# Patient Record
Sex: MALE | Race: WHITE | NOT HISPANIC OR LATINO | Employment: FULL TIME | URBAN - METROPOLITAN AREA
[De-identification: names, ages, dates, MRNs, and addresses within clinical notes are randomized per-mention and may not be internally consistent; named-entity substitution may affect disease eponyms.]

---

## 2017-01-10 ENCOUNTER — TELEPHONE (OUTPATIENT)
Dept: INTERNAL MEDICINE | Facility: CLINIC | Age: 62
End: 2017-01-10

## 2017-01-10 DIAGNOSIS — R35.1 NOCTURIA: ICD-10-CM

## 2017-01-10 DIAGNOSIS — R93.1 AGATSTON CORONARY ARTERY CALCIUM SCORE GREATER THAN 400: Primary | ICD-10-CM

## 2017-01-10 DIAGNOSIS — K21.9 GASTROESOPHAGEAL REFLUX DISEASE WITHOUT ESOPHAGITIS: ICD-10-CM

## 2017-01-10 DIAGNOSIS — N40.0 BENIGN NON-NODULAR PROSTATIC HYPERPLASIA WITHOUT LOWER URINARY TRACT SYMPTOMS: ICD-10-CM

## 2017-01-10 DIAGNOSIS — Z00.00 ROUTINE GENERAL MEDICAL EXAMINATION AT A HEALTH CARE FACILITY: ICD-10-CM

## 2017-01-10 DIAGNOSIS — M10.9 GOUT, UNSPECIFIED: ICD-10-CM

## 2017-01-10 DIAGNOSIS — I10 ESSENTIAL HYPERTENSION, BENIGN: ICD-10-CM

## 2017-01-10 DIAGNOSIS — I10 ESSENTIAL HYPERTENSION: ICD-10-CM

## 2017-01-10 RX ORDER — ROSUVASTATIN CALCIUM 10 MG/1
40 TABLET, COATED ORAL DAILY
Qty: 90 TABLET | Refills: 3 | Status: SHIPPED | OUTPATIENT
Start: 2017-01-10 | End: 2017-08-02

## 2017-01-10 NOTE — TELEPHONE ENCOUNTER
From: Raul Dutta <[email or web address]>  Date: Fri, Jan 6, 2017 at 8:30 PM  Subject: Re:  To: Darnell Grant <[email or web address]>    Charles Patrick- a stress imaging test is reasonable. You are on a statin and aspirin. I can order this and set up a cardiology follow up. [place]    On Fri, Jan 6, 2017 at 7:21 PM Darnell Grant [email or web address]> wrote:  charles ribeiro  CT is 90 percentile for age  coronary calcium score 772 (>300 ) or above 75% is considered high  interpretation  high and signifcant coronary atherosclerotic plaque burden c/w high risk clinically significant CAD  areas of mod non-obstructive coronoary narorrowing are likely present with as much as 30-40% chance there is also flow limiting stenosis  suggestions are: stress test with imaging may be indicated; referral to [facility] specialist may be indicated  low dose ASA  aggressive clinical approach to modifying risk including chol mgmt   suggestions?  thanks  darnell

## 2017-01-11 NOTE — TELEPHONE ENCOUNTER
Test ordered, [employer] message sent to pt, provided phone # to call for scheduling.  Shara Pinto RN

## 2017-01-16 DIAGNOSIS — E88.810 METABOLIC SYNDROME X: ICD-10-CM

## 2017-01-16 DIAGNOSIS — R93.1 AGATSTON CORONARY ARTERY CALCIUM SCORE GREATER THAN 400: ICD-10-CM

## 2017-01-16 LAB
CALCIUM SERPL-MCNC: 9.3 MG/DL (ref 8.5–10.1)
DEPRECATED CALCIDIOL+CALCIFEROL SERPL-MC: 30 UG/L (ref 20–75)
HBA1C MFR BLD: 5.2 % (ref 4.3–6)
PTH-INTACT SERPL-MCNC: 87 PG/ML (ref 12–72)

## 2017-02-07 ENCOUNTER — HOSPITAL ENCOUNTER (OUTPATIENT)
Dept: NUCLEAR MEDICINE | Facility: CLINIC | Age: 62
Setting detail: NUCLEAR MEDICINE
End: 2017-02-07
Attending: INTERNAL MEDICINE
Payer: COMMERCIAL

## 2017-02-07 ENCOUNTER — HOSPITAL ENCOUNTER (OUTPATIENT)
Dept: NUCLEAR MEDICINE | Facility: CLINIC | Age: 62
Setting detail: NUCLEAR MEDICINE
Discharge: HOME | End: 2017-02-07
Attending: INTERNAL MEDICINE | Admitting: INTERNAL MEDICINE
Payer: COMMERCIAL

## 2017-02-07 DIAGNOSIS — R93.1 AGATSTON CORONARY ARTERY CALCIUM SCORE GREATER THAN 400: ICD-10-CM

## 2017-02-07 PROCEDURE — 93018 CV STRESS TEST I&R ONLY: CPT | Performed by: INTERNAL MEDICINE

## 2017-02-07 PROCEDURE — 78452 HT MUSCLE IMAGE SPECT MULT: CPT | Mod: 26 | Performed by: INTERNAL MEDICINE

## 2017-02-07 PROCEDURE — 34300033 ZZH RX 343: Performed by: INTERNAL MEDICINE

## 2017-02-07 PROCEDURE — 25000128 H RX IP 250 OP 636: Performed by: INTERNAL MEDICINE

## 2017-02-07 PROCEDURE — 93016 CV STRESS TEST SUPVJ ONLY: CPT | Performed by: INTERNAL MEDICINE

## 2017-02-07 PROCEDURE — 78452 HT MUSCLE IMAGE SPECT MULT: CPT

## 2017-02-07 PROCEDURE — A9502 TC99M TETROFOSMIN: HCPCS | Performed by: INTERNAL MEDICINE

## 2017-02-07 RX ORDER — REGADENOSON 0.08 MG/ML
0.4 INJECTION, SOLUTION INTRAVENOUS ONCE
Status: COMPLETED | OUTPATIENT
Start: 2017-02-07 | End: 2017-02-07

## 2017-02-07 RX ADMIN — TETROFOSMIN 37.8 MCI.: 0.23 INJECTION, POWDER, LYOPHILIZED, FOR SOLUTION INTRAVENOUS at 10:28

## 2017-02-07 RX ADMIN — REGADENOSON 0.4 MG: 0.08 INJECTION, SOLUTION INTRAVENOUS at 10:23

## 2017-02-07 RX ADMIN — TETROFOSMIN 9.8 MCI.: 0.23 INJECTION, POWDER, LYOPHILIZED, FOR SOLUTION INTRAVENOUS at 09:15

## 2017-02-07 NOTE — PROGRESS NOTES
Pt here for Lexiscan.  Test, medication and side effects reviewed with patient.  Lung sounds clear.  Pt denies caffeine use. Pt tolerated Lexiscan dose without any adverse reactions.

## 2017-02-08 ENCOUNTER — PRE VISIT (OUTPATIENT)
Dept: CARDIOLOGY | Facility: CLINIC | Age: 62
End: 2017-02-08

## 2017-02-08 NOTE — TELEPHONE ENCOUNTER
2/7/17--Lexiscan  Impression:  1. Normal myocardial SPECT study with a summed stress score of 0. A  summed stress score of <4 is associated with an annual event rate of  0.8% and 0.9% for myocardial infarction and cardiac death,  respectively (Home. Circulation 1998;98:535-43).  2. Normal left ventricular systolic function as described above.  3. No significant perfusion abnormalities.  4. No prior study available for comparison.

## 2017-02-10 ENCOUNTER — OFFICE VISIT (OUTPATIENT)
Dept: CARDIOLOGY | Facility: CLINIC | Age: 62
End: 2017-02-10
Attending: INTERNAL MEDICINE
Payer: COMMERCIAL

## 2017-02-10 VITALS
WEIGHT: 296.2 LBS | HEIGHT: 75 IN | HEART RATE: 72 BPM | BODY MASS INDEX: 36.83 KG/M2 | DIASTOLIC BLOOD PRESSURE: 78 MMHG | SYSTOLIC BLOOD PRESSURE: 138 MMHG | OXYGEN SATURATION: 97 %

## 2017-02-10 DIAGNOSIS — R93.1 AGATSTON CORONARY ARTERY CALCIUM SCORE GREATER THAN 400: Primary | ICD-10-CM

## 2017-02-10 PROCEDURE — 99213 OFFICE O/P EST LOW 20 MIN: CPT

## 2017-02-10 PROCEDURE — 99203 OFFICE O/P NEW LOW 30 MIN: CPT | Mod: ZP | Performed by: INTERNAL MEDICINE

## 2017-02-10 ASSESSMENT — PAIN SCALES - GENERAL: PAINLEVEL: NO PAIN (0)

## 2017-02-10 NOTE — NURSING NOTE
Chief Complaint   Patient presents with     New Patient     Patient referred by Raul Dutta MD for cardiac evaluation related to [place] coronary artery calcium score greater than 400

## 2017-02-10 NOTE — MR AVS SNAPSHOT
After Visit Summary   2/10/2017    Darnell Grant    MRN: [id number]           Patient Information     Date Of Birth          1955        Visit Information        Provider Department      2/10/2017 10:00 AM Heidi Abraham MD [facility]        Care Instructions    Thank you for your visit today.  Please call me with any questions or concerns.   Robby Guzman RN  Cardiology Care Coordinator  [phone], press option 1 then option 3        Follow-ups after your visit        Follow-up notes from your care team     Return in about 2 years (around 2/10/2019) for Abnormal calcium scan, Dr. Abraham.      Who to contact     If you have questions or need follow up information about today's clinic visit or your schedule please contact [facility] directly at [phone].  Normal or non-critical lab and imaging results will be communicated to you by MyChart, letter or phone within 4 business days after the clinic has received the results. If you do not hear from us within 7 days, please contact the clinic through "[employer]"hart or phone. If you have a critical or abnormal lab result, we will notify you by phone as soon as possible.  Submit refill requests through [employer] or call your pharmacy and they will forward the refill request to us. Please allow 3 business days for your refill to be completed.          Additional Information About Your Visit        MyChart Information     [employer] gives you secure access to your electronic health record. If you see a primary care provider, you can also send messages to your care team and make appointments. If you have questions, please call your primary care clinic.  If you do not have a primary care provider, please call [phone] and they will assist you.        Care EveryWhere ID     This is your Care EveryWhere ID. This could be used by other organizations to access your [place] medical records  [id number]        Your Vitals Were     Pulse  "Height BMI (Body Mass Index) Pulse Oximetry          72 1.905 m (6' 3\") 37.02 kg/m2 97%         Blood Pressure from Last 3 Encounters:   02/10/17 138/78   11/30/16 128/83   05/04/16 122/83    Weight from Last 3 Encounters:   02/10/17 134.355 kg (296 lb 3.2 oz)   11/30/16 134.401 kg (296 lb 4.8 oz)   05/04/16 126.1 kg (278 lb)              Today, you had the following     No orders found for display       Primary Care Provider Office Phone # Fax #    Raul Basil Dutta [phone]        [facility] [address] [facility] [address]  [facility] [address]        Thank you!     Thank you for choosing [facility]  for your care. Our goal is always to provide you with excellent care. Hearing back from our patients is one way we can continue to improve our services. Please take a few minutes to complete the written survey that you may receive in the mail after your visit with us. Thank you!             Your Updated Medication List - Protect others around you: Learn how to safely use, store and throw away your medicines at www.disposemymeds.org.          This list is accurate as of: 2/10/17 10:24 AM.  Always use your most recent med list.                   Brand Name Dispense Instructions for use    albuterol 108 (90 BASE) MCG/ACT Inhaler    PROAIR HFA/PROVENTIL HFA/VENTOLIN HFA    2 Inhaler    Inhale 2 puffs into the lungs every 6 hours as needed for shortness of breath / dyspnea or wheezing       allopurinol 100 MG tablet    ZYLOPRIM    90 tablet    1 qday       amLODIPine 10 MG tablet    NORVASC    90 tablet    Take 1 tablet (10 mg) by mouth daily       dutasteride 0.5 MG capsule    AVODART    90 capsule    Take 1 capsule (0.5 mg) by mouth daily       fluticasone-salmeterol 500-50 MCG/DOSE diskus inhaler    ADVAIR    3 Inhaler    Inhale 1 puff into the lungs 2 times daily       metFORMIN 1000 MG tablet    GLUCOPHAGE    90 tablet    Take one tablet daily at bedtime       omeprazole 20 MG tablet     90 " tablet    Take 1 tablet (20 mg) by mouth daily       order for DME      Respironics Dreamstation AutoPap 7-15 cm with Respironics Leidy mask.       rosuvastatin 10 MG tablet    CRESTOR    90 tablet    Take 4 tablets (40 mg) by mouth daily       tamsulosin 0.4 MG capsule    FLOMAX    90 capsule    Take 1 capsule (0.4 mg) by mouth daily       valsartan 160 MG tablet    DIOVAN    90 tablet    Take 1 tablet (160 mg) by mouth daily

## 2017-02-10 NOTE — PROGRESS NOTES
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 This was done for screening. Not driven by Sx.    HT, HL, Metabolic syndrome, LISETTE, BPH, Gout.  No prior MI. Never smoked. Dad had CABG in his 70s    No chest pains , dyspnea, palpitations or syncope. Largely sedentary.    PAST MEDICAL HISTORY:  Past Medical History   Diagnosis Date     BPH (benign prostatic hyperplasia)      Hypertension        CURRENT MEDICATIONS:  Current Outpatient Prescriptions   Medication Sig Dispense Refill     rosuvastatin (CRESTOR) 10 MG tablet Take 4 tablets (40 mg) by mouth daily 90 tablet 3     valsartan (DIOVAN) 160 MG tablet Take 1 tablet (160 mg) by mouth daily 90 tablet 3     allopurinol (ZYLOPRIM) 100 MG tablet 1 qday 90 tablet 3     amLODIPine (NORVASC) 10 MG tablet Take 1 tablet (10 mg) by mouth daily 90 tablet 3     metFORMIN (GLUCOPHAGE) 1000 MG tablet Take one tablet daily at bedtime 90 tablet 3     omeprazole 20 MG tablet Take 1 tablet (20 mg) by mouth daily 90 tablet 3     dutasteride (AVODART) 0.5 MG capsule Take 1 capsule (0.5 mg) by mouth daily 90 capsule 3     tamsulosin (FLOMAX) 0.4 MG capsule Take 1 capsule (0.4 mg) by mouth daily 90 capsule 3     order for DME RespirBioVexs Dreamstation AutoPap 7-15 cm with Respironics Leidy mask.       fluticasone-salmeterol (ADVAIR) 500-50 MCG/DOSE diskus inhaler Inhale 1 puff into the lungs 2 times daily 3 Inhaler 2     albuterol (PROAIR HFA/PROVENTIL HFA/VENTOLIN HFA) 108 (90 BASE) MCG/ACT Inhaler Inhale 2 puffs into the lungs every 6 hours as needed for shortness of breath / dyspnea or wheezing 2 Inhaler 2       PAST SURGICAL HISTORY:  Past Surgical History   Procedure Laterality Date     Back surgery       repair disc     Hc breath hydrogen test  5/22/2013     Procedure: HYDROGEN BREATH TEST;  Surgeon: Donny Paris MD;  Location:  GI       ALLERGIES     Allergies   Allergen Reactions     Ragweeds      Watery eyes,  "itchy nose       FAMILY HISTORY:  Family History   Problem Relation Age of Onset     Asthma Brother        SOCIAL HISTORY:  Social History     Social History     Marital Status:      Spouse Name: N/A     Number of Children: N/A     Years of Education: N/A     Social History Main Topics     Smoking status: Never Smoker      Smokeless tobacco: Never Used     Alcohol Use: No     Drug Use: No     Sexual Activity: Not Asked     Other Topics Concern     None     Social History Narrative       ROS:   Constitutional: No fever, chills, or sweats. No weight gain/loss   ENT: No visual disturbance, ear ache, epistaxis, sore throat  Allergies/Immunologic: Negative.   Respiratory: No cough, hemoptysia  Cardiovascular: As per HPI  GI: No nausea, vomiting, hematemesis, melena, or hematochezia  : No urinary frequency, dysuria, or hematuria  Integument: Negative  Psychiatric: Negative  Neuro: Negative  Endocrinology: Negative   Musculoskeletal: Negative    EXAM:  /78 mmHg  Pulse 72  Ht 1.905 m (6' 3\")  Wt 134.355 kg (296 lb 3.2 oz)  BMI 37.02 kg/m2  SpO2 97%  In general, the patient is a pleasant male in no apparent distress.    HEENT: NC/AT.  PERRLA.  EOMI.  Sclerae white, not injected.  Nares clear.  Pharynx without erythema or exudate.  Dentition intact.    Neck: No adenopathy.  No thyromegaly. Carotids +4/4 bilaterally without bruits.  No jugular venous distension.   Heart: RRR. Normal S1, S2 splits physiologically. No murmur, rub, click, or gallop. The PMI is in the 5th ICS in the midclavicular line. There is no heave.    Lungs: CTA.  No ronchi, wheezes, rales.  No dullness to percussion.   Abdomen: Soft, nontender, nondistended. No organomegaly.  No bruits.   Extremities: No clubbing, cyanosis, or edema.  The pulses are +4/4 at the radial, brachial, femoral, popliteal, DP, and PT sites bilaterally.  No bruits are noted.  Neurologic: Alert and oriented to person/place/time, normal speech, gait and " affect  Skin: No petechiae, purpura or rash.    Labs:  LIPID RESULTS:  Lab Results   Component Value Date    CHOL 120 11/21/2016    HDL 37* 11/21/2016    LDL 40 11/21/2016    LDL 71 06/06/2013    TRIG 219* 11/21/2016    CHOLHDLRATIO 3.4 08/31/2015    NHDL 83 11/21/2016       LIVER ENZYME RESULTS:  Lab Results   Component Value Date    AST 20 11/21/2016    ALT 41 11/21/2016       CBC RESULTS:  Lab Results   Component Value Date    WBC 5.9 07/14/2014    RBC 5.23 07/14/2014    HGB 15.9 07/14/2014    HCT 45.8 07/14/2014    MCV 88 07/14/2014    MCH 30.4 07/14/2014    MCHC 34.7 07/14/2014    RDW 14.4 07/14/2014     07/14/2014       BMP RESULTS:  Lab Results   Component Value Date     11/21/2016    POTASSIUM 4.0 11/21/2016    CHLORIDE 105 11/21/2016    CO2 28 11/21/2016    ANIONGAP 8 11/21/2016    * 11/21/2016    BUN 14 11/21/2016    CR 0.86 11/21/2016    GFRESTIMATED >90  Non  GFR Calc   11/21/2016    GFRESTBLACK >90   GFR Calc   11/21/2016    ZHANE 9.3 01/16/2017        A1C RESULTS:  Lab Results   Component Value Date    A1C 5.2 01/16/2017       INR RESULTS:  Lab Results   Component Value Date    INR 0.90 12/25/2010       Procedures:      Assessment and Plan:     Please see HPI for history.    Darnell had a Lexiscan on 2/7/17 that was normal . EF 62%.  Echo in 2013 showed normal LV function.  LDL 11/16 : 40. HDL 37.     Cardiac exam is benign. Labs and medications reviewed.    Recommend : EKG    Overall, I do not feel we need to proceed to coronary angiography. The Ca score is 774 and hence a CTA may not be very helpful. The NM stress test is normal and hence the possibility of CAD is low. Primary prevention measures would be helpful - as currently being done.Follow up with Dr. Dutta. Review in 2 years or sooner as needed.    Plans discussed with Dr. Grant, who is in agreement.      CC  Patient Care Team:  Raul Dutta MD as PCP - General (Internal  Medicine)  LOGAN BOWMAN

## 2017-02-10 NOTE — LETTER
2/10/2017      RE: Darnell Grant  [address] [facility] [place] [address]       Dear Colleague,    Thank you for the opportunity to participate in the care of your patient, Darnell Grant, at the [facility]. Please see a copy of my visit note below.    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 This was done for screening. Not driven by Sx.    HT, HL, Metabolic syndrome, LISETTE, BPH, Gout.  No prior MI. Never smoked. Dad had CABG in his 70s    No chest pains , dyspnea, palpitations or syncope. Largely sedentary.    PAST MEDICAL HISTORY:  Past Medical History   Diagnosis Date     BPH (benign prostatic hyperplasia)      Hypertension        CURRENT MEDICATIONS:  Current Outpatient Prescriptions   Medication Sig Dispense Refill     rosuvastatin (CRESTOR) 10 MG tablet Take 4 tablets (40 mg) by mouth daily 90 tablet 3     valsartan (DIOVAN) 160 MG tablet Take 1 tablet (160 mg) by mouth daily 90 tablet 3     allopurinol (ZYLOPRIM) 100 MG tablet 1 qday 90 tablet 3     amLODIPine (NORVASC) 10 MG tablet Take 1 tablet (10 mg) by mouth daily 90 tablet 3     metFORMIN (GLUCOPHAGE) 1000 MG tablet Take one tablet daily at bedtime 90 tablet 3     omeprazole 20 MG tablet Take 1 tablet (20 mg) by mouth daily 90 tablet 3     dutasteride (AVODART) 0.5 MG capsule Take 1 capsule (0.5 mg) by mouth daily 90 capsule 3     tamsulosin (FLOMAX) 0.4 MG capsule Take 1 capsule (0.4 mg) by mouth daily 90 capsule 3     order for DME Respironics Dreamstation AutoPap 7-15 cm with Respironics Leidy mask.       fluticasone-salmeterol (ADVAIR) 500-50 MCG/DOSE diskus inhaler Inhale 1 puff into the lungs 2 times daily 3 Inhaler 2     albuterol (PROAIR HFA/PROVENTIL HFA/VENTOLIN HFA) 108 (90 BASE) MCG/ACT Inhaler Inhale 2 puffs into the lungs every 6 hours as needed for shortness of breath / dyspnea or wheezing 2 Inhaler 2  "      PAST SURGICAL HISTORY:  Past Surgical History   Procedure Laterality Date     Back surgery       repair disc     Hc breath hydrogen test  5/22/2013     Procedure: HYDROGEN BREATH TEST;  Surgeon: Donny Paris MD;  Location:  GI       ALLERGIES     Allergies   Allergen Reactions     Ragweeds      Watery eyes, itchy nose       FAMILY HISTORY:  Family History   Problem Relation Age of Onset     Asthma Brother        SOCIAL HISTORY:  Social History     Social History     Marital Status:      Spouse Name: N/A     Number of Children: N/A     Years of Education: N/A     Social History Main Topics     Smoking status: Never Smoker      Smokeless tobacco: Never Used     Alcohol Use: No     Drug Use: No     Sexual Activity: Not Asked     Other Topics Concern     None     Social History Narrative       ROS:   Constitutional: No fever, chills, or sweats. No weight gain/loss   ENT: No visual disturbance, ear ache, epistaxis, sore throat  Allergies/Immunologic: Negative.   Respiratory: No cough, hemoptysia  Cardiovascular: As per HPI  GI: No nausea, vomiting, hematemesis, melena, or hematochezia  : No urinary frequency, dysuria, or hematuria  Integument: Negative  Psychiatric: Negative  Neuro: Negative  Endocrinology: Negative   Musculoskeletal: Negative    EXAM:  /78 mmHg  Pulse 72  Ht 1.905 m (6' 3\")  Wt 134.355 kg (296 lb 3.2 oz)  BMI 37.02 kg/m2  SpO2 97%  In general, the patient is a pleasant male in no apparent distress.    HEENT: NC/AT.  PERRLA.  EOMI.  Sclerae white, not injected.  Nares clear.  Pharynx without erythema or exudate.  Dentition intact.    Neck: No adenopathy.  No thyromegaly. Carotids +4/4 bilaterally without bruits.  No jugular venous distension.   Heart: RRR. Normal S1, S2 splits physiologically. No murmur, rub, click, or gallop. The PMI is in the 5th ICS in the midclavicular line. There is no heave.    Lungs: CTA.  No ronchi, wheezes, rales.  No dullness to percussion. "   Abdomen: Soft, nontender, nondistended. No organomegaly.  No bruits.   Extremities: No clubbing, cyanosis, or edema.  The pulses are +4/4 at the radial, brachial, femoral, popliteal, DP, and PT sites bilaterally.  No bruits are noted.  Neurologic: Alert and oriented to person/place/time, normal speech, gait and affect  Skin: No petechiae, purpura or rash.    Labs:  LIPID RESULTS:  Lab Results   Component Value Date    CHOL 120 11/21/2016    HDL 37* 11/21/2016    LDL 40 11/21/2016    LDL 71 06/06/2013    TRIG 219* 11/21/2016    CHOLHDLRATIO 3.4 08/31/2015    NHDL 83 11/21/2016       LIVER ENZYME RESULTS:  Lab Results   Component Value Date    AST 20 11/21/2016    ALT 41 11/21/2016       CBC RESULTS:  Lab Results   Component Value Date    WBC 5.9 07/14/2014    RBC 5.23 07/14/2014    HGB 15.9 07/14/2014    HCT 45.8 07/14/2014    MCV 88 07/14/2014    MCH 30.4 07/14/2014    MCHC 34.7 07/14/2014    RDW 14.4 07/14/2014     07/14/2014       BMP RESULTS:  Lab Results   Component Value Date     11/21/2016    POTASSIUM 4.0 11/21/2016    CHLORIDE 105 11/21/2016    CO2 28 11/21/2016    ANIONGAP 8 11/21/2016    * 11/21/2016    BUN 14 11/21/2016    CR 0.86 11/21/2016    GFRESTIMATED >90  Non  GFR Calc   11/21/2016    GFRESTBLACK >90   GFR Calc   11/21/2016    ZHANE 9.3 01/16/2017        A1C RESULTS:  Lab Results   Component Value Date    A1C 5.2 01/16/2017       INR RESULTS:  Lab Results   Component Value Date    INR 0.90 12/25/2010       Procedures:      Assessment and Plan:     Please see HPI for history.    Darnell had a Lexiscan on 2/7/17 that was normal . EF 62%.  Echo in 2013 showed normal LV function.  LDL 11/16 : 40. HDL 37.     Cardiac exam is benign. Labs and medications reviewed.    Recommend : EKG    Overall, I do not feel we need to proceed to coronary angiography. The Ca score is 774 and hence a CTA may not be very helpful. The NM stress test is normal and hence  the possibility of CAD is low. Primary prevention measures would be helpful - as currently being done.Follow up with Dr. Bowman. Review in 2 years or sooner as needed.    Plans discussed with Dr. Grant, who is in agreement.      CC  Patient Care Team:  Logan Bowman MD as PCP - General (Internal Medicine)  LOGAN BOWMAN      Please do not hesitate to contact me if you have any questions/concerns.     Sincerely,     Heidi Abraham MD

## 2017-02-10 NOTE — NURSING NOTE
Med Reconcile: Reviewed and verified all current medications with the patient. The updated medication list was printed and given to the patient.  Return Appointment: Follow up in 2 years. Patient given instructions regarding scheduling next clinic visit. Patient demonstrated understanding of this information and agreed to call with further questions or concerns.  Patient stated he understood all health information given and agreed to call with further questions or concerns.

## 2017-03-02 ENCOUNTER — MYC MEDICAL ADVICE (OUTPATIENT)
Dept: INTERNAL MEDICINE | Facility: CLINIC | Age: 62
End: 2017-03-02

## 2017-03-02 DIAGNOSIS — E55.9 VITAMIN D DEFICIENCY: ICD-10-CM

## 2017-03-02 DIAGNOSIS — R93.1 AGATSTON CORONARY ARTERY CALCIUM SCORE GREATER THAN 400: Primary | ICD-10-CM

## 2017-03-16 ENCOUNTER — TELEPHONE (OUTPATIENT)
Dept: INTERNAL MEDICINE | Facility: CLINIC | Age: 62
End: 2017-03-16

## 2017-03-16 DIAGNOSIS — E66.9 OBESITY: ICD-10-CM

## 2017-03-16 DIAGNOSIS — E88.810 METABOLIC SYNDROME: Primary | ICD-10-CM

## 2017-03-16 NOTE — TELEPHONE ENCOUNTER
Will do. [place]    On Thu, Mar 16, 2017 at 8:32 AM Raul Dutta <[email or web address]> wrote:  Can you put in this referral? Alexis ribeiro    ---------- Forwarded message ---------  From: Darnell Grant <[email or web address]>  Date: Thu, Mar 16, 2017 at 12:04 AM  Subject: Fwd: lisa  To: Raul Dutta <[email or web address]>    darrin ribeiro  not sure how to request a referral in [facility] but i have made an appt with pravin whom i know over the years on weight issues  could you submit a referral for this? appointment is scheduled week after next  thanks  darnell  ---------- Forwarded message ----------  From: Jyoti Grant <[email or web address]  Date: Thu, Mar 16, 2017 at 12:01 AM  Subject: lisa  To: Darnell Grant <[email or web address]>    [facility]  Address: [address] [place] [address] [address], [place] [address]  Phone: [phone]     Dr. Diaz Delvalle  ----------  Above requested referral done .  Shara Pinto RN

## 2017-03-31 DIAGNOSIS — R93.1 AGATSTON CORONARY ARTERY CALCIUM SCORE GREATER THAN 400: ICD-10-CM

## 2017-03-31 DIAGNOSIS — E55.9 VITAMIN D DEFICIENCY: ICD-10-CM

## 2017-03-31 LAB
DEPRECATED CALCIDIOL+CALCIFEROL SERPL-MC: 38 UG/L (ref 20–75)
PTH-INTACT SERPL-MCNC: 108 PG/ML (ref 12–72)

## 2017-04-26 ENCOUNTER — MEDICAL CORRESPONDENCE (OUTPATIENT)
Dept: HEALTH INFORMATION MANAGEMENT | Facility: CLINIC | Age: 62
End: 2017-04-26

## 2017-04-27 DIAGNOSIS — E29.9 TESTICULAR DYSFUNCTION: Primary | ICD-10-CM

## 2017-04-27 LAB
FSH SERPL-ACNC: 3 IU/L (ref 0.7–10.8)
LH SERPL-ACNC: 4.7 IU/L (ref 1.5–9.3)
PROLACTIN SERPL-MCNC: 7 UG/L (ref 2–18)

## 2017-04-28 LAB — TESTOST SERPL-MCNC: 195 NG/DL (ref 240–950)

## 2017-04-30 ENCOUNTER — TELEPHONE (OUTPATIENT)
Dept: OTHER | Facility: CLINIC | Age: 62
End: 2017-04-30

## 2017-04-30 DIAGNOSIS — J20.9 ACUTE BRONCHITIS, UNSPECIFIED ORGANISM: Primary | ICD-10-CM

## 2017-04-30 RX ORDER — AZITHROMYCIN 250 MG/1
TABLET, FILM COATED ORAL
Qty: 6 TABLET | Refills: 0 | Status: SHIPPED | OUTPATIENT
Start: 2017-04-30 | End: 2018-01-26

## 2017-04-30 NOTE — TELEPHONE ENCOUNTER
Reason for call: concern for bronchitis    Discussed recent symptoms of worsening cough with bronchospasm and shortness of breath. At times productive. Has noted similar symptoms in the past. No fevers. Has h/o bronchitis and responded well to antibiotics in the past. Will be traveling out of country and wants antibiotic in case issues. Reviewed for drug allergies and no allergies    Recommendations   -prescribed azithromycin. 500 mg day 1 then 250 mg q24 hours x 4.   -follow up with PCP if recurrent or worsening sx    Patient agreeable to plan    Diaz Grant MD

## 2017-08-02 ENCOUNTER — OFFICE VISIT (OUTPATIENT)
Dept: INTERNAL MEDICINE | Facility: CLINIC | Age: 62
End: 2017-08-02

## 2017-08-02 VITALS
WEIGHT: 277.4 LBS | DIASTOLIC BLOOD PRESSURE: 75 MMHG | BODY MASS INDEX: 34.67 KG/M2 | HEART RATE: 83 BPM | RESPIRATION RATE: 18 BRPM | OXYGEN SATURATION: 98 % | SYSTOLIC BLOOD PRESSURE: 115 MMHG

## 2017-08-02 DIAGNOSIS — K21.9 GASTROESOPHAGEAL REFLUX DISEASE WITHOUT ESOPHAGITIS: ICD-10-CM

## 2017-08-02 DIAGNOSIS — I10 ESSENTIAL HYPERTENSION: ICD-10-CM

## 2017-08-02 DIAGNOSIS — R53.83 OTHER FATIGUE: Primary | ICD-10-CM

## 2017-08-02 DIAGNOSIS — M10.9 GOUT, UNSPECIFIED: ICD-10-CM

## 2017-08-02 DIAGNOSIS — N40.0 BENIGN NON-NODULAR PROSTATIC HYPERPLASIA WITHOUT LOWER URINARY TRACT SYMPTOMS: ICD-10-CM

## 2017-08-02 DIAGNOSIS — Z13.89 SCREENING FOR DIABETIC PERIPHERAL NEUROPATHY: ICD-10-CM

## 2017-08-02 DIAGNOSIS — Z00.00 ROUTINE GENERAL MEDICAL EXAMINATION AT A HEALTH CARE FACILITY: ICD-10-CM

## 2017-08-02 DIAGNOSIS — Z11.59 NEED FOR HEPATITIS C SCREENING TEST: ICD-10-CM

## 2017-08-02 DIAGNOSIS — Z12.5 ENCOUNTER FOR SCREENING FOR MALIGNANT NEOPLASM OF PROSTATE: ICD-10-CM

## 2017-08-02 DIAGNOSIS — E78.5 HYPERLIPIDEMIA LDL GOAL <100: ICD-10-CM

## 2017-08-02 DIAGNOSIS — I10 ESSENTIAL HYPERTENSION, BENIGN: ICD-10-CM

## 2017-08-02 DIAGNOSIS — R35.1 NOCTURIA: ICD-10-CM

## 2017-08-02 RX ORDER — VALSARTAN 160 MG/1
160 TABLET ORAL DAILY
Qty: 90 TABLET | Refills: 3 | Status: SHIPPED | OUTPATIENT
Start: 2017-08-02 | End: 2018-03-16

## 2017-08-02 RX ORDER — ROSUVASTATIN CALCIUM 10 MG/1
40 TABLET, COATED ORAL DAILY
Qty: 90 TABLET | Refills: 3 | Status: SHIPPED | OUTPATIENT
Start: 2017-08-02 | End: 2018-09-11

## 2017-08-02 RX ORDER — AMLODIPINE BESYLATE 10 MG/1
10 TABLET ORAL DAILY
Qty: 90 TABLET | Refills: 3 | Status: SHIPPED | OUTPATIENT
Start: 2017-08-02 | End: 2018-09-11

## 2017-08-02 RX ORDER — ALLOPURINOL 100 MG/1
TABLET ORAL
Qty: 90 TABLET | Refills: 3 | Status: SHIPPED | OUTPATIENT
Start: 2017-08-02 | End: 2018-09-11

## 2017-08-02 RX ORDER — NICOTINE POLACRILEX 4 MG/1
20 GUM, CHEWING ORAL DAILY
Qty: 90 TABLET | Refills: 3 | Status: SHIPPED | OUTPATIENT
Start: 2017-08-02 | End: 2018-03-12

## 2017-08-02 RX ORDER — DUTASTERIDE 0.5 MG/1
0.5 CAPSULE, LIQUID FILLED ORAL DAILY
Qty: 90 CAPSULE | Refills: 3 | Status: SHIPPED | OUTPATIENT
Start: 2017-08-02 | End: 2018-11-19

## 2017-08-02 RX ORDER — TAMSULOSIN HYDROCHLORIDE 0.4 MG/1
0.4 CAPSULE ORAL DAILY
Qty: 90 CAPSULE | Refills: 3 | Status: SHIPPED | OUTPATIENT
Start: 2017-08-02 | End: 2018-11-19

## 2017-08-02 ASSESSMENT — PAIN SCALES - GENERAL: PAINLEVEL: NO PAIN (0)

## 2017-08-02 NOTE — PATIENT INSTRUCTIONS
[facility] Medication Refill Request Information:  * Please contact your pharmacy regarding ANY request for medication refills.  ** [facility] Pres[facility] Fax = [phone]  * Please allow 3 business days for routine medication refills.  * Please allow 5 business days for controlled substance medication refills.     [facility] Test Result notification information:  *You will be notified with in 7-10 days of your appointment day regarding the results of your test.  If you are on MyChart you will be notified as soon as the provider has reviewed the results and signed off on them.    [facility] [phone]

## 2017-08-02 NOTE — NURSING NOTE
Chief Complaint   Patient presents with     Hypertension     Patient is here to follow up on blood pressure.      Yokasta Loomis LPN at 6:11 PM on 8/2/2017.

## 2017-08-02 NOTE — MR AVS SNAPSHOT
After Visit Summary   8/2/2017    Darnell Grant    MRN: [id number]           Patient Information     Date Of Birth          1955        Visit Information        Provider Department      8/2/2017 6:25 PM Raul Dutta MD [facility]        Today's Diagnoses     Other fatigue    -  1    Need for hepatitis C screening test        Screening for diabetic peripheral neuropathy        Essential hypertension        Gout, unspecified        Gastroesophageal reflux disease without esophagitis        Benign non-nodular prostatic hyperplasia without lower urinary tract symptoms        Routine general medical examination at a health care facility        Essential hypertension, benign        Nocturia        Hyperlipidemia LDL goal <100        Encounter for screening for malignant neoplasm of prostate          Care Instructions    Primary Care Center Medication Refill Request Information:  * Please contact your pharmacy regarding ANY request for medication refills.  ** [facility] Fax = [phone]  * Please allow 3 business days for routine medication refills.  * Please allow 5 business days for controlled substance medication refills.     Primary Care Center Test Result notification information:  *You will be notified with in 7-10 days of your appointment day regarding the results of your test.  If you are on MyChart you will be notified as soon as the provider has reviewed the results and signed off on them.    [facility] [phone]             Follow-ups after your visit        Future tests that were ordered for you today     Open Future Orders        Priority Expected Expires Ordered    PSA screen Routine 8/2/2017 8/2/2018 8/2/2017    Comprehensive metabolic panel Routine 2/7/2018 8/2/2018 8/2/2017    Lipid Profile Routine 2/7/2018 8/2/2018 8/2/2017    CBC with platelets differential Routine 2/7/2018 8/1/2018 8/2/2017    Hepatitis C Screen Reflex to HCV RNA Quant  and Genotype Routine 2/7/2018 8/2/2018 8/2/2017    TSH WITH FREE T4 REFLEX - (Today) Routine 2/7/2018 8/2/2018 8/2/2017            Who to contact     Please call your clinic at [phone] to:    Ask questions about your health    Make or cancel appointments    Discuss your medicines    Learn about your test results    Speak to your doctor   If you have compliments or concerns about an experience at your clinic, or if you wish to file a complaint, please contact [facility] Patient Relations at [phone] or email us at [email or web address]         Additional Information About Your Visit        [employer] Information     [employer] gives you secure access to your electronic health record. If you see a primary care provider, you can also send messages to your care team and make appointments. If you have questions, please call your primary care clinic.  If you do not have a primary care provider, please call [phone] and they will assist you.      [employer] is an electronic gateway that provides easy, online access to your medical records. With [employer], you can request a clinic appointment, read your test results, renew a prescription or communicate with your care team.     To access your existing account, please contact your [facility] or call [phone] for assistance.        Care EveryWhere ID     This is your Care EveryWhere ID. This could be used by other organizations to access your [place] medical records  [id number]        Your Vitals Were     Pulse Respirations Pulse Oximetry BMI (Body Mass Index)          83 18 98% 34.67 kg/m2         Blood Pressure from Last 3 Encounters:   08/02/17 115/75   02/10/17 138/78   11/30/16 128/83    Weight from Last 3 Encounters:   08/02/17 125.8 kg (277 lb 6.4 oz)   02/10/17 134.4 kg (296 lb 3.2 oz)   11/30/16 134.4 kg (296 lb 4.8 oz)                 Where to get your medicines      These medications were  sent to [facility] - [place], [place] - [address]  [address], [facility] [address]     Phone:  [phone]     allopurinol 100 MG tablet    amLODIPine 10 MG tablet    dutasteride 0.5 MG capsule    metFORMIN 1000 MG tablet    omeprazole 20 MG tablet    rosuvastatin 10 MG tablet    tamsulosin 0.4 MG capsule    valsartan 160 MG tablet          Primary Care Provider Office Phone # Fax #    Raul Basil Dutta [phone]        [facility] [address]  [facility] [address]        Equal Access to Services     [facility]: Hadii aad ku hadasho Soomaali, waaxda luqadaha, qaybta kaalmada adeegyabret, ric farias . So [facility] [phone].    ATENCIÓN: Si habla español, tiene a mulligan disposición servicios gratuitos de asistencia lingüística. [facility] [phone].    We comply with applicable federal civil rights laws and [place] laws. We do not discriminate on the basis of race, color, national origin, age, disability sex, sexual orientation or gender identity.            Thank you!     Thank you for choosing [facility]  for your care. Our goal is always to provide you with excellent care. Hearing back from our patients is one way we can continue to improve our services. Please take a few minutes to complete the written survey that you may receive in the mail after your visit with us. Thank you!             Your Updated Medication List - Protect others around you: Learn how to safely use, store and throw away your medicines at www.disposemymeds.org.          This list is accurate as of: 8/2/17  7:21 PM.  Always use your most recent med list.                   Brand Name Dispense Instructions for use Diagnosis    albuterol 108 (90 BASE) MCG/ACT Inhaler    PROAIR HFA/PROVENTIL HFA/VENTOLIN HFA    2 Inhaler    Inhale 2 puffs into the lungs every 6 hours as needed for shortness of breath / dyspnea or wheezing     Obstructive chronic bronchitis with exacerbation (H)       allopurinol 100 MG tablet    ZYLOPRIM    90 tablet    1 qday    Gout, unspecified, Essential hypertension, Gastroesophageal reflux disease without esophagitis, Benign non-nodular prostatic hyperplasia without lower urinary tract symptoms, Routine general medical examination at a health care facility, Essential hypertension, benign, Nocturia, Need for hepatitis C screening test, Screening for diabetic peripheral neuropathy, Other fatigue, Hyperlipidemia LDL goal <100       amLODIPine 10 MG tablet    NORVASC    90 tablet    Take 1 tablet (10 mg) by mouth daily    Essential hypertension, Gout, unspecified, Gastroesophageal reflux disease without esophagitis, Benign non-nodular prostatic hyperplasia without lower urinary tract symptoms, Routine general medical examination at a health care facility, Essential hypertension, benign, Nocturia, Need for hepatitis C screening test, Screening for diabetic peripheral neuropathy, Other fatigue, Hyperlipidemia LDL goal <100       azithromycin 250 MG tablet    ZITHROMAX    6 tablet    Take 500 mg once then 250 mg q24 hours x 4 days    Acute bronchitis, unspecified organism       dutasteride 0.5 MG capsule    AVODART    90 capsule    Take 1 capsule (0.5 mg) by mouth daily    Benign non-nodular prostatic hyperplasia without lower urinary tract symptoms, Essential hypertension, Gout, unspecified, Gastroesophageal reflux disease without esophagitis, Routine general medical examination at a health care facility, Essential hypertension, benign, Nocturia, Need for hepatitis C screening test, Screening for diabetic peripheral neuropathy, Other fatigue, Hyperlipidemia LDL goal <100       fluticasone-salmeterol 500-50 MCG/DOSE diskus inhaler    ADVAIR    3 Inhaler    Inhale 1 puff into the lungs 2 times daily    Obstructive chronic bronchitis with exacerbation (H)       metFORMIN 1000 MG tablet    GLUCOPHAGE    90 tablet    Take one  tablet daily at bedtime    Essential hypertension, Gout, unspecified, Gastroesophageal reflux disease without esophagitis, Benign non-nodular prostatic hyperplasia without lower urinary tract symptoms, Routine general medical examination at a health care facility, Essential hypertension, benign, Nocturia, Need for hepatitis C screening test, Screening for diabetic peripheral neuropathy, Other fatigue, Hyperlipidemia LDL goal <100       omeprazole 20 MG tablet     90 tablet    Take 1 tablet (20 mg) by mouth daily    Gastroesophageal reflux disease without esophagitis, Essential hypertension, Gout, unspecified, Benign non-nodular prostatic hyperplasia without lower urinary tract symptoms, Routine general medical examination at a health care facility, Essential hypertension, benign, Nocturia, Need for hepatitis C screening test, Screening for diabetic peripheral neuropathy, Other fatigue, Hyperlipidemia LDL goal <100       order for DME      RespirStarGreetz Dreamstation AutoPap 7-15 cm with Respironics Leidy mask.        rosuvastatin 10 MG tablet    CRESTOR    90 tablet    Take 4 tablets (40 mg) by mouth daily    Essential hypertension, Gout, unspecified, Gastroesophageal reflux disease without esophagitis, Benign non-nodular prostatic hyperplasia without lower urinary tract symptoms, Routine general medical examination at a health care facility, Essential hypertension, benign, Nocturia, Need for hepatitis C screening test, Screening for diabetic peripheral neuropathy, Other fatigue, Hyperlipidemia LDL goal <100       tamsulosin 0.4 MG capsule    FLOMAX    90 capsule    Take 1 capsule (0.4 mg) by mouth daily    Benign non-nodular prostatic hyperplasia without lower urinary tract symptoms, Nocturia, Routine general medical examination at a health care facility, Essential hypertension, benign, Gout, unspecified, Essential hypertension, Gastroesophageal reflux disease without esophagitis, Need for hepatitis C screening test,  Screening for diabetic peripheral neuropathy, Other fatigue, Hyperlipidemia LDL goal <100       valsartan 160 MG tablet    DIOVAN    90 tablet    Take 1 tablet (160 mg) by mouth daily    Essential hypertension, benign, Routine general medical examination at a health care facility, Gout, unspecified, Essential hypertension, Gastroesophageal reflux disease without esophagitis, Benign non-nodular prostatic hyperplasia without lower urinary tract symptoms, Nocturia, Need for hepatitis C screening test, Screening for diabetic peripheral neuropathy, Other fatigue, Hyperlipidemia LDL goal <100

## 2017-08-02 NOTE — PROGRESS NOTES
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 He's been doing well and is seeing an endocrinologist for wt loss, hypogonadism, and the elevated PTH. Unclear whether this is a primary hyperpara (normocalcemic) or secondary (due to low vitamin d) so currently getting vitamin d replacement to rule this out first. No chest pain, dyspnea, rashes, or other symptoms.    O) /75  Pulse 83  Resp 18  Wt 125.8 kg (277 lb 6.4 oz)  SpO2 98%  BMI 34.67 kg/m2  Well appearing  Cor RRR no MRG  Lungs clear  Abd soft and benign  Ext nl   Neuro nl    Labs and studies reviewed.     A/P 1) HTN. BP excellent on current regimen no change- valsartan and amlodipine renewed.  2) Hyperlipidemia - max RF reduction strategy. Continue rosuvastatin (renewed)  3) Hyperparathyroidism. Primary (normocalcemic) vs secondary (due to vitamin d deficiency). Rule out vitamin D component first with adequate supplementation and retesting. The elevated PTH likely contributed to the high calcium score on coronary screening.   4) Hypogonadism. Followed/managed by Dr. Lomas. Follow for side effects of clomiphene.  5) BPH- AUA score of 8. No indication for surgical intervention. Check PSA on clomiphene. Continue medical mgmt with dutaseride, tamsulosin.  6) Metabolic Syndrome- continue metformin. Aggressive wt loss regimen ongoing.   7) Gerd Stable on PPI.  8) Gout- no flares on allopurinol. Urate 5.8    Over 15 min of 25 min visit spent in care coordination and counseling related to the above issues.    Raul Dutta MD, FACP, FAAP

## 2017-10-01 ENCOUNTER — TELEPHONE (OUTPATIENT)
Dept: HEMATOLOGY | Facility: CLINIC | Age: 62
End: 2017-10-01

## 2017-10-01 DIAGNOSIS — B35.1 ONYCHOMYCOSIS: Primary | ICD-10-CM

## 2017-10-01 RX ORDER — CEPHALEXIN 500 MG/1
500 CAPSULE ORAL 2 TIMES DAILY
Qty: 14 CAPSULE | Refills: 0 | Status: SHIPPED | OUTPATIENT
Start: 2017-10-01 | End: 2018-01-26

## 2017-10-02 NOTE — TELEPHONE ENCOUNTER
Nailbed infection not improving with soaking    Recs  Advised topical abx  Will prescribe PO abx in case doesn't improve    Diaz Grant MD   PGY5  [facility] Onc Fellow

## 2018-01-04 ENCOUNTER — TELEPHONE (OUTPATIENT)
Dept: CARDIOLOGY | Facility: CLINIC | Age: 63
End: 2018-01-04

## 2018-01-05 DIAGNOSIS — I49.3 VENTRICULAR ECTOPY: Primary | ICD-10-CM

## 2018-01-05 NOTE — TELEPHONE ENCOUNTER
"Dr. Grant called me on 1/2/18 in regard to asymptomatic PVCs and communicated the following information:    \"Thanks so much Leo for reviewing this  Please go ahead and access my medical records.   It is hard for me to jeffry frequency but it seems to be that every 4-12 beats or so there is a run of PVCs or dropped beats or poor transmission.No symptoms. Last phentramine 37.5 mg was Saturday 8 am and nothing has changed.   safe trip home   best wishes   darnell Gordon    Happy holidays and New Year    I have been seeing Dr. Diaz Delvalle for weight loss. Today he noticed an irregular heart beat and did a cardiogram that is below showing PVCs along with other findings. The irregular rhythm was asymptomatic. My blood pressure at checkin was 116/80.i believe i had a normal stress test through you around the time of the last appointment. in the more distant past i have had a normal 24 hr holter. about 2 years ago i had a high calcium score on my imaging scan that lead me to see you.    I am on phentramine 37.5 (to be increased to 56 mg) and the following meds as below.   I am not due to see you until 2/19 per clinic. Can you let me know what i should do for followup?     Thanks  Best wishes  Darnell\"    I reviewed the chart and was able to confirm the Lexiscan in Feb 2017 was a normal study.  His ECHO in June 3013 showed normal LV function and no structural heart disease (no LVH, no valvular heart disease)  I am unable to locate outside Holter or coronary calcium score.  I need to clarify whether the PVCs were symptomatic.  During the ECG, he was asymptomatic.  However, his message above suggests he may be having tachycardia.   A review of his medications indicates the use of phentermine which can be associate with arrhythmia.      In summary, I would like to determine whether he is having significant ventricular arrhythmias and a Holter would be the easiest if he is not having any symptoms.  If he is having " symptoms, we could simply perform a Ziopatch.  Assuming no complex ventricular ectopy and presumed normal LV function and recent negative stress nuclear study, coronary ischemia would not be likely.  Furthermore, if there is no complex ventricular ectopy, the ongoing use of Phentermine would not be an issue.  If NSVT on monitor, I would avoid Phentermine.    Leo Dutta MD    ADDENDUM (1/17/18): Ziopatch showed frequent ventricular ectopy, 2 dominant morphologies for PVCs with 14% burden.  107 NSVT, max 5 beats.  I left message for Darnell that I would like to see him in the clinic.  Consider stress cardiac MRI.    Leo Dutta MD

## 2018-01-12 ENCOUNTER — HOSPITAL ENCOUNTER (OUTPATIENT)
Dept: CARDIOLOGY | Facility: CLINIC | Age: 63
Discharge: HOME | End: 2018-01-12
Attending: INTERNAL MEDICINE | Admitting: INTERNAL MEDICINE
Payer: COMMERCIAL

## 2018-01-12 DIAGNOSIS — I49.3 VENTRICULAR ECTOPY: ICD-10-CM

## 2018-01-12 PROCEDURE — 93227 XTRNL ECG REC<48 HR R&I: CPT | Performed by: INTERNAL MEDICINE

## 2018-01-12 PROCEDURE — 93225 XTRNL ECG REC<48 HRS REC: CPT | Performed by: INTERNAL MEDICINE

## 2018-01-15 DIAGNOSIS — R31.29 MICROSCOPIC HEMATURIA: Primary | ICD-10-CM

## 2018-01-17 ENCOUNTER — HOSPITAL ENCOUNTER (OUTPATIENT)
Dept: CT IMAGING | Facility: CLINIC | Age: 63
Discharge: HOME | End: 2018-01-17
Attending: UROLOGY | Admitting: UROLOGY
Payer: COMMERCIAL

## 2018-01-17 DIAGNOSIS — R31.29 MICROSCOPIC HEMATURIA: ICD-10-CM

## 2018-01-17 LAB — RADIOLOGIST FLAGS: NORMAL

## 2018-01-17 PROCEDURE — 25000128 H RX IP 250 OP 636: Performed by: RADIOLOGY

## 2018-01-17 PROCEDURE — 74178 CT ABD&PLV WO CNTR FLWD CNTR: CPT

## 2018-01-17 RX ORDER — IOPAMIDOL 755 MG/ML
135 INJECTION, SOLUTION INTRAVASCULAR ONCE
Status: COMPLETED | OUTPATIENT
Start: 2018-01-17 | End: 2018-01-17

## 2018-01-17 RX ADMIN — IOPAMIDOL 135 ML: 755 INJECTION, SOLUTION INTRAVENOUS at 14:10

## 2018-01-18 DIAGNOSIS — I47.29 NSVT (NONSUSTAINED VENTRICULAR TACHYCARDIA) (H): Primary | ICD-10-CM

## 2018-01-18 LAB
BACTERIA SPEC CULT: NO GROWTH
COPATH REPORT: NORMAL
Lab: NORMAL
SPECIMEN SOURCE: NORMAL

## 2018-01-19 ENCOUNTER — HOSPITAL ENCOUNTER (OUTPATIENT)
Dept: MRI IMAGING | Facility: CLINIC | Age: 63
Discharge: HOME | End: 2018-01-19
Attending: INTERNAL MEDICINE | Admitting: INTERNAL MEDICINE
Payer: COMMERCIAL

## 2018-01-19 ENCOUNTER — OFFICE VISIT (OUTPATIENT)
Dept: CARDIOLOGY | Facility: CLINIC | Age: 63
End: 2018-01-19
Attending: INTERNAL MEDICINE
Payer: COMMERCIAL

## 2018-01-19 VITALS
SYSTOLIC BLOOD PRESSURE: 124 MMHG | RESPIRATION RATE: 16 BRPM | DIASTOLIC BLOOD PRESSURE: 74 MMHG | OXYGEN SATURATION: 92 %

## 2018-01-19 VITALS
DIASTOLIC BLOOD PRESSURE: 74 MMHG | HEART RATE: 86 BPM | HEIGHT: 75 IN | SYSTOLIC BLOOD PRESSURE: 124 MMHG | OXYGEN SATURATION: 96 % | WEIGHT: 282.7 LBS | BODY MASS INDEX: 35.15 KG/M2

## 2018-01-19 DIAGNOSIS — I49.3 PVC'S (PREMATURE VENTRICULAR CONTRACTIONS): Primary | ICD-10-CM

## 2018-01-19 DIAGNOSIS — I47.29 NSVT (NONSUSTAINED VENTRICULAR TACHYCARDIA) (H): ICD-10-CM

## 2018-01-19 PROCEDURE — 93010 ELECTROCARDIOGRAM REPORT: CPT | Mod: ZP | Performed by: INTERNAL MEDICINE

## 2018-01-19 PROCEDURE — 40000065 ZZH STATISTIC EKG NON-CHARGEABLE

## 2018-01-19 PROCEDURE — 75563 CARD MRI W/STRESS IMG & DYE: CPT

## 2018-01-19 PROCEDURE — 93018 CV STRESS TEST I&R ONLY: CPT | Performed by: INTERNAL MEDICINE

## 2018-01-19 PROCEDURE — 25000128 H RX IP 250 OP 636: Performed by: INTERNAL MEDICINE

## 2018-01-19 PROCEDURE — 75563 CARD MRI W/STRESS IMG & DYE: CPT | Mod: 26 | Performed by: INTERNAL MEDICINE

## 2018-01-19 PROCEDURE — 99215 OFFICE O/P EST HI 40 MIN: CPT | Mod: ZP | Performed by: INTERNAL MEDICINE

## 2018-01-19 PROCEDURE — 93017 CV STRESS TEST TRACING ONLY: CPT

## 2018-01-19 PROCEDURE — 93005 ELECTROCARDIOGRAM TRACING: CPT

## 2018-01-19 PROCEDURE — A9585 GADOBUTROL INJECTION: HCPCS | Performed by: INTERNAL MEDICINE

## 2018-01-19 PROCEDURE — 93016 CV STRESS TEST SUPVJ ONLY: CPT | Performed by: INTERNAL MEDICINE

## 2018-01-19 PROCEDURE — G0463 HOSPITAL OUTPT CLINIC VISIT: HCPCS | Mod: 25,ZF

## 2018-01-19 RX ORDER — ACYCLOVIR 200 MG/1
0-1 CAPSULE ORAL
Status: DISCONTINUED | OUTPATIENT
Start: 2018-01-19 | End: 2018-01-20 | Stop reason: HOSPADM

## 2018-01-19 RX ORDER — DIAZEPAM 5 MG
5 TABLET ORAL EVERY 30 MIN PRN
Status: DISCONTINUED | OUTPATIENT
Start: 2018-01-19 | End: 2018-01-20 | Stop reason: HOSPADM

## 2018-01-19 RX ORDER — AMINOPHYLLINE 25 MG/ML
100 INJECTION, SOLUTION INTRAVENOUS ONCE
Status: COMPLETED | OUTPATIENT
Start: 2018-01-19 | End: 2018-01-19

## 2018-01-19 RX ORDER — GADOBUTROL 604.72 MG/ML
10 INJECTION INTRAVENOUS ONCE
Status: COMPLETED | OUTPATIENT
Start: 2018-01-19 | End: 2018-01-19

## 2018-01-19 RX ORDER — REGADENOSON 0.08 MG/ML
0.4 INJECTION, SOLUTION INTRAVENOUS ONCE
Status: COMPLETED | OUTPATIENT
Start: 2018-01-19 | End: 2018-01-19

## 2018-01-19 RX ORDER — ALBUTEROL SULFATE 90 UG/1
2 AEROSOL, METERED RESPIRATORY (INHALATION) EVERY 5 MIN PRN
Status: DISCONTINUED | OUTPATIENT
Start: 2018-01-19 | End: 2018-01-20 | Stop reason: HOSPADM

## 2018-01-19 RX ORDER — GADOBUTROL 604.72 MG/ML
10 INJECTION INTRAVENOUS ONCE
Status: DISCONTINUED | OUTPATIENT
Start: 2018-01-19 | End: 2018-01-20 | Stop reason: HOSPADM

## 2018-01-19 RX ADMIN — AMINOPHYLLINE 100 MG: 25 INJECTION, SOLUTION INTRAVENOUS at 14:00

## 2018-01-19 RX ADMIN — GADOBUTROL 5 ML: 604.72 INJECTION INTRAVENOUS at 14:26

## 2018-01-19 RX ADMIN — REGADENOSON 0.4 MG: 0.08 INJECTION, SOLUTION INTRAVENOUS at 13:43

## 2018-01-19 RX ADMIN — GADOBUTROL 10 ML: 604.72 INJECTION INTRAVENOUS at 14:26

## 2018-01-19 ASSESSMENT — PAIN SCALES - GENERAL: PAINLEVEL: NO PAIN (0)

## 2018-01-19 NOTE — PROGRESS NOTES
"CARDIOLOGY CONSULTATION    Referring Provider:  Raul Dutta  Primary Care Provider:   Raul Dutta  Indication for Consultation:  PVCs    HPI: Darnell Grant is a 62 year old male being seen today for evaluation of ventricular ectopy.   The patient's risk factor profile is: (+) HTN, (+) glucose intolerance, (+) hypercholesterolemia, (-) tobacco use, (+) fam Hx premature CAD [paternal CABG in 60s]  The patient has no history of cardiovascular disease (CAD, CHF, arrhythmia, valvular heart disease).  The patient has no Hx of PAD or cerebrovascular disease.    He has had intermittent cardiac studies over the past several years.  He had an ECHO in 2013 that showed normal LV function and no valvular heart disease.  He had a stress nuclear study in Feb 2017 that was completely normal.  He has not had a prior coronary angiogram.  He had a coronary calcium score of 772 (90% percentile) in 2017.    He had a recent routine clinic visit and had a pulse 100-110.  On auscultation, irregularity was noted.  An ECG showed NSR with LVH and ventricular ectopy.  The patient had no symptoms at that time.    The patient denies a history of chest discomfort, dyspnea, PND, orthopnea, pedal edema, lightheadedness, and syncope.  On occasion, he has had a sensation that his heart beat is irregular.  It \"skips a beat\".  With effort, he is able to detect irregularity in his pulse.  He does not exercise so he is not able to determine whether the PVCs are suppressed or increase with exercise.    He walks up two flight of stairs with mild dyspnea.  If he goes up fast, he may get short of breath.  He does not participate in formal exercise program.        PAST MEDICAL HISTORY:Past Medical History:   Diagnosis Date     BPH (benign prostatic hyperplasia)      Hypertension        CURRENT MEDICATIONS:  Current Outpatient Prescriptions   Medication Sig Dispense Refill     rosuvastatin (CRESTOR) 10 MG tablet Take 4 tablets (40 mg) by " mouth daily 90 tablet 3     valsartan (DIOVAN) 160 MG tablet Take 1 tablet (160 mg) by mouth daily 90 tablet 3     allopurinol (ZYLOPRIM) 100 MG tablet 1 qday 90 tablet 3     amLODIPine (NORVASC) 10 MG tablet Take 1 tablet (10 mg) by mouth daily 90 tablet 3     metFORMIN (GLUCOPHAGE) 1000 MG tablet Take one tablet daily at bedtime 90 tablet 3     omeprazole 20 MG tablet Take 1 tablet (20 mg) by mouth daily 90 tablet 3     dutasteride (AVODART) 0.5 MG capsule Take 1 capsule (0.5 mg) by mouth daily 90 capsule 3     tamsulosin (FLOMAX) 0.4 MG capsule Take 1 capsule (0.4 mg) by mouth daily 90 capsule 3     fluticasone-salmeterol (ADVAIR) 500-50 MCG/DOSE diskus inhaler Inhale 1 puff into the lungs 2 times daily 3 Inhaler 2     albuterol (PROAIR HFA/PROVENTIL HFA/VENTOLIN HFA) 108 (90 BASE) MCG/ACT Inhaler Inhale 2 puffs into the lungs every 6 hours as needed for shortness of breath / dyspnea or wheezing 2 Inhaler 2     cephALEXin (KEFLEX) 500 MG capsule Take 1 capsule (500 mg) by mouth 2 times daily (Patient not taking: Reported on 1/19/2018) 14 capsule 0     azithromycin (ZITHROMAX) 250 MG tablet Take 500 mg once then 250 mg q24 hours x 4 days (Patient not taking: Reported on 1/19/2018) 6 tablet 0     order for DME Respironics Dreamstation AutoPap 7-15 cm with Respironics Leidy mask.         PAST SURGICAL HISTORY:  Past Surgical History:   Procedure Laterality Date     BACK SURGERY      repair disc     HC BREATH HYDROGEN TEST  5/22/2013    Procedure: HYDROGEN BREATH TEST;  Surgeon: Donny Paris MD;  Location:  GI       ALLERGIES  Ragweeds    FAMILY HX:  Family History   Problem Relation Age of Onset     Asthma Brother        SOCIAL HX:  Social History     Social History     Marital status:      Spouse name: N/A     Number of children: N/A     Years of education: N/A     Social History Main Topics     Smoking status: Never Smoker     Smokeless tobacco: Never Used     Alcohol use No     Drug use: No      "Sexual activity: Not Asked     Other Topics Concern     None     Social History Narrative       ROS:  Constitutional: No fever, chills, or sweats. No weight gain/loss.   HEENT: No visual disturbance, ear ache, epistaxis, sore throat.   Allergies/Immunologic: Negative.   Respiratory: No cough, hemoptysis.   Cardiovascular: As per HPI.   GI: No nausea, vomiting, hematemesis, melena, or hematochezia.   : No urinary frequency, dysuria, or hematuria.   Integument: No rash.   Psychiatric: No anxiety / depression.   Neuro: No speech disturbance, focal sensory or motor deficit.   Endocrinology: No polyuria / polyphagia.   Musculoskeletal: No myalgia.    VITAL SIGNS:  /74  Pulse 86  Ht 1.905 m (6' 3\")  Wt 128.2 kg (282 lb 11.2 oz)  SpO2 96%  BMI 35.34 kg/m2  Body mass index is 35.34 kg/(m^2).  Wt Readings from Last 2 Encounters:   01/19/18 128.2 kg (282 lb 11.2 oz)   08/02/17 125.8 kg (277 lb 6.4 oz)       PHYSICAL EXAM  Danrell Grant is a 62 year old male.in no acute distress.  Overweight.  HEENT: Eyes Nonicteric.  Neck: JVP normal.  Carotids +3/3 bilaterally without bruits.  Lungs: CTA.  Cor: RRR. Normal S1 and S2.  No murmur, rub, or gallop.  PMI in Lf 5th ICS.  Abd: Soft, nontender, nondistended.  NABS.  No pulsatile mass.  Extremities: No C/C/E.  Pulses +3/3 symmetric in upper and lower extremities.  Neuro: Grossly intact.  Psych: A&O x 3.  Skin: No rash.    LABS  Recent Labs   Lab Test  07/14/14   0801  05/20/13   1208   WBC  5.9  6.5   HGB  15.9  15.6   HCT  45.8  44.7   PLT  167  187     Recent Labs   Lab Test  01/16/17   0811  11/21/16   0714  04/18/16   0808   NA   --   142  140   POTASSIUM   --   4.0  3.8   CHLORIDE   --   105  104   CO2   --   28  28   GLC   --   114*  108*   BUN   --   14  10   CR   --   0.86  0.91   ZHANE  9.3  8.9  9.3     Recent Labs   Lab Test  11/21/16   0714  04/18/16   0808  08/31/15   0741  07/14/14   0801   CHOL  120  98  117  120   HDL  37*  29*  34*  32*   LDL  40  35  45  " 59   TRIG  219*  168*  190*  146   CHOLHDLRATIO   --    --   3.4  3.7   NHDL  83  68   --    --         EK18  12:31 pm  NSR.  LVH.  Nonspecific QRS widening.  Two separate PVCs, different sources.    EK18  2:37 pm  NSR.  LVH.  Nonspecific QRS widening.  No ventricular ectopy.    HOLTER (18): Frequent ventricular ectopy, 2 dominant morphologies for PVCs with 14% burden.  107 NSVT, max 5 beats.  I left message for Darnell that I would like to see him in the clinic.  Consider stress cardiac MRI.  ECHO: 13  Global and regional left ventricular function is normal with an EF of 55-60%.   Global right ventricular function is normal. Pulmonary artery systolic pressure is normal. No pericardial effusion is present.    Coronary Calcium Score:  2017  Score 772.  90% percentile    LEXISCAN STRESS TEST:  17  Findings:  1. Overall quality of the study: Slightly reduced due to patient motion artifact.   2. Left ventricular cavity is normal on the rest and stress studies.  3. SPECT images demonstrate overall homogeneous radiotracer uptake in all myocardial segments at both stress and rest The summed stress score is 0.   4. Gated SPECT images reveal normal left ventricular systolic function without wall motion abnormalities. Left ventricular ejection fraction is 62%. Left ventricular end-diastolic volume is 205 mL. End-systolic volume is 78 mL.       Impression:  1. Normal myocardial SPECT study with a summed stress score of 0. A summed stress score of <4 is associated with an annual event rate of 0.8% and 0.9% for myocardial infarction and cardiac death, respectively ([employer]. Circulation 1998;98:535-43).  2. Normal left ventricular systolic function as described above.  3. No significant perfusion abnormalities.  4. No prior study available for comparison.    CARDIAC CATH: None    ASSESSMENT AND PLAN:   1. Ventricular ectopy, NSVT.  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  He has no other symptoms to suggest CHF or typical angina.  His physical exam is unremarkable.  His ECG shows PVCs with two dominant morphologies and his Holter showed 14% PVC burden with two dominant morphologies.  He had 107 runs of NSVT with no more than 5 consecutive beats.  He has no symptoms with the PVCs or NSVT.  He has normal LV function on prior ECHO and this is confirmed with recent Lexiscan and on cardiac MRI today.  He has no evidence of prior MI or ischemia by Lexiscan or stress cardiac MRI.  I do not believe further testing would be indicated given the asymptomatic nature of the PVCs and the normal LV function.  I did discuss the likelihood of underlying CAD based on the prior coronary calcium score but the only symptom I can elicit is mild SOB with 2 flights of stairs and this could easily be attributable to exercise deconditioning.  With the results of the noninvasive studies, I would not recommend coronary angiography at this time.  However, I did explain to him that should he develop a change in symptoms, he should contact us at once and we might reconsider coronary angiography.      2. HTN.  Continue Norvasc 10 qd an Diovan 160 mg qd.    3. Hypercholesterolemia.  LDL well controlled.  Continue Crestor 10 mg qd.    4. Metabolic syndrome / Glucose Intolerance. Continue Metformin.        FOLLOW UP:  PCP    I spent approximately 90 minutes in the process of gathering information, making calls, reviewing studies, and interviewing and examining patient.    Stress Cardiac MRI (1/19/18):  1. The LV is normal in cavity size and wall thickness. The global systolic function is normal. The LVEF is  55%. There are no regional wall motion abnormalities.  2. The RV is normal in cavity size. The global systolic function is normal. The RVEF is 61%.   3. Both atria are normal in size.  4. There is no significant valvular disease.   5. Late gadolinium enhancement  imaging shows no MI, fibrosis or infiltrative disease.   6. Regadenoson stress perfusion imaging shows no ischemia.    CONCLUSIONS: No ischemia or infarction. Normal LV and RV function, LVEF of 55% and RVEF of 61%. No obvious substrate for arrhythmia.    ZIOPATCH: (Jan 2018):      ADDENDUM (2/8/18): Ziopatch showed only 1 episode of NSVT (4 beats) in comparison to prior Holter.   PVC burden was 11% in comparison to 14% on the prior Holter.  I would not treat these asymptomatic arrhythmias.  A beta blocker would not be unreasonable if current antihypertensive regime (Ca blocker, ARB) becomes less effective.    Leo Dutta MD    Divisions of Cardiology  [place], [place]    CC  Patient Care Team:  Logan Bowman MD as PCP - General (Internal Medicine)  Robby Guzman RN as Nurse Coordinator (Cardiology)  LOGAN BOWMAN

## 2018-01-19 NOTE — PROGRESS NOTES
Pt here for cardiac MRI with stress. Safety checklist, allergies, and meds all reviewed. Test explained and all questions answered. Lungs clear. Denied caffeine intake. Lexiscan 0.4mg given over 15 seconds followed by 5cc NS flush. Aminophylline 100mg post Janny injection per protocol. Pt tolerated scan, meds, and contrast well; stable throughout. Pre and post EKG completed. Pt monitored post MRI and escorted back to [facility] waiting room.

## 2018-01-19 NOTE — LETTER
"1/19/2018      RE: Darnell Grant  [address]  [place] [address]       Dear Colleague,    Thank you for the opportunity to participate in the care of your patient, Darnell Grant, at the [facility] at [facility]. Please see a copy of my visit note below.    CARDIOLOGY CONSULTATION    Referring Provider:  Raul Dutta  Primary Care Provider:   Raul Dutta  Indication for Consultation:  PVCs    HPI: Darnell Grant is a 62 year old male being seen today for evaluation of ventricular ectopy.   The patient's risk factor profile is: (+) HTN, (+) glucose intolerance, (+) hypercholesterolemia, (-) tobacco use, (+) fam Hx premature CAD [paternal CABG in 60s]  The patient has no history of cardiovascular disease (CAD, CHF, arrhythmia, valvular heart disease).  The patient has no Hx of PAD or cerebrovascular disease.    He has had intermittent cardiac studies over the past several years.  He had an ECHO in 2013 that showed normal LV function and no valvular heart disease.  He had a stress nuclear study in Feb 2017 that was completely normal.  He has not had a prior coronary angiogram.  He had a coronary calcium score of 772 (90% percentile) in 2017.    He had a recent routine clinic visit and had a pulse 100-110.  On auscultation, irregularity was noted.  An ECG showed NSR with LVH and ventricular ectopy.  The patient had no symptoms at that time.    The patient denies a history of chest discomfort, dyspnea, PND, orthopnea, pedal edema, lightheadedness, and syncope.  On occasion, he has had a sensation that his heart beat is irregular.  It \"skips a beat\".  With effort, he is able to detect irregularity in his pulse.  He does not exercise so he is not able to determine whether the PVCs are suppressed or increase with exercise.    He walks up two flight of stairs with mild dyspnea.  If he goes up fast, he may get short of breath.  He does not participate " in formal exercise program.        PAST MEDICAL HISTORY:Past Medical History:   Diagnosis Date     BPH (benign prostatic hyperplasia)      Hypertension        CURRENT MEDICATIONS:  Current Outpatient Prescriptions   Medication Sig Dispense Refill     rosuvastatin (CRESTOR) 10 MG tablet Take 4 tablets (40 mg) by mouth daily 90 tablet 3     valsartan (DIOVAN) 160 MG tablet Take 1 tablet (160 mg) by mouth daily 90 tablet 3     allopurinol (ZYLOPRIM) 100 MG tablet 1 qday 90 tablet 3     amLODIPine (NORVASC) 10 MG tablet Take 1 tablet (10 mg) by mouth daily 90 tablet 3     metFORMIN (GLUCOPHAGE) 1000 MG tablet Take one tablet daily at bedtime 90 tablet 3     omeprazole 20 MG tablet Take 1 tablet (20 mg) by mouth daily 90 tablet 3     dutasteride (AVODART) 0.5 MG capsule Take 1 capsule (0.5 mg) by mouth daily 90 capsule 3     tamsulosin (FLOMAX) 0.4 MG capsule Take 1 capsule (0.4 mg) by mouth daily 90 capsule 3     fluticasone-salmeterol (ADVAIR) 500-50 MCG/DOSE diskus inhaler Inhale 1 puff into the lungs 2 times daily 3 Inhaler 2     albuterol (PROAIR HFA/PROVENTIL HFA/VENTOLIN HFA) 108 (90 BASE) MCG/ACT Inhaler Inhale 2 puffs into the lungs every 6 hours as needed for shortness of breath / dyspnea or wheezing 2 Inhaler 2     cephALEXin (KEFLEX) 500 MG capsule Take 1 capsule (500 mg) by mouth 2 times daily (Patient not taking: Reported on 1/19/2018) 14 capsule 0     azithromycin (ZITHROMAX) 250 MG tablet Take 500 mg once then 250 mg q24 hours x 4 days (Patient not taking: Reported on 1/19/2018) 6 tablet 0     order for DME Respironics Dreamstation AutoPap 7-15 cm with Respironics Leidy mask.         PAST SURGICAL HISTORY:  Past Surgical History:   Procedure Laterality Date     BACK SURGERY      repair disc     HC BREATH HYDROGEN TEST  5/22/2013    Procedure: HYDROGEN BREATH TEST;  Surgeon: Donny Paris MD;  Location:  GI       ALLERGIES  Ragweeds    FAMILY HX:  Family History   Problem Relation Age of Onset      "Asthma Brother        SOCIAL HX:  Social History     Social History     Marital status:      Spouse name: N/A     Number of children: N/A     Years of education: N/A     Social History Main Topics     Smoking status: Never Smoker     Smokeless tobacco: Never Used     Alcohol use No     Drug use: No     Sexual activity: Not Asked     Other Topics Concern     None     Social History Narrative       ROS:  Constitutional: No fever, chills, or sweats. No weight gain/loss.   HEENT: No visual disturbance, ear ache, epistaxis, sore throat.   Allergies/Immunologic: Negative.   Respiratory: No cough, hemoptysis.   Cardiovascular: As per HPI.   GI: No nausea, vomiting, hematemesis, melena, or hematochezia.   : No urinary frequency, dysuria, or hematuria.   Integument: No rash.   Psychiatric: No anxiety / depression.   Neuro: No speech disturbance, focal sensory or motor deficit.   Endocrinology: No polyuria / polyphagia.   Musculoskeletal: No myalgia.    VITAL SIGNS:  /74  Pulse 86  Ht 1.905 m (6' 3\")  Wt 128.2 kg (282 lb 11.2 oz)  SpO2 96%  BMI 35.34 kg/m2  Body mass index is 35.34 kg/(m^2).  Wt Readings from Last 2 Encounters:   01/19/18 128.2 kg (282 lb 11.2 oz)   08/02/17 125.8 kg (277 lb 6.4 oz)       PHYSICAL EXAM  Darnell Grant is a 62 year old male.in no acute distress.  Overweight.  HEENT: Eyes Nonicteric.  Neck: JVP normal.  Carotids +3/3 bilaterally without bruits.  Lungs: CTA.  Cor: RRR. Normal S1 and S2.  No murmur, rub, or gallop.  PMI in Lf 5th ICS.  Abd: Soft, nontender, nondistended.  NABS.  No pulsatile mass.  Extremities: No C/C/E.  Pulses +3/3 symmetric in upper and lower extremities.  Neuro: Grossly intact.  Psych: A&O x 3.  Skin: No rash.    LABS  Recent Labs   Lab Test  07/14/14   0801  05/20/13   1208   WBC  5.9  6.5   HGB  15.9  15.6   HCT  45.8  44.7   PLT  167  187     Recent Labs   Lab Test  01/16/17   0811  11/21/16   0714  04/18/16   0808   NA   --   142  140   POTASSIUM   --  "  4.0  3.8   CHLORIDE   --   105  104   CO2   --   28  28   GLC   --   114*  108*   BUN   --   14  10   CR   --   0.86  0.91   ZHANE  9.3  8.9  9.3     Recent Labs   Lab Test  16   0714  16   0808  08/31/15   0741  14   0801   CHOL  120  98  117  120   HDL  37*  29*  34*  32*   LDL  40  35  45  59   TRIG  219*  168*  190*  146   CHOLHDLRATIO   --    --   3.4  3.7   NHDL  83  68   --    --         EK18  12:31 pm  NSR.  LVH.  Nonspecific QRS widening.  Two separate PVCs, different sources.    EK18  2:37 pm  NSR.  LVH.  Nonspecific QRS widening.  No ventricular ectopy.    ZIOPATCH (18):  Frequent ventricular ectopy, 2 dominant morphologies for PVCs with 14% burden.  107 NSVT, max 5 beats.  I left message for Darnell that I would like to see him in the clinic.  Consider stress cardiac MRI.  ECHO: 13  Global and regional left ventricular function is normal with an EF of 55-60%.   Global right ventricular function is normal. Pulmonary artery systolic pressure is normal. No pericardial effusion is present.    Coronary Calcium Score:  2017  Score 772.  90% percentile    LEXISCAN STRESS TEST:  17  Findings:  1. Overall quality of the study: Slightly reduced due to patient motion artifact.   2. Left ventricular cavity is normal on the rest and stress studies.  3. SPECT images demonstrate overall homogeneous radiotracer uptake in all myocardial segments at both stress and rest The summed stress score is 0.   4. Gated SPECT images reveal normal left ventricular systolic function without wall motion abnormalities. Left ventricular ejection fraction is 62%. Left ventricular end-diastolic volume is 205 mL. End-systolic volume is 78 mL.       Impression:  1. Normal myocardial SPECT study with a summed stress score of 0. A summed stress score of <4 is associated with an annual event rate of 0.8% and 0.9% for myocardial infarction and cardiac death, respectively ([place].  Circulation 1998;98:535-43).  2. Normal left ventricular systolic function as described above.  3. No significant perfusion abnormalities.  4. No prior study available for comparison.    CARDIAC CATH: None    ASSESSMENT AND PLAN:   1. Ventricular ectopy, NSVT.  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  He has no other symptoms to suggest CHF or typical angina.  His physical exam is unremarkable.  His ECG shows PVCs with two dominant morphologies and his Ziopatch showed 14% PVC burden with two dominant morphologies.  He had 107 runs of NSVT with no more than 5 consecutive beats.  He has no symptoms with the PVCs or NSVT.  He has normal LV function on prior ECHO and this is confirmed with recent Lexiscan and on cardiac MRI today.  He has no evidence of prior MI or ischemia by Lexiscan or stress cardiac MRI.  I do not believe further testing would be indicated given the asymptomatic nature of the PVCs and the normal LV function.  I did discuss the likelihood of underlying CAD based on the prior coronary calcium score but the only symptom I can elicit is mild SOB with 2 flights of stairs and this could easily be attributable to exercise deconditioning.  With the results of the noninvasive studies, I would not recommend coronary angiography at this time.  However, I did explain to him that should he develop a change in symptoms, he should contact us at once and we might reconsider coronary angiography.      2. HTN.  Continue Norvasc 10 qd an Diovan 160 mg qd.    3. Hypercholesterolemia.  LDL well controlled.  Continue Crestor 10 mg qd.    4. Metabolic syndrome / Glucose Intolerance. Continue Metformin.        FOLLOW UP:  PCP    I spent approximately 90 minutes in the process of gathering information, making calls, reviewing studies, and interviewing and examining patient.    Stress Cardiac MRI (1/19/18):  1. The LV is normal in cavity size and wall thickness. The  global systolic function is normal. The LVEF is  55%. There are no regional wall motion abnormalities.  2. The RV is normal in cavity size. The global systolic function is normal. The RVEF is 61%.   3. Both atria are normal in size.  4. There is no significant valvular disease.   5. Late gadolinium enhancement imaging shows no MI, fibrosis or infiltrative disease.   6. Regadenoson stress perfusion imaging shows no ischemia.    CONCLUSIONS: No ischemia or infarction. Normal LV and RV function, LVEF of 55% and RVEF of 61%. No obvious substrate for arrhythmia.    Leo Dutta MD    Divisions of Cardiology  [facility]  Patient Care Team:  Logan Bowman MD as PCP - General (Internal Medicine)  Robby Guzman RN as Nurse Coordinator (Cardiology)  LOGAN BOWMAN

## 2018-01-19 NOTE — NURSING NOTE
Chief Complaint   Patient presents with     Follow Up For     return PVCS     Vitals were taken and medications were reconciled.     Shira Aiken MA

## 2018-01-19 NOTE — PATIENT INSTRUCTIONS
Patient Instructions:  It was a pleasure to see you in the cardiology clinic today.      If you have any questions, you can reach my nurse, Jolie Rogers, at [phone].  Press Option #1 for the [facility], and then press Option #3 for nursing.  We are encouraging the use of [employer] to communicate with your HealthCare Provider    Note the new medications: none  Stop the following medications: none    Follow the American Heart Association Diet and Lifestyle recommendations:  Limit saturated fat, trans fat, sodium, red meat, sweets and sugar-sweetened beverages. If you choose to eat red meat, compare labels and select the leanest cuts available.  Aim for at least 150 minutes of moderate physical activity or 75 minutes of vigorous physical activity - or an equal combination of both - each week.    The results from today include: pending cMR  Please follow up with Dr. Dutta by phone    Sincerely,    Leo Dutta MD     If you have an urgent need after hours (8:00 am to 4:30 pm) please call [phone] and ask for the cardiology fellow on call.

## 2018-01-19 NOTE — MR AVS SNAPSHOT
After Visit Summary   1/19/2018    Darnell Grant    MRN: [id number]           Patient Information     Date Of Birth          1955        Visit Information        Provider Department      1/19/2018 2:00 PM Leo Dutta MD [facility]        Today's Diagnoses     PVC's (premature ventricular contractions)    -  1      Care Instructions    Patient Instructions:  It was a pleasure to see you in the cardiology clinic today.      If you have any questions, you can reach my nurse, Jolie Rogers, at [phone].  Press Option #1 for the [facility], and then press Option #3 for nursing.  We are encouraging the use of Heyzap to communicate with your HealthCare Provider    Note the new medications: none  Stop the following medications: none    Follow the American Heart Association Diet and Lifestyle recommendations:  Limit saturated fat, trans fat, sodium, red meat, sweets and sugar-sweetened beverages. If you choose to eat red meat, compare labels and select the leanest cuts available.  Aim for at least 150 minutes of moderate physical activity or 75 minutes of vigorous physical activity - or an equal combination of both - each week.    The results from today include: pending cMR  Please follow up with Dr. Dutta by phone    Sincerely,    Leo Dutta MD     If you have an urgent need after hours (8:00 am to 4:30 pm) please call [phone] and ask for the cardiology fellow on call.                    Follow-ups after your visit        Your next 10 appointments already scheduled     Jan 26, 2018  3:00 PM CST   (Arrive by 2:45 PM)   Return Visit with Yaya Lomeli MD   [facility] ([facility])    [address] [place] [id number] [phone]            Mar 06, 2018  7:30 AM CST   LAB with  LAB   [facility] ([facility])    [facility]  [address] [facility] [id number] [phone]           Please do not eat 10-12 hours before your appointment if you are coming in fasting for labs on lipids, cholesterol, or glucose (sugar). This does not apply to pregnant women. Water, hot tea and black coffee (with nothing added) are okay. Do not drink other fluids, diet soda or chew gum.            Mar 12, 2018 11:00 AM CDT   (Arrive by 10:45 AM)   Return Visit with Raul Dutta MD   [facility] ([facility])    [address] [facility]  [address] [facility] [address] [phone]              Future tests that were ordered for you today     Open Standing Orders        Priority Remaining Interval Expires Ordered    Oxygen: Nasal cannula Routine [id number] CONTINUOUS  1/19/2018          Open Future Orders        Priority Expected Expires Ordered    Zio Patch Holter Routine  3/5/2018 1/19/2018    MRI Cardiac w/contrast and stress Routine 1/19/2018 1/18/2019 1/18/2018            Who to contact     If you have questions or need follow up information about today's clinic visit or your schedule please contact [facility] directly at [phone].  Normal or non-critical lab and imaging results will be communicated to you by PartTechart, letter or phone within 4 business days after the clinic has received the results. If you do not hear from us within 7 days, please contact the clinic through PartTechart or phone. If you have a critical or abnormal lab result, we will notify you by phone as soon as possible.  Submit refill requests through Ichiba or call your pharmacy and they will forward the refill request to us. Please allow 3 business days for your refill to be completed.          Additional Information About Your Visit        Ichiba Information     Ichiba gives you secure access to your electronic health record. If you see a primary care provider, you can also send messages to your  "care team and make appointments. If you have questions, please call your primary care clinic.  If you do not have a primary care provider, please call [phone] and they will assist you.        Care EveryWhere ID     This is your Care EveryWhere ID. This could be used by other organizations to access your [place] medical records  [id number]        Your Vitals Were     Pulse Height Pulse Oximetry BMI (Body Mass Index)          86 1.905 m (6' 3\") 96% 35.34 kg/m2         Blood Pressure from Last 3 Encounters:   01/19/18 124/74   01/19/18 124/74   08/02/17 115/75    Weight from Last 3 Encounters:   01/19/18 128.2 kg (282 lb 11.2 oz)   08/02/17 125.8 kg (277 lb 6.4 oz)   02/10/17 134.4 kg (296 lb 3.2 oz)              We Performed the Following     EKG 12-lead, tracing only        Primary Care Provider Office Phone # Fax #    Raul Basil Dutta [phone]       [address] [address]        Equal Access to Services     [facility]: Hadii aad ku hadasho Soomaali, waaxda luqadaha, qaybta kaalmada aderobsonyada, ric farias . So [facility] [phone].    ATENCIÓN: Si habla español, tiene a mulligan disposición servicios gratuitos de asistencia lingüística. [facility] [phone].    We comply with applicable federal civil rights laws and [place] laws. We do not discriminate on the basis of race, color, national origin, age, disability, sex, sexual orientation, or gender identity.            Thank you!     Thank you for choosing [facility]  for your care. Our goal is always to provide you with excellent care. Hearing back from our patients is one way we can continue to improve our services. Please take a few minutes to complete the written survey that you may receive in the mail after your visit with us. Thank you!             Your Updated Medication List - Protect others around you: Learn how to safely use, store and throw away your medicines at " www.disposemymeds.org.          This list is accurate as of: 1/19/18  4:31 PM.  Always use your most recent med list.                   Brand Name Dispense Instructions for use Diagnosis    albuterol 108 (90 BASE) MCG/ACT Inhaler    PROAIR HFA/PROVENTIL HFA/VENTOLIN HFA    2 Inhaler    Inhale 2 puffs into the lungs every 6 hours as needed for shortness of breath / dyspnea or wheezing    Obstructive chronic bronchitis with exacerbation (H)       allopurinol 100 MG tablet    ZYLOPRIM    90 tablet    1 qday    Gout, unspecified, Essential hypertension, Gastroesophageal reflux disease without esophagitis, Benign non-nodular prostatic hyperplasia without lower urinary tract symptoms, Routine general medical examination at a health care facility, Essential hypertension, benign, Nocturia, Need for hepatitis C screening test, Screening for diabetic peripheral neuropathy, Other fatigue, Hyperlipidemia LDL goal <100       amLODIPine 10 MG tablet    NORVASC    90 tablet    Take 1 tablet (10 mg) by mouth daily    Essential hypertension, Gout, unspecified, Gastroesophageal reflux disease without esophagitis, Benign non-nodular prostatic hyperplasia without lower urinary tract symptoms, Routine general medical examination at a health care facility, Essential hypertension, benign, Nocturia, Need for hepatitis C screening test, Screening for diabetic peripheral neuropathy, Other fatigue, Hyperlipidemia LDL goal <100       azithromycin 250 MG tablet    ZITHROMAX    6 tablet    Take 500 mg once then 250 mg q24 hours x 4 days    Acute bronchitis, unspecified organism       cephALEXin 500 MG capsule    KEFLEX    14 capsule    Take 1 capsule (500 mg) by mouth 2 times daily    Onychomycosis       dutasteride 0.5 MG capsule    AVODART    90 capsule    Take 1 capsule (0.5 mg) by mouth daily    Benign non-nodular prostatic hyperplasia without lower urinary tract symptoms, Essential hypertension, Gout, unspecified, Gastroesophageal reflux  disease without esophagitis, Routine general medical examination at a health care facility, Essential hypertension, benign, Nocturia, Need for hepatitis C screening test, Screening for diabetic peripheral neuropathy, Other fatigue, Hyperlipidemia LDL goal <100       fluticasone-salmeterol 500-50 MCG/DOSE diskus inhaler    ADVAIR    3 Inhaler    Inhale 1 puff into the lungs 2 times daily    Obstructive chronic bronchitis with exacerbation (H)       metFORMIN 1000 MG tablet    GLUCOPHAGE    90 tablet    Take one tablet daily at bedtime    Essential hypertension, Gout, unspecified, Gastroesophageal reflux disease without esophagitis, Benign non-nodular prostatic hyperplasia without lower urinary tract symptoms, Routine general medical examination at a health care facility, Essential hypertension, benign, Nocturia, Need for hepatitis C screening test, Screening for diabetic peripheral neuropathy, Other fatigue, Hyperlipidemia LDL goal <100       omeprazole 20 MG tablet     90 tablet    Take 1 tablet (20 mg) by mouth daily    Gastroesophageal reflux disease without esophagitis, Essential hypertension, Gout, unspecified, Benign non-nodular prostatic hyperplasia without lower urinary tract symptoms, Routine general medical examination at a health care facility, Essential hypertension, benign, Nocturia, Need for hepatitis C screening test, Screening for diabetic peripheral neuropathy, Other fatigue, Hyperlipidemia LDL goal <100       order for DME      Respironics Dreamstation AutoPap 7-15 cm with Respironics Leidy mask.        rosuvastatin 10 MG tablet    CRESTOR    90 tablet    Take 4 tablets (40 mg) by mouth daily    Essential hypertension, Gout, unspecified, Gastroesophageal reflux disease without esophagitis, Benign non-nodular prostatic hyperplasia without lower urinary tract symptoms, Routine general medical examination at a health care facility, Essential hypertension, benign, Nocturia, Need for hepatitis C screening  test, Screening for diabetic peripheral neuropathy, Other fatigue, Hyperlipidemia LDL goal <100       tamsulosin 0.4 MG capsule    FLOMAX    90 capsule    Take 1 capsule (0.4 mg) by mouth daily    Benign non-nodular prostatic hyperplasia without lower urinary tract symptoms, Nocturia, Routine general medical examination at a health care facility, Essential hypertension, benign, Gout, unspecified, Essential hypertension, Gastroesophageal reflux disease without esophagitis, Need for hepatitis C screening test, Screening for diabetic peripheral neuropathy, Other fatigue, Hyperlipidemia LDL goal <100       valsartan 160 MG tablet    DIOVAN    90 tablet    Take 1 tablet (160 mg) by mouth daily    Essential hypertension, benign, Routine general medical examination at a health care facility, Gout, unspecified, Essential hypertension, Gastroesophageal reflux disease without esophagitis, Benign non-nodular prostatic hyperplasia without lower urinary tract symptoms, Nocturia, Need for hepatitis C screening test, Screening for diabetic peripheral neuropathy, Other fatigue, Hyperlipidemia LDL goal <100

## 2018-01-22 ENCOUNTER — PRE VISIT (OUTPATIENT)
Dept: UROLOGY | Facility: CLINIC | Age: 63
End: 2018-01-22

## 2018-01-22 DIAGNOSIS — Z29.89 ALTITUDE SICKNESS PREVENTATIVE MEASURES: Primary | ICD-10-CM

## 2018-01-22 LAB
INTERPRETATION ECG - MUSE: NORMAL

## 2018-01-22 RX ORDER — ACETAZOLAMIDE 125 MG/1
125 TABLET ORAL 3 TIMES DAILY
Qty: 30 TABLET | Refills: 0 | Status: SHIPPED | OUTPATIENT
Start: 2018-01-22 | End: 2018-03-07

## 2018-01-22 NOTE — TELEPHONE ENCOUNTER
Patient coming in for a hematuria work up. Chart reviewed and labs and imaging are available. No need for a call.

## 2018-01-26 ENCOUNTER — ALLIED HEALTH/NURSE VISIT (OUTPATIENT)
Dept: CARDIOLOGY | Facility: CLINIC | Age: 63
End: 2018-01-26
Attending: INTERNAL MEDICINE
Payer: COMMERCIAL

## 2018-01-26 ENCOUNTER — OFFICE VISIT (OUTPATIENT)
Dept: UROLOGY | Facility: CLINIC | Age: 63
End: 2018-01-26
Payer: COMMERCIAL

## 2018-01-26 ENCOUNTER — APPOINTMENT (OUTPATIENT)
Dept: UROLOGY | Facility: CLINIC | Age: 63
End: 2018-01-26
Payer: COMMERCIAL

## 2018-01-26 VITALS
WEIGHT: 275 LBS | DIASTOLIC BLOOD PRESSURE: 84 MMHG | HEIGHT: 75 IN | HEART RATE: 98 BPM | SYSTOLIC BLOOD PRESSURE: 135 MMHG | BODY MASS INDEX: 34.19 KG/M2

## 2018-01-26 DIAGNOSIS — I49.3 PVC'S (PREMATURE VENTRICULAR CONTRACTIONS): ICD-10-CM

## 2018-01-26 DIAGNOSIS — N32.89 MASS OF URINARY BLADDER: Primary | ICD-10-CM

## 2018-01-26 DIAGNOSIS — R31.29 MICROSCOPIC HEMATURIA: ICD-10-CM

## 2018-01-26 PROCEDURE — 0298T ZZC EXT ECG > 48HR TO 21 DAY REVIEW AND INTERPRETATN: CPT | Performed by: INTERNAL MEDICINE

## 2018-01-26 PROCEDURE — 0296T ZIO PATCH HOLTER: CPT | Mod: ZF

## 2018-01-26 RX ORDER — CEFAZOLIN SODIUM 1 G/3ML
1 INJECTION, POWDER, FOR SOLUTION INTRAMUSCULAR; INTRAVENOUS SEE ADMIN INSTRUCTIONS
Status: CANCELLED | OUTPATIENT
Start: 2018-01-26

## 2018-01-26 RX ORDER — CEFAZOLIN SODIUM 1 G/50ML
3 SOLUTION INTRAVENOUS
Status: CANCELLED | OUTPATIENT
Start: 2018-01-26

## 2018-01-26 ASSESSMENT — PAIN SCALES - GENERAL
PAINLEVEL: NO PAIN (0)
PAINLEVEL: NO PAIN (0)

## 2018-01-26 NOTE — PROGRESS NOTES
Pre-procedure diagnosis: Bladder mass/hematuria  Post procedure diagnosis: abnormal cystoscopy  Procedure performed: cystoscopy  Surgeon: JOSIAS Lomeli MD  Anesthesia: local    Indications for procedure: Patient is a 62 year old male with a history of urinary calculi and recent hematuria.  CT urogram showed an enhancing mass at the dome of the bladder with negative urine cytology.  Here today for cysto    Description of procedure: After fully informed voluntary consent was obtained patient was brought into the procedure room, identified and placed in a supine position on the cysto table.  The groin/scrotum were prepped and draped in a sterile fashion with betadine.  A 15F flexible cystoscope was inserted into the urethra and the bladder and urethra examined in a systematic manner.  There was a <1cm mass on the dome to posterior wall junction corresponding to the mass seen on CT with a smooth surface mucosa.  No stones or diverticula.  Ureteric orifices were normal in position and number and effluxing clear urine.  The prostate was 3.5 cm long and showedbilobar hypertrophy.  There was a small median lobe.  Distal urethra was normal.  The patient tolerated the procedure well and there were no complications.      Assessment/Plan: Patient with a history of hematuria and a bladder mass with negative cytology.  Will schedule transurethral resection of mass.  Risks and benefits explained.  He understands and agrees to proceed

## 2018-01-26 NOTE — NURSING NOTE
Invasive Procedure Safety Checklist:    Procedure: Cystoscopy    Action: Complete sections and checkboxes as appropriate.    Pre-procedure:  1. Patient ID Verified with 2 identifiers (Sofi and  or MRN) : YES    2. Procedure and site verified with patient/designee (when able) : YES    3. Accurate consent documentation in medical record : YES    4. H&P (or appropriate assessment) documented in medical record : NO  H&P must be up to 30 days prior to procedure an updated within 24 hours of                 Procedure as applicable.     5. Relevant diagnostic and radiology test results appropriately labeled and displayed as applicable : NO    6. Blood products, implants, devices, and/or special equipment available for the procedure as applicable : NO    7. Procedure site(s) marked with provider initials [Exclusions: ] : NO    8. Marking not required. Reason : Yes  Procedure does not require site marking    Time Out:     Time-Out performed immediately prior to starting procedure, including verbal and active participation of all team members addressing: YES    1. Correct patient identity.  2. Confirmed that the correct side and site are marked.  3. An accurate procedure to be done.  4. Agreement on the procedure to be done.  5. Correct patient position.  6. Relevant images and results are properly labeled and appropriately displayed.  7. The need to administer antibiotics or fluids for irrigation purposes during the procedure as applicable.  8. Safety precautions based on patient history or medication use.    During Procedure: Verification of correct person, site, and procedure occurs any time the responsibility for care of the patient is transferred to another member of the care team.    VASILIY Dodson

## 2018-01-26 NOTE — LETTER
1/26/2018       RE: Darnell Grant  [address]  [facility] [address]     Dear Colleague,    Thank you for referring your patient, Darnell Grant, to the [facility] AND [facility] PR[facility] at [facility]. Please see a copy of my visit note below.    Pre-procedure diagnosis: Bladder mass/hematuria  Post procedure diagnosis: abnormal cystoscopy  Procedure performed: cystoscopy  Surgeon: JOSIAS Lomeli MD  Anesthesia: local    Indications for procedure: Patient is a 62 year old male with a history of urinary calculi and recent hematuria.  CT urogram showed an enhancing mass at the dome of the bladder with negative urine cytology.  Here today for cysto    Description of procedure: After fully informed voluntary consent was obtained patient was brought into the procedure room, identified and placed in a supine position on the cysto table.  The groin/scrotum were prepped and draped in a sterile fashion with betadine.  A 15F flexible cystoscope was inserted into the urethra and the bladder and urethra examined in a systematic manner.  There was a <1cm mass on the dome to posterior wall junction corresponding to the mass seen on CT with a smooth surface mucosa.  No stones or diverticula.  Ureteric orifices were normal in position and number and effluxing clear urine.  The prostate was 3.5 cm long and showedbilobar hypertrophy.  There was a small median lobe.  Distal urethra was normal.  The patient tolerated the procedure well and there were no complications.      Assessment/Plan: Patient with a history of hematuria and a bladder mass with negative cytology.  Will schedule transurethral resection of mass.  Risks and benefits explained.  He understands and agrees to proceed      Again, thank you for allowing me to participate in the care of your patient.      Sincerely,    Yaya Lomeli MD

## 2018-01-26 NOTE — MR AVS SNAPSHOT
After Visit Summary   1/26/2018    Darnell Grant    MRN: [id number]           Patient Information     Date Of Birth          1955        Visit Information        Provider Department      1/26/2018 3:00 PM Yaya Lomeli MD [facility] for Prostate and Urologic Cancers        Today's Diagnoses     Mass of urinary bladder    -  1    Microscopic hematuria           Follow-ups after your visit        Additional Services     PAC Visit Referral (For [facility] Only)       Does this visit require an Anesthesia consult?  Yes - Evaluate for medical necessity related to one of the following conditions:      H&P done by:  N/A and Other (Specify): PAC      Please be aware that coverage of these services is subject to the terms and limitations of your health insurance plan.  Call member services at your health plan with any benefit or coverage questions.      Please bring the following to your appointment:  >>   Any x-rays, CTs or MRIs which have been performed.  Contact the facility where they were done to arrange for  prior to your scheduled appointment.  Any new CT, MRI or other procedures ordered by your specialist must be performed at a [facility] facility or coordinated by your clinic's referral office.    >>   List of current medications  >>   This referral request   >>   Any documents/labs given to you for this referral                  Follow-up notes from your care team     Return in about 1 week (around 2/2/2018).      Your next 10 appointments already scheduled     Jan 26, 2018  4:30 PM CST   (Arrive by 4:15 PM)   Return Visit with  Prostate Cancer Ctr Nurse   [facility]state [facility] Urologic Cancers ([facility])    [address] [place] [address]  4th Floor  [facility] [address] [phone]            Mar 06, 2018  7:30 AM CST   LAB with  LAB    Health Lab ([facility])    [address] [place] [address]  Floor  [facility] [id number] [phone]           Please do not eat 10-12 hours before your appointment if you are coming in fasting for labs on lipids, cholesterol, or glucose (sugar). This does not apply to pregnant women. Water, hot tea and black coffee (with nothing added) are okay. Do not drink other fluids, diet soda or chew gum.            Mar 12, 2018 11:00 AM CDT   (Arrive by 10:45 AM)   Return Visit with Raul Dutta MD   [facility] ([facility])    [address] [facility]  4th Floor  [facility] [id number] [phone]              Who to contact     Please call your clinic at [phone] to:    Ask questions about your health    Make or cancel appointments    Discuss your medicines    Learn about your test results    Speak to your doctor   If you have compliments or concerns about an experience at your clinic, or if you wish to file a complaint, please contact [facility] Patient Relations at [phone] or email us at [email or web address]         Additional Information About Your Visit        [employer] Information     [employer] gives you secure access to your electronic health record. If you see a primary care provider, you can also send messages to your care team and make appointments. If you have questions, please call your primary care clinic.  If you do not have a primary care provider, please call [phone] and they will assist you.      [employer] is an electronic gateway that provides easy, online access to your medical records. With [employer], you can request a clinic appointment, read your test results, renew a prescription or communicate with your care team.     To access your existing account, please contact your [facility] Clinic or call [phone] for assistance.        Care EveryWhere ID     This is your Care EveryWhere ID. This could be used by other organizations to  "access your [facility] medical records  [id number]        Your Vitals Were     Pulse Height BMI (Body Mass Index)             98 1.905 m (6' 3\") 34.37 kg/m2          Blood Pressure from Last 3 Encounters:   01/26/18 135/84   01/19/18 124/74   01/19/18 124/74    Weight from Last 3 Encounters:   01/26/18 124.7 kg (275 lb)   01/19/18 128.2 kg (282 lb 11.2 oz)   08/02/17 125.8 kg (277 lb 6.4 oz)              We Performed the Following     Cystoscopy ([address])     PAC Visit Referral (For [facility] Only)     Renee-Operative Worksheet          Today's Medication Changes          These changes are accurate as of 1/26/18  4:24 PM.  If you have any questions, ask your nurse or doctor.               Stop taking these medicines if you haven't already. Please contact your care team if you have questions.     albuterol 108 (90 BASE) MCG/ACT Inhaler   Commonly known as:  PROAIR HFA/PROVENTIL HFA/VENTOLIN HFA   Stopped by:  Yaya Lomeli MD           azithromycin 250 MG tablet   Commonly known as:  ZITHROMAX   Stopped by:  Yaya Lomeli MD           cephALEXin 500 MG capsule   Commonly known as:  KEFLEX   Stopped by:  Yaya Lomeli MD           fluticasone-salmeterol 500-50 MCG/DOSE diskus inhaler   Commonly known as:  ADVAIR   Stopped by:  Yaya Lomeli MD                    Primary Care Provider Office Phone # Fax     Raul Basil Dutta [phone] [address] [address]        Equal Access to Services     [facility] : Hadhardik Amaro, wadre lupavel, qaybta markalric renteria. So [facility] [phone].    ATENCIÓN: Si habla español, tiene a mulligan disposición servicios gratuitos de asistencia lingüística. Llame al [phone].    We comply with applicable federal civil rights laws and [place] laws. We do not discriminate on the basis of race, color, national origin, age, disability, sex, sexual " orientation, or gender identity.            Thank you!     Thank you for choosing [facility] AND [facility] FOR PROSTATE AND UROLOGIC CANCERS  for your care. Our goal is always to provide you with excellent care. Hearing back from our patients is one way we can continue to improve our services. Please take a few minutes to complete the written survey that you may receive in the mail after your visit with us. Thank you!             Your Updated Medication List - Protect others around you: Learn how to safely use, store and throw away your medicines at www.disposemymeds.org.          This list is accurate as of 1/26/18  4:24 PM.  Always use your most recent med list.                   Brand Name Dispense Instructions for use Diagnosis    acetaZOLAMIDE 125 MG tablet    DIAMOX    30 tablet    Take 1 tablet (125 mg) by mouth 3 times daily    Altitude sickness preventative measures       allopurinol 100 MG tablet    ZYLOPRIM    90 tablet    1 qday    Gout, unspecified, Essential hypertension, Gastroesophageal reflux disease without esophagitis, Benign non-nodular prostatic hyperplasia without lower urinary tract symptoms, Routine general medical examination at a health care facility, Essential hypertension, benign, Nocturia, Need for hepatitis C screening test, Screening for diabetic peripheral neuropathy, Other fatigue, Hyperlipidemia LDL goal <100       amLODIPine 10 MG tablet    NORVASC    90 tablet    Take 1 tablet (10 mg) by mouth daily    Essential hypertension, Gout, unspecified, Gastroesophageal reflux disease without esophagitis, Benign non-nodular prostatic hyperplasia without lower urinary tract symptoms, Routine general medical examination at a health care facility, Essential hypertension, benign, Nocturia, Need for hepatitis C screening test, Screening for diabetic peripheral neuropathy, Other fatigue, Hyperlipidemia LDL goal <100       dutasteride 0.5 MG capsule    AVODART    90 capsule    Take 1 capsule  (0.5 mg) by mouth daily    Benign non-nodular prostatic hyperplasia without lower urinary tract symptoms, Essential hypertension, Gout, unspecified, Gastroesophageal reflux disease without esophagitis, Routine general medical examination at a health care facility, Essential hypertension, benign, Nocturia, Need for hepatitis C screening test, Screening for diabetic peripheral neuropathy, Other fatigue, Hyperlipidemia LDL goal <100       metFORMIN 1000 MG tablet    GLUCOPHAGE    90 tablet    Take one tablet daily at bedtime    Essential hypertension, Gout, unspecified, Gastroesophageal reflux disease without esophagitis, Benign non-nodular prostatic hyperplasia without lower urinary tract symptoms, Routine general medical examination at a health care facility, Essential hypertension, benign, Nocturia, Need for hepatitis C screening test, Screening for diabetic peripheral neuropathy, Other fatigue, Hyperlipidemia LDL goal <100       omeprazole 20 MG tablet     90 tablet    Take 1 tablet (20 mg) by mouth daily    Gastroesophageal reflux disease without esophagitis, Essential hypertension, Gout, unspecified, Benign non-nodular prostatic hyperplasia without lower urinary tract symptoms, Routine general medical examination at a health care facility, Essential hypertension, benign, Nocturia, Need for hepatitis C screening test, Screening for diabetic peripheral neuropathy, Other fatigue, Hyperlipidemia LDL goal <100       order for DME      Respironics Dreamstation AutoPap 7-15 cm with Respironics Leidy mask.        rosuvastatin 10 MG tablet    CRESTOR    90 tablet    Take 4 tablets (40 mg) by mouth daily    Essential hypertension, Gout, unspecified, Gastroesophageal reflux disease without esophagitis, Benign non-nodular prostatic hyperplasia without lower urinary tract symptoms, Routine general medical examination at a health care facility, Essential hypertension, benign, Nocturia, Need for hepatitis C screening test,  Screening for diabetic peripheral neuropathy, Other fatigue, Hyperlipidemia LDL goal <100       tamsulosin 0.4 MG capsule    FLOMAX    90 capsule    Take 1 capsule (0.4 mg) by mouth daily    Benign non-nodular prostatic hyperplasia without lower urinary tract symptoms, Nocturia, Routine general medical examination at a health care facility, Essential hypertension, benign, Gout, unspecified, Essential hypertension, Gastroesophageal reflux disease without esophagitis, Need for hepatitis C screening test, Screening for diabetic peripheral neuropathy, Other fatigue, Hyperlipidemia LDL goal <100       valsartan 160 MG tablet    DIOVAN    90 tablet    Take 1 tablet (160 mg) by mouth daily    Essential hypertension, benign, Routine general medical examination at a health care facility, Gout, unspecified, Essential hypertension, Gastroesophageal reflux disease without esophagitis, Benign non-nodular prostatic hyperplasia without lower urinary tract symptoms, Nocturia, Need for hepatitis C screening test, Screening for diabetic peripheral neuropathy, Other fatigue, Hyperlipidemia LDL goal <100

## 2018-01-26 NOTE — NURSING NOTE
Per Leo Marcus, patient to have 48 hr holter monitor placed.  Diagnosis: PVC's  Monitor placed: Yes  Patient Instructed: Yes  Patient verbalized understanding: Yes  Holter # [id number]      Placed by VASILIY Jackson

## 2018-01-26 NOTE — MR AVS SNAPSHOT
After Visit Summary   1/26/2018    Darnell Grant    MRN: [id number]           Patient Information     Date Of Birth          1955        Visit Information        Provider Department      1/26/2018 11:30 AM Tech, Uc Cvc Monitor, [facility]        Today's Diagnoses     PVC's (premature ventricular contractions)           Follow-ups after your visit        Your next 10 appointments already scheduled     Jan 26, 2018  3:00 PM CST   (Arrive by 2:45 PM)   Return Visit with Yaya Lomeli MD   [facility] Prostate and Urologic Cancers ([facility])    [address] [place] [id number] [phone]            Mar 06, 2018  7:30 AM CST   LAB with [facility]   [facility] ([facility])    [address] [place] [id number] [phone]           Please do not eat 10-12 hours before your appointment if you are coming in fasting for labs on lipids, cholesterol, or glucose (sugar). This does not apply to pregnant women. Water, hot tea and black coffee (with nothing added) are okay. Do not drink other fluids, diet soda or chew gum.            Mar 12, 2018 11:00 AM CDT   (Arrive by 10:45 AM)   Return Visit with Raul Dutta MD   [facility] ([facility])    [address] [place] [id number] [phone]              Who to contact     If you have questions or need follow up information about today's clinic visit or your schedule please contact [facility] directly at [phone].  Normal or non-critical lab and imaging results will be communicated to you by MyChart, letter or phone within 4 business days after the clinic has received the results. If you do not hear from us within 7 days, please contact the clinic through MyChart or phone. If you have a critical or abnormal lab result, we will  notify you by phone as soon as possible.  Submit refill requests through [employer] or call your pharmacy and they will forward the refill request to us. Please allow 3 business days for your refill to be completed.          Additional Information About Your Visit        [employer] Information     [employer] gives you secure access to your electronic health record. If you see a primary care provider, you can also send messages to your care team and make appointments. If you have questions, please call your primary care clinic.  If you do not have a primary care provider, please call [phone] and they will assist you.        Care EveryWhere ID     This is your Care EveryWhere ID. This could be used by other organizations to access your [place] medical records  [id number]         Blood Pressure from Last 3 Encounters:   01/19/18 124/74   01/19/18 124/74   08/02/17 115/75    Weight from Last 3 Encounters:   01/19/18 128.2 kg (282 lb 11.2 oz)   08/02/17 125.8 kg (277 lb 6.4 oz)   02/10/17 134.4 kg (296 lb 3.2 oz)              We Performed the Following     Zio Patch Holter        Primary Care Provider Office Phone # Fax #    Raul Basil Dutta [phone]       [address] [address]        Equal Access to Services     BRIGITTE BOWENS : Hadii aad ku hadasho Soomaali, waaxda luqadaha, qaybta kaalmada adeegyada, waxay idiin hayaan vasiliy farias . So [facility] [phone].    ATENCIÓN: Si habla español, tiene a mulligan disposición servicios gratuitos de asistencia lingüística. Alexandra al [phone].    We comply with applicable federal civil rights laws and [place] laws. We do not discriminate on the basis of race, color, national origin, age, disability, sex, sexual orientation, or gender identity.            Thank you!     Thank you for choosing [facility]  for your care. Our goal is always to provide you with excellent care. Hearing back from our patients is one way we can  continue to improve our services. Please take a few minutes to complete the written survey that you may receive in the mail after your visit with us. Thank you!             Your Updated Medication List - Protect others around you: Learn how to safely use, store and throw away your medicines at www.disposemymeds.org.          This list is accurate as of 1/26/18 11:35 AM.  Always use your most recent med list.                   Brand Name Dispense Instructions for use Diagnosis    acetaZOLAMIDE 125 MG tablet    DIAMOX    30 tablet    Take 1 tablet (125 mg) by mouth 3 times daily    Altitude sickness preventative measures       albuterol 108 (90 BASE) MCG/ACT Inhaler    PROAIR HFA/PROVENTIL HFA/VENTOLIN HFA    2 Inhaler    Inhale 2 puffs into the lungs every 6 hours as needed for shortness of breath / dyspnea or wheezing    Obstructive chronic bronchitis with exacerbation (H)       allopurinol 100 MG tablet    ZYLOPRIM    90 tablet    1 qday    Gout, unspecified, Essential hypertension, Gastroesophageal reflux disease without esophagitis, Benign non-nodular prostatic hyperplasia without lower urinary tract symptoms, Routine general medical examination at a health care facility, Essential hypertension, benign, Nocturia, Need for hepatitis C screening test, Screening for diabetic peripheral neuropathy, Other fatigue, Hyperlipidemia LDL goal <100       amLODIPine 10 MG tablet    NORVASC    90 tablet    Take 1 tablet (10 mg) by mouth daily    Essential hypertension, Gout, unspecified, Gastroesophageal reflux disease without esophagitis, Benign non-nodular prostatic hyperplasia without lower urinary tract symptoms, Routine general medical examination at a health care facility, Essential hypertension, benign, Nocturia, Need for hepatitis C screening test, Screening for diabetic peripheral neuropathy, Other fatigue, Hyperlipidemia LDL goal <100       azithromycin 250 MG tablet    ZITHROMAX    6 tablet    Take 500 mg once then  250 mg q24 hours x 4 days    Acute bronchitis, unspecified organism       cephALEXin 500 MG capsule    KEFLEX    14 capsule    Take 1 capsule (500 mg) by mouth 2 times daily    Onychomycosis       dutasteride 0.5 MG capsule    AVODART    90 capsule    Take 1 capsule (0.5 mg) by mouth daily    Benign non-nodular prostatic hyperplasia without lower urinary tract symptoms, Essential hypertension, Gout, unspecified, Gastroesophageal reflux disease without esophagitis, Routine general medical examination at a health care facility, Essential hypertension, benign, Nocturia, Need for hepatitis C screening test, Screening for diabetic peripheral neuropathy, Other fatigue, Hyperlipidemia LDL goal <100       fluticasone-salmeterol 500-50 MCG/DOSE diskus inhaler    ADVAIR    3 Inhaler    Inhale 1 puff into the lungs 2 times daily    Obstructive chronic bronchitis with exacerbation (H)       metFORMIN 1000 MG tablet    GLUCOPHAGE    90 tablet    Take one tablet daily at bedtime    Essential hypertension, Gout, unspecified, Gastroesophageal reflux disease without esophagitis, Benign non-nodular prostatic hyperplasia without lower urinary tract symptoms, Routine general medical examination at a health care facility, Essential hypertension, benign, Nocturia, Need for hepatitis C screening test, Screening for diabetic peripheral neuropathy, Other fatigue, Hyperlipidemia LDL goal <100       omeprazole 20 MG tablet     90 tablet    Take 1 tablet (20 mg) by mouth daily    Gastroesophageal reflux disease without esophagitis, Essential hypertension, Gout, unspecified, Benign non-nodular prostatic hyperplasia without lower urinary tract symptoms, Routine general medical examination at a health care facility, Essential hypertension, benign, Nocturia, Need for hepatitis C screening test, Screening for diabetic peripheral neuropathy, Other fatigue, Hyperlipidemia LDL goal <100       order for [facility] [place] [place]      Respironics Dreamstation AutoPap 7-15 cm  with Respironics Leidy mask.        rosuvastatin 10 MG tablet    CRESTOR    90 tablet    Take 4 tablets (40 mg) by mouth daily    Essential hypertension, Gout, unspecified, Gastroesophageal reflux disease without esophagitis, Benign non-nodular prostatic hyperplasia without lower urinary tract symptoms, Routine general medical examination at a health care facility, Essential hypertension, benign, Nocturia, Need for hepatitis C screening test, Screening for diabetic peripheral neuropathy, Other fatigue, Hyperlipidemia LDL goal <100       tamsulosin 0.4 MG capsule    FLOMAX    90 capsule    Take 1 capsule (0.4 mg) by mouth daily    Benign non-nodular prostatic hyperplasia without lower urinary tract symptoms, Nocturia, Routine general medical examination at a health care facility, Essential hypertension, benign, Gout, unspecified, Essential hypertension, Gastroesophageal reflux disease without esophagitis, Need for hepatitis C screening test, Screening for diabetic peripheral neuropathy, Other fatigue, Hyperlipidemia LDL goal <100       valsartan 160 MG tablet    DIOVAN    90 tablet    Take 1 tablet (160 mg) by mouth daily    Essential hypertension, benign, Routine general medical examination at a health care facility, Gout, unspecified, Essential hypertension, Gastroesophageal reflux disease without esophagitis, Benign non-nodular prostatic hyperplasia without lower urinary tract symptoms, Nocturia, Need for hepatitis C screening test, Screening for diabetic peripheral neuropathy, Other fatigue, Hyperlipidemia LDL goal <100

## 2018-01-26 NOTE — PATIENT INSTRUCTIONS
Schedule surgery with Dr. Lomeli.    It was a pleasure meeting with you today.  Thank you for allowing me and my team the privilege of caring for you today.  YOU are the reason we are here, and I truly hope we provided you with the excellent service you deserve.  Please let us know if there is anything else we can do for you so that we can be sure you are leaving completely satisfied with your care experience.      VASILIY Dodson

## 2018-01-26 NOTE — NURSING NOTE
"Chief Complaint   Patient presents with     RECHECK     Hematuria follow up       Initial Ht 1.905 m (6' 3\")  Wt 124.7 kg (275 lb)  BMI 34.37 kg/m2 Estimated body mass index is 34.37 kg/(m^2) as calculated from the following:    Height as of this encounter: 1.905 m (6' 3\").    Weight as of this encounter: 124.7 kg (275 lb).  Medication Reconciliation: complete     Kathryn AMANDA    "

## 2018-01-29 ENCOUNTER — OFFICE VISIT (OUTPATIENT)
Dept: SURGERY | Facility: CLINIC | Age: 63
End: 2018-01-29
Payer: COMMERCIAL

## 2018-01-29 ENCOUNTER — APPOINTMENT (OUTPATIENT)
Dept: SURGERY | Facility: CLINIC | Age: 63
End: 2018-01-29
Payer: COMMERCIAL

## 2018-01-29 ENCOUNTER — ANESTHESIA EVENT (OUTPATIENT)
Dept: SURGERY | Facility: AMBULATORY SURGERY CENTER | Age: 63
End: 2018-01-29

## 2018-01-29 ENCOUNTER — ALLIED HEALTH/NURSE VISIT (OUTPATIENT)
Dept: SURGERY | Facility: CLINIC | Age: 63
End: 2018-01-29
Payer: COMMERCIAL

## 2018-01-29 VITALS
TEMPERATURE: 97.4 F | HEIGHT: 75 IN | HEART RATE: 108 BPM | SYSTOLIC BLOOD PRESSURE: 154 MMHG | WEIGHT: 278.5 LBS | BODY MASS INDEX: 34.63 KG/M2 | DIASTOLIC BLOOD PRESSURE: 92 MMHG | OXYGEN SATURATION: 96 % | RESPIRATION RATE: 18 BRPM

## 2018-01-29 DIAGNOSIS — Z01.818 PREOP GENERAL PHYSICAL EXAM: Primary | ICD-10-CM

## 2018-01-29 RX ORDER — MULTIPLE VITAMINS W/ MINERALS TAB 9MG-400MCG
1 TAB ORAL AT BEDTIME
COMMUNITY

## 2018-01-29 RX ORDER — PHENTERMINE HYDROCHLORIDE 37.5 MG/1
56 TABLET ORAL
COMMUNITY
End: 2020-01-06

## 2018-01-29 NOTE — H&P
Pre-Operative H & P     CC:  Preoperative exam to assess for increased cardiopulmonary risk while undergoing surgery and anesthesia.    Date of Encounter: 1/29/2018  Primary Care Physician:  Raul Dutta    HPI  Darnell Grant is a 62 year old male who presents for pre-operative H & P in preparation for Transurethral Removal Of Bladder Tumor  with Dr. Lomeli on 1/31/2018 at [facility].     History is obtained from the patient.   Recent diagnosis of bladder mass after work-up for hematuria. Evaluated by Dr. Lomeli and above procedure planned.      Past Medical History  Past Medical History:   Diagnosis Date     Bladder mass      BPH (benign prostatic hyperplasia)      GERD (gastroesophageal reflux disease)      Hypertension      LISETTE (obstructive sleep apnea)        Past Surgical History  Past Surgical History:   Procedure Laterality Date     BACK SURGERY      repair disc     HC BREATH HYDROGEN TEST  5/22/2013    Procedure: HYDROGEN BREATH TEST;  Surgeon: Donny Paris MD;  Location: [facility]       Hx of Blood transfusions/reactions: none    Hx of abnormal bleeding or anti-platelet use: asa, already on hold    Menstrual history: No LMP for male patient.: none    Steroid use in the last year: none    Personal or FH with difficulty with Anesthesia:  None        Prior to Admission Medications  Current Outpatient Prescriptions   Medication Sig Dispense Refill     multivitamin, therapeutic with minerals (MULTI-VITAMIN) TABS tablet Take 1 tablet by mouth At Bedtime       phentermine (ADIPEX-P) 37.5 MG tablet Take 56 mg by mouth every morning (before breakfast)       rosuvastatin (CRESTOR) 10 MG tablet Take 4 tablets (40 mg) by mouth daily (Patient taking differently: Take 40 mg by mouth At Bedtime ) 90 tablet 3     valsartan (DIOVAN) 160 MG tablet Take 1 tablet (160 mg) by mouth daily (Patient taking differently: Take 160 mg by mouth At Bedtime ) 90 tablet 3     allopurinol (ZYLOPRIM)  100 MG tablet 1 qday (Patient taking differently: Take 100 mg by mouth At Bedtime 1 qday) 90 tablet 3     amLODIPine (NORVASC) 10 MG tablet Take 1 tablet (10 mg) by mouth daily (Patient taking differently: Take 10 mg by mouth At Bedtime ) 90 tablet 3     metFORMIN (GLUCOPHAGE) 1000 MG tablet Take one tablet daily at bedtime (Patient taking differently: 500 mg Takes 4 tablets at bedtime.) 90 tablet 3     omeprazole 20 MG tablet Take 1 tablet (20 mg) by mouth daily (Patient taking differently: Take 20 mg by mouth At Bedtime ) 90 tablet 3     dutasteride (AVODART) 0.5 MG capsule Take 1 capsule (0.5 mg) by mouth daily (Patient taking differently: Take 0.5 mg by mouth At Bedtime ) 90 capsule 3     tamsulosin (FLOMAX) 0.4 MG capsule Take 1 capsule (0.4 mg) by mouth daily (Patient taking differently: Take 0.4 mg by mouth At Bedtime ) 90 capsule 3     acetaZOLAMIDE (DIAMOX) 125 MG tablet Take 1 tablet (125 mg) by mouth 3 times daily (Patient taking differently: Take 125 mg by mouth as needed ) 30 tablet 0     order for DME Respironics Dreamstation AutoPap 7-15 cm with Respironics Leidy mask.         Allergies  Allergies   Allergen Reactions     Ragweeds      Watery eyes, itchy nose       Social History  Social History     Social History     Marital status:      Spouse name: N/A     Number of children: N/A     Years of education: N/A     Occupational History     Not on file.     Social History Main Topics     Smoking status: Never Smoker     Smokeless tobacco: Never Used     Alcohol use No     Drug use: No     Sexual activity: Not on file     Other Topics Concern     Not on file     Social History Narrative       Family History  Family History   Problem Relation Age of Onset     Asthma Brother            Anesthesia Evaluation     . Pt has had prior anesthetic. Type: General and MAC           ROS/MED HX    ENT/Pulmonary:     (+)sleep apnea, doesn't use CPAP , . .    Neurologic:  - neg neurologic ROS     Cardiovascular:   "   (+) hypertension----. : . . . :. . Previous cardiac testing       METS/Exercise Tolerance:  >4 METS   Hematologic:  - neg hematologic  ROS       Musculoskeletal:  - neg musculoskeletal ROS       GI/Hepatic:     (+) GERD Asymptomatic on medication,       Renal/Genitourinary:     (+) Other Renal/ Genitourinary, bladder mass      Endo:  - neg endo ROS       Psychiatric:  - neg psychiatric ROS       Infectious Disease:  - neg infectious disease ROS       Malignancy:   (+) Malignancy History of Other  Other CA bladder Active status post         Other:    (+) No chance of pregnancy C-spine cleared: N/A, no H/O Chronic Pain,no other significant disability              Physical Exam  Normal systems: cardiovascular, pulmonary and dental    Airway   Mallampati: III  TM distance: >3 FB  Neck ROM: full    Dental   Normal    Cardiovascular   Rhythm and rate: regular and normal      Pulmonary    breath sounds clear to auscultation        The complete review of systems is negative other than noted in the HPI or here.   Temp: 97.4  F (36.3  C) Temp src: Oral BP: (!) 154/92 Pulse: 108   Resp: 18 SpO2: 96 %         278 lbs 8 oz  6' 3\"   Body mass index is 34.81 kg/(m^2).       Physical Exam  Constitutional: Awake, alert, cooperative, no apparent distress, and appears stated age.  Eyes: Pupils equal, round and reactive to light, extra ocular muscles intact, sclera clear, conjunctiva normal.  HENT: Normocephalic, oral pharynx with moist mucus membranes, good dentition. No goiter appreciated.   Respiratory: Clear to auscultation bilaterally, no crackles or wheezing.  Cardiovascular: Regular rate and rhythm, normal S1 and S2, and no murmur noted.  Carotids +2, no bruits. No edema. Palpable pulses to radial  DP and PT arteries.   GI: Denies abdominal pain or tenderness.  Surgical scars: back  Lymph/Hematologic: No cervical lymphadenopathy and no supraclavicular lymphadenopathy.  Genitourinary:  deferred  Skin: Warm and dry.  No rashes " at anticipated surgical site.   Musculoskeletal: Full ROM of neck. There is no redness, warmth, or swelling of the joints. Gross motor strength is normal.    Neurologic: Awake, alert, oriented to name, place and time. Cranial nerves II-XII are grossly intact. Gait is normal.   Neuropsychiatric: Calm, cooperative. Normal affect.     Labs: (personally reviewed)  Labs 12/30/2017  Na-140  K-3.8  Cr-0.8  BUN- 11  Hgb -14.9  Plts -151    Results for PATSY GRANT (MRN [id number]) as of 1/29/2018 16:54   Ref. Range 1/17/2018 07:10   Specimen Description Unknown Unspecified Urine   Culture Micro Unknown No growth   URINE CULTURE AEROBIC BACTERIAL Unknown Rpt       EKG: Personally reviewed but formal cardiology read pending: EKG 1/19/2018 SR with ocassional PVC and anterior fascicular block        MRI cardiac stress 1/19/2018  Clinical history: 62-year-old male with frequent PVCs and calcium score of 744, CMR to rule out ischemia.   Comparison CMR: None     1. The LV is normal in cavity size and wall thickness. The global systolic function is normal. The LVEF is  55%. There are no regional wall motion abnormalities.     2. The RV is normal in cavity size. The global systolic function is normal. The RVEF is 61%.      3. Both atria are normal in size.     4. There is no significant valvular disease.      5. Late gadolinium enhancement imaging shows no MI, fibrosis or infiltrative disease.      6. Regadenoson stress perfusion imaging shows no ischemia.     CONCLUSIONS: No ischemia or infarction. Normal LV and RV function, LVEF of 55% and RVEF of 61%. No obvious  substrate for arrhythmia.        ASSESSMENT and PLAN  Patsy Grant is a 62 year old male scheduled to undergo Transurethral Removal Of Bladder Tumor. He has the following specific operative considerations:   - RCRI : No serious cardiac risks.  0.4% risk of major adverse cardiac event.   - Anesthesia considerations:  Refer to PAC assessment in anesthesia records  -  VTE risk: 3%  - LISETTE # of risks = known LISETTE without CPAP use  - Post-op delirium risk: high risk due to age  - Risk of PONV score = 2.  If > 2, anti-emetic intervention recommended.    Previous anesthesia without complications.   1) Cardiac: Known PVC, shown up at 14% on recent holter. EKG 1/19/2018 SR with ocassional PVC and anterior fascicular block. MRI cardiac 1/19/2018 negative for ischemia and EF 55%. METS well over 4 without cardiac symptoms.  2) Pulmonary: LISETTE, does not use CPAP  3) GI: GERD, well managed with omeprazole  4) Renal: Recent diagnosis of bladder mass after work-up for hematuria.  5) Endo: BMI 35     Pt optimized for surgery. AVS with information on surgery time/arrival time, meds and NPO status given by nursing staff  Pt is appropriate for ASC       Patient was discussed with Dr Brenner.    CHRISTIN Zuleta CNS  Preoperative Assessment Center  [facility] and [facility]  Phone: [phone]  Fax: [phone]

## 2018-01-29 NOTE — PATIENT INSTRUCTIONS
Preparing for Your Surgery      Name:  Darnell Grant   MRN:  [id number]   :  1955   Today's Date:  2018     Arriving for surgery:  Surgery date:  18  Arrival time:  6:30 am  Please come to:     [facility]  [address] [place] [id number]     Parking is available in front of the Clinics and Surgery Center building from 5:30AM to 8:00PM.  -  Proceed to the 5th floor to check into the Ambulatory Surgery Center.              >> There will be patient concierges on the 1st and 5th floor, for assistance or an escort, if you would like.              >> Please call [phone] with any questions.    What can I eat or drink?  -  You may have solid food or milk products until 8 hours prior to your surgery.  Stop midnight 18  -  You may have water, apple juice or 7up/Sprite until 2 hours prior to your surgery.  Stop 6:00 am  18    Which medicines can I take?         No aspirin before surgery, stop advil/ibuprofen 1 day before surgery.    -  Do NOT take these medications in the morning, the day of surgery: 18                -  Please take these medications the day of surgery: 18  NONE      How do I prepare myself?  -  Take two showers: one the night before surgery; and one the morning of surgery.         Use Scrubcare or Hibiclens to wash from neck down.  You may use your own shampoo and conditioner. No other hair products.   -  Do NOT use lotion, powder, deodorant, or antiperspirant the day of your surgery.  -  Do NOT wear any makeup, fingernail polish or jewelry.    -Do not bring your own medications to the hospital, except for inhalers and eye drops.  -  Bring your ID and insurance card.    Questions or Concerns:  If you have questions or concerns, please call the  Preoperative Assessment Center, Monday-Friday 7AM-7PM:  [phone]  AFTER YOUR SURGERY  Breathing exercises   Breathing exercises help you recover faster. Take deep breaths and  let the air out slowly. This will:     Help you wake up after surgery.    Help prevent complications like pneumonia.  Preventing complications will help you go home sooner.   We may give you a breathing device (incentive spirometer) to encourage you to breathe deeply.   Nausea and vomiting   You may feel sick to your stomach after surgery; if so, let your nurse know.    Pain control:  After surgery, you may have pain. Our goal is to help you manage your pain. Pain medicine will help you feel comfortable enough to do activities that will help you heal.  These activities may include breathing exercises, walking and physical therapy.   To help your health care team treat your pain we will ask: 1) If you have pain  2) where it is located 3) describe your pain in your words  Methods of pain control include medications given by mouth, vein or by nerve block for some surgeries.  We may give you a pain control pump that will:  1) Deliver the medicine through a tube placed in your vein  2) Control the amount of medicine you receive  3) Allow you to push a button to deliver a dose of pain medicine  Sequential Compression Device (SCD) or Pneumo Boots:  You may need to wear SCD S on your legs or feet. These are wraps connected to a machine that pumps in air and releases it. The repeated pumping helps prevent blood clots from forming.

## 2018-01-29 NOTE — ANESTHESIA PREPROCEDURE EVALUATION
Anesthesia Evaluation     . Pt has had prior anesthetic. Type: General and MAC           ROS/MED HX    ENT/Pulmonary:     (+)sleep apnea, doesn't use CPAP , . .    Neurologic:  - neg neurologic ROS     Cardiovascular:     (+) hypertension----. : . . . :. . Previous cardiac testing date:results:date: results:Stress Echo and MRI: No evidence of ischemia. Normal LV function. No valvular abnormalities. date: results: date: results:          METS/Exercise Tolerance:  >4 METS   Hematologic:  - neg hematologic  ROS       Musculoskeletal:  - neg musculoskeletal ROS       GI/Hepatic:     (+) GERD Asymptomatic on medication,       Renal/Genitourinary:     (+) Other Renal/ Genitourinary, bladder mass      Endo:  - neg endo ROS       Psychiatric:  - neg psychiatric ROS       Infectious Disease:  - neg infectious disease ROS       Malignancy:   (+) Malignancy History of Other  Other CA bladder Active status post         Other:    (+) No chance of pregnancy C-spine cleared: N/A, no H/O Chronic Pain,no other significant disability                    Physical Exam  Normal systems: cardiovascular, pulmonary and dental    Airway   Mallampati: III  TM distance: >3 FB  Neck ROM: full    Dental     Cardiovascular   Rhythm and rate: regular and normal      Pulmonary    breath sounds clear to auscultation    Other findings:   Labs 12/30/2017  Na-140  K-3.8  Cr-0.8  BUN- 11  Hgb -14.9  Plts -151           PAC Discussion and Assessment    ASA Classification: 3  Case is suitable for: [place]  Anesthetic techniques and relevant risks discussed: GA  Invasive monitoring and risk discussed: Yes  Types:   Possibility and Risk of blood transfusion discussed:   NPO instructions given:   Additional anesthetic preparation and risks discussed:   Needs early admission to pre-op area:   Other:     PAC Resident/NP Anesthesia Assessment:        Mid-Level Provider/Resident:   Date:   Time:     Attending Anesthesiologist Anesthesia Assessment:  62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 Chart reviewed, patient seen and evaluated; agree with above assessment. He is happy with eithe rMAC or GA.    Patient is appropriate for the planned procedure without further workup or medical management change. The final anesthesia plan will be determined by the physician anesthesiologist caring for the patient on the day of surgery.      Reviewed and Signed by PAC Anesthesiologist  Anesthesiologist: sammi  Date: 1/29/2018  Time:   Pass/Fail: Pass  Disposition:     PAC Pharmacist Assessment:        Pharmacist:   Date:   Time:      Anesthesia Plan      History & Physical Review  History and physical reviewed and following examination; no interval change.    ASA Status:  3 .    NPO Status:  > 8 hours    Plan for General and LMA with Intravenous induction. Maintenance will be TIVA.    PONV prophylaxis:  Ondansetron (or other 5HT-3) and Dexamethasone or Solumedrol       Postoperative Care  Postoperative pain management:  IV analgesics and Oral pain medications.      Consents  Anesthetic plan, risks, benefits and alternatives discussed with:  Patient..                          .

## 2018-01-31 ENCOUNTER — SURGERY (OUTPATIENT)
Age: 63
End: 2018-01-31

## 2018-01-31 ENCOUNTER — ANESTHESIA (OUTPATIENT)
Dept: SURGERY | Facility: AMBULATORY SURGERY CENTER | Age: 63
End: 2018-01-31

## 2018-01-31 ENCOUNTER — HOSPITAL ENCOUNTER (OUTPATIENT)
Facility: AMBULATORY SURGERY CENTER | Age: 63
End: 2018-01-31
Attending: UROLOGY
Payer: COMMERCIAL

## 2018-01-31 VITALS
OXYGEN SATURATION: 95 % | BODY MASS INDEX: 34.66 KG/M2 | SYSTOLIC BLOOD PRESSURE: 124 MMHG | WEIGHT: 278.8 LBS | TEMPERATURE: 97.6 F | HEIGHT: 75 IN | RESPIRATION RATE: 12 BRPM | DIASTOLIC BLOOD PRESSURE: 72 MMHG

## 2018-01-31 DIAGNOSIS — N32.89 MASS OF URINARY BLADDER: ICD-10-CM

## 2018-01-31 RX ORDER — LIDOCAINE HYDROCHLORIDE 20 MG/ML
INJECTION, SOLUTION INFILTRATION; PERINEURAL PRN
Status: DISCONTINUED | OUTPATIENT
Start: 2018-01-31 | End: 2018-01-31

## 2018-01-31 RX ORDER — FENTANYL CITRATE 50 UG/ML
INJECTION, SOLUTION INTRAMUSCULAR; INTRAVENOUS PRN
Status: DISCONTINUED | OUTPATIENT
Start: 2018-01-31 | End: 2018-01-31

## 2018-01-31 RX ORDER — ONDANSETRON 2 MG/ML
4 INJECTION INTRAMUSCULAR; INTRAVENOUS EVERY 30 MIN PRN
Status: DISCONTINUED | OUTPATIENT
Start: 2018-01-31 | End: 2018-02-01 | Stop reason: HOSPADM

## 2018-01-31 RX ORDER — FENTANYL CITRATE 50 UG/ML
25-50 INJECTION, SOLUTION INTRAMUSCULAR; INTRAVENOUS
Status: DISCONTINUED | OUTPATIENT
Start: 2018-01-31 | End: 2018-02-01 | Stop reason: HOSPADM

## 2018-01-31 RX ORDER — NALOXONE HYDROCHLORIDE 0.4 MG/ML
.1-.4 INJECTION, SOLUTION INTRAMUSCULAR; INTRAVENOUS; SUBCUTANEOUS
Status: DISCONTINUED | OUTPATIENT
Start: 2018-01-31 | End: 2018-02-01 | Stop reason: HOSPADM

## 2018-01-31 RX ORDER — SODIUM CHLORIDE, SODIUM LACTATE, POTASSIUM CHLORIDE, CALCIUM CHLORIDE 600; 310; 30; 20 MG/100ML; MG/100ML; MG/100ML; MG/100ML
INJECTION, SOLUTION INTRAVENOUS CONTINUOUS
Status: DISCONTINUED | OUTPATIENT
Start: 2018-01-31 | End: 2018-01-31 | Stop reason: HOSPADM

## 2018-01-31 RX ORDER — ONDANSETRON 4 MG/1
4 TABLET, ORALLY DISINTEGRATING ORAL EVERY 30 MIN PRN
Status: DISCONTINUED | OUTPATIENT
Start: 2018-01-31 | End: 2018-02-01 | Stop reason: HOSPADM

## 2018-01-31 RX ORDER — ONDANSETRON 2 MG/ML
INJECTION INTRAMUSCULAR; INTRAVENOUS PRN
Status: DISCONTINUED | OUTPATIENT
Start: 2018-01-31 | End: 2018-01-31

## 2018-01-31 RX ORDER — GABAPENTIN 300 MG/1
300 CAPSULE ORAL ONCE
Status: COMPLETED | OUTPATIENT
Start: 2018-01-31 | End: 2018-01-31

## 2018-01-31 RX ORDER — SULFAMETHOXAZOLE/TRIMETHOPRIM 800-160 MG
1 TABLET ORAL 2 TIMES DAILY
Qty: 6 TABLET | Refills: 0 | Status: SHIPPED | OUTPATIENT
Start: 2018-01-31 | End: 2018-03-07

## 2018-01-31 RX ORDER — PROPOFOL 10 MG/ML
INJECTION, EMULSION INTRAVENOUS PRN
Status: DISCONTINUED | OUTPATIENT
Start: 2018-01-31 | End: 2018-01-31

## 2018-01-31 RX ORDER — CEFAZOLIN SODIUM 1 G/50ML
3 SOLUTION INTRAVENOUS
Status: COMPLETED | OUTPATIENT
Start: 2018-01-31 | End: 2018-01-31

## 2018-01-31 RX ORDER — SODIUM CHLORIDE, SODIUM LACTATE, POTASSIUM CHLORIDE, CALCIUM CHLORIDE 600; 310; 30; 20 MG/100ML; MG/100ML; MG/100ML; MG/100ML
INJECTION, SOLUTION INTRAVENOUS CONTINUOUS
Status: DISCONTINUED | OUTPATIENT
Start: 2018-01-31 | End: 2018-02-01 | Stop reason: HOSPADM

## 2018-01-31 RX ORDER — MEPERIDINE HYDROCHLORIDE 25 MG/ML
12.5 INJECTION INTRAMUSCULAR; INTRAVENOUS; SUBCUTANEOUS
Status: DISCONTINUED | OUTPATIENT
Start: 2018-01-31 | End: 2018-02-01 | Stop reason: HOSPADM

## 2018-01-31 RX ORDER — HYDROCODONE BITARTRATE AND ACETAMINOPHEN 5; 325 MG/1; MG/1
1 TABLET ORAL ONCE
Status: DISCONTINUED | OUTPATIENT
Start: 2018-01-31 | End: 2018-02-01 | Stop reason: HOSPADM

## 2018-01-31 RX ORDER — ACETAMINOPHEN 325 MG/1
975 TABLET ORAL ONCE
Status: COMPLETED | OUTPATIENT
Start: 2018-01-31 | End: 2018-01-31

## 2018-01-31 RX ORDER — HYDROCODONE BITARTRATE AND ACETAMINOPHEN 5; 325 MG/1; MG/1
1-2 TABLET ORAL EVERY 4 HOURS PRN
Qty: 10 TABLET | Refills: 0 | Status: SHIPPED | OUTPATIENT
Start: 2018-01-31 | End: 2018-03-07

## 2018-01-31 RX ORDER — PHENAZOPYRIDINE HYDROCHLORIDE 95 MG/1
190 TABLET ORAL 3 TIMES DAILY
Qty: 12 TABLET | Refills: 0 | Status: SHIPPED | OUTPATIENT
Start: 2018-01-31 | End: 2018-03-07

## 2018-01-31 RX ORDER — DEXAMETHASONE SODIUM PHOSPHATE 4 MG/ML
INJECTION, SOLUTION INTRA-ARTICULAR; INTRALESIONAL; INTRAMUSCULAR; INTRAVENOUS; SOFT TISSUE PRN
Status: DISCONTINUED | OUTPATIENT
Start: 2018-01-31 | End: 2018-01-31

## 2018-01-31 RX ORDER — PROPOFOL 10 MG/ML
INJECTION, EMULSION INTRAVENOUS CONTINUOUS PRN
Status: DISCONTINUED | OUTPATIENT
Start: 2018-01-31 | End: 2018-01-31

## 2018-01-31 RX ORDER — LIDOCAINE 40 MG/G
CREAM TOPICAL
Status: DISCONTINUED | OUTPATIENT
Start: 2018-01-31 | End: 2018-01-31 | Stop reason: HOSPADM

## 2018-01-31 RX ADMIN — Medication 100 MCG: at 09:21

## 2018-01-31 RX ADMIN — PROPOFOL 70 MG: 10 INJECTION, EMULSION INTRAVENOUS at 09:25

## 2018-01-31 RX ADMIN — PROPOFOL 200 MCG/KG/MIN: 10 INJECTION, EMULSION INTRAVENOUS at 08:59

## 2018-01-31 RX ADMIN — LIDOCAINE HYDROCHLORIDE 100 MG: 20 INJECTION, SOLUTION INFILTRATION; PERINEURAL at 08:59

## 2018-01-31 RX ADMIN — CEFAZOLIN SODIUM 3 G: 1 SOLUTION INTRAVENOUS at 09:00

## 2018-01-31 RX ADMIN — DEXAMETHASONE SODIUM PHOSPHATE 4 MG: 4 INJECTION, SOLUTION INTRA-ARTICULAR; INTRALESIONAL; INTRAMUSCULAR; INTRAVENOUS; SOFT TISSUE at 09:07

## 2018-01-31 RX ADMIN — PROPOFOL: 10 INJECTION, EMULSION INTRAVENOUS at 09:21

## 2018-01-31 RX ADMIN — SODIUM CHLORIDE, SODIUM LACTATE, POTASSIUM CHLORIDE, CALCIUM CHLORIDE: 600; 310; 30; 20 INJECTION, SOLUTION INTRAVENOUS at 08:53

## 2018-01-31 RX ADMIN — FENTANYL CITRATE 50 MCG: 50 INJECTION, SOLUTION INTRAMUSCULAR; INTRAVENOUS at 08:59

## 2018-01-31 RX ADMIN — Medication 100 MCG: at 09:12

## 2018-01-31 RX ADMIN — PROPOFOL 300 MG: 10 INJECTION, EMULSION INTRAVENOUS at 08:59

## 2018-01-31 RX ADMIN — ACETAMINOPHEN 975 MG: 325 TABLET ORAL at 07:29

## 2018-01-31 RX ADMIN — GABAPENTIN 300 MG: 300 CAPSULE ORAL at 07:30

## 2018-01-31 RX ADMIN — PROPOFOL 50 MG: 10 INJECTION, EMULSION INTRAVENOUS at 09:17

## 2018-01-31 RX ADMIN — Medication 100 MCG: at 09:35

## 2018-01-31 RX ADMIN — FENTANYL CITRATE 25 MCG: 50 INJECTION, SOLUTION INTRAMUSCULAR; INTRAVENOUS at 09:25

## 2018-01-31 RX ADMIN — FENTANYL CITRATE 25 MCG: 50 INJECTION, SOLUTION INTRAMUSCULAR; INTRAVENOUS at 09:17

## 2018-01-31 RX ADMIN — ONDANSETRON 4 MG: 2 INJECTION INTRAMUSCULAR; INTRAVENOUS at 09:07

## 2018-01-31 NOTE — OP NOTE
OPERATIVE REPORT    PREOPERATIVE DIAGNOSIS:   1) Bladder tumor  2)  Bilateral lobar BPH    POSTOPERATIVE DIAGNOSIS:  Same    PROCEDURE PERFORMED: Transurethral resection of Bladder Tumor <2 cm    ATTENDING SURGEON: Dr. Lomeli    RESIDENT SURGEON: Juana Ramírez MD  FINDINGS:  1 cm tumor located on the bladder dome, significant bilobar BPH     ANESTHESIA: General   INTRAVENOUS FLUIDS: See dictated anesthesia record  ESTIMATED BLOOD LOSS: 20  mL.     SPECIMENS:   1. Bladder tumor chips  2. Base of bladder tumor    DRAINS:   18Fr acosta catheter    INDICATIONS FOR PROCEDURE: This is a 62 year old male with a history of urinary calculi, BPH, and recent hematuria. CT urogram showed an enhancing mass at the dome of the bladder with negative urine cytology. Bedside cystocopy with Dr. Lomeli on 1/26/2018 noted a small bladder dome tumor corresponding to the CTu findings... After discussion of the risks, benefits and alternatives of the procedure, the patient agreed to proceed with transurethral resection of his bladder tumor.     DESCRIPTION OF PROCEDURE: After verifying informed consent, the patient was taken to the operating room. Adequate anesthesia was induced, the patient was placed in the dorsal lithotomy position and prepped and draped in standard sterile fashion. A timeout was performed to verify correct patient and procedure. Pneumo boots and perioperative antibiotics were in place before the procedure commenced.     A 22-Georgian rigid cystoscope was inserted into a well lubricated urethra. We began by performing a white light cystourethroscopy. The urethra was tortuous, and the prostate was noted for significant bilobar hypertrophy. The ureteral orifices were in their orthotopic positions bilaterally. A single bladder tumor was noted in the bladder dome.    We then introduced the 26-Georgian bipolar Gyrus resectoscope. We obtained several cold cut biopsies then cauterized the base using electrocautery. Of note,  procedure was made difficult by the patient's significant prostatic hypertrophy. We then reintroduced the resectoscope to ensure meticulous hemostasis.     An 18-Tamazight acosta catheter was placed and 10 mL was instilled into the balloon. Catheter was hooked up to continuous irrigation which was clear;  the patient was awoken from anesthesia and transferred to the recovery room in stable condition.     POSTOPERATIVE PLAN:   1. Will d/c with acosta; he may remove it at home tomorrow  2. Will also d/c with 3 day course of bactrim DS BID and azo; he may resume his home baby ASA and fish oil in 3 days as well  3. Follow up on 2/14/2018 with Dr. Lomeli in clinic to review pathology    I ws present and performed and entire procedure

## 2018-01-31 NOTE — ANESTHESIA CARE TRANSFER NOTE
Patient: Darnell Grant    Procedure(s):  Transurethral Biopsy and Resection of Bladder Tumor - Wound Class: II-Clean Contaminated    Diagnosis: Mass Of Urinary Bladder  Diagnosis Additional Information: No value filed.    Anesthesia Type:   General, LMA     Note:  Airway :Room Air  Patient transferred to:PACU  Comments: To pacu on room air vss report to RN    87, 16, 124/84, 96%Handoff Report: Identifed the Patient, Identified the Reponsible Provider, Reviewed the pertinent medical history, Discussed the surgical course, Reviewed Intra-OP anesthesia mangement and issues during anesthesia, Set expectations for post-procedure period and Allowed opportunity for questions and acknowledgement of understanding      Vitals: (Last set prior to Anesthesia Care Transfer)    CRNA VITALS  1/31/2018 0923 - 1/31/2018 0956      1/31/2018             Pulse: 97    SpO2: (!)  89 %    Resp Rate (set): 10                Electronically Signed By: CHRISTIN Isbell CRNA  January 31, 2018  9:56 AM

## 2018-01-31 NOTE — IP AVS SNAPSHOT
MRN:[id number]                      After Visit Summary   1/31/2018    Darnell Grant    MRN: [id number]           Thank you!     Thank you for choosing [facility] for your care. Our goal is always to provide you with excellent care. Hearing back from our patients is one way we can continue to improve our services. Please take a few minutes to complete the written survey that you may receive in the mail after you visit with us. Thank you!        Patient Information     Date Of Birth          1955        About your hospital stay     You were admitted on:  January 31, 2018 [facility]    You were discharged on:  January 31, 2018       Who to Call     For medical emergencies, please call 911.  For non-urgent questions about your medical care, please call your primary care provider or clinic, [phone]  For questions related to your surgery, please call your surgery clinic        Attending Provider     Provider Specialty    Yaya Lomeli MD Urology       Primary Care Provider Office Phone # Fax #    Raul Basil Dutta [phone]      After Care Instructions     Discharge Instructions       What to expect post-operatively  - Some hematuria (pink urine) with a few clots is expected for about 1 week from your procedure. Consider calling the numbers below if this persists past 1 week or if the hematuria progresses to significant blood and or clots.   - Some dysuria (pain with urination) and bladder discomfort/spasms are also expected post-operatively    Activity  - Do not strain with bowel movements.  - Do not drive until you can press the brake pedal quickly and fully without pain.   - Do not operate a motor vehicle while taking narcotic pain medications. .    Medications    - Transition from narcotic pain medications to tylenol (acetaminophen) as you are able.  Wean yourself off all pain medications as you are  "able.  - Some pain medications contain both tylenol (acetaminophen) and a narcotic (Norco, vicodin, percocet), do not take more than 4,000mg of Tylenol (acetaminophen) from all sources in any 24 hour period.  - Narcotics can make you constipated.  Take over the counter fiber (metamucil or benefiber) and stool softeners (miralax, docusate or senna) while taking narcotic pain medications, but stop if you develop diarrhea.  - No driving or operating machinery while taking narcotic pain medications     Follow-Up:  - Follow up with Dr. Lomeli as scheduled on 2/14/2018    - Call your primary care provider to touch base regarding your recent procedure.    - Call or return sooner than your regularly scheduled visit if you develop any of the following: fever (greater than 101.5), uncontrolled pain, uncontrolled nausea or vomiting, as well as increased redness, swelling, or drainage from your wound.     Phone numbers:   - Monday through Friday 8am to 4:30pm: Call [phone] with questions or to schedule or confirm appointment.    - Nights or weekends: call the after hours emergency pager - [phone] and tell the  \"I would like to page the Urology Resident on call.\"  - For emergencies, call [phone]                  Your next 10 appointments already scheduled     Feb 14, 2018  3:00 PM CST   (Arrive by 2:45 PM)   Post-Op with Yaya Lomeli MD   [facility] ([facility])    [address]  4th [address]  [facility] [address] [phone]            Mar 06, 2018  7:30 AM CST   LAB with  LAB   [facility] ([facility])    [address]  1st [address]  [facility] [address] [phone]           Please do not eat 10-12 hours before your appointment if you are coming in fasting for labs on lipids, cholesterol, or glucose (sugar). This does not apply to pregnant women. Water, hot tea and black coffee " (with nothing added) are okay. Do not drink other fluids, diet soda or chew gum.            Mar 12, 2018 11:00 AM CDT   (Arrive by 10:45 AM)   Return Visit with Raul Dutta MD   [facility] ([facility])    [address] [place] [address] [phone]              Further instructions from your care team       You will be discharged with a acosta catheter. You may remove this catheter tomorrow morning  You will also be discharged with prophylactic antibiotic course of bactrim DS (x 3 days)    What to expect post-operatively  - Some hematuria (pink urine) with a few clots is expected for about 1 week from your procedure. Consider calling the numbers below if this persists past 1 week or if the hematuria progresses to significant blood and or clots.   - Some dysuria (pain with urination) and bladder discomfort/spasms are also expected post-operatively    Activity  - Do not strain with bowel movements.  - Do not drive until you can press the brake pedal quickly and fully without pain.   - Do not operate a motor vehicle while taking narcotic pain medications. .    Medications    - Transition from narcotic pain medications to tylenol (acetaminophen) as you are able.  Wean yourself off all pain medications as you are able.  - Some pain medications contain both tylenol (acetaminophen) and a narcotic (Norco, vicodin, percocet), do not take more than 4,000mg of Tylenol (acetaminophen) from all sources in any 24 hour period.  - Narcotics can make you constipated.  Take over the counter fiber (metamucil or benefiber) and stool softeners (miralax, docusate or senna) while taking narcotic pain medications, but stop if you develop diarrhea.  - No driving or operating machinery while taking narcotic pain medications     Follow-Up:  - Follow up with Dr. Lomeli as scheduled on 2/14/2018    - Call your primary care provider to touch base regarding your  "recent procedure.    - Call or return sooner than your regularly scheduled visit if you develop any of the following: fever (greater than 101.5), uncontrolled pain, uncontrolled nausea or vomiting, as well as increased redness, swelling, or drainage from your wound.     Phone numbers:   - Monday through Friday 8am to 4:30pm: Call [phone] with questions or to schedule or confirm appointment.    - Nights or weekends: call the after hours emergency pager - [phone] and tell the  \"I would like to page the Urology Resident on call.\"  - For emergencies, call 911     [facility]  Home Care Following Anesthesia  For 24 hours after surgery:  1. Get plenty of rest.  A responsible adult must stay with you for at least 24 hours after you leave the surgery center.  2. Do not drive or use heavy equipment.  If you have weakness or tingling, don't drive or use heavy equipment until this feeling goes away.   3. Do not drink alcohol.   4. Avoid strenuous or risky activities.  Ask for help when climbing stairs.  5. You may feel lightheaded.  IF so, sit for a few minutes before standing.  Have someone help you get up.   6. If you have nausea (feel sick to your stomach): Drink only clear liquids such as apple juice, ginger ale, broth or 7-Up.  Rest may also help.  Be sure to drink enough fluids.  Move to a regular diet as you feel able.   7. You may have a slight fever.  Call the doctor if your fever is over 100 F (37.7 C) (taken under the tongue) or lasts longer than 24 hours.  8. You may have a dry mouth, a sore throat, muscle aches or trouble sleeping. These should go away after 24 hours.  9. Do not make important or legal decisions.            Tips for taking pain medications  To get the best pain relief possible, remember these points:    Take pain medications as directed, before pain becomes severe.    Pain medication can upset your stomach: taking it with food may " help.    Constipation is a common side effect of pain medication. Drink plenty of  fluids.    Eat foods high in fiber. Take a stool softener if recommended by your doctor or pharmacist.    Do not drink alcohol, drive or operate machinery while taking pain medications.    Ask about other ways to control pain, such as with heat, ice or relaxation.    Tylenol/Acetaminophen Consumption  To help encourage the safe use of acetaminophen, the makers of TYLENOL  have lowered the maximum daily dose for single-ingredient Extra Strength TYLENOL  (acetaminophen) products sold in the [place] from 8 pills per day (4,000 mg) to 6 pills per day (3,000 mg). The dosing interval has also changed from 2 pills every 4-6 hours to 2 pills every 6 hours.    If you feel your pain relief is insufficient, you may take Tylenol/Acetaminophen in addition to your narcotic pain medication.     Be careful not to exceed 3,000 mg of Tylenol/Acetaminophen in a 24 hour period from all sources.    If you are taking extra strength Tylenol/acetaminophen (500 mg), the maximum dose is 6 tablets in 24 hours.    If you are taking regular strength acetaminophen (325 mg), the maximum dose is 9 tablets in 24 hours.    Call a doctor for any of the followin. Signs of infection (fever, growing tenderness at the surgery site, a large amount of drainage or bleeding, severe pain, foul-smelling drainage, redness, swelling).  2. It has been over 8 to 10 hours since surgery and you are still not able to urinate (pass water).  3. Headache for over 24 hours.    Your doctor is:  Dr. Yaya Lomeli, Prostate and Urology: [phone]                  Or dial [phone] and ask for the resident on call for:  Prostate Urology  For emergency care, call the:  [facility]:  [phone] (TTY for hearing impaired: [phone])  Care of Indwelling Krause Catheter  Cleanliness is VERY important!  1. Wash well around catheter with soap and water and rinse  well.  Do this every morning and before bedtime.  2. Empty leg bag or bed bag into toilet whenever it becomes half full.  3. When disconnecting and reconnecting, wipe both the catheter end and tubing tip with alcohol. (You may use commercially prepared alcohol wipes or regular cotton balls soaked in alcohol.)  4. Rinse leg bag and bed bag inside and out after each use. You can soak leg bag and/or bed bag in two to three ounces of white vinegar when not in use. Rinse thoroughly before reconnecting to catheter.  5. Drink lots of fluids (at least eight to ten cups/glasses per day) and take two to four grams of Vitamin C (optional) per day.      6. Watch for sign of catheter-associated urinary tract infection which include:      Cloudy urine, sediment in urine (may look like sand particles or white flakes), foul smelling urine    A burning feeling, pressure or pain in your lower abdomen    A burning feeling in the urethra or genitalia    Aching in the back (by the kidney)      How to Remove your Catheter    1. Wash your hands.    2. Insert the syringe into balloon port of the catheter.    3. Withdraw all the fluid from the balloon. You may have to re-insert the syringe into the port additional times until no more fluid can be withdrawn.    4. Pull gently on the catheter. If no resistance, slowly withdraw.    5. Dispose of the catheter and the syringe.    6. Wash your hands.    7. Call your doctor if unable to urinate within 6 to 8 hours after catheter was removed or when uncomfortable and feel urge to urinate but cannot.                    Pending Results     Date and Time Order Name Status Description    1/31/2018 0926 Surgical pathology exam In process             Admission Information     Date & Time Provider Department Dept. Phone    1/31/2018 Yaya Lomeli MD [facility] [phone]      Your Vitals Were     Blood Pressure Temperature Respirations Height Weight Pulse Oximetry     "128/82 97  F (36.1  C) (Temporal) 20 1.905 m (6' 3\") 126.5 kg (278 lb 12.8 oz) 90%    BMI (Body Mass Index)                   34.85 kg/m2           [facility] Information     [employer] gives you secure access to your electronic health record. If you see a primary care provider, you can also send messages to your care team and make appointments. If you have questions, please call your primary care clinic.  If you do not have a primary care provider, please call [phone] and they will assist you.      [employer] is an electronic gateway that provides easy, online access to your medical records. With [facility], you can request a clinic appointment, read your test results, renew a prescription or communicate with your care team.     To access your existing account, please contact your [facility] or call [phone] for assistance.        Care EveryWhere ID     This is your Care EveryWhere ID. This could be used by other organizations to access your [facility] medical records  [id number]        Equal Access to Services     BRIGITTE BOWENS : Hadii lisette deleono Soeva, waaxda luqadaha, qaybta kaalmada adeterver, ric pride. So [facility] [phone].    ATENCIÓN: Si habla español, tiene a mulligan disposición servicios gratuitos de asistencia lingüística. Llame al [phone].    We comply with applicable federal civil rights laws and [place] laws. We do not discriminate on the basis of race, color, national origin, age, disability, sex, sexual orientation, or gender identity.               Review of your medicines      START taking        Dose / Directions    HYDROcodone-acetaminophen 5-325 MG per tablet   Commonly known as:  NORCO   Used for:  Mass of urinary bladder        Dose:  1-2 tablet   Take 1-2 tablets by mouth every 4 hours as needed for moderate to severe pain (Moderate to Severe Pain)   Quantity:  10 tablet   Refills:  0       phenazopyridine HCl tablet "   Commonly known as:  AZO URINARY PAIN RELIEF   Used for:  Mass of urinary bladder        Dose:  190 mg   Take 2 tablets (190 mg) by mouth 3 times daily   Quantity:  12 tablet   Refills:  0       sulfamethoxazole-trimethoprim 800-160 MG per tablet   Commonly known as:  BACTRIM DS/SEPTRA DS   Used for:  Mass of urinary bladder        Dose:  1 tablet   Take 1 tablet by mouth 2 times daily   Quantity:  6 tablet   Refills:  0         CONTINUE these medicines which may have CHANGED, or have new prescriptions. If we are uncertain of the size of tablets/capsules you have at home, strength may be listed as something that might have changed.        Dose / Directions    acetaZOLAMIDE 125 MG tablet   Commonly known as:  DIAMOX   This may have changed:    - when to take this  - reasons to take this   Used for:  Altitude sickness preventative measures        Dose:  125 mg   Take 1 tablet (125 mg) by mouth 3 times daily   Quantity:  30 tablet   Refills:  0       allopurinol 100 MG tablet   Commonly known as:  ZYLOPRIM   This may have changed:    - how much to take  - how to take this  - when to take this  - additional instructions   Used for:  Gout, unspecified, Essential hypertension, Gastroesophageal reflux disease without esophagitis, Benign non-nodular prostatic hyperplasia without lower urinary tract symptoms, Routine general medical examination at a health care facility, Essential hypertension, benign, Nocturia, Need for hepatitis C screening test, Screening for diabetic peripheral neuropathy, Other fatigue, Hyperlipidemia LDL goal <100        1 qday   Quantity:  90 tablet   Refills:  3       amLODIPine 10 MG tablet   Commonly known as:  NORVASC   This may have changed:  when to take this   Used for:  Essential hypertension, Gout, unspecified, Gastroesophageal reflux disease without esophagitis, Benign non-nodular prostatic hyperplasia without lower urinary tract symptoms, Routine general medical examination at a health  care facility, Essential hypertension, benign, Nocturia, Need for hepatitis C screening test, Screening for diabetic peripheral neuropathy, Other fatigue, Hyperlipidemia LDL goal <100        Dose:  10 mg   Take 1 tablet (10 mg) by mouth daily   Quantity:  90 tablet   Refills:  3       dutasteride 0.5 MG capsule   Commonly known as:  AVODART   This may have changed:  when to take this   Used for:  Benign non-nodular prostatic hyperplasia without lower urinary tract symptoms, Essential hypertension, Gout, unspecified, Gastroesophageal reflux disease without esophagitis, Routine general medical examination at a health care facility, Essential hypertension, benign, Nocturia, Need for hepatitis C screening test, Screening for diabetic peripheral neuropathy, Other fatigue, Hyperlipidemia LDL goal <100        Dose:  0.5 mg   Take 1 capsule (0.5 mg) by mouth daily   Quantity:  90 capsule   Refills:  3       metFORMIN 1000 MG tablet   Commonly known as:  GLUCOPHAGE   This may have changed:    - how much to take  - additional instructions   Used for:  Essential hypertension, Gout, unspecified, Gastroesophageal reflux disease without esophagitis, Benign non-nodular prostatic hyperplasia without lower urinary tract symptoms, Routine general medical examination at a health care facility, Essential hypertension, benign, Nocturia, Need for hepatitis C screening test, Screening for diabetic peripheral neuropathy, Other fatigue, Hyperlipidemia LDL goal <100        Take one tablet daily at bedtime   Quantity:  90 tablet   Refills:  3       omeprazole 20 MG tablet   This may have changed:  when to take this   Used for:  Gastroesophageal reflux disease without esophagitis, Essential hypertension, Gout, unspecified, Benign non-nodular prostatic hyperplasia without lower urinary tract symptoms, Routine general medical examination at a health care facility, Essential hypertension, benign, Nocturia, Need for hepatitis C screening test,  Screening for diabetic peripheral neuropathy, Other fatigue, Hyperlipidemia LDL goal <100        Dose:  20 mg   Take 1 tablet (20 mg) by mouth daily   Quantity:  90 tablet   Refills:  3       rosuvastatin 10 MG tablet   Commonly known as:  CRESTOR   This may have changed:  when to take this   Used for:  Essential hypertension, Gout, unspecified, Gastroesophageal reflux disease without esophagitis, Benign non-nodular prostatic hyperplasia without lower urinary tract symptoms, Routine general medical examination at a health care facility, Essential hypertension, benign, Nocturia, Need for hepatitis C screening test, Screening for diabetic peripheral neuropathy, Other fatigue, Hyperlipidemia LDL goal <100        Dose:  40 mg   Take 4 tablets (40 mg) by mouth daily   Quantity:  90 tablet   Refills:  3       tamsulosin 0.4 MG capsule   Commonly known as:  FLOMAX   This may have changed:  when to take this   Used for:  Benign non-nodular prostatic hyperplasia without lower urinary tract symptoms, Nocturia, Routine general medical examination at a health care facility, Essential hypertension, benign, Gout, unspecified, Essential hypertension, Gastroesophageal reflux disease without esophagitis, Need for hepatitis C screening test, Screening for diabetic peripheral neuropathy, Other fatigue, Hyperlipidemia LDL goal <100        Dose:  0.4 mg   Take 1 capsule (0.4 mg) by mouth daily   Quantity:  90 capsule   Refills:  3       valsartan 160 MG tablet   Commonly known as:  DIOVAN   This may have changed:  when to take this   Used for:  Essential hypertension, benign, Routine general medical examination at a health care facility, Gout, unspecified, Essential hypertension, Gastroesophageal reflux disease without esophagitis, Benign non-nodular prostatic hyperplasia without lower urinary tract symptoms, Nocturia, Need for hepatitis C screening test, Screening for diabetic peripheral neuropathy, Other fatigue, Hyperlipidemia LDL  goal <100        Dose:  160 mg   Take 1 tablet (160 mg) by mouth daily   Quantity:  90 tablet   Refills:  3         CONTINUE these medicines which have NOT CHANGED        Dose / Directions    Multi-vitamin Tabs tablet        Dose:  1 tablet   Take 1 tablet by mouth At Bedtime   Refills:  0       order for DME        Respironics Dreamstation AutoPap 7-15 cm with Respironics Leidy mask.   Refills:  0       phentermine 37.5 MG tablet   Commonly known as:  ADIPEX-P        Dose:  56 mg   Take 56 mg by mouth every morning (before breakfast)   Refills:  0            Where to get your medicines      These medications were sent to [place], [place] - [address]  [address], [facility] [address]    Hours:  TRANSPLANT PHONE NUMBER [phone] Phone:  [phone]     phenazopyridine HCl tablet    sulfamethoxazole-trimethoprim 800-160 MG per tablet         Some of these will need a paper prescription and others can be bought over the counter. Ask your nurse if you have questions.     Bring a paper prescription for each of these medications     HYDROcodone-acetaminophen 5-325 MG per tablet                Protect others around you: Learn how to safely use, store and throw away your medicines at www.disposemymeds.org.        ANTIBIOTIC INSTRUCTION     You've Been Prescribed an Antibiotic - Now What?  Your healthcare team thinks that you or your loved one might have an infection. Some infections can be treated with antibiotics, which are powerful, life-saving drugs. Like all medications, antibiotics have side effects and should only be used when necessary. There are some important things you should know about your antibiotic treatment.      Your healthcare team may run tests before you start taking an antibiotic.    Your team may take samples (e.g., from your blood, urine or other areas) to run tests to look for bacteria. These test can be important to determine if  you need an antibiotic at all and, if you do, which antibiotic will work best.      Within a few days, your healthcare team might change or even stop your antibiotic.    Your team may start you on an antibiotic while they are working to find out what is making you sick.    Your team might change your antibiotic because test results show that a different antibiotic would be better to treat your infection.    In some cases, once your team has more information, they learn that you do not need an antibiotic at all. They may find out that you don't have an infection, or that the antibiotic you're taking won't work against your infection. For example, an infection caused by a virus can't be treated with antibiotics. Staying on an antibiotic when you don't need it is more likely to be harmful than helpful.      You may experience side effects from your antibiotic.    Like all medications, antibiotics have side effects. Some of these can be serious.    Let you healthcare team know if you have any known allergies when you are admitted to the hospital.    One significant side effect of nearly all antibiotics is the risk of severe and sometimes deadly diarrhea caused by Clostridium difficile (C. Difficile). This occurs when a person takes antibiotics because some good germs are destroyed. Antibiotic use allows C. diificile to take over, putting patients at high risk for this serious infection.    As a patient or caregiver, it is important to understand your or your loved one's antibiotic treatment. It is especially important for caregivers to speak up when patients can't speak for themselves. Here are some important questions to ask your healthcare team.    What infection is this antibiotic treating and how do you know I have that infection?    What side effects might occur from this antibiotic?    How long will I need to take this antibiotic?    Is it safe to take this antibiotic with other medications or supplements (e.g.,  vitamins) that I am taking?     Are there any special directions I need to know about taking this antibiotic? For example, should I take it with food?    How will I be monitored to know whether my infection is responding to the antibiotic?    What tests may help to make sure the right antibiotic is prescribed for me?      Information provided by:  www.cdc.gov/getsmart  U.S. Department of Health and Human Services  Centers for disease Control and Prevention  National Center for Emerging and Zoonotic Infectious Diseases  Division of Healthcare Quality Promotion        Information about OPIOIDS     PRESCRIPTION OPIOIDS: WHAT YOU NEED TO KNOW    Prescription opioids can be used to help relieve moderate to severe pain and are often prescribed following a surgery or injury, or for certain health conditions. These medications can be an important part of treatment but also come with serious risks. It is important to work with your health care provider to make sure you are getting the safest, most effective care.    WHAT ARE THE RISKS AND SIDE EFFECTS OF OPIOID USE?  Prescription opioids carry serious risks of addiction and overdose, especially with prolonged use. An opioid overdose, often marked by slowed breathing can cause sudden death. The use of prescription opioids can have a number of side effects as well, even when taken as directed:      Tolerance - meaning you might need to take more of a medication for the same pain relief    Physical dependence - meaning you have symptoms of withdrawal when a medication is stopped    Increased sensitivity to pain    Constipation    Nausea, vomiting, and dry mouth    Sleepiness and dizziness    Confusion    Depression    Low levels of testosterone that can result in lower sex drive, energy, and strength    Itching and sweating    RISKS ARE GREATER WITH:    History of drug misuse, substance use disorder, or overdose    Mental health conditions (such as depression or  anxiety)    Sleep apnea    Older age (65 years or older)    Pregnancy    Avoid alcohol while taking prescription opioids.   Also, unless specifically advised by your health care provider, medications to avoid include:    Benzodiazepines (such as Xanax or Valium)    Muscle relaxants (such as Soma or Flexeril)    Hypnotics (such as Ambien or Lunesta)    Other prescription opioids    KNOW YOUR OPTIONS:  Talk to your health care provider about ways to manage your pain that do not involve prescription opioids. Some of these options may actually work better and have fewer risks and side effects:    Pain relievers such as acetaminophen, ibuprofen, and naproxen    Some medications that are also used for depression or seizures    Physical therapy and exercise    Cognitive behavioral therapy, a psychological, goal-directed approach, in which patients learn how to modify physical, behavioral, and emotional triggers of pain and stress    IF YOU ARE PRESCRIBED OPIOIDS FOR PAIN:    Never take opioids in greater amounts or more often than prescribed    Follow up with your primary health care provider and work together to create a plan on how to manage your pain.    Talk about ways to help manage your pain that do not involve prescription opioids    Talk about all concerns and side effects    Help prevent misuse and abuse    Never sell or share prescription opioids    Never use another person's prescription opioids    Store prescription opioids in a secure place and out of reach of others (this may include visitors, children, friends, and family)    Visit www.cdc.gov/drugoverdose to learn about risks of opioid abuse and overdose    If you believe you may be struggling with addiction, tell your health care provider and ask for guidance or call [facility] National Helpline at 0-610-263-HELP    LEARN MORE / www.cdc.gov/drugoverdose/prescribing/guideline.html    Safely dispose of unused prescription opioids: Find your local drug  take-back programs and more information about the importance of safe disposal at www.doseofreality.mn.gov             Medication List: This is a list of all your medications and when to take them. Check marks below indicate your daily home schedule. Keep this list as a reference.      Medications           Morning Afternoon Evening Bedtime As Needed    acetaZOLAMIDE 125 MG tablet   Commonly known as:  DIAMOX   Take 1 tablet (125 mg) by mouth 3 times daily                                allopurinol 100 MG tablet   Commonly known as:  ZYLOPRIM   1 qday                                amLODIPine 10 MG tablet   Commonly known as:  NORVASC   Take 1 tablet (10 mg) by mouth daily                                dutasteride 0.5 MG capsule   Commonly known as:  AVODART   Take 1 capsule (0.5 mg) by mouth daily                                HYDROcodone-acetaminophen 5-325 MG per tablet   Commonly known as:  NORCO   Take 1-2 tablets by mouth every 4 hours as needed for moderate to severe pain (Moderate to Severe Pain)                                metFORMIN 1000 MG tablet   Commonly known as:  GLUCOPHAGE   Take one tablet daily at bedtime                                Multi-vitamin Tabs tablet   Take 1 tablet by mouth At Bedtime                                omeprazole 20 MG tablet   Take 1 tablet (20 mg) by mouth daily                                order for DME   Respironics Dreamstation AutoPap 7-15 cm with Respironics Leidy mask.                                phenazopyridine HCl tablet   Commonly known as:  AZO URINARY PAIN RELIEF   Take 2 tablets (190 mg) by mouth 3 times daily                                phentermine 37.5 MG tablet   Commonly known as:  ADIPEX-P   Take 56 mg by mouth every morning (before breakfast)                                rosuvastatin 10 MG tablet   Commonly known as:  CRESTOR   Take 4 tablets (40 mg) by mouth daily                                sulfamethoxazole-trimethoprim 800-160 MG per  tablet   Commonly known as:  BACTRIM DS/SEPTRA DS   Take 1 tablet by mouth 2 times daily                                tamsulosin 0.4 MG capsule   Commonly known as:  FLOMAX   Take 1 capsule (0.4 mg) by mouth daily                                valsartan 160 MG tablet   Commonly known as:  DIOVAN   Take 1 tablet (160 mg) by mouth daily

## 2018-01-31 NOTE — DISCHARGE INSTRUCTIONS
You will be discharged with a acosta catheter. You may remove this catheter tomorrow morning  You will also be discharged with prophylactic antibiotic course of bactrim DS (x 3 days)    What to expect post-operatively  - Some hematuria (pink urine) with a few clots is expected for about 1 week from your procedure. Consider calling the numbers below if this persists past 1 week or if the hematuria progresses to significant blood and or clots.   - Some dysuria (pain with urination) and bladder discomfort/spasms are also expected post-operatively    Activity  - Do not strain with bowel movements.  - Do not drive until you can press the brake pedal quickly and fully without pain.   - Do not operate a motor vehicle while taking narcotic pain medications. .    Medications    - Transition from narcotic pain medications to tylenol (acetaminophen) as you are able.  Wean yourself off all pain medications as you are able.  - Some pain medications contain both tylenol (acetaminophen) and a narcotic (Norco, vicodin, percocet), do not take more than 4,000mg of Tylenol (acetaminophen) from all sources in any 24 hour period.  - Narcotics can make you constipated.  Take over the counter fiber (metamucil or benefiber) and stool softeners (miralax, docusate or senna) while taking narcotic pain medications, but stop if you develop diarrhea.  - No driving or operating machinery while taking narcotic pain medications     Follow-Up:  - Follow up with Dr. Lomeli as scheduled on 2/14/2018    - Call your primary care provider to touch base regarding your recent procedure.    - Call or return sooner than your regularly scheduled visit if you develop any of the following: fever (greater than 101.5), uncontrolled pain, uncontrolled nausea or vomiting, as well as increased redness, swelling, or drainage from your wound.     Phone numbers:   - Monday through Friday 8am to 4:30pm: Call [phone] with questions or to schedule or confirm  "appointment.    - Nights or weekends: call the after hours emergency pager - [phone] and tell the  \"I would like to page the Urology Resident on call.\"  - For emergencies, call 911     [facility]  Home Care Following Anesthesia  For 24 hours after surgery:  1. Get plenty of rest.  A responsible adult must stay with you for at least 24 hours after you leave the surgery center.  2. Do not drive or use heavy equipment.  If you have weakness or tingling, don't drive or use heavy equipment until this feeling goes away.   3. Do not drink alcohol.   4. Avoid strenuous or risky activities.  Ask for help when climbing stairs.  5. You may feel lightheaded.  IF so, sit for a few minutes before standing.  Have someone help you get up.   6. If you have nausea (feel sick to your stomach): Drink only clear liquids such as apple juice, ginger ale, broth or 7-Up.  Rest may also help.  Be sure to drink enough fluids.  Move to a regular diet as you feel able.   7. You may have a slight fever.  Call the doctor if your fever is over 100 F (37.7 C) (taken under the tongue) or lasts longer than 24 hours.  8. You may have a dry mouth, a sore throat, muscle aches or trouble sleeping. These should go away after 24 hours.  9. Do not make important or legal decisions.            Tips for taking pain medications  To get the best pain relief possible, remember these points:    Take pain medications as directed, before pain becomes severe.    Pain medication can upset your stomach: taking it with food may help.    Constipation is a common side effect of pain medication. Drink plenty of  fluids.    Eat foods high in fiber. Take a stool softener if recommended by your doctor or pharmacist.    Do not drink alcohol, drive or operate machinery while taking pain medications.    Ask about other ways to control pain, such as with heat, ice or relaxation.    Tylenol/Acetaminophen Consumption  To help " encourage the safe use of acetaminophen, the makers of TYLENOL  have lowered the maximum daily dose for single-ingredient Extra Strength TYLENOL  (acetaminophen) products sold in the [place] from 8 pills per day (4,000 mg) to 6 pills per day (3,000 mg). The dosing interval has also changed from 2 pills every 4-6 hours to 2 pills every 6 hours.    If you feel your pain relief is insufficient, you may take Tylenol/Acetaminophen in addition to your narcotic pain medication.     Be careful not to exceed 3,000 mg of Tylenol/Acetaminophen in a 24 hour period from all sources.    If you are taking extra strength Tylenol/acetaminophen (500 mg), the maximum dose is 6 tablets in 24 hours.    If you are taking regular strength acetaminophen (325 mg), the maximum dose is 9 tablets in 24 hours.    Call a doctor for any of the followin. Signs of infection (fever, growing tenderness at the surgery site, a large amount of drainage or bleeding, severe pain, foul-smelling drainage, redness, swelling).  2. It has been over 8 to 10 hours since surgery and you are still not able to urinate (pass water).  3. Headache for over 24 hours.    Your doctor is:  Dr. Yaya Lomeli, Prostate and Urology: [phone]                  Or dial [phone] and ask for the resident on call for:  Prostate Urology  For emergency care, call the:  [facility]:  [phone] (TTY for hearing impaired: [phone])  Care of Indwelling Krause Catheter  Cleanliness is VERY important!  1. Wash well around catheter with soap and water and rinse well.  Do this every morning and before bedtime.  2. Empty leg bag or bed bag into toilet whenever it becomes half full.  3. When disconnecting and reconnecting, wipe both the catheter end and tubing tip with alcohol. (You may use commercially prepared alcohol wipes or regular cotton balls soaked in alcohol.)  4. Rinse leg bag and bed bag inside and out after each use. You can soak leg bag  and/or bed bag in two to three ounces of white vinegar when not in use. Rinse thoroughly before reconnecting to catheter.  5. Drink lots of fluids (at least eight to ten cups/glasses per day) and take two to four grams of Vitamin C (optional) per day.      6. Watch for sign of catheter-associated urinary tract infection which include:      Cloudy urine, sediment in urine (may look like sand particles or white flakes), foul smelling urine    A burning feeling, pressure or pain in your lower abdomen    A burning feeling in the urethra or genitalia    Aching in the back (by the kidney)      How to Remove your Catheter    1. Wash your hands.    2. Insert the syringe into balloon port of the catheter.    3. Withdraw all the fluid from the balloon. You may have to re-insert the syringe into the port additional times until no more fluid can be withdrawn.    4. Pull gently on the catheter. If no resistance, slowly withdraw.    5. Dispose of the catheter and the syringe.    6. Wash your hands.    7. Call your doctor if unable to urinate within 6 to 8 hours after catheter was removed or when uncomfortable and feel urge to urinate but cannot.

## 2018-01-31 NOTE — ANESTHESIA POSTPROCEDURE EVALUATION
Patient: Darnell Grant    Procedure(s):  Transurethral Biopsy and Resection of Bladder Tumor - Wound Class: II-Clean Contaminated    Diagnosis:Mass Of Urinary Bladder  Diagnosis Additional Information: No value filed.    Anesthesia Type:  General, LMA    Note:  Anesthesia Post Evaluation    Patient location during evaluation: PACU  Patient participation: Able to fully participate in evaluation  Level of consciousness: awake  Pain management: adequate  Airway patency: patent  Cardiovascular status: acceptable  Respiratory status: acceptable  Hydration status: balanced  PONV: none     Anesthetic complications: None          Last vitals:  Vitals:    01/31/18 1000 01/31/18 1010 01/31/18 1045   BP: 128/82 127/75 124/72   Resp: 20 20 12   Temp:  36.1  C (97  F) 36.4  C (97.6  F)   SpO2: 90% 93% 95%         Electronically Signed By: Rakan Mariscal MD  January 31, 2018  11:14 AM

## 2018-01-31 NOTE — BRIEF OP NOTE
[facility]    Brief Operative Note    Pre-operative diagnosis: Mass Of Urinary Bladder  Post-operative diagnosis * No post-op diagnosis entered *  Procedure: Procedure(s):  Transurethral Biopsy and Resection of Bladder Tumor - Wound Class: II-Clean Contaminated  Surgeon: Surgeon(s) and Role:     * Yaya Lomeli MD - Primary     * Juana Ramírez MD - Resident - Assisting  Anesthesia: General   Estimated blood loss: Less than 10 ml  Drains: None  Specimens:   ID Type Source Tests Collected by Time Destination   A : Bladder Tumor Tissue Bladder SURGICAL PATHOLOGY EXAM Yaya Lomeli MD 1/31/2018  9:26 AM      Findings:   None.  Complications: None.  Implants: None.

## 2018-02-02 LAB — COPATH REPORT: NORMAL

## 2018-02-06 ENCOUNTER — PRE VISIT (OUTPATIENT)
Dept: UROLOGY | Facility: CLINIC | Age: 63
End: 2018-02-06

## 2018-02-06 NOTE — TELEPHONE ENCOUNTER
Pt coming in for a post TURBT appointment. Chart reviewed and pathology available. No need for a call.

## 2018-03-06 DIAGNOSIS — K21.9 GASTROESOPHAGEAL REFLUX DISEASE WITHOUT ESOPHAGITIS: ICD-10-CM

## 2018-03-06 DIAGNOSIS — I10 ESSENTIAL HYPERTENSION, BENIGN: ICD-10-CM

## 2018-03-06 DIAGNOSIS — R53.83 OTHER FATIGUE: ICD-10-CM

## 2018-03-06 DIAGNOSIS — I10 ESSENTIAL HYPERTENSION: ICD-10-CM

## 2018-03-06 DIAGNOSIS — E78.5 HYPERLIPIDEMIA LDL GOAL <100: ICD-10-CM

## 2018-03-06 DIAGNOSIS — R35.1 NOCTURIA: ICD-10-CM

## 2018-03-06 DIAGNOSIS — Z12.5 ENCOUNTER FOR SCREENING FOR MALIGNANT NEOPLASM OF PROSTATE: ICD-10-CM

## 2018-03-06 DIAGNOSIS — Z11.59 NEED FOR HEPATITIS C SCREENING TEST: ICD-10-CM

## 2018-03-06 DIAGNOSIS — N40.0 BENIGN NON-NODULAR PROSTATIC HYPERPLASIA WITHOUT LOWER URINARY TRACT SYMPTOMS: ICD-10-CM

## 2018-03-06 DIAGNOSIS — Z13.89 SCREENING FOR DIABETIC PERIPHERAL NEUROPATHY: ICD-10-CM

## 2018-03-06 DIAGNOSIS — M10.9 GOUT: ICD-10-CM

## 2018-03-06 DIAGNOSIS — Z00.00 ROUTINE GENERAL MEDICAL EXAMINATION AT A HEALTH CARE FACILITY: ICD-10-CM

## 2018-03-06 LAB
ALBUMIN SERPL-MCNC: 4.1 G/DL (ref 3.4–5)
ALP SERPL-CCNC: 58 U/L (ref 40–150)
ALT SERPL W P-5'-P-CCNC: 38 U/L (ref 0–70)
ANION GAP SERPL CALCULATED.3IONS-SCNC: 8 MMOL/L (ref 3–14)
AST SERPL W P-5'-P-CCNC: 23 U/L (ref 0–45)
BASOPHILS # BLD AUTO: 0.1 10E9/L (ref 0–0.2)
BASOPHILS NFR BLD AUTO: 0.9 %
BILIRUB SERPL-MCNC: 0.5 MG/DL (ref 0.2–1.3)
BUN SERPL-MCNC: 12 MG/DL (ref 7–30)
CALCIUM SERPL-MCNC: 9.4 MG/DL (ref 8.5–10.1)
CHLORIDE SERPL-SCNC: 104 MMOL/L (ref 94–109)
CHOLEST SERPL-MCNC: 77 MG/DL
CO2 SERPL-SCNC: 25 MMOL/L (ref 20–32)
CREAT SERPL-MCNC: 0.74 MG/DL (ref 0.66–1.25)
DIFFERENTIAL METHOD BLD: ABNORMAL
EOSINOPHIL # BLD AUTO: 0.2 10E9/L (ref 0–0.7)
EOSINOPHIL NFR BLD AUTO: 3.5 %
ERYTHROCYTE [DISTWIDTH] IN BLOOD BY AUTOMATED COUNT: 14.1 % (ref 10–15)
GFR SERPL CREATININE-BSD FRML MDRD: >90 ML/MIN/1.7M2
GLUCOSE SERPL-MCNC: 104 MG/DL (ref 70–99)
HCT VFR BLD AUTO: 47.7 % (ref 40–53)
HCV AB SERPL QL IA: NONREACTIVE
HDLC SERPL-MCNC: 32 MG/DL
HGB BLD-MCNC: 16.2 G/DL (ref 13.3–17.7)
IMM GRANULOCYTES # BLD: 0 10E9/L (ref 0–0.4)
IMM GRANULOCYTES NFR BLD: 0.5 %
LDLC SERPL CALC-MCNC: 22 MG/DL
LYMPHOCYTES # BLD AUTO: 1.5 10E9/L (ref 0.8–5.3)
LYMPHOCYTES NFR BLD AUTO: 23.9 %
MCH RBC QN AUTO: 30.5 PG (ref 26.5–33)
MCHC RBC AUTO-ENTMCNC: 34 G/DL (ref 31.5–36.5)
MCV RBC AUTO: 90 FL (ref 78–100)
MONOCYTES # BLD AUTO: 0.3 10E9/L (ref 0–1.3)
MONOCYTES NFR BLD AUTO: 5.4 %
NEUTROPHILS # BLD AUTO: 4.2 10E9/L (ref 1.6–8.3)
NEUTROPHILS NFR BLD AUTO: 65.8 %
NONHDLC SERPL-MCNC: 45 MG/DL
NRBC # BLD AUTO: 0 10*3/UL
NRBC BLD AUTO-RTO: 0 /100
PLATELET # BLD AUTO: 145 10E9/L (ref 150–450)
POTASSIUM SERPL-SCNC: 3.8 MMOL/L (ref 3.4–5.3)
PROT SERPL-MCNC: 7.3 G/DL (ref 6.8–8.8)
PSA SERPL-ACNC: 0.96 UG/L (ref 0–4)
RBC # BLD AUTO: 5.32 10E12/L (ref 4.4–5.9)
SODIUM SERPL-SCNC: 137 MMOL/L (ref 133–144)
TRIGL SERPL-MCNC: 119 MG/DL
TSH SERPL DL<=0.005 MIU/L-ACNC: 2.52 MU/L (ref 0.4–4)
WBC # BLD AUTO: 6.4 10E9/L (ref 4–11)

## 2018-03-07 ENCOUNTER — OFFICE VISIT (OUTPATIENT)
Dept: UROLOGY | Facility: CLINIC | Age: 63
End: 2018-03-07
Payer: COMMERCIAL

## 2018-03-07 DIAGNOSIS — N13.8 BENIGN PROSTATIC HYPERPLASIA WITH URINARY OBSTRUCTION: Primary | ICD-10-CM

## 2018-03-07 DIAGNOSIS — N40.1 BENIGN PROSTATIC HYPERPLASIA WITH URINARY OBSTRUCTION: Primary | ICD-10-CM

## 2018-03-07 RX ORDER — CEFAZOLIN SODIUM 1 G/50ML
3 SOLUTION INTRAVENOUS
Status: CANCELLED | OUTPATIENT
Start: 2018-03-07

## 2018-03-07 ASSESSMENT — PAIN SCALES - GENERAL
PAINLEVEL: NO PAIN (0)
PAINLEVEL: NO PAIN (0)

## 2018-03-07 NOTE — MR AVS SNAPSHOT
After Visit Summary   3/7/2018    Darnell Grant    MRN: [id number]           Patient Information     Date Of Birth          1955        Visit Information        Provider Department      3/7/2018 5:30 PM Yaya Lomeli MD [facility] Urology and [facility] for Prostate and Urologic Cancers        Today's Diagnoses     Benign prostatic hyperplasia with urinary obstruction    -  1       Follow-ups after your visit        Additional Services     PAC Visit Referral (For [facility] Only)       Does this visit require an Anesthesia consult?  Yes - Evaluate for medical necessity related to one of the following conditions:      H&P done by:  N/A and Other (Specify): PAC      Please be aware that coverage of these services is subject to the terms and limitations of your health insurance plan.  Call member services at your health plan with any benefit or coverage questions.      Please bring the following to your appointment:  >>   Any x-rays, CTs or MRIs which have been performed.  Contact the facility where they were done to arrange for  prior to your scheduled appointment.  Any new CT, MRI or other procedures ordered by your specialist must be performed at a [facility] facility or coordinated by your clinic's referral office.    >>   List of current medications  >>   This referral request   >>   Any documents/labs given to you for this referral                  Follow-up notes from your care team     Return in about 6 months (around 9/7/2018).      Your next 10 appointments already scheduled     Mar 30, 2018  3:00 PM CDT   (Arrive by 2:45 PM)   PAC EVALUATION with Uc Pac Elier 7   [facility] Preoperative Assessment Center ([facility])    [address] [place] [address]  4th [facility] [id number] [phone]            Mar 30, 2018  4:00 PM CDT   (Arrive by 3:45 PM)   PAC RN ASSESSMENT with Thomas Pac Rn   [facility] Preoperative [facility] Center ([facility])     [address] [facility] [facility]  4th [facility] [address]   [phone]            Mar 30, 2018  4:30 PM CDT   (Arrive by 4:15 PM)   PAC Anesthesia Consult with  Pac Anesthesiologist   [facility] Preoperative [facility] ([facility])    [address] [place] [address]  4th [facility] [address]   [phone]            Apr 13, 2018   Procedure with Yaya Lomeli MD   [facility] ([facility])    [address] [place] [address]  5th [facility] [address]   [phone]           Located in the Clinics [facility] at [address] [address].   parking is very convenient and highly recommended.  is a $6 flat rate fee.  Both  and self parkers should enter the main arrival plaza from [facility]; parking attendants will direct you based on your parking preference.            Apr 25, 2018  3:00 PM CDT   (Arrive by 2:45 PM)   Post-Op with Yaya Lomeli MD   [facility] for Prostate and Urologic Cancers ([facility])    [address] [place] [address]   [phone]            Jul 18, 2018  3:00 PM CDT   (Arrive by 2:45 PM)   Return Visit with Yaya Lomeli MD   [facility] Prostate and Urologic Cancers ([facility])    [address] [place] [address]   [phone]              Future tests that were ordered for you today     Open Future Orders        Priority Expected Expires Ordered    Parathyroid Hormone Intact Routine 9/12/2018 3/12/2019 3/12/2018    Vitamin D Deficiency Routine 9/12/2018 3/12/2019 3/12/2018    Comprehensive metabolic panel Routine 9/12/2018 3/12/2019 3/12/2018    Hemoglobin A1c Routine 9/12/2018 3/12/2019 3/12/2018    Dexa hip/pelvis/spine* Routine  3/12/2019 3/12/2018    Albumin Random Urine Quantitative with Creat Ratio Routine  "3/12/2018 3/12/2019 3/12/2018    HEMOGLOBIN A1C -(Today) Add-On 3/12/2018 3/12/2019 3/12/2018            Who to contact     Please call your clinic at [phone] to:    Ask questions about your health    Make or cancel appointments    Discuss your medicines    Learn about your test results    Speak to your doctor            Additional Information About Your Visit        DNA Directhart Information     EGEN gives you secure access to your electronic health record. If you see a primary care provider, you can also send messages to your care team and make appointments. If you have questions, please call your primary care clinic.  If you do not have a primary care provider, please call [phone] and they will assist you.      EGEN is an electronic gateway that provides easy, online access to your medical records. With EGEN, you can request a clinic appointment, read your test results, renew a prescription or communicate with your care team.     To access your existing account, please contact your [facility] or call [phone] for assistance.        Care EveryWhere ID     This is your Care EveryWhere ID. This could be used by other organizations to access your [facility] medical records  [id number]        Your Vitals Were     Pulse Height BMI (Body Mass Index)             99 1.905 m (6' 3\") 34.72 kg/m2          Blood Pressure from Last 3 Encounters:   03/12/18 136/83   03/07/18 132/86   01/31/18 124/72    Weight from Last 3 Encounters:   03/12/18 125.1 kg (275 lb 14.4 oz)   03/07/18 126 kg (277 lb 12.8 oz)   01/31/18 126.5 kg (278 lb 12.8 oz)              We Performed the Following     Cystoscopy ([address])     PAC Visit Referral (For [facility] Only)     Renee-Operative Worksheet          Today's Medication Changes          These changes are accurate as of 3/7/18 11:59 PM.  If you have any questions, ask your nurse or doctor.               These medicines have changed or have updated " prescriptions.        Dose/Directions    allopurinol 100 MG tablet   Commonly known as:  ZYLOPRIM   This may have changed:    - how much to take  - how to take this  - when to take this  - additional instructions   Used for:  Gout, unspecified, Essential hypertension, Gastroesophageal reflux disease without esophagitis, Benign non-nodular prostatic hyperplasia without lower urinary tract symptoms, Routine general medical examination at a health care facility, Essential hypertension, benign, Nocturia, Need for hepatitis C screening test, Screening for diabetic peripheral neuropathy, Other fatigue, Hyperlipidemia LDL goal <100        1 qday   Quantity:  90 tablet   Refills:  3       amLODIPine 10 MG tablet   Commonly known as:  NORVASC   This may have changed:  when to take this   Used for:  Essential hypertension, Gout, unspecified, Gastroesophageal reflux disease without esophagitis, Benign non-nodular prostatic hyperplasia without lower urinary tract symptoms, Routine general medical examination at a health care facility, Essential hypertension, benign, Nocturia, Need for hepatitis C screening test, Screening for diabetic peripheral neuropathy, Other fatigue, Hyperlipidemia LDL goal <100        Dose:  10 mg   Take 1 tablet (10 mg) by mouth daily   Quantity:  90 tablet   Refills:  3       dutasteride 0.5 MG capsule   Commonly known as:  AVODART   This may have changed:  when to take this   Used for:  Benign non-nodular prostatic hyperplasia without lower urinary tract symptoms, Essential hypertension, Gout, unspecified, Gastroesophageal reflux disease without esophagitis, Routine general medical examination at a health care facility, Essential hypertension, benign, Nocturia, Need for hepatitis C screening test, Screening for diabetic peripheral neuropathy, Other fatigue, Hyperlipidemia LDL goal <100        Dose:  0.5 mg   Take 1 capsule (0.5 mg) by mouth daily   Quantity:  90 capsule   Refills:  3       metFORMIN  1000 MG tablet   Commonly known as:  GLUCOPHAGE   This may have changed:    - how much to take  - additional instructions   Used for:  Essential hypertension, Gout, unspecified, Gastroesophageal reflux disease without esophagitis, Benign non-nodular prostatic hyperplasia without lower urinary tract symptoms, Routine general medical examination at a health care facility, Essential hypertension, benign, Nocturia, Need for hepatitis C screening test, Screening for diabetic peripheral neuropathy, Other fatigue, Hyperlipidemia LDL goal <100        Take one tablet daily at bedtime   Quantity:  90 tablet   Refills:  3       omeprazole 20 MG tablet   This may have changed:  when to take this   Used for:  Gastroesophageal reflux disease without esophagitis, Essential hypertension, Gout, unspecified, Benign non-nodular prostatic hyperplasia without lower urinary tract symptoms, Routine general medical examination at a health care facility, Essential hypertension, benign, Nocturia, Need for hepatitis C screening test, Screening for diabetic peripheral neuropathy, Other fatigue, Hyperlipidemia LDL goal <100        Dose:  20 mg   Take 1 tablet (20 mg) by mouth daily   Quantity:  90 tablet   Refills:  3       rosuvastatin 10 MG tablet   Commonly known as:  CRESTOR   This may have changed:  when to take this   Used for:  Essential hypertension, Gout, unspecified, Gastroesophageal reflux disease without esophagitis, Benign non-nodular prostatic hyperplasia without lower urinary tract symptoms, Routine general medical examination at a health care facility, Essential hypertension, benign, Nocturia, Need for hepatitis C screening test, Screening for diabetic peripheral neuropathy, Other fatigue, Hyperlipidemia LDL goal <100        Dose:  40 mg   Take 4 tablets (40 mg) by mouth daily   Quantity:  90 tablet   Refills:  3       tamsulosin 0.4 MG capsule   Commonly known as:  FLOMAX   This may have changed:  when to take this   Used for:   Benign non-nodular prostatic hyperplasia without lower urinary tract symptoms, Nocturia, Routine general medical examination at a health care facility, Essential hypertension, benign, Gout, unspecified, Essential hypertension, Gastroesophageal reflux disease without esophagitis, Need for hepatitis C screening test, Screening for diabetic peripheral neuropathy, Other fatigue, Hyperlipidemia LDL goal <100        Dose:  0.4 mg   Take 1 capsule (0.4 mg) by mouth daily   Quantity:  90 capsule   Refills:  3       valsartan 160 MG tablet   Commonly known as:  DIOVAN   This may have changed:  when to take this   Used for:  Essential hypertension, benign, Routine general medical examination at a health care facility, Gout, unspecified, Essential hypertension, Gastroesophageal reflux disease without esophagitis, Benign non-nodular prostatic hyperplasia without lower urinary tract symptoms, Nocturia, Need for hepatitis C screening test, Screening for diabetic peripheral neuropathy, Other fatigue, Hyperlipidemia LDL goal <100        Dose:  160 mg   Take 1 tablet (160 mg) by mouth daily   Quantity:  90 tablet   Refills:  3         Stop taking these medicines if you haven't already. Please contact your care team if you have questions.     acetaZOLAMIDE 125 MG tablet   Commonly known as:  DIAMOX   Stopped by:  Yaya Lomeli MD           HYDROcodone-acetaminophen 5-325 MG per tablet   Commonly known as:  NORCO   Stopped by:  Yaya Lomeli MD           phenazopyridine HCl tablet   Commonly known as:  AZO URINARY PAIN RELIEF   Stopped by:  Yaya Lomeli MD           sulfamethoxazole-trimethoprim 800-160 MG per tablet   Commonly known as:  BACTRIM DS/SEPTRA DS   Stopped by:  Yaya Lomeli MD                    Primary Care Provider Office Phone # Fax #    Raul Basil Dutta [phone]       [address] [address]        Equal Access to Services     BRIGITTE BOWENS AH:  Hadii aad ku neftalyyasmanio Soomaali, waaxda luqadaha, qaybta kaalmada juan, ric pride. So [facility] [phone].    ATENCIÓN: Si habla iker, tiene a mulligan disposición servicios gratuitos de asistencia lingüística. Llame al [phone].    We comply with applicable federal civil rights laws and [place] laws. We do not discriminate on the basis of race, color, national origin, age, disability, sex, sexual orientation, or gender identity.            Thank you!     Thank you for choosing [facility] UROLOGY AND [facility] FOR PROSTATE AND UROLOGIC CANCERS  for your care. Our goal is always to provide you with excellent care. Hearing back from our patients is one way we can continue to improve our services. Please take a few minutes to complete the written survey that you may receive in the mail after your visit with us. Thank you!             Your Updated Medication List - Protect others around you: Learn how to safely use, store and throw away your medicines at www.disposemymeds.org.          This list is accurate as of 3/7/18 11:59 PM.  Always use your most recent med list.                   Brand Name Dispense Instructions for use Diagnosis    allopurinol 100 MG tablet    ZYLOPRIM    90 tablet    1 qday    Gout, unspecified, Essential hypertension, Gastroesophageal reflux disease without esophagitis, Benign non-nodular prostatic hyperplasia without lower urinary tract symptoms, Routine general medical examination at a health care facility, Essential hypertension, benign, Nocturia, Need for hepatitis C screening test, Screening for diabetic peripheral neuropathy, Other fatigue, Hyperlipidemia LDL goal <100       amLODIPine 10 MG tablet    NORVASC    90 tablet    Take 1 tablet (10 mg) by mouth daily    Essential hypertension, Gout, unspecified, Gastroesophageal reflux disease without esophagitis, Benign non-nodular prostatic hyperplasia without lower urinary tract symptoms, Routine general medical  examination at a health care facility, Essential hypertension, benign, Nocturia, Need for hepatitis C screening test, Screening for diabetic peripheral neuropathy, Other fatigue, Hyperlipidemia LDL goal <100       dutasteride 0.5 MG capsule    AVODART    90 capsule    Take 1 capsule (0.5 mg) by mouth daily    Benign non-nodular prostatic hyperplasia without lower urinary tract symptoms, Essential hypertension, Gout, unspecified, Gastroesophageal reflux disease without esophagitis, Routine general medical examination at a health care facility, Essential hypertension, benign, Nocturia, Need for hepatitis C screening test, Screening for diabetic peripheral neuropathy, Other fatigue, Hyperlipidemia LDL goal <100       metFORMIN 1000 MG tablet    GLUCOPHAGE    90 tablet    Take one tablet daily at bedtime    Essential hypertension, Gout, unspecified, Gastroesophageal reflux disease without esophagitis, Benign non-nodular prostatic hyperplasia without lower urinary tract symptoms, Routine general medical examination at a health care facility, Essential hypertension, benign, Nocturia, Need for hepatitis C screening test, Screening for diabetic peripheral neuropathy, Other fatigue, Hyperlipidemia LDL goal <100       Multi-vitamin Tabs tablet      Take 1 tablet by mouth At Bedtime        omeprazole 20 MG tablet     90 tablet    Take 1 tablet (20 mg) by mouth daily    Gastroesophageal reflux disease without esophagitis, Essential hypertension, Gout, unspecified, Benign non-nodular prostatic hyperplasia without lower urinary tract symptoms, Routine general medical examination at a health care facility, Essential hypertension, benign, Nocturia, Need for hepatitis C screening test, Screening for diabetic peripheral neuropathy, Other fatigue, Hyperlipidemia LDL goal <100       order for DME      RespirP3 [employer] Dreamstation AutoPap 7-15 cm with Respironics Leidy mask.        phentermine 37.5 MG tablet    ADIPEX-P     Take 56 mg by  mouth every morning (before breakfast)        rosuvastatin 10 MG tablet    CRESTOR    90 tablet    Take 4 tablets (40 mg) by mouth daily    Essential hypertension, Gout, unspecified, Gastroesophageal reflux disease without esophagitis, Benign non-nodular prostatic hyperplasia without lower urinary tract symptoms, Routine general medical examination at a health care facility, Essential hypertension, benign, Nocturia, Need for hepatitis C screening test, Screening for diabetic peripheral neuropathy, Other fatigue, Hyperlipidemia LDL goal <100       tamsulosin 0.4 MG capsule    FLOMAX    90 capsule    Take 1 capsule (0.4 mg) by mouth daily    Benign non-nodular prostatic hyperplasia without lower urinary tract symptoms, Nocturia, Routine general medical examination at a health care facility, Essential hypertension, benign, Gout, unspecified, Essential hypertension, Gastroesophageal reflux disease without esophagitis, Need for hepatitis C screening test, Screening for diabetic peripheral neuropathy, Other fatigue, Hyperlipidemia LDL goal <100       valsartan 160 MG tablet    DIOVAN    90 tablet    Take 1 tablet (160 mg) by mouth daily    Essential hypertension, benign, Routine general medical examination at a health care facility, Gout, unspecified, Essential hypertension, Gastroesophageal reflux disease without esophagitis, Benign non-nodular prostatic hyperplasia without lower urinary tract symptoms, Nocturia, Need for hepatitis C screening test, Screening for diabetic peripheral neuropathy, Other fatigue, Hyperlipidemia LDL goal <100

## 2018-03-07 NOTE — NURSING NOTE
"Chief Complaint   Patient presents with     RECHECK     Review pathology       Blood pressure 132/86, pulse 99, height 1.905 m (6' 3\"), weight 126 kg (277 lb 12.8 oz). Body mass index is 34.72 kg/(m^2).    Patient Active Problem List   Diagnosis     Adjustment disorder with mixed anxiety and depressed mood     Prostate nodule     Pure hypercholesterolemia     Essential hypertension, benign     Chronic cough     Diarrhea     Obstructive sleep apnea hypopnea, moderate [AHI 21 on CPAP 5-15]     Agatston coronary artery calcium score greater than 400     Mass of urinary bladder     Microscopic hematuria       Allergies   Allergen Reactions     Ragweeds      Watery eyes, itchy nose       Current Outpatient Prescriptions   Medication Sig Dispense Refill     multivitamin, therapeutic with minerals (MULTI-VITAMIN) TABS tablet Take 1 tablet by mouth At Bedtime       phentermine (ADIPEX-P) 37.5 MG tablet Take 56 mg by mouth every morning (before breakfast)       rosuvastatin (CRESTOR) 10 MG tablet Take 4 tablets (40 mg) by mouth daily (Patient taking differently: Take 40 mg by mouth At Bedtime ) 90 tablet 3     valsartan (DIOVAN) 160 MG tablet Take 1 tablet (160 mg) by mouth daily (Patient taking differently: Take 160 mg by mouth At Bedtime ) 90 tablet 3     allopurinol (ZYLOPRIM) 100 MG tablet 1 qday (Patient taking differently: Take 100 mg by mouth At Bedtime 1 qday) 90 tablet 3     amLODIPine (NORVASC) 10 MG tablet Take 1 tablet (10 mg) by mouth daily (Patient taking differently: Take 10 mg by mouth At Bedtime ) 90 tablet 3     metFORMIN (GLUCOPHAGE) 1000 MG tablet Take one tablet daily at bedtime (Patient taking differently: 500 mg Takes 4 tablets at bedtime.) 90 tablet 3     omeprazole 20 MG tablet Take 1 tablet (20 mg) by mouth daily (Patient taking differently: Take 20 mg by mouth At Bedtime ) 90 tablet 3     dutasteride (AVODART) 0.5 MG capsule Take 1 capsule (0.5 mg) by mouth daily (Patient taking differently: Take " 0.5 mg by mouth At Bedtime ) 90 capsule 3     tamsulosin (FLOMAX) 0.4 MG capsule Take 1 capsule (0.4 mg) by mouth daily (Patient taking differently: Take 0.4 mg by mouth At Bedtime ) 90 capsule 3     order for DME Respironics Dreamstation AutoPap 7-15 cm with Respironics Leidy mask.         Social History   Substance Use Topics     Smoking status: Never Smoker     Smokeless tobacco: Never Used     Alcohol use No       VASILIY Orta  3/7/2018  5:01 PM

## 2018-03-07 NOTE — PROGRESS NOTES
Reason for visit:  F/u resection of bladder tumor    HPI: Darnell Grant is a 62 year old male with history of microscopic hematuria who on workup was found to have an enhancing mass at the bladder dome on CTUrogram.  He underwent cold cup biopsy and fulguration on 1/31/18.  Pathology results showed NEM.  He returns to the clinic today doing well and denies any episodes of gross hematuria.  He does continue to endorse obstructive LUTS, managed on dutasteride and tamsulosin.  He presents today for follow up evaluation.    Current meds    Current Outpatient Prescriptions   Medication Sig Dispense Refill     multivitamin, therapeutic with minerals (MULTI-VITAMIN) TABS tablet Take 1 tablet by mouth At Bedtime       phentermine (ADIPEX-P) 37.5 MG tablet Take 56 mg by mouth every morning (before breakfast)       rosuvastatin (CRESTOR) 10 MG tablet Take 4 tablets (40 mg) by mouth daily (Patient taking differently: Take 40 mg by mouth At Bedtime ) 90 tablet 3     valsartan (DIOVAN) 160 MG tablet Take 1 tablet (160 mg) by mouth daily (Patient taking differently: Take 160 mg by mouth At Bedtime ) 90 tablet 3     allopurinol (ZYLOPRIM) 100 MG tablet 1 qday (Patient taking differently: Take 100 mg by mouth At Bedtime 1 qday) 90 tablet 3     amLODIPine (NORVASC) 10 MG tablet Take 1 tablet (10 mg) by mouth daily (Patient taking differently: Take 10 mg by mouth At Bedtime ) 90 tablet 3     metFORMIN (GLUCOPHAGE) 1000 MG tablet Take one tablet daily at bedtime (Patient taking differently: 500 mg Takes 4 tablets at bedtime.) 90 tablet 3     omeprazole 20 MG tablet Take 1 tablet (20 mg) by mouth daily (Patient taking differently: Take 20 mg by mouth At Bedtime ) 90 tablet 3     dutasteride (AVODART) 0.5 MG capsule Take 1 capsule (0.5 mg) by mouth daily (Patient taking differently: Take 0.5 mg by mouth At Bedtime ) 90 capsule 3     tamsulosin (FLOMAX) 0.4 MG capsule Take 1 capsule (0.4 mg) by mouth daily (Patient taking differently:  "Take 0.4 mg by mouth At Bedtime ) 90 capsule 3     order for DME Respironics Dreamstation AutoPap 7-15 cm with Respironics Leidy mask.             OBJECTIVE:  /86 (BP Location: Left arm, Patient Position: Chair, Cuff Size: Adult Large)  Pulse 99  Ht 1.905 m (6' 3\")  Wt 126 kg (277 lb 12.8 oz)  BMI 34.72 kg/m2    EXAM:  GENERAL: No acute distress. Well nourished.   HEENT:  Sclerae anicteric.  Conjunctivae pink.  Moist mucous membranes.  LUNGS:  Non-labored breathing.  NEURO: normal gait, no focal deficits.     ASSESSMENT: Darnell Grant is a 62 year old male with microscopic hematuria with CTU finding of lesion at the bladder dome that was biopsied, but showed negative pathology.  He also endorses obstructive LUTS and elected for TRUS evaluation, which showed a 35 cc prostate.    PLAN:   - Will return for repeat cystoscopic evaluation in 4 months  - Given small size of the prostate and obstructive symptoms, will schedule patient for Rezum prostatic ablation  - we performed a TRUS today to evaluate prostate size    Patient seen and plan discussed with Dr. Lomeli  CC  Patient Care Team:  Logan Dutta MD as PCP - General (Internal Medicine)  Robby Guzman, RN as Nurse Coordinator (Cardiology)  Yaya Lomeli MD as MD (Urology)  Sherry Monzon, RN as Registered Nurse (Urology)  Logan Dutta MD as Referring Physician (Internal Medicine)  LOGAN DUTTA      Patient seen and examined with the resident.  Visit time 15 minutes and >50% spent in counseling.  I agree with the resident's note and plan of care.       Yaya Lomeli MD  Urology Staff    Copy to patient  DARNELL GRANT  [address] [facility] [address]    PREPROCEDURE DIAGNOSIS: BPH   POSTPROCEDURE DIAGNOSIS: same.   PROCEDURE: Transrectal ultrasound   SURGEON: Armani  DESCRIPTION OF PROCEDURE: The procedure, the outcome, the anesthesia, and the risks were discussed with the patient.  Informed consent was " obtained and signed and a timeout was completed prior to the procedure. Patient was placed in a left lateral decubitus position on the procedure table.  A 7.5mHz ultrasound probe was inserted into the rectum.  TRUS demonstrated a 35.5 cc prostate. There was one hypoechoic area in the right mid TZ.  Patient tolerated the procedure well.  There were no complications.     Patient would be a good candidate for a REZUM procedure        Pre-procedure diagnosis: Urachal lesion post biopsy  Post procedure diagnosis: abnormal cystoscopy  Procedure performed: cystoscopy  Surgeon: JOSIAS Lomeli MD  Anesthesia: local     Indications for procedure: Patient is a 62 year old male with a history of urachal lesion recently biopsied with a benign diagnosis     Description of procedure: After fully informed voluntary consent was obtained patient was brought into the procedure room, identified and placed in a supine position on the cysto table.  The groin/scrotum were prepped and draped in a sterile fashion with betadine.  A 15F flexible cystoscope was inserted into the urethra and the bladder and urethra examined in a systematic manner.  There were no tumor stones or diverticula.  Area of biopsy had some necrotic debris but no new growth.  Distal urethra was normal.  The patient tolerated the procedure well and there were no complications.       Assessment/Plan: Patient with a history of urachal lesion with abnormal cysto but not suggestive of any new growth.  Will observe for now and repeat in 6 months

## 2018-03-07 NOTE — LETTER
3/7/2018    RE: Darnell Grant  [address]  [facility] [address]     Reason for visit:  F/u resection of bladder tumor    HPI: Darnell Grant is a 62 year old male with history of microscopic hematuria who on workup was found to have an enhancing mass at the bladder dome on CTUrogram.  He underwent cold cup biopsy and fulguration on 1/31/18.  Pathology results showed NEM.  He returns to the clinic today doing well and denies any episodes of gross hematuria.  He does continue to endorse obstructive LUTS, managed on dutasteride and tamsulosin.  He presents today for consideration for repeat cystoscopic evaluation.    Current meds    Current Outpatient Prescriptions   Medication Sig Dispense Refill     multivitamin, therapeutic with minerals (MULTI-VITAMIN) TABS tablet Take 1 tablet by mouth At Bedtime       phentermine (ADIPEX-P) 37.5 MG tablet Take 56 mg by mouth every morning (before breakfast)       rosuvastatin (CRESTOR) 10 MG tablet Take 4 tablets (40 mg) by mouth daily (Patient taking differently: Take 40 mg by mouth At Bedtime ) 90 tablet 3     valsartan (DIOVAN) 160 MG tablet Take 1 tablet (160 mg) by mouth daily (Patient taking differently: Take 160 mg by mouth At Bedtime ) 90 tablet 3     allopurinol (ZYLOPRIM) 100 MG tablet 1 qday (Patient taking differently: Take 100 mg by mouth At Bedtime 1 qday) 90 tablet 3     amLODIPine (NORVASC) 10 MG tablet Take 1 tablet (10 mg) by mouth daily (Patient taking differently: Take 10 mg by mouth At Bedtime ) 90 tablet 3     metFORMIN (GLUCOPHAGE) 1000 MG tablet Take one tablet daily at bedtime (Patient taking differently: 500 mg Takes 4 tablets at bedtime.) 90 tablet 3     omeprazole 20 MG tablet Take 1 tablet (20 mg) by mouth daily (Patient taking differently: Take 20 mg by mouth At Bedtime ) 90 tablet 3     dutasteride (AVODART) 0.5 MG capsule Take 1 capsule (0.5 mg) by mouth daily (Patient taking differently: Take 0.5 mg by mouth At Bedtime ) 90 capsule 3  "    tamsulosin (FLOMAX) 0.4 MG capsule Take 1 capsule (0.4 mg) by mouth daily (Patient taking differently: Take 0.4 mg by mouth At Bedtime ) 90 capsule 3     order for DME Respironics Dreamstation AutoPap 7-15 cm with Respironics Leidy mask.             OBJECTIVE:  /86 (BP Location: Left arm, Patient Position: Chair, Cuff Size: Adult Large)  Pulse 99  Ht 1.905 m (6' 3\")  Wt 126 kg (277 lb 12.8 oz)  BMI 34.72 kg/m2    EXAM:  GENERAL: No acute distress. Well nourished.   HEENT:  Sclerae anicteric.  Conjunctivae pink.  Moist mucous membranes.  LUNGS:  Non-labored breathing.  NEURO: normal gait, no focal deficits.     ASSESSMENT: Darnell Grant is a 62 year old male with microscopic hematuria with CTU finding of lesion at the bladder dome that was biopsied, but showed negative pathology.  He also endorses obstructive LUTS and elected for TRUS evaluation, which showed a 35 cc prostate.    PLAN:   - Will return for repeat cystoscopic evaluation in 4 months  - Given small size of the prostate and obstructive symptoms, will schedule patient for Rezum prostatic ablation    Patient seen and plan discussed with Dr. Lomeli  CC  Patient Care Team:  Logan Dutta MD as PCP - General (Internal Medicine)  oRbby Guzman, RN as Nurse Coordinator (Cardiology)  Yaya Lomeli MD as MD (Urology)  Sherry Monzon, RN as Registered Nurse (Urology)  Logan Dutta MD as Referring Physician (Internal Medicine)  LOGAN DUTTA      Patient seen and examined with the resident.  Visit time 15 minutes and >50% spent in counseling.  I agree with the resident's note and plan of care.       Yaya Lomeli MD  Urology Staff    Copy to patient  DARNELL GRANT  [address] [facility] [address]    PREPROCEDURE DIAGNOSIS: BPH   POSTPROCEDURE DIAGNOSIS: same.   PROCEDURE: Transrectal ultrasound   SURGEON: Armani  DESCRIPTION OF PROCEDURE: The procedure, the outcome, the anesthesia, and the risks were " discussed with the patient.  Informed consent was obtained and signed and a timeout was completed prior to the procedure. Patient was placed in a left lateral decubitus position on the procedure table.  A 7.5mHz ultrasound probe was inserted into the rectum.  TRUS demonstrated a 35.5 cc prostate. There was one hypoechoic area in the right mid TZ.  Patient tolerated the procedure well.  There were no complications.     Patient would be a good candidate for a REZUM procedure        Pre-procedure diagnosis: Urachal lesion post biopsy  Post procedure diagnosis: abnormal cystoscopy  Procedure performed: cystoscopy  Surgeon: JOSIAS Lomeli MD  Anesthesia: local     Indications for procedure: Patient is a 62 year old male with a history of urachal lesion recently biopsied with a benign diagnosis     Description of procedure: After fully informed voluntary consent was obtained patient was brought into the procedure room, identified and placed in a supine position on the cysto table.  The groin/scrotum were prepped and draped in a sterile fashion with betadine.  A 15F flexible cystoscope was inserted into the urethra and the bladder and urethra examined in a systematic manner.  There were no tumor stones or diverticula.  Area of biopsy had some necrotic debris but no new growth.  Distal urethra was normal.  The patient tolerated the procedure well and there were no complications.       Assessment/Plan: Patient with a history of urachal lesion with abnormal cysto but not suggestive of any new growth.  Will observe for now and repeat in 6 months              Yaya Lomeli MD

## 2018-03-07 NOTE — LETTER
3/7/2018     RE: Darnell Grant  [address]  [facility] [address]     Dear Colleague,    Thank you for referring your patient, Darnell Grant, to the [facility] UROLOGY AND INST FOR PROSTATE AND UROLOGIC CANCERS at [facility]. Please see a copy of my visit note below.    Reason for visit:  F/u resection of bladder tumor    HPI: Darnell Grant is a 62 year old male with history of microscopic hematuria who on workup was found to have an enhancing mass at the bladder dome on CTUrogram.  He underwent cold cup biopsy and fulguration on 1/31/18.  Pathology results showed NEM.  He returns to the clinic today doing well and denies any episodes of gross hematuria.  He does continue to endorse obstructive LUTS, managed on dutasteride and tamsulosin.  He presents today for consideration for repeat cystoscopic evaluation.    Current meds    Current Outpatient Prescriptions   Medication Sig Dispense Refill     multivitamin, therapeutic with minerals (MULTI-VITAMIN) TABS tablet Take 1 tablet by mouth At Bedtime       phentermine (ADIPEX-P) 37.5 MG tablet Take 56 mg by mouth every morning (before breakfast)       rosuvastatin (CRESTOR) 10 MG tablet Take 4 tablets (40 mg) by mouth daily (Patient taking differently: Take 40 mg by mouth At Bedtime ) 90 tablet 3     valsartan (DIOVAN) 160 MG tablet Take 1 tablet (160 mg) by mouth daily (Patient taking differently: Take 160 mg by mouth At Bedtime ) 90 tablet 3     allopurinol (ZYLOPRIM) 100 MG tablet 1 qday (Patient taking differently: Take 100 mg by mouth At Bedtime 1 qday) 90 tablet 3     amLODIPine (NORVASC) 10 MG tablet Take 1 tablet (10 mg) by mouth daily (Patient taking differently: Take 10 mg by mouth At Bedtime ) 90 tablet 3     metFORMIN (GLUCOPHAGE) 1000 MG tablet Take one tablet daily at bedtime (Patient taking differently: 500 mg Takes 4 tablets at bedtime.) 90 tablet 3     omeprazole 20 MG tablet Take 1 tablet (20 mg) by mouth  "daily (Patient taking differently: Take 20 mg by mouth At Bedtime ) 90 tablet 3     dutasteride (AVODART) 0.5 MG capsule Take 1 capsule (0.5 mg) by mouth daily (Patient taking differently: Take 0.5 mg by mouth At Bedtime ) 90 capsule 3     tamsulosin (FLOMAX) 0.4 MG capsule Take 1 capsule (0.4 mg) by mouth daily (Patient taking differently: Take 0.4 mg by mouth At Bedtime ) 90 capsule 3     order for DME Respironics Dreamstation AutoPap 7-15 cm with Respironics Leidy mask.             OBJECTIVE:  /86 (BP Location: Left arm, Patient Position: Chair, Cuff Size: Adult Large)  Pulse 99  Ht 1.905 m (6' 3\")  Wt 126 kg (277 lb 12.8 oz)  BMI 34.72 kg/m2    EXAM:  GENERAL: No acute distress. Well nourished.   HEENT:  Sclerae anicteric.  Conjunctivae pink.  Moist mucous membranes.  LUNGS:  Non-labored breathing.  NEURO: normal gait, no focal deficits.     ASSESSMENT: Darnell Grant is a 62 year old male with microscopic hematuria with CTU finding of lesion at the bladder dome that was biopsied, but showed negative pathology.  He also endorses obstructive LUTS and elected for TRUS evaluation, which showed a 35 cc prostate.    PLAN:   - Will return for repeat cystoscopic evaluation in 4 months  - Given small size of the prostate and obstructive symptoms, will schedule patient for Rezum prostatic ablation    Patient seen and plan discussed with Dr. Lomeli  CC  Patient Care Team:  Logan Dutta MD as PCP - General (Internal Medicine)  Robby Guzman RN as Nurse Coordinator (Cardiology)  Yaya Lomeli MD as MD (Urology)  Sherry Monzon, RN as Registered Nurse (Urology)  Logan Dutta MD as Referring Physician (Internal Medicine)  LOGAN DUTTA      Patient seen and examined with the resident.  Visit time 15 minutes and >50% spent in counseling.  I agree with the resident's note and plan of care.       Yaya Lomeli MD  Urology Staff    Copy to patient  DARNELL GRANT  [address]  [facility] [address]    PREPROCEDURE DIAGNOSIS: BPH   POSTPROCEDURE DIAGNOSIS: same.   PROCEDURE: Transrectal ultrasound   SURGEON: Armani  DESCRIPTION OF PROCEDURE: The procedure, the outcome, the anesthesia, and the risks were discussed with the patient.  Informed consent was obtained and signed and a timeout was completed prior to the procedure. Patient was placed in a left lateral decubitus position on the procedure table.  A 7.5mHz ultrasound probe was inserted into the rectum.  TRUS demonstrated a 35.5 cc prostate. There was one hypoechoic area in the right mid TZ.  Patient tolerated the procedure well.  There were no complications.     Patient would be a good candidate for a REZUM procedure        Pre-procedure diagnosis: Urachal lesion post biopsy  Post procedure diagnosis: abnormal cystoscopy  Procedure performed: cystoscopy  Surgeon: JOSIAS Lomeli MD  Anesthesia: local     Indications for procedure: Patient is a 62 year old male with a history of urachal lesion recently biopsied with a benign diagnosis     Description of procedure: After fully informed voluntary consent was obtained patient was brought into the procedure room, identified and placed in a supine position on the cysto table.  The groin/scrotum were prepped and draped in a sterile fashion with betadine.  A 15F flexible cystoscope was inserted into the urethra and the bladder and urethra examined in a systematic manner.  There were no tumor stones or diverticula.  Area of biopsy had some necrotic debris but no new growth.  Distal urethra was normal.  The patient tolerated the procedure well and there were no complications.       Assessment/Plan: Patient with a history of urachal lesion with abnormal cysto but not suggestive of any new growth.  Will observe for now and repeat in 6 months        Again, thank you for allowing me to participate in the care of your patient.      Sincerely,    Yaya Lomeli MD

## 2018-03-08 VITALS
SYSTOLIC BLOOD PRESSURE: 132 MMHG | BODY MASS INDEX: 34.54 KG/M2 | WEIGHT: 277.8 LBS | HEIGHT: 75 IN | HEART RATE: 99 BPM | DIASTOLIC BLOOD PRESSURE: 86 MMHG

## 2018-03-08 NOTE — NURSING NOTE
Invasive Procedure Safety Checklist:    Procedure:     Action: Complete sections and checkboxes as appropriate.    Pre-procedure:  1. Patient ID Verified with 2 identifiers (Sofi and  or MRN) : YES    2. Procedure and site verified with patient/designee (when able) : YES    3. Accurate consent documentation in medical record : YES    4. H&P (or appropriate assessment) documented in medical record : YES  H&P must be up to 30 days prior to procedure an updated within 24 hours of                 Procedure as applicable.     5. Relevant diagnostic and radiology test results appropriately labeled and displayed as applicable : YES    6. Blood products, implants, devices, and/or special equipment available for the procedure as applicable : YES    7. Procedure site(s) marked with provider initials [Exclusions: None] : NO    8. Marking not required. Reason : Yes  Procedure does not require site marking    Time Out:     Time-Out performed immediately prior to starting procedure, including verbal and active participation of all team members addressing: YES    1. Correct patient identity.  2. Confirmed that the correct side and site are marked.  3. An accurate procedure to be done.  4. Agreement on the procedure to be done.  5. Correct patient position.  6. Relevant images and results are properly labeled and appropriately displayed.  7. The need to administer antibiotics or fluids for irrigation purposes during the procedure as applicable.  8. Safety precautions based on patient history or medication use.    During Procedure: Verification of correct person, site, and procedure occurs any time the responsibility for care of the patient is transferred to another member of the care team.

## 2018-03-08 NOTE — PROGRESS NOTES
PREPROCEDURE DIAGNOSIS: BPH   POSTPROCEDURE DIAGNOSIS: same.   PROCEDURE: Transrectal ultrasound   SURGEON: Armani  DESCRIPTION OF PROCEDURE: The procedure, the outcome, the anesthesia, and the risks were discussed with the patient.  Informed consent was obtained and signed and a timeout was completed prior to the procedure. Patient was placed in a left lateral decubitus position on the procedure table.  A 7.5mHz ultrasound probe was inserted into the rectum.  TRUS demonstrated a 35.5 cc prostate. There was one hypoechoic area in the right mid TZ.  Patient tolerated the procedure well.  There were no complications.    Patient would be a good candidate for a REZUM procedure      Pre-procedure diagnosis: Urachal lesion post biopsy  Post procedure diagnosis: abnormal cystoscopy  Procedure performed: cystoscopy  Surgeon: JOSIAS Lomeli MD  Anesthesia: local    Indications for procedure: Patient is a 62 year old male with a history of urachal lesion recently biopsied with a benign diagnosis    Description of procedure: After fully informed voluntary consent was obtained patient was brought into the procedure room, identified and placed in a supine position on the cysto table.  The groin/scrotum were prepped and draped in a sterile fashion with betadine.  A 15F flexible cystoscope was inserted into the urethra and the bladder and urethra examined in a systematic manner.  There were no tumor stones or diverticula.  Area of biopsy had some necrotic debris but no new growth.  Distal urethra was normal.  The patient tolerated the procedure well and there were no complications.      Assessment/Plan: Patient with a history of urachal lesion with abnormal cysto but not suggestive of any new growth.  Will observe for now and repeat in 6 months

## 2018-03-12 ENCOUNTER — OFFICE VISIT (OUTPATIENT)
Dept: INTERNAL MEDICINE | Facility: CLINIC | Age: 63
End: 2018-03-12
Payer: COMMERCIAL

## 2018-03-12 VITALS
WEIGHT: 275.9 LBS | DIASTOLIC BLOOD PRESSURE: 83 MMHG | OXYGEN SATURATION: 96 % | BODY MASS INDEX: 34.49 KG/M2 | HEART RATE: 99 BPM | SYSTOLIC BLOOD PRESSURE: 136 MMHG

## 2018-03-12 DIAGNOSIS — Z13.89 SCREENING FOR DIABETIC PERIPHERAL NEUROPATHY: Primary | ICD-10-CM

## 2018-03-12 DIAGNOSIS — E78.5 HYPERLIPIDEMIA LDL GOAL <100: ICD-10-CM

## 2018-03-12 DIAGNOSIS — K21.9 GASTROESOPHAGEAL REFLUX DISEASE WITHOUT ESOPHAGITIS: ICD-10-CM

## 2018-03-12 DIAGNOSIS — R35.1 NOCTURIA: ICD-10-CM

## 2018-03-12 DIAGNOSIS — E55.9 VITAMIN D DEFICIENCY: ICD-10-CM

## 2018-03-12 DIAGNOSIS — R53.83 OTHER FATIGUE: ICD-10-CM

## 2018-03-12 DIAGNOSIS — G47.33 OBSTRUCTIVE SLEEP APNEA HYPOPNEA, MODERATE: ICD-10-CM

## 2018-03-12 DIAGNOSIS — Z11.59 NEED FOR HEPATITIS C SCREENING TEST: ICD-10-CM

## 2018-03-12 DIAGNOSIS — I10 ESSENTIAL HYPERTENSION, BENIGN: ICD-10-CM

## 2018-03-12 DIAGNOSIS — R93.1 AGATSTON CORONARY ARTERY CALCIUM SCORE GREATER THAN 400: ICD-10-CM

## 2018-03-12 DIAGNOSIS — N40.0 BENIGN NON-NODULAR PROSTATIC HYPERPLASIA WITHOUT LOWER URINARY TRACT SYMPTOMS: ICD-10-CM

## 2018-03-12 DIAGNOSIS — I10 ESSENTIAL HYPERTENSION: ICD-10-CM

## 2018-03-12 DIAGNOSIS — R31.29 MICROSCOPIC HEMATURIA: ICD-10-CM

## 2018-03-12 DIAGNOSIS — E78.00 PURE HYPERCHOLESTEROLEMIA: ICD-10-CM

## 2018-03-12 DIAGNOSIS — E88.810 DYSMETABOLIC SYNDROME X: ICD-10-CM

## 2018-03-12 DIAGNOSIS — Z00.00 ROUTINE GENERAL MEDICAL EXAMINATION AT A HEALTH CARE FACILITY: ICD-10-CM

## 2018-03-12 ASSESSMENT — PAIN SCALES - GENERAL: PAINLEVEL: NO PAIN (0)

## 2018-03-12 NOTE — MR AVS SNAPSHOT
After Visit Summary   3/12/2018    Darnell Grant    MRN: [id number]           Patient Information     Date Of Birth          1955        Visit Information        Provider Department      3/12/2018 11:00 AM Raul Dutta MD [facility]        Today's Diagnoses     Screening for diabetic peripheral neuropathy    -  1    Dysmetabolic syndrome X        Gastroesophageal reflux disease without esophagitis        Pure hypercholesterolemia        Essential hypertension, benign        Obstructive sleep apnea hypopnea, moderate [AHI 21 on CPAP 5-15]        Agatston coronary artery calcium score greater than 400        Microscopic hematuria        Vitamin D deficiency          Care Instructions    Primary Care Center: [phone]     Primary Care Center Medication Refill Request Information:  * Please contact your pharmacy regarding ANY request for medication refills.  ** [facility] Prescription Fax = [phone]  * Please allow 3 business days for routine medication refills.  * Please allow 5 business days for controlled substance medication refills.     Primary Care Center Test Result notification information:  *You will be notified with in 7-10 days of your appointment day regarding the results of your test.  If you are on MyChart you will be notified as soon as the provider has reviewed the results and signed off on them.      Radiology ([facility]) [phone]    Please contact Radiology to schedule the Dexa Scan.              Follow-ups after your visit        Your next 10 appointments already scheduled     Mar 30, 2018  3:00 PM CDT   (Arrive by 2:45 PM)   PAC EVALUATION with Uc Pac Elier [address] [place] [address] Preoperative Assessment Center ([facility])    [address] [place] [address] [phone]            Mar 30, 2018  4:00 PM CDT   (Arrive by 3:45 PM)   PAC RN ASSESSMENT with Thomas Pac Rn   [facility] Preoperative Assessment [place] (  [facility] [place])    [address]  4th [facility] [address]   [phone]            Mar 30, 2018  4:30 PM CDT   (Arrive by 4:15 PM)   PAC Anesthesia Consult with  Pac Anesthesiologist   [facility] Preoperative Assessment Center ([facility])    [address] [place] [address]  4th [facility] [address]   [phone]            Apr 13, 2018   Procedure with Yaya Lomeli MD   [facility] ([facility])    [address] [place] [address]  5th [facility] [address] [phone]           Located in the [facility] at [address] [place] [address].   parking is very convenient and highly recommended.  is a $6 flat rate fee.  Both  and self parkers should enter the main arrival plaza from [facility]; parking attendants will direct you based on your parking preference.            Apr 25, 2018  3:00 PM CDT   (Arrive by 2:45 PM)   Post-Op with Yaya Lomeli MD   [facility] for Prostate and Urologic Cancers ([facility])    [address] [place] [id number] [phone]            Jul 18, 2018  3:00 PM CDT   (Arrive by 2:45 PM)   Return Visit with Yaya Lomeli MD   [facility] and [facility] for Prostate and Urologic Cancers ([facility])    [address] [place] [address] [phone]              Future tests that were ordered for you today     Open Future Orders        Priority Expected Expires Ordered    Dexa hip/pelvis/spine* Routine  3/12/2019 3/12/2018    Albumin Random Urine Quantitative with Creat Ratio Routine 3/12/2018 3/12/2019 3/12/2018    HEMOGLOBIN A1C -(Today) Add-On 3/12/2018 3/12/2019 3/12/2018            Who to contact     Please call your clinic at [phone] to:    Ask questions about your health    Make or  cancel appointments    Discuss your medicines    Learn about your test results    Speak to your doctor            Additional Information About Your Visit        Movie [employer] K12 Information     [employer] gives you secure access to your electronic health record. If you see a primary care provider, you can also send messages to your care team and make appointments. If you have questions, please call your primary care clinic.  If you do not have a primary care provider, please call [phone] and they will assist you.      [employer] is an electronic gateway that provides easy, online access to your medical records. With [employer], you can request a clinic appointment, read your test results, renew a prescription or communicate with your care team.     To access your existing account, please contact your [facility] or call [phone] for assistance.        Care EveryWhere ID     This is your Care EveryWhere ID. This could be used by other organizations to access your [place] medical records  [id number]        Your Vitals Were     Pulse Pulse Oximetry BMI (Body Mass Index)             99 96% 34.49 kg/m2          Blood Pressure from Last 3 Encounters:   03/12/18 136/83   03/07/18 132/86   01/31/18 124/72    Weight from Last 3 Encounters:   03/12/18 125.1 kg (275 lb 14.4 oz)   03/07/18 126 kg (277 lb 12.8 oz)   01/31/18 126.5 kg (278 lb 12.8 oz)              We Performed the Following     FOOT EXAM - NO CHARGE     ZOSTER VACCINE RECOMBINANT ADJUVANTED IM NJX (SHINGRIX)          Today's Medication Changes          These changes are accurate as of 3/12/18 11:30 AM.  If you have any questions, ask your nurse or doctor.               Start taking these medicines.        Dose/Directions    ranitidine 150 MG tablet   Commonly known as:  ZANTAC   Used for:  Gastroesophageal reflux disease without esophagitis, Screening for diabetic peripheral neuropathy, Dysmetabolic syndrome X   Started by:  Tra  Raul Gracia MD        Dose:  150 mg   Take 1 tablet (150 mg) by mouth 2 times daily   Quantity:  180 tablet   Refills:  3         These medicines have changed or have updated prescriptions.        Dose/Directions    allopurinol 100 MG tablet   Commonly known as:  ZYLOPRIM   This may have changed:    - how much to take  - how to take this  - when to take this  - additional instructions   Used for:  Gout, unspecified, Essential hypertension, Gastroesophageal reflux disease without esophagitis, Benign non-nodular prostatic hyperplasia without lower urinary tract symptoms, Routine general medical examination at a health care facility, Essential hypertension, benign, Nocturia, Need for hepatitis C screening test, Screening for diabetic peripheral neuropathy, Other fatigue, Hyperlipidemia LDL goal <100        1 qday   Quantity:  90 tablet   Refills:  3       amLODIPine 10 MG tablet   Commonly known as:  NORVASC   This may have changed:  when to take this   Used for:  Essential hypertension, Gout, unspecified, Gastroesophageal reflux disease without esophagitis, Benign non-nodular prostatic hyperplasia without lower urinary tract symptoms, Routine general medical examination at a health care facility, Essential hypertension, benign, Nocturia, Need for hepatitis C screening test, Screening for diabetic peripheral neuropathy, Other fatigue, Hyperlipidemia LDL goal <100        Dose:  10 mg   Take 1 tablet (10 mg) by mouth daily   Quantity:  90 tablet   Refills:  3       dutasteride 0.5 MG capsule   Commonly known as:  AVODART   This may have changed:  when to take this   Used for:  Benign non-nodular prostatic hyperplasia without lower urinary tract symptoms, Essential hypertension, Gout, unspecified, Gastroesophageal reflux disease without esophagitis, Routine general medical examination at a health care facility, Essential hypertension, benign, Nocturia, Need for hepatitis C screening test, Screening for diabetic  peripheral neuropathy, Other fatigue, Hyperlipidemia LDL goal <100        Dose:  0.5 mg   Take 1 capsule (0.5 mg) by mouth daily   Quantity:  90 capsule   Refills:  3       metFORMIN 1000 MG tablet   Commonly known as:  GLUCOPHAGE   This may have changed:    - how much to take  - additional instructions   Used for:  Essential hypertension, Gout, unspecified, Gastroesophageal reflux disease without esophagitis, Benign non-nodular prostatic hyperplasia without lower urinary tract symptoms, Routine general medical examination at a health care facility, Essential hypertension, benign, Nocturia, Need for hepatitis C screening test, Screening for diabetic peripheral neuropathy, Other fatigue, Hyperlipidemia LDL goal <100        Take one tablet daily at bedtime   Quantity:  90 tablet   Refills:  3       rosuvastatin 10 MG tablet   Commonly known as:  CRESTOR   This may have changed:  when to take this   Used for:  Essential hypertension, Gout, unspecified, Gastroesophageal reflux disease without esophagitis, Benign non-nodular prostatic hyperplasia without lower urinary tract symptoms, Routine general medical examination at a health care facility, Essential hypertension, benign, Nocturia, Need for hepatitis C screening test, Screening for diabetic peripheral neuropathy, Other fatigue, Hyperlipidemia LDL goal <100        Dose:  40 mg   Take 4 tablets (40 mg) by mouth daily   Quantity:  90 tablet   Refills:  3       tamsulosin 0.4 MG capsule   Commonly known as:  FLOMAX   This may have changed:  when to take this   Used for:  Benign non-nodular prostatic hyperplasia without lower urinary tract symptoms, Nocturia, Routine general medical examination at a health care facility, Essential hypertension, benign, Gout, unspecified, Essential hypertension, Gastroesophageal reflux disease without esophagitis, Need for hepatitis C screening test, Screening for diabetic peripheral neuropathy, Other fatigue, Hyperlipidemia LDL goal <100         Dose:  0.4 mg   Take 1 capsule (0.4 mg) by mouth daily   Quantity:  90 capsule   Refills:  3       valsartan 160 MG tablet   Commonly known as:  DIOVAN   This may have changed:  when to take this   Used for:  Essential hypertension, benign, Routine general medical examination at a health care facility, Gout, unspecified, Essential hypertension, Gastroesophageal reflux disease without esophagitis, Benign non-nodular prostatic hyperplasia without lower urinary tract symptoms, Nocturia, Need for hepatitis C screening test, Screening for diabetic peripheral neuropathy, Other fatigue, Hyperlipidemia LDL goal <100        Dose:  160 mg   Take 1 tablet (160 mg) by mouth daily   Quantity:  90 tablet   Refills:  3         Stop taking these medicines if you haven't already. Please contact your care team if you have questions.     omeprazole 20 MG tablet   Stopped by:  Raul Dutta MD                Where to get your medicines      These medications were sent to [facility] [address] [address]     Phone:  [phone]     ranitidine 150 MG tablet                Primary Care Provider Office Phone # Fax #    Raul Dutta [phone] [address] [address]        Equal Access to Services     RYAN BOWENS AH: Hadii lisette deleono Soeva, waaxda luqadaha, qaybta kaalmada aderobsonyada, ric pride. So [facility] [phone].    ATENCIÓN: Si habla español, tiene a mulligan disposición servicios gratuitos de asistencia lingüística. Alexandra al [phone].    We comply with applicable federal civil rights laws and [place] laws. We do not discriminate on the basis of race, color, national origin, age, disability, sex, sexual orientation, or gender identity.            Thank you!     Thank you for choosing [facility]  for your care. Our goal is always to  provide you with excellent care. Hearing back from our patients is one way we can continue to improve our services. Please take a few minutes to complete the written survey that you may receive in the mail after your visit with us. Thank you!             Your Updated Medication List - Protect others around you: Learn how to safely use, store and throw away your medicines at www.disposemymeds.org.          This list is accurate as of 3/12/18 11:30 AM.  Always use your most recent med list.                   Brand Name Dispense Instructions for use Diagnosis    allopurinol 100 MG tablet    ZYLOPRIM    90 tablet    1 qday    Gout, unspecified, Essential hypertension, Gastroesophageal reflux disease without esophagitis, Benign non-nodular prostatic hyperplasia without lower urinary tract symptoms, Routine general medical examination at a health care facility, Essential hypertension, benign, Nocturia, Need for hepatitis C screening test, Screening for diabetic peripheral neuropathy, Other fatigue, Hyperlipidemia LDL goal <100       amLODIPine 10 MG tablet    NORVASC    90 tablet    Take 1 tablet (10 mg) by mouth daily    Essential hypertension, Gout, unspecified, Gastroesophageal reflux disease without esophagitis, Benign non-nodular prostatic hyperplasia without lower urinary tract symptoms, Routine general medical examination at a health care facility, Essential hypertension, benign, Nocturia, Need for hepatitis C screening test, Screening for diabetic peripheral neuropathy, Other fatigue, Hyperlipidemia LDL goal <100       dutasteride 0.5 MG capsule    AVODART    90 capsule    Take 1 capsule (0.5 mg) by mouth daily    Benign non-nodular prostatic hyperplasia without lower urinary tract symptoms, Essential hypertension, Gout, unspecified, Gastroesophageal reflux disease without esophagitis, Routine general medical examination at a health care facility, Essential hypertension, benign, Nocturia, Need for hepatitis C  screening test, Screening for diabetic peripheral neuropathy, Other fatigue, Hyperlipidemia LDL goal <100       metFORMIN 1000 MG tablet    GLUCOPHAGE    90 tablet    Take one tablet daily at bedtime    Essential hypertension, Gout, unspecified, Gastroesophageal reflux disease without esophagitis, Benign non-nodular prostatic hyperplasia without lower urinary tract symptoms, Routine general medical examination at a health care facility, Essential hypertension, benign, Nocturia, Need for hepatitis C screening test, Screening for diabetic peripheral neuropathy, Other fatigue, Hyperlipidemia LDL goal <100       Multi-vitamin Tabs tablet      Take 1 tablet by mouth At Bedtime        order for DME      Respironics Dreamstation AutoPap 7-15 cm with Respironics Leidy mask.        phentermine 37.5 MG tablet    ADIPEX-P     Take 56 mg by mouth every morning (before breakfast)        ranitidine 150 MG tablet    ZANTAC    180 tablet    Take 1 tablet (150 mg) by mouth 2 times daily    Gastroesophageal reflux disease without esophagitis, Screening for diabetic peripheral neuropathy, Dysmetabolic syndrome X       rosuvastatin 10 MG tablet    CRESTOR    90 tablet    Take 4 tablets (40 mg) by mouth daily    Essential hypertension, Gout, unspecified, Gastroesophageal reflux disease without esophagitis, Benign non-nodular prostatic hyperplasia without lower urinary tract symptoms, Routine general medical examination at a health care facility, Essential hypertension, benign, Nocturia, Need for hepatitis C screening test, Screening for diabetic peripheral neuropathy, Other fatigue, Hyperlipidemia LDL goal <100       tamsulosin 0.4 MG capsule    FLOMAX    90 capsule    Take 1 capsule (0.4 mg) by mouth daily    Benign non-nodular prostatic hyperplasia without lower urinary tract symptoms, Nocturia, Routine general medical examination at a health care facility, Essential hypertension, benign, Gout, unspecified, Essential hypertension,  Gastroesophageal reflux disease without esophagitis, Need for hepatitis C screening test, Screening for diabetic peripheral neuropathy, Other fatigue, Hyperlipidemia LDL goal <100       valsartan 160 MG tablet    DIOVAN    90 tablet    Take 1 tablet (160 mg) by mouth daily    Essential hypertension, benign, Routine general medical examination at a health care facility, Gout, unspecified, Essential hypertension, Gastroesophageal reflux disease without esophagitis, Benign non-nodular prostatic hyperplasia without lower urinary tract symptoms, Nocturia, Need for hepatitis C screening test, Screening for diabetic peripheral neuropathy, Other fatigue, Hyperlipidemia LDL goal <100

## 2018-03-12 NOTE — PROGRESS NOTES
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 This wrecks sleep. Planned REZUM procedure in April. Sleeps on side and does not think he's snoring. Not using CPAP. No chest pain. Heart rate in 90s- 11% PVCs by Zio patch. No Afib.   No change in family/social history- reviewed by me.   Meds reviewed as below  Current Outpatient Prescriptions   Medication     ranitidine (ZANTAC) 150 MG tablet (stopped omeprazole today-- change to zantac)     multivitamin, therapeutic with minerals (MULTI-VITAMIN) TABS tablet     phentermine (ADIPEX-P) 37.5 MG tablet     rosuvastatin (CRESTOR) 10 MG tablet     valsartan (DIOVAN) 160 MG tablet     allopurinol (ZYLOPRIM) 100 MG tablet     amLODIPine (NORVASC) 10 MG tablet     metFORMIN (GLUCOPHAGE) 1000 MG tablet     dutasteride (AVODART) 0.5 MG capsule     tamsulosin (FLOMAX) 0.4 MG capsule     order for DME     No current facility-administered medications for this visit.        O) /83  Pulse 99  Wt 125.1 kg (275 lb 14.4 oz)  SpO2 96%  BMI 34.49 kg/m2  Exam:  Constitutional: healthy, alert and no distress  Head: Normocephalic. No masses, lesions, tenderness or abnormalities  Neck: normal  ENT: Normal  Cardiovascular: negative, PMI normal. No lifts, heaves, or thrills. RRR but PVCs evident. No murmurs, clicks gallops or rub  Respiratory: negative, Percussion normal. Good diaphragmatic excursion. Lungs clear  Gastrointestinal: Normal  : Deferred  Musculoskeletal: extremities normal  Skin: no suspicious lesions or rashes  Neurologic: Gait normal.Sensation grossly WNL.  Psychiatric: mentation appears normal and affect normal/bright  Hematologic/Lymphatic/Immunologic: nl    A/P)   1) HCM. Good shingrix candidate. Obese by BMI criteria but wt trend is downward with lifestyle modification. Exercise bike ongoing. Walking desk discussed. Colon screening UTD.  2) HTN. BP controlled with  amlodipine 10 mg/d, valsartan 160 mg/d. Need home BP readings to assess whether we need to increase the diovan to 320 mg/d. He will email perlita.   3) ASCVD Risk. Max RF reduction with statin, BP control. Coronary calcium score elevated but Nuc Med stress test with Lexiscan showed summed stress score of 0. Continue current regimen/strategy. Labs reviewed.   4) BPH. Recent obstructive symptoms and microscopic hematuria evaluated with cystoscopy and bladder dome mass biopsy that was benign. REZUM therapy recommended (transurethral application of heated water vapor to reduce prostate mass) and scheduled for April 13 with Dr. Lomeli. Potential weaning of the dutasteride thereafter.  5) LISETTE. Not tolerating CPAP. Sleep will improve with BPH treatment.  6) Obesity. Currently seeing a wt loss provider and using phentermine for pharmacologic treatment.  7) Gout. No flares. Urate <6 on allopurinol. No changes.  8) GERD. Attempt de-escalation to H2 Blocker.    9) Arrhythmia. Zio patch reassuring. No indication for further w/u at this time. F/U in Cards clinic with Dr. Dutta in 6 mos.   10) Metabolic syndrome. Continue metformin, wt loss, exercise.   11) Vitamin D deficiency. Resolved with replacement. Check DEXA.    Over 25 min of 40 min visit spent in care coordination and counseling related to the above issues.      Raul Dutta MD, FACP, FAAP

## 2018-03-12 NOTE — NURSING NOTE
Chief Complaint   Patient presents with     Hypertension     Patient is here to follow up on blood pressure.      Yokasta Loomis LPN at 10:51 AM on 3/12/2018.

## 2018-03-12 NOTE — PATIENT INSTRUCTIONS
[facility]: [phone]     [facility] Medication Refill Request Information:  * Please contact your pharmacy regarding ANY request for medication refills.  ** [facility] Prescription Fax = [phone]  * Please allow 3 business days for routine medication refills.  * Please allow 5 business days for controlled substance medication refills.     [facility] Test Result notification information:  *You will be notified with in 7-10 days of your appointment day regarding the results of your test.  If you are on MyChart you will be notified as soon as the provider has reviewed the results and signed off on them.      Radiology ([place]) [phone]    Please contact Radiology to schedule the Dexa Scan.

## 2018-03-16 ENCOUNTER — RADIANT APPOINTMENT (OUTPATIENT)
Dept: BONE DENSITY | Facility: CLINIC | Age: 63
End: 2018-03-16
Attending: INTERNAL MEDICINE
Payer: COMMERCIAL

## 2018-03-16 ENCOUNTER — ALLIED HEALTH/NURSE VISIT (OUTPATIENT)
Dept: INTERNAL MEDICINE | Facility: CLINIC | Age: 63
End: 2018-03-16
Payer: COMMERCIAL

## 2018-03-16 DIAGNOSIS — K21.9 GASTROESOPHAGEAL REFLUX DISEASE WITHOUT ESOPHAGITIS: ICD-10-CM

## 2018-03-16 DIAGNOSIS — I10 ESSENTIAL HYPERTENSION, BENIGN: ICD-10-CM

## 2018-03-16 DIAGNOSIS — E55.9 VITAMIN D DEFICIENCY: ICD-10-CM

## 2018-03-16 DIAGNOSIS — E78.00 PURE HYPERCHOLESTEROLEMIA: ICD-10-CM

## 2018-03-16 DIAGNOSIS — Z23 NEED FOR SHINGLES VACCINE: Primary | ICD-10-CM

## 2018-03-16 DIAGNOSIS — R31.29 MICROSCOPIC HEMATURIA: ICD-10-CM

## 2018-03-16 DIAGNOSIS — E88.810 DYSMETABOLIC SYNDROME X: ICD-10-CM

## 2018-03-16 DIAGNOSIS — Z13.89 SCREENING FOR DIABETIC PERIPHERAL NEUROPATHY: ICD-10-CM

## 2018-03-16 DIAGNOSIS — G47.33 OBSTRUCTIVE SLEEP APNEA HYPOPNEA, MODERATE: ICD-10-CM

## 2018-03-16 DIAGNOSIS — R93.1 AGATSTON CORONARY ARTERY CALCIUM SCORE GREATER THAN 400: ICD-10-CM

## 2018-03-16 RX ORDER — VALSARTAN 320 MG/1
320 TABLET ORAL DAILY
Qty: 90 TABLET | Refills: 3 | Status: SHIPPED | OUTPATIENT
Start: 2018-03-16 | End: 2018-11-19

## 2018-03-16 NOTE — NURSING NOTE
Chief Complaint   Patient presents with     Imm/Inj     Patient is here for Shingle Shingrix vaccine     Darnell Grant comes into clinic today at the request of Dr. Raul Dutta for Shingle Shingrix vaccine.    Administered Shingle Shingrix vaccine (see Immunizations in Chart Review). Patient tolerated well.        This service provided today was under the direct supervision of Dr. Walter Morris, who was available if needed.      Nilton Alves CMA at 2:37 PM on 3/16/2018

## 2018-03-16 NOTE — MR AVS SNAPSHOT
After Visit Summary   3/16/2018    Darnell Grant    MRN: [id number]           Patient Information     Date Of Birth          1955        Visit Information        Provider Department      3/16/2018 2:30 PM Nurse,  [facility]        Today's Diagnoses     Need for shingles vaccine    -  1       Follow-ups after your visit        Your next 10 appointments already scheduled     Mar 16, 2018  3:30 PM CDT   DX HIP/PELVIS/SPINE with UCDX1   [facility] ([facility])    [address] [place] [address]  1st [facility] [id number] [phone]           Please do not take any of the following 24 hours prior to the day of your exam: vitamins, calcium tablets, antacids.  If possible, please wear clothes without metal (snaps, zippers). A sweatsuit works well.            Mar 30, 2018  3:00 PM CDT   (Arrive by 2:45 PM)   PAC EVALUATION with  Pac Elier 7   [facility] ([facility])    [address] [place] [address]  4th [facility] [address]   [phone]            Mar 30, 2018  4:00 PM CDT   (Arrive by 3:45 PM)   PAC RN ASSESSMENT with  Pac Rn   [facility] ([facility])    [address] [place] [id number]   [phone]            Mar 30, 2018  4:30 PM CDT   (Arrive by 4:15 PM)   PAC Anesthesia Consult with  Pac Anesthesiologist   [facility] ([facility])    [address] [place] [address]  4th [facility] [id number] [phone]            Apr 13, 2018   Procedure with Yaya Lomeli MD   [facility] ([facility])    [address] [place] [address]  5th [facility] [id number] [phone]           Located in the Clinics and Surgery Center at [address] [place] [address].   parking is  very convenient and highly recommended.  is a $6 flat rate fee.  Both  and self parkers should enter the main arrival plaza from [facility]; parking attendants will direct you based on your parking preference.            Apr 25, 2018  3:00 PM CDT   (Arrive by 2:45 PM)   Post-Op with Yaya Lomeli MD   [facility] ([facility])    [address] [place] [id number] [phone]            Oct 03, 2018  4:00 PM CDT   (Arrive by 3:45 PM)   Cystoscopy with Yaya Lomeli MD   [facility] ([facility])    [address] [place] [id number]   [phone]            Oct 10, 2018  7:00 AM CDT   LAB with  LAB   [facility] ([facility])    [address] [place] [id number] [phone]           Please do not eat 10-12 hours before your appointment if you are coming in fasting for labs on lipids, cholesterol, or glucose (sugar). This does not apply to pregnant women. Water, hot tea and black coffee (with nothing added) are okay. Do not drink other fluids, diet soda or chew gum.            Oct 15, 2018 11:00 AM CDT   (Arrive by 10:45 AM)   Return Visit with Raul Dutta MD   [facility] ([facility])    [address] [place] [id number] [phone]              Who to contact     Please call your clinic at [phone] to:    Ask questions about your health    Make or cancel appointments    Discuss your medicines    Learn about your test results    Speak to your doctor            Additional Information About Your Visit        MyChart Information     [employer] gives you secure access to your electronic health record. If you see a primary care provider, you can also send messages to  your care team and make appointments. If you have questions, please call your primary care clinic.  If you do not have a primary care provider, please call [phone] and they will assist you.      [employer] is an electronic gateway that provides easy, online access to your medical records. With [employer], you can request a clinic appointment, read your test results, renew a prescription or communicate with your care team.     To access your existing account, please contact your [facility] or call [phone] for assistance.        Care EveryWhere ID     This is your Care EveryWhere ID. This could be used by other organizations to access your [facility] [place] medical records  [id number]         Blood Pressure from Last 3 Encounters:   03/12/18 136/83   03/07/18 132/86   01/31/18 124/72    Weight from Last 3 Encounters:   03/12/18 125.1 kg (275 lb 14.4 oz)   03/07/18 126 kg (277 lb 12.8 oz)   01/31/18 126.5 kg (278 lb 12.8 oz)              We Performed the Following     ZOSTER VACCINE RECOMBINANT ADJUVANTED IM NJX (SHINGRIX)          Today's Medication Changes          These changes are accurate as of 3/16/18  2:43 PM.  If you have any questions, ask your nurse or doctor.               These medicines have changed or have updated prescriptions.        Dose/Directions    allopurinol 100 MG tablet   Commonly known as:  ZYLOPRIM   This may have changed:    - how much to take  - how to take this  - when to take this  - additional instructions   Used for:  Gout, unspecified, Essential hypertension, Gastroesophageal reflux disease without esophagitis, Benign non-nodular prostatic hyperplasia without lower urinary tract symptoms, Routine general medical examination at a health care facility, Essential hypertension, benign, Nocturia, Need for hepatitis C screening test, Screening for diabetic peripheral neuropathy, Other fatigue, Hyperlipidemia LDL goal <100        1 qday   Quantity:  90 tablet    Refills:  3       amLODIPine 10 MG tablet   Commonly known as:  NORVASC   This may have changed:  when to take this   Used for:  Essential hypertension, Gout, unspecified, Gastroesophageal reflux disease without esophagitis, Benign non-nodular prostatic hyperplasia without lower urinary tract symptoms, Routine general medical examination at a health care facility, Essential hypertension, benign, Nocturia, Need for hepatitis C screening test, Screening for diabetic peripheral neuropathy, Other fatigue, Hyperlipidemia LDL goal <100        Dose:  10 mg   Take 1 tablet (10 mg) by mouth daily   Quantity:  90 tablet   Refills:  3       dutasteride 0.5 MG capsule   Commonly known as:  AVODART   This may have changed:  when to take this   Used for:  Benign non-nodular prostatic hyperplasia without lower urinary tract symptoms, Essential hypertension, Gout, unspecified, Gastroesophageal reflux disease without esophagitis, Routine general medical examination at a health care facility, Essential hypertension, benign, Nocturia, Need for hepatitis C screening test, Screening for diabetic peripheral neuropathy, Other fatigue, Hyperlipidemia LDL goal <100        Dose:  0.5 mg   Take 1 capsule (0.5 mg) by mouth daily   Quantity:  90 capsule   Refills:  3       metFORMIN 1000 MG tablet   Commonly known as:  GLUCOPHAGE   This may have changed:    - how much to take  - additional instructions   Used for:  Essential hypertension, Gout, unspecified, Gastroesophageal reflux disease without esophagitis, Benign non-nodular prostatic hyperplasia without lower urinary tract symptoms, Routine general medical examination at a health care facility, Essential hypertension, benign, Nocturia, Need for hepatitis C screening test, Screening for diabetic peripheral neuropathy, Other fatigue, Hyperlipidemia LDL goal <100        Take one tablet daily at bedtime   Quantity:  90 tablet   Refills:  3       rosuvastatin 10 MG tablet   Commonly known as:   CRESTOR   This may have changed:  when to take this   Used for:  Essential hypertension, Gout, unspecified, Gastroesophageal reflux disease without esophagitis, Benign non-nodular prostatic hyperplasia without lower urinary tract symptoms, Routine general medical examination at a health care facility, Essential hypertension, benign, Nocturia, Need for hepatitis C screening test, Screening for diabetic peripheral neuropathy, Other fatigue, Hyperlipidemia LDL goal <100        Dose:  40 mg   Take 4 tablets (40 mg) by mouth daily   Quantity:  90 tablet   Refills:  3       tamsulosin 0.4 MG capsule   Commonly known as:  FLOMAX   This may have changed:  when to take this   Used for:  Benign non-nodular prostatic hyperplasia without lower urinary tract symptoms, Nocturia, Routine general medical examination at a health care facility, Essential hypertension, benign, Gout, unspecified, Essential hypertension, Gastroesophageal reflux disease without esophagitis, Need for hepatitis C screening test, Screening for diabetic peripheral neuropathy, Other fatigue, Hyperlipidemia LDL goal <100        Dose:  0.4 mg   Take 1 capsule (0.4 mg) by mouth daily   Quantity:  90 capsule   Refills:  3                Primary Care Provider Office Phone # Fax #    Raul Basil Dutta [phone] [address] [address]        Equal Access to Services     [facility]: Hadii lisette hernandez hadasho Solucasali, waaxda luqadaha, qaybta kaalmada adeegyada, ric farias . So [facility] [phone].    ATENCIÓN: Si habla español, tiene a mulligan disposición servicios gratuitos de asistencia lingüística. Llame al [phone].    We comply with applicable federal civil rights laws and [place] laws. We do not discriminate on the basis of race, color, national origin, age, disability, sex, sexual orientation, or gender identity.            Thank you!     Thank you for choosing [facility]  for your care. Our goal is always to provide you with excellent care. Hearing back from our patients is one way we can continue to improve our services. Please take a few minutes to complete the written survey that you may receive in the mail after your visit with us. Thank you!             Your Updated Medication List - Protect others around you: Learn how to safely use, store and throw away your medicines at www.disposemymeds.org.          This list is accurate as of 3/16/18  2:43 PM.  Always use your most recent med list.                   Brand Name Dispense Instructions for use Diagnosis    allopurinol 100 MG tablet    ZYLOPRIM    90 tablet    1 qday    Gout, unspecified, Essential hypertension, Gastroesophageal reflux disease without esophagitis, Benign non-nodular prostatic hyperplasia without lower urinary tract symptoms, Routine general medical examination at a health care facility, Essential hypertension, benign, Nocturia, Need for hepatitis C screening test, Screening for diabetic peripheral neuropathy, Other fatigue, Hyperlipidemia LDL goal <100       amLODIPine 10 MG tablet    NORVASC    90 tablet    Take 1 tablet (10 mg) by mouth daily    Essential hypertension, Gout, unspecified, Gastroesophageal reflux disease without esophagitis, Benign non-nodular prostatic hyperplasia without lower urinary tract symptoms, Routine general medical examination at a health care facility, Essential hypertension, benign, Nocturia, Need for hepatitis C screening test, Screening for diabetic peripheral neuropathy, Other fatigue, Hyperlipidemia LDL goal <100       dutasteride 0.5 MG capsule    AVODART    90 capsule    Take 1 capsule (0.5 mg) by mouth daily    Benign non-nodular prostatic hyperplasia without lower urinary tract symptoms, Essential hypertension, Gout, unspecified, Gastroesophageal reflux disease without esophagitis, Routine general medical examination at a health care facility, Essential  hypertension, benign, Nocturia, Need for hepatitis C screening test, Screening for diabetic peripheral neuropathy, Other fatigue, Hyperlipidemia LDL goal <100       metFORMIN 1000 MG tablet    GLUCOPHAGE    90 tablet    Take one tablet daily at bedtime    Essential hypertension, Gout, unspecified, Gastroesophageal reflux disease without esophagitis, Benign non-nodular prostatic hyperplasia without lower urinary tract symptoms, Routine general medical examination at a health care facility, Essential hypertension, benign, Nocturia, Need for hepatitis C screening test, Screening for diabetic peripheral neuropathy, Other fatigue, Hyperlipidemia LDL goal <100       Multi-vitamin Tabs tablet      Take 1 tablet by mouth At Bedtime        order for DME      Respironics Dreamstation AutoPap 7-15 cm with Respironics Leidy mask.        phentermine 37.5 MG tablet    ADIPEX-P     Take 56 mg by mouth every morning (before breakfast)        ranitidine 150 MG tablet    ZANTAC    180 tablet    Take 1 tablet (150 mg) by mouth 2 times daily    Gastroesophageal reflux disease without esophagitis, Screening for diabetic peripheral neuropathy, Dysmetabolic syndrome X, Pure hypercholesterolemia, Essential hypertension, benign, Obstructive sleep apnea hypopnea, moderate, Agatston coronary artery calcium score greater than 400, Microscopic hematuria, Vitamin D deficiency       rosuvastatin 10 MG tablet    CRESTOR    90 tablet    Take 4 tablets (40 mg) by mouth daily    Essential hypertension, Gout, unspecified, Gastroesophageal reflux disease without esophagitis, Benign non-nodular prostatic hyperplasia without lower urinary tract symptoms, Routine general medical examination at a health care facility, Essential hypertension, benign, Nocturia, Need for hepatitis C screening test, Screening for diabetic peripheral neuropathy, Other fatigue, Hyperlipidemia LDL goal <100       tamsulosin 0.4 MG capsule    FLOMAX    90 capsule    Take 1 capsule  (0.4 mg) by mouth daily    Benign non-nodular prostatic hyperplasia without lower urinary tract symptoms, Nocturia, Routine general medical examination at a health care facility, Essential hypertension, benign, Gout, unspecified, Essential hypertension, Gastroesophageal reflux disease without esophagitis, Need for hepatitis C screening test, Screening for diabetic peripheral neuropathy, Other fatigue, Hyperlipidemia LDL goal <100       valsartan 320 MG tablet    DIOVAN    90 tablet    Take 1 tablet (320 mg) by mouth daily    Essential hypertension, benign, Routine general medical examination at a health care facility, Essential hypertension, Gastroesophageal reflux disease without esophagitis, Benign non-nodular prostatic hyperplasia without lower urinary tract symptoms, Nocturia, Need for hepatitis C screening test, Screening for diabetic peripheral neuropathy, Other fatigue, Hyperlipidemia LDL goal <100

## 2018-03-30 ENCOUNTER — ALLIED HEALTH/NURSE VISIT (OUTPATIENT)
Dept: SURGERY | Facility: CLINIC | Age: 63
End: 2018-03-30
Payer: COMMERCIAL

## 2018-03-30 ENCOUNTER — ANESTHESIA EVENT (OUTPATIENT)
Dept: SURGERY | Facility: AMBULATORY SURGERY CENTER | Age: 63
End: 2018-03-30

## 2018-03-30 ENCOUNTER — OFFICE VISIT (OUTPATIENT)
Dept: SURGERY | Facility: CLINIC | Age: 63
End: 2018-03-30
Payer: COMMERCIAL

## 2018-03-30 ENCOUNTER — APPOINTMENT (OUTPATIENT)
Dept: SURGERY | Facility: CLINIC | Age: 63
End: 2018-03-30
Payer: COMMERCIAL

## 2018-03-30 VITALS
WEIGHT: 277.5 LBS | DIASTOLIC BLOOD PRESSURE: 74 MMHG | HEIGHT: 75 IN | OXYGEN SATURATION: 97 % | TEMPERATURE: 97.5 F | SYSTOLIC BLOOD PRESSURE: 118 MMHG | BODY MASS INDEX: 34.5 KG/M2 | RESPIRATION RATE: 16 BRPM | HEART RATE: 99 BPM

## 2018-03-30 DIAGNOSIS — Z01.818 PREOP GENERAL PHYSICAL EXAM: Primary | ICD-10-CM

## 2018-03-30 DIAGNOSIS — R39.9 LOWER URINARY TRACT SYMPTOMS (LUTS): ICD-10-CM

## 2018-03-30 DIAGNOSIS — N32.89 MASS OF URINARY BLADDER: ICD-10-CM

## 2018-03-30 NOTE — ANESTHESIA PREPROCEDURE EVALUATION
Anesthesia Evaluation     . Pt has had prior anesthetic. Type: General and MAC           ROS/MED HX    ENT/Pulmonary:     (+)sleep apnea, doesn't use CPAP , . .    Neurologic:  - neg neurologic ROS     Cardiovascular:     (+) hypertension----. : . . . :. . Previous cardiac testing date:results:Stress Testdate: results:Stress Echo and MRI: No evidence of ischemia. Normal LV function. No valvular abnormalities.ECG reviewed date: results: date: results:          METS/Exercise Tolerance:  >4 METS   Hematologic:  - neg hematologic  ROS       Musculoskeletal:  - neg musculoskeletal ROS       GI/Hepatic:     (+) GERD Asymptomatic on medication,       Renal/Genitourinary:     (+) Other Renal/ Genitourinary, bladder mass      Endo:     (+) Obesity, .      Psychiatric:  - neg psychiatric ROS       Infectious Disease:  - neg infectious disease ROS       Malignancy:   (+) Malignancy History of Other  Other CA bladder Active status post         Other:    (+) No chance of pregnancy C-spine cleared: N/A, no H/O Chronic Pain,no other significant disability                    Physical Exam  Normal systems: cardiovascular and pulmonary    Airway   Mallampati: III  TM distance: >3 FB  Neck ROM: full    Dental   (+) implants    Cardiovascular   Rhythm and rate: regular and normal      Pulmonary    breath sounds clear to auscultation    Other findings:   EKG: Personally reviewed but formal cardiology read pending: EKG 1/19/2018 SR with ocassional PVC and anterior fascicular block           MRI cardiac stress 1/19/2018    Clinical history: 62-year-old male with frequent PVCs and calcium score of 744, CMR to rule out ischemia.   Comparison CMR: None      1. The LV is normal in cavity size and wall thickness. The global systolic function is normal. The LVEF is  55%. There are no regional wall motion abnormalities.      2. The RV is normal in cavity size. The global systolic function is normal. The RVEF is 61%.       3. Both atria are  normal in size.      4. There is no significant valvular disease.       5. Late gadolinium enhancement imaging shows no MI, fibrosis or infiltrative disease.       6. Regadenoson stress perfusion imaging shows no ischemia.      CONCLUSIONS: No ischemia or infarction. Normal LV and RV function, LVEF of 55% and RVEF of 61%. No obvious  substrate for arrhythmia.           PAC Discussion and Assessment    ASA Classification: 3  Case is suitable for: ASC  Anesthetic techniques and relevant risks discussed: GA  Invasive monitoring and risk discussed: Yes  Types:   Possibility and Risk of blood transfusion discussed:   NPO instructions given:   Additional anesthetic preparation and risks discussed:   Needs early admission to pre-op area:   Other:     PAC Resident/NP Anesthesia Assessment:  Darnell Grant is a 63 yo male scheduled for Rezum Procedure on 4/13/2018 by Dr. Lomeli in treatment of urinary symptoms and history of bladder cancer      Previous anesthesia, last 1/31/2018 at [facility].  1) Cardiac: Known PVC, shown up at 14% on recent holter. EKG 1/19/2018 SR with ocassional PVC and anterior fascicular block. MRI cardiac 1/19/2018 negative for ischemia and EF 55%. METS well over 4 without cardiac symptoms.  2) Pulmonary: LISETTE, does not use CPAP  3) GI: GERD, well managed with omeprazole  4) : microscopic hematuria who on workup was found to have an enhancing mass at the bladder dome on CT urogram.  He underwent cold cup biopsy and fulguration on 1/31/18. Complains of LUTS, most bothersome is waking up during the night due to urinary frequency.  5) Endo: BMI 35, DM II on metformin     Feasible airway           I spent 30 minutes with patient, greater than 50% educating on preop meds, counseling on anesthesia and coordinating care for bladder cancer/LUTS        Reviewed and Signed by PAC Mid-Level Provider/Resident  Mid-Level Provider/Resident: Racheal Griffith, APRN  Date: 3/30/2018  Time: 1630    Attending Anesthesiologist  Anesthesia Assessment:        Anesthesiologist:   Date:   Time:   Pass/Fail:   Disposition:     PAC Pharmacist Assessment:        Pharmacist:   Date:   Time:      Anesthesia Plan      History & Physical Review  History and physical reviewed and following examination; no interval change.    ASA Status:  3 .    NPO Status:  > 8 hours    Plan for General and LMA with Intravenous induction. Maintenance will be TIVA.    PONV prophylaxis:  Ondansetron (or other 5HT-3) and Dexamethasone or Solumedrol       Postoperative Care  Postoperative pain management:  Multi-modal analgesia, IV analgesics and Oral pain medications.      Consents  Anesthetic plan, risks, benefits and alternatives discussed with:  Patient..                          .

## 2018-03-30 NOTE — MR AVS SNAPSHOT
After Visit Summary   3/30/2018    Darnell Grant    MRN: [id number]           Patient Information     Date Of Birth          1955        Visit Information        Provider Department      3/30/2018 4:00 PM Rn, [facility]        Care Instructions    Preparing for Your Surgery      Name:  Darnell Grant   MRN:  [id number]   :  1955   Today's Date:  3/30/2018     Arriving for surgery:  Surgery date:  2018  Arrival time:  06:15 am  Please come to:     [facility]  [address] [place] [id number]     Parking is available in front of the [facility] building from 5:30AM to 8:00PM.  -  Proceed to the 5th floor to check into the Ambulatory Surgery Center.              >> There will be patient concierges on the 1st and 5th floor, for assistance or an escort, if you would like.              >> Please call [phone] with any questions.    What can I eat or drink?  -  You may have solid food or milk products until 8 hours prior to your surgery or 11:45 pm.  -  You may have water, apple juice or 7up/Sprite until 2 hours prior to your surgery or 05:45 am.    Which medicines can I take?      - No aspirin, ibuprofen, vitamins, supplements for one week before your upcoming surgery date.  -  Do NOT take these medications in the morning, the day of surgery:       - Stop phentermine 24 hours before surgery.       -  Please take these medications the day of surgery:   Ranitidine, valsartan    How do I prepare myself?  -  Take two showers: one the night before surgery; and one the morning of surgery.         Use Scrubcare or Hibiclens to wash from neck down.  You may use your own shampoo and conditioner. No other hair products.   -  Do NOT use lotion, powder, deodorant, or antiperspirant the day of your surgery.  -  Do NOT wear any makeup, fingernail polish or jewelry.  -  Do not bring your own medications to  the hospital, except for inhalers and eye drops.  -  Bring your ID and insurance card.    Questions or Concerns:  If you have questions or concerns regarding the day of surgery, please call the Preoperative Assessment Center (PAC), Monday-Friday 7AM-7PM:  [phone].  After surgery please call your surgeons office.           AFTER YOUR SURGERY  Breathing exercises   Breathing exercises help you recover faster. Take deep breaths and let the air out slowly. This will:     Help you wake up after surgery.    Help prevent complications like pneumonia.  Preventing complications will help you go home sooner.   We may give you a breathing device (incentive spirometer) to encourage you to breathe deeply.   Nausea and vomiting   You may feel sick to your stomach after surgery; if so, let your nurse know.    Pain control:  After surgery, you may have pain. Our goal is to help you manage your pain. Pain medicine will help you feel comfortable enough to do activities that will help you heal.  These activities may include breathing exercises, walking and physical therapy.   To help your health care team treat your pain we will ask: 1) If you have pain  2) where it is located 3) describe your pain in your words  Methods of pain control include medications given by mouth, vein or by nerve block for some surgeries.  We may give you a pain control pump that will:  1) Deliver the medicine through a tube placed in your vein  2) Control the amount of medicine you receive  3) Allow you to push a button to deliver a dose of pain medicine  Sequential Compression Device (SCD) or Pneumo Boots:  You may need to wear SCD S on your legs or feet. These are wraps connected to a machine that pumps in air and releases it. The repeated pumping helps prevent blood clots from forming.           Follow-ups after your visit        Your next 10 appointments already scheduled     Mar 30, 2018  4:00 PM CDT   (Arrive by 3:45 PM)   PAC RN ASSESSMENT with   Pac Rn   [facility] ([facility])    [address] [place] [address]  4th [facility] [address] [phone]            Mar 30, 2018  4:30 PM CDT   (Arrive by 4:15 PM)   PAC Anesthesia Consult with  Pac Anesthesiologist   [facility] ([facility])    [address] [place] [address]  4th [facility] [address] [phone]            Apr 13, 2018   Procedure with Yaya Lomeli MD   [facility] ([facility])    [address] [place] [address]  5th Floor  [facility] [address] [phone]           Located in the Clinics [facility] at [address] [place] [address].   parking is very convenient and highly recommended.  is a $6 flat rate fee.  Both  and self parkers should enter the main arrival plaza from [facility]; parking attendants will direct you based on your parking preference.            Apr 25, 2018  3:00 PM CDT   (Arrive by 2:45 PM)   Post-Op with Yaya Lomeli MD   [facility] Prostate and Urologic Cancers ([facility])    [address] [place] [address]  4th [facility] [address] [phone]            Oct 10, 2018  7:00 AM CDT   LAB with  LAB   [facility] Lab ([facility])    [address] [place] [address]  1st [facility] [address] [phone]           Please do not eat 10-12 hours before your appointment if you are coming in fasting for labs on lipids, cholesterol, or glucose (sugar). This does not apply to pregnant women. Water, hot tea and black coffee (with nothing added) are okay. Do not drink other fluids, diet soda or chew gum.            Oct 15, 2018 11:00 AM CDT   (Arrive by 10:45 AM)   Return Visit with Raul Dutta MD   [facility] ([facility])    [address] [place] [address]  Se  4th [facility] [address] [phone]            Oct 17, 2018  4:00 PM CDT   (Arrive by 3:45 PM)   Cystoscopy with Yaya Lomeli MD   [facility] ([facility])    [address] [address] [phone]              Who to contact     Please call your clinic at [phone] to:    Ask questions about your health    Make or cancel appointments    Discuss your medicines    Learn about your test results    Speak to your doctor            Additional Information About Your Visit        [employer] Information     [employer] gives you secure access to your electronic health record. If you see a primary care provider, you can also send messages to your care team and make appointments. If you have questions, please call your primary care clinic.  If you do not have a primary care provider, please call [phone] and they will assist you.      [employer] is an electronic gateway that provides easy, online access to your medical records. With [employer], you can request a clinic appointment, read your test results, renew a prescription or communicate with your care team.     To access your existing account, please contact your [facility] or call [phone] for assistance.        Care EveryWhere ID     This is your Care EveryWhere ID. This could be used by other organizations to access your [place] medical records  [id number]         Blood Pressure from Last 3 Encounters:   03/30/18 118/74   03/12/18 136/83   03/07/18 132/86    Weight from Last 3 Encounters:   03/30/18 125.9 kg (277 lb 8 oz)   03/12/18 125.1 kg (275 lb 14.4 oz)   03/07/18 126 kg (277 lb 12.8 oz)              Today, you had the following     No orders found for display         Today's Medication Changes          These changes are accurate as of 3/30/18  3:21 PM.  If you have any questions, ask your nurse or  doctor.               These medicines have changed or have updated prescriptions.        Dose/Directions    allopurinol 100 MG tablet   Commonly known as:  ZYLOPRIM   This may have changed:    - how much to take  - how to take this  - when to take this  - additional instructions   Used for:  Gout, unspecified, Essential hypertension, Gastroesophageal reflux disease without esophagitis, Benign non-nodular prostatic hyperplasia without lower urinary tract symptoms, Routine general medical examination at a health care facility, Essential hypertension, benign, Nocturia, Need for hepatitis C screening test, Screening for diabetic peripheral neuropathy, Other fatigue, Hyperlipidemia LDL goal <100        1 qday   Quantity:  90 tablet   Refills:  3       amLODIPine 10 MG tablet   Commonly known as:  NORVASC   This may have changed:  when to take this   Used for:  Essential hypertension, Gout, unspecified, Gastroesophageal reflux disease without esophagitis, Benign non-nodular prostatic hyperplasia without lower urinary tract symptoms, Routine general medical examination at a health care facility, Essential hypertension, benign, Nocturia, Need for hepatitis C screening test, Screening for diabetic peripheral neuropathy, Other fatigue, Hyperlipidemia LDL goal <100        Dose:  10 mg   Take 1 tablet (10 mg) by mouth daily   Quantity:  90 tablet   Refills:  3       dutasteride 0.5 MG capsule   Commonly known as:  AVODART   This may have changed:  when to take this   Used for:  Benign non-nodular prostatic hyperplasia without lower urinary tract symptoms, Essential hypertension, Gout, unspecified, Gastroesophageal reflux disease without esophagitis, Routine general medical examination at a health care facility, Essential hypertension, benign, Nocturia, Need for hepatitis C screening test, Screening for diabetic peripheral neuropathy, Other fatigue, Hyperlipidemia LDL goal <100        Dose:  0.5 mg   Take 1 capsule (0.5 mg) by  mouth daily   Quantity:  90 capsule   Refills:  3       metFORMIN 1000 MG tablet   Commonly known as:  GLUCOPHAGE   This may have changed:    - how much to take  - additional instructions   Used for:  Essential hypertension, Gout, unspecified, Gastroesophageal reflux disease without esophagitis, Benign non-nodular prostatic hyperplasia without lower urinary tract symptoms, Routine general medical examination at a health care facility, Essential hypertension, benign, Nocturia, Need for hepatitis C screening test, Screening for diabetic peripheral neuropathy, Other fatigue, Hyperlipidemia LDL goal <100        Take one tablet daily at bedtime   Quantity:  90 tablet   Refills:  3       rosuvastatin 10 MG tablet   Commonly known as:  CRESTOR   This may have changed:  when to take this   Used for:  Essential hypertension, Gout, unspecified, Gastroesophageal reflux disease without esophagitis, Benign non-nodular prostatic hyperplasia without lower urinary tract symptoms, Routine general medical examination at a health care facility, Essential hypertension, benign, Nocturia, Need for hepatitis C screening test, Screening for diabetic peripheral neuropathy, Other fatigue, Hyperlipidemia LDL goal <100        Dose:  40 mg   Take 4 tablets (40 mg) by mouth daily   Quantity:  90 tablet   Refills:  3       tamsulosin 0.4 MG capsule   Commonly known as:  FLOMAX   This may have changed:  when to take this   Used for:  Benign non-nodular prostatic hyperplasia without lower urinary tract symptoms, Nocturia, Routine general medical examination at a health care facility, Essential hypertension, benign, Gout, unspecified, Essential hypertension, Gastroesophageal reflux disease without esophagitis, Need for hepatitis C screening test, Screening for diabetic peripheral neuropathy, Other fatigue, Hyperlipidemia LDL goal <100        Dose:  0.4 mg   Take 1 capsule (0.4 mg) by mouth daily   Quantity:  90 capsule   Refills:  3       valsartan  320 MG tablet   Commonly known as:  DIOVAN   This may have changed:  when to take this   Used for:  Essential hypertension, benign, Routine general medical examination at a health care facility, Essential hypertension, Gastroesophageal reflux disease without esophagitis, Benign non-nodular prostatic hyperplasia without lower urinary tract symptoms, Nocturia, Need for hepatitis C screening test, Screening for diabetic peripheral neuropathy, Other fatigue, Hyperlipidemia LDL goal <100        Dose:  320 mg   Take 1 tablet (320 mg) by mouth daily   Quantity:  90 tablet   Refills:  3                Primary Care Provider Office Phone # Fax #    Raul Basil Dutta [phone] [address] [address]        Equal Access to Services     BRIGITTE BOWENS : Paxton Amaro, wadre guerrero, marichuy madridmabret martinez, ric pride. So [facility] [phone].    ATENCIÓN: Si habla español, tiene a mulligan disposición servicios gratuitos de asistencia lingüística. Ll[facility] [phone].    We comply with applicable federal civil rights laws and [place] laws. We do not discriminate on the basis of race, color, national origin, age, disability, sex, sexual orientation, or gender identity.            Thank you!     Thank you for choosing [facility]  for your care. Our goal is always to provide you with excellent care. Hearing back from our patients is one way we can continue to improve our services. Please take a few minutes to complete the written survey that you may receive in the mail after your visit with us. Thank you!             Your Updated Medication List - Protect others around you: Learn how to safely use, store and throw away your medicines at www.disposemymeds.org.          This list is accurate as of 3/30/18  3:21 PM.  Always use your most recent med list.                   Brand Name Dispense Instructions for  use Diagnosis    allopurinol 100 MG tablet    ZYLOPRIM    90 tablet    1 qday    Gout, unspecified, Essential hypertension, Gastroesophageal reflux disease without esophagitis, Benign non-nodular prostatic hyperplasia without lower urinary tract symptoms, Routine general medical examination at a health care facility, Essential hypertension, benign, Nocturia, Need for hepatitis C screening test, Screening for diabetic peripheral neuropathy, Other fatigue, Hyperlipidemia LDL goal <100       amLODIPine 10 MG tablet    NORVASC    90 tablet    Take 1 tablet (10 mg) by mouth daily    Essential hypertension, Gout, unspecified, Gastroesophageal reflux disease without esophagitis, Benign non-nodular prostatic hyperplasia without lower urinary tract symptoms, Routine general medical examination at a health care facility, Essential hypertension, benign, Nocturia, Need for hepatitis C screening test, Screening for diabetic peripheral neuropathy, Other fatigue, Hyperlipidemia LDL goal <100       dutasteride 0.5 MG capsule    AVODART    90 capsule    Take 1 capsule (0.5 mg) by mouth daily    Benign non-nodular prostatic hyperplasia without lower urinary tract symptoms, Essential hypertension, Gout, unspecified, Gastroesophageal reflux disease without esophagitis, Routine general medical examination at a health care facility, Essential hypertension, benign, Nocturia, Need for hepatitis C screening test, Screening for diabetic peripheral neuropathy, Other fatigue, Hyperlipidemia LDL goal <100       metFORMIN 1000 MG tablet    GLUCOPHAGE    90 tablet    Take one tablet daily at bedtime    Essential hypertension, Gout, unspecified, Gastroesophageal reflux disease without esophagitis, Benign non-nodular prostatic hyperplasia without lower urinary tract symptoms, Routine general medical examination at a health care facility, Essential hypertension, benign, Nocturia, Need for hepatitis C screening test, Screening for diabetic peripheral  neuropathy, Other fatigue, Hyperlipidemia LDL goal <100       Multi-vitamin Tabs tablet      Take 1 tablet by mouth At Bedtime        order for DME      Respironics Dreamstation AutoPap 7-15 cm with Respironics Leidy mask.        phentermine 37.5 MG tablet    ADIPEX-P     Take 56 mg by mouth every morning (before breakfast)        ranitidine 150 MG tablet    ZANTAC    180 tablet    Take 1 tablet (150 mg) by mouth 2 times daily    Gastroesophageal reflux disease without esophagitis, Screening for diabetic peripheral neuropathy, Dysmetabolic syndrome X, Pure hypercholesterolemia, Essential hypertension, benign, Obstructive sleep apnea hypopnea, moderate, Agatston coronary artery calcium score greater than 400, Microscopic hematuria, Vitamin D deficiency       rosuvastatin 10 MG tablet    CRESTOR    90 tablet    Take 4 tablets (40 mg) by mouth daily    Essential hypertension, Gout, unspecified, Gastroesophageal reflux disease without esophagitis, Benign non-nodular prostatic hyperplasia without lower urinary tract symptoms, Routine general medical examination at a health care facility, Essential hypertension, benign, Nocturia, Need for hepatitis C screening test, Screening for diabetic peripheral neuropathy, Other fatigue, Hyperlipidemia LDL goal <100       tamsulosin 0.4 MG capsule    FLOMAX    90 capsule    Take 1 capsule (0.4 mg) by mouth daily    Benign non-nodular prostatic hyperplasia without lower urinary tract symptoms, Nocturia, Routine general medical examination at a health care facility, Essential hypertension, benign, Gout, unspecified, Essential hypertension, Gastroesophageal reflux disease without esophagitis, Need for hepatitis C screening test, Screening for diabetic peripheral neuropathy, Other fatigue, Hyperlipidemia LDL goal <100       valsartan 320 MG tablet    DIOVAN    90 tablet    Take 1 tablet (320 mg) by mouth daily    Essential hypertension, benign, Routine general medical examination at a  health care facility, Essential hypertension, Gastroesophageal reflux disease without esophagitis, Benign non-nodular prostatic hyperplasia without lower urinary tract symptoms, Nocturia, Need for hepatitis C screening test, Screening for diabetic peripheral neuropathy, Other fatigue, Hyperlipidemia LDL goal <100

## 2018-03-30 NOTE — H&P
Pre-Operative H & P     CC:  Preoperative exam to assess for increased cardiopulmonary risk while undergoing surgery and anesthesia.    Date of Encounter: 3/30/2018  Primary Care Physician:  Raul Dutta    Reason for visit:     Preop general physical exam  Mass of urinary bladder  Lower urinary tract symptoms (LUTS)        HPI  Darnell Grant is a 62 year old male who presents for pre-operative H & P in preparation for Rezum Procedure on 4/13/2018 by Dr. Lomeli in treatment of urinary symptoms and history of bladder cancer at [facility].     History is obtained from the patient.   Microscopic hematuria who on workup was found to have an enhancing mass at the bladder dome on CT urogram.  He underwent cold cup biopsy and fulguration on 1/31/18. Complains of LUTS, most bothersome is waking up during the night due to urinary frequency.      Past Medical History  Past Medical History:   Diagnosis Date     Bladder mass      BPH (benign prostatic hyperplasia)      GERD (gastroesophageal reflux disease)      Hypertension      LISETTE (obstructive sleep apnea)        Past Surgical History  Past Surgical History:   Procedure Laterality Date     BACK SURGERY      repair disc     CYSTOSCOPY, TRANSURETHRAL RESECTION (TUR) TUMOR BLADDER, COMBINED N/A 1/31/2018    Procedure: COMBINED CYSTOSCOPY, TRANSURETHRAL RESECTION (TUR) TUMOR BLADDER;  Transurethral Biopsy and Resection of Bladder Tumor;  Surgeon: Yaya Lomeli MD;  Location:  OR      BREATH HYDROGEN TEST  5/22/2013    Procedure: HYDROGEN BREATH TEST;  Surgeon: Donny Paris MD;  Location: [facility]       Hx of Blood transfusions/reactions: none    Hx of abnormal bleeding or anti-platelet use: none    Menstrual history: No LMP for male patient.:     Steroid use in the last year: none    Personal or FH with difficulty with Anesthesia:  none        Prior to Admission Medications  Current Outpatient Prescriptions   Medication Sig  Dispense Refill     valsartan (DIOVAN) 320 MG tablet Take 1 tablet (320 mg) by mouth daily (Patient taking differently: Take 320 mg by mouth At Bedtime ) 90 tablet 3     ranitidine (ZANTAC) 150 MG tablet Take 1 tablet (150 mg) by mouth 2 times daily 180 tablet 3     multivitamin, therapeutic with minerals (MULTI-VITAMIN) TABS tablet Take 1 tablet by mouth At Bedtime       phentermine (ADIPEX-P) 37.5 MG tablet Take 56 mg by mouth every morning (before breakfast)       rosuvastatin (CRESTOR) 10 MG tablet Take 4 tablets (40 mg) by mouth daily (Patient taking differently: Take 40 mg by mouth At Bedtime ) 90 tablet 3     allopurinol (ZYLOPRIM) 100 MG tablet 1 qday (Patient taking differently: Take 100 mg by mouth At Bedtime 1 qday) 90 tablet 3     amLODIPine (NORVASC) 10 MG tablet Take 1 tablet (10 mg) by mouth daily (Patient taking differently: Take 10 mg by mouth At Bedtime ) 90 tablet 3     metFORMIN (GLUCOPHAGE) 1000 MG tablet Take one tablet daily at bedtime (Patient taking differently: 500 mg Takes 4 tablets at bedtime.) 90 tablet 3     dutasteride (AVODART) 0.5 MG capsule Take 1 capsule (0.5 mg) by mouth daily (Patient taking differently: Take 0.5 mg by mouth At Bedtime ) 90 capsule 3     tamsulosin (FLOMAX) 0.4 MG capsule Take 1 capsule (0.4 mg) by mouth daily (Patient taking differently: Take 0.4 mg by mouth At Bedtime ) 90 capsule 3     order for DME Respironics Dreamstation AutoPap 7-15 cm with Respironics Leidy mask.         Allergies  Allergies   Allergen Reactions     Ragweeds      Watery eyes, itchy nose       Social History  Social History     Social History     Marital status:      Spouse name: N/A     Number of children: N/A     Years of education: N/A     Occupational History     Not on file.     Social History Main Topics     Smoking status: Never Smoker     Smokeless tobacco: Never Used     Alcohol use No     Drug use: No     Sexual activity: Not on file     Other Topics Concern     Not on file  "    Social History Narrative       Family History  Family History   Problem Relation Age of Onset     Asthma Brother              Anesthesia Evaluation     . Pt has had prior anesthetic. Type: General and MAC           ROS/MED HX    ENT/Pulmonary:     (+)sleep apnea, doesn't use CPAP , . .    Neurologic:  - neg neurologic ROS     Cardiovascular:     (+) hypertension----. : . . . :. . Previous cardiac testing date:results:date: results:Stress Echo and MRI: No evidence of ischemia. Normal LV function. No valvular abnormalities.ECG reviewed date: results: date: results:          METS/Exercise Tolerance:  >4 METS   Hematologic:  - neg hematologic  ROS       Musculoskeletal:  - neg musculoskeletal ROS       GI/Hepatic:     (+) GERD Asymptomatic on medication,       Renal/Genitourinary:     (+) Other Renal/ Genitourinary, bladder mass      Endo:     (+) Obesity, .      Psychiatric:  - neg psychiatric ROS       Infectious Disease:  - neg infectious disease ROS       Malignancy:   (+) Malignancy History of Other  Other CA bladder Active status post         Other:    (+) No chance of pregnancy C-spine cleared: N/A, no H/O Chronic Pain,no other significant disability              Physical Exam  Normal systems: cardiovascular, pulmonary and dental    Airway   Mallampati: III  TM distance: >3 FB  Neck ROM: full    Dental   Normal    Cardiovascular   Rhythm and rate: regular and normal      Pulmonary    breath sounds clear to auscultation        The complete review of systems is negative other than noted in the HPI or here.   Temp: 97.5  F (36.4  C) Temp src: Oral BP: 118/74 Pulse: 99   Resp: 16 SpO2: 97 %         277 lbs 8 oz  6' 3\"   Body mass index is 34.69 kg/(m^2).         Physical Exam  Constitutional: Awake, alert, cooperative, no apparent distress, and appears stated age.  Eyes: Pupils equal, round and reactive to light, extra ocular muscles intact, sclera clear, conjunctiva normal.  HENT: Normocephalic, oral pharynx " with moist mucus membranes, good dentition. No goiter appreciated.   Respiratory: Clear to auscultation bilaterally, no crackles or wheezing.  Cardiovascular: Regular rate and rhythm, normal S1 and S2, and no murmur noted.  Carotids +2, no bruits. No edema. Palpable pulses to radial  DP and PT arteries.   GI: Normal bowel sounds, soft, non-distended, non-tender, no masses palpated, no hepatosplenomegaly.    Lymph/Hematologic: No cervical lymphadenopathy and no supraclavicular lymphadenopathy.  Genitourinary:  defered  Skin: Warm and dry.  No rashes at anticipated surgical site.   Musculoskeletal: Full ROM of neck. There is no redness, warmth, or swelling of the joints. Gross motor strength is normal.    Neurologic: Awake, alert, oriented to name, place and time. Cranial nerves II-XII are grossly intact. Gait is normal.   Neuropsychiatric: Calm, cooperative. Normal affect.     Labs: (personally reviewed)  Lab Results   Component Value Date    WBC 6.4 03/06/2018     Lab Results   Component Value Date    RBC 5.32 03/06/2018     Lab Results   Component Value Date    HGB 16.2 03/06/2018     Lab Results   Component Value Date    HCT 47.7 03/06/2018     No components found for: MCT  Lab Results   Component Value Date    MCV 90 03/06/2018     Lab Results   Component Value Date    MCH 30.5 03/06/2018     Lab Results   Component Value Date    MCHC 34.0 03/06/2018     Lab Results   Component Value Date    RDW 14.1 03/06/2018     Lab Results   Component Value Date     03/06/2018       Last Basic Metabolic Panel:  Lab Results   Component Value Date     03/06/2018      Lab Results   Component Value Date    POTASSIUM 3.8 03/06/2018     Lab Results   Component Value Date    CHLORIDE 104 03/06/2018     Lab Results   Component Value Date    ZHANE 9.4 03/06/2018     Lab Results   Component Value Date    CO2 25 03/06/2018     Lab Results   Component Value Date    BUN 12 03/06/2018     Lab Results   Component Value Date    CR  0.74 03/06/2018     Lab Results   Component Value Date     03/06/2018       He is aware that he need urine culture and states that he will do it sometime before surgery      Cardiac testing personally reviewed    EKG: Personally reviewed but formal cardiology read pending: EKG 1/19/2018 SR with ocassional PVC and anterior fascicular block           MRI cardiac stress 1/19/2018  Clinical history: 62-year-old male with frequent PVCs and calcium score of 744, CMR to rule out ischemia.   Comparison CMR: None      1. The LV is normal in cavity size and wall thickness. The global systolic function is normal. The LVEF is  55%. There are no regional wall motion abnormalities.      2. The RV is normal in cavity size. The global systolic function is normal. The RVEF is 61%.       3. Both atria are normal in size.      4. There is no significant valvular disease.       5. Late gadolinium enhancement imaging shows no MI, fibrosis or infiltrative disease.       6. Regadenoson stress perfusion imaging shows no ischemia.      CONCLUSIONS: No ischemia or infarction. Normal LV and RV function, LVEF of 55% and RVEF of 61%. No obvious  substrate for arrhythmia.        ASSESSMENT and PLAN  Darnell Grant is a 62 year old male scheduled to undergo Rezum Procedure on 4/13/2018 by Dr. Lomeli in treatment of urinary symptoms and history of bladder cancer. He has the following specific operative considerations:   - RCRI : Low serious cardiac risks.  0.4% risk of major adverse cardiac event.   - Anesthesia considerations:  Refer to PAC assessment in anesthesia records  - VTE risk: 3%  - LISETTE # of risks = known LSIETTE and does not use CPAP  - Post-op delirium risk: high risk due to age  - Risk of PONV score = 2.  If > 2, anti-emetic intervention recommended.      Previous anesthesia, last 1/31/2018 at [facility].  1) Cardiac: Known PVC, shown up at 14% on recent holter. EKG 1/19/2018 SR with ocassional PVC and anterior fascicular block. MRI  cardiac 1/19/2018 negative for ischemia and EF 55%. METS well over 4 without cardiac symptoms.  2) Pulmonary: LISETTE, does not use CPAP  3) GI: GERD, well managed with omeprazole  4) : microscopic hematuria who on workup was found to have an enhancing mass at the bladder dome on CT urogram.  He underwent cold cup biopsy and fulguration on 1/31/18. Complains of LUTS, most bothersome is waking up during the night due to urinary frequency.  5) Endo: BMI 35, DM II on metformin     Feasible airway          I spent 30 minutes with patient, greater than 50% educating on preop meds, counseling on anesthesia and coordinating care for bladder cancer  Pt is appropriate for ASC    Pt optimized for surgery. AVS with information on surgery time/arrival time, meds and NPO status given by nursing staff      Patient was discussed with Dr Giraldo.    CHRISTIN Zuleta CNS  Preoperative Assessment Center  [facility]  Clinic and Surgery Center  Phone: [phone]  Fax: [phone]

## 2018-03-30 NOTE — PATIENT INSTRUCTIONS
Preparing for Your Surgery      Name:  Darnell Grant   MRN:  [id number]   :  1955   Today's Date:  3/30/2018     Arriving for surgery:  Surgery date:  2018  Arrival time:  06:15 am  Please come to:     [facility]  [address] [place] [id number]     Parking is available in front of the Clinics and Surgery Center building from 5:30AM to 8:00PM.  -  Proceed to the 5th floor to check into the Ambulatory Surgery Center.              >> There will be patient concierges on the 1st and 5th floor, for assistance or an escort, if you would like.              >> Please call [phone] with any questions.    What can I eat or drink?  -  You may have solid food or milk products until 8 hours prior to your surgery or 11:45 pm.  -  You may have water, apple juice or 7up/Sprite until 2 hours prior to your surgery or 05:45 am.    Which medicines can I take?      - No aspirin, ibuprofen, vitamins, supplements for one week before your upcoming surgery date.  -  Do NOT take these medications in the morning, the day of surgery:       - Stop phentermine 24 hours before surgery.       -  Please take these medications the day of surgery:   Ranitidine, valsartan    How do I prepare myself?  -  Take two showers: one the night before surgery; and one the morning of surgery.         Use Scrubcare or Hibiclens to wash from neck down.  You may use your own shampoo and conditioner. No other hair products.   -  Do NOT use lotion, powder, deodorant, or antiperspirant the day of your surgery.  -  Do NOT wear any makeup, fingernail polish or jewelry.  -  Do not bring your own medications to the hospital, except for inhalers and eye drops.  -  Bring your ID and insurance card.    Questions or Concerns:  If you have questions or concerns regarding the day of surgery, please call the Preoperative Assessment Center (PAC), Monday-Friday 7AM-7PM:  [phone].  After surgery please call your  surgeons office.           AFTER YOUR SURGERY  Breathing exercises   Breathing exercises help you recover faster. Take deep breaths and let the air out slowly. This will:     Help you wake up after surgery.    Help prevent complications like pneumonia.  Preventing complications will help you go home sooner.   We may give you a breathing device (incentive spirometer) to encourage you to breathe deeply.   Nausea and vomiting   You may feel sick to your stomach after surgery; if so, let your nurse know.    Pain control:  After surgery, you may have pain. Our goal is to help you manage your pain. Pain medicine will help you feel comfortable enough to do activities that will help you heal.  These activities may include breathing exercises, walking and physical therapy.   To help your health care team treat your pain we will ask: 1) If you have pain  2) where it is located 3) describe your pain in your words  Methods of pain control include medications given by mouth, vein or by nerve block for some surgeries.  We may give you a pain control pump that will:  1) Deliver the medicine through a tube placed in your vein  2) Control the amount of medicine you receive  3) Allow you to push a button to deliver a dose of pain medicine  Sequential Compression Device (SCD) or Pneumo Boots:  You may need to wear SCD S on your legs or feet. These are wraps connected to a machine that pumps in air and releases it. The repeated pumping helps prevent blood clots from forming.

## 2018-04-10 ENCOUNTER — CARE COORDINATION (OUTPATIENT)
Dept: UROLOGY | Facility: CLINIC | Age: 63
End: 2018-04-10

## 2018-04-10 NOTE — PROGRESS NOTES
Pre Op Teaching Flowsheet       Pre and Post op Patient Education  Relevant Diagnosis:  BPH  Surgical procedure:  REZUM procedure  Teaching Topic:  Pre and post op teaching  Person Involved in teaching: Darnell Grant    Motivation Level:  Asks Questions: Yes  Eager to Learn:  Yes  Cooperative: Yes  Receptive (willing/able to accept information):  Yes    Patient demonstrates understanding of the following:  Date of surgery:  4/13/18  Location of surgery:  [address] [place] [address]  History and Physical and any other testing necessary prior to surgery: Yes  Required time line for completion of History and Physical and any pre-op testing: Yes    Patient demonstrates understanding of the following:  Pre-op bowel prep:  None needed  Pre-op showering/scrub information with PCMX Soap: Yes  Blood thinner medications discussed and when to stop (if applicable):  Yes      Infection Prevention:   Patient demonstrates understanding of the following:  Surgical procedure site care taught: at time of discharge  Signs and symptoms of infection taught:  Yes      Post-op follow-up:  Discussed how to contact the hospital, nurse, and clinic scheduling staff if necessary.    Instructional materials used/given/mailed:  [facility] Booklet, post op teaching sheet, Map, Soap, and arrival/location information.    Surgical instructions packet mailed to patient.     Patient has a  and someone to stay with him for the first 24 hours.

## 2018-04-13 ENCOUNTER — SURGERY (OUTPATIENT)
Age: 63
End: 2018-04-13

## 2018-04-13 ENCOUNTER — ANESTHESIA (OUTPATIENT)
Dept: SURGERY | Facility: AMBULATORY SURGERY CENTER | Age: 63
End: 2018-04-13

## 2018-04-13 ENCOUNTER — HOSPITAL ENCOUNTER (OUTPATIENT)
Facility: AMBULATORY SURGERY CENTER | Age: 63
End: 2018-04-13
Attending: UROLOGY
Payer: COMMERCIAL

## 2018-04-13 VITALS
DIASTOLIC BLOOD PRESSURE: 74 MMHG | SYSTOLIC BLOOD PRESSURE: 121 MMHG | TEMPERATURE: 97.8 F | HEART RATE: 85 BPM | RESPIRATION RATE: 16 BRPM | HEIGHT: 74 IN | OXYGEN SATURATION: 94 % | WEIGHT: 275 LBS | BODY MASS INDEX: 35.29 KG/M2

## 2018-04-13 DIAGNOSIS — N40.1 BENIGN PROSTATIC HYPERPLASIA WITH URINARY OBSTRUCTION: ICD-10-CM

## 2018-04-13 DIAGNOSIS — G89.18 POSTOPERATIVE PAIN: Primary | ICD-10-CM

## 2018-04-13 DIAGNOSIS — N39.0 URINARY TRACT INFECTION WITHOUT HEMATURIA, SITE UNSPECIFIED: ICD-10-CM

## 2018-04-13 DIAGNOSIS — N13.8 BENIGN PROSTATIC HYPERPLASIA WITH URINARY OBSTRUCTION: ICD-10-CM

## 2018-04-13 RX ORDER — FENTANYL CITRATE 50 UG/ML
25-50 INJECTION, SOLUTION INTRAMUSCULAR; INTRAVENOUS
Status: DISCONTINUED | OUTPATIENT
Start: 2018-04-13 | End: 2018-04-13 | Stop reason: HOSPADM

## 2018-04-13 RX ORDER — PROPOFOL 10 MG/ML
INJECTION, EMULSION INTRAVENOUS PRN
Status: DISCONTINUED | OUTPATIENT
Start: 2018-04-13 | End: 2018-04-13

## 2018-04-13 RX ORDER — FENTANYL CITRATE 50 UG/ML
INJECTION, SOLUTION INTRAMUSCULAR; INTRAVENOUS PRN
Status: DISCONTINUED | OUTPATIENT
Start: 2018-04-13 | End: 2018-04-13

## 2018-04-13 RX ORDER — SULFAMETHOXAZOLE/TRIMETHOPRIM 800-160 MG
1 TABLET ORAL 2 TIMES DAILY
Qty: 14 TABLET | Refills: 0 | Status: SHIPPED | OUTPATIENT
Start: 2018-04-13 | End: 2018-11-19

## 2018-04-13 RX ORDER — SODIUM CHLORIDE, SODIUM LACTATE, POTASSIUM CHLORIDE, CALCIUM CHLORIDE 600; 310; 30; 20 MG/100ML; MG/100ML; MG/100ML; MG/100ML
INJECTION, SOLUTION INTRAVENOUS CONTINUOUS
Status: DISCONTINUED | OUTPATIENT
Start: 2018-04-13 | End: 2018-04-13 | Stop reason: HOSPADM

## 2018-04-13 RX ORDER — PROPOFOL 10 MG/ML
INJECTION, EMULSION INTRAVENOUS CONTINUOUS PRN
Status: DISCONTINUED | OUTPATIENT
Start: 2018-04-13 | End: 2018-04-13

## 2018-04-13 RX ORDER — CEFAZOLIN SODIUM 1 G/50ML
3 SOLUTION INTRAVENOUS
Status: COMPLETED | OUTPATIENT
Start: 2018-04-13 | End: 2018-04-13

## 2018-04-13 RX ORDER — OXYCODONE HYDROCHLORIDE 5 MG/1
5 TABLET ORAL EVERY 4 HOURS PRN
Status: DISCONTINUED | OUTPATIENT
Start: 2018-04-13 | End: 2018-04-14 | Stop reason: HOSPADM

## 2018-04-13 RX ORDER — ONDANSETRON 2 MG/ML
INJECTION INTRAMUSCULAR; INTRAVENOUS PRN
Status: DISCONTINUED | OUTPATIENT
Start: 2018-04-13 | End: 2018-04-13

## 2018-04-13 RX ORDER — GABAPENTIN 300 MG/1
300 CAPSULE ORAL ONCE
Status: COMPLETED | OUTPATIENT
Start: 2018-04-13 | End: 2018-04-13

## 2018-04-13 RX ORDER — MEPERIDINE HYDROCHLORIDE 25 MG/ML
12.5 INJECTION INTRAMUSCULAR; INTRAVENOUS; SUBCUTANEOUS
Status: DISCONTINUED | OUTPATIENT
Start: 2018-04-13 | End: 2018-04-14 | Stop reason: HOSPADM

## 2018-04-13 RX ORDER — LIDOCAINE 40 MG/G
CREAM TOPICAL
Status: DISCONTINUED | OUTPATIENT
Start: 2018-04-13 | End: 2018-04-13 | Stop reason: HOSPADM

## 2018-04-13 RX ORDER — ACETAMINOPHEN 325 MG/1
975 TABLET ORAL ONCE
Status: COMPLETED | OUTPATIENT
Start: 2018-04-13 | End: 2018-04-13

## 2018-04-13 RX ORDER — ONDANSETRON 4 MG/1
4 TABLET, ORALLY DISINTEGRATING ORAL EVERY 30 MIN PRN
Status: DISCONTINUED | OUTPATIENT
Start: 2018-04-13 | End: 2018-04-14 | Stop reason: HOSPADM

## 2018-04-13 RX ORDER — ONDANSETRON 2 MG/ML
4 INJECTION INTRAMUSCULAR; INTRAVENOUS EVERY 30 MIN PRN
Status: DISCONTINUED | OUTPATIENT
Start: 2018-04-13 | End: 2018-04-14 | Stop reason: HOSPADM

## 2018-04-13 RX ORDER — NALOXONE HYDROCHLORIDE 0.4 MG/ML
.1-.4 INJECTION, SOLUTION INTRAMUSCULAR; INTRAVENOUS; SUBCUTANEOUS
Status: DISCONTINUED | OUTPATIENT
Start: 2018-04-13 | End: 2018-04-14 | Stop reason: HOSPADM

## 2018-04-13 RX ORDER — SODIUM CHLORIDE, SODIUM LACTATE, POTASSIUM CHLORIDE, CALCIUM CHLORIDE 600; 310; 30; 20 MG/100ML; MG/100ML; MG/100ML; MG/100ML
INJECTION, SOLUTION INTRAVENOUS CONTINUOUS
Status: DISCONTINUED | OUTPATIENT
Start: 2018-04-13 | End: 2018-04-14 | Stop reason: HOSPADM

## 2018-04-13 RX ORDER — FENTANYL CITRATE 50 UG/ML
25-50 INJECTION, SOLUTION INTRAMUSCULAR; INTRAVENOUS
Status: DISCONTINUED | OUTPATIENT
Start: 2018-04-13 | End: 2018-04-14 | Stop reason: HOSPADM

## 2018-04-13 RX ORDER — OXYCODONE HYDROCHLORIDE 5 MG/1
5 TABLET ORAL EVERY 6 HOURS PRN
Qty: 6 TABLET | Refills: 0 | Status: SHIPPED | OUTPATIENT
Start: 2018-04-13 | End: 2018-04-25

## 2018-04-13 RX ADMIN — PROPOFOL 20 MG: 10 INJECTION, EMULSION INTRAVENOUS at 07:54

## 2018-04-13 RX ADMIN — PROPOFOL 20 MG: 10 INJECTION, EMULSION INTRAVENOUS at 07:50

## 2018-04-13 RX ADMIN — GABAPENTIN 300 MG: 300 CAPSULE ORAL at 06:31

## 2018-04-13 RX ADMIN — ACETAMINOPHEN 975 MG: 325 TABLET ORAL at 06:31

## 2018-04-13 RX ADMIN — OXYCODONE HYDROCHLORIDE 5 MG: 5 TABLET ORAL at 08:20

## 2018-04-13 RX ADMIN — PROPOFOL 30 MG: 10 INJECTION, EMULSION INTRAVENOUS at 07:57

## 2018-04-13 RX ADMIN — PROPOFOL 30 MG: 10 INJECTION, EMULSION INTRAVENOUS at 07:39

## 2018-04-13 RX ADMIN — CEFAZOLIN SODIUM 3 G: 1 SOLUTION INTRAVENOUS at 07:38

## 2018-04-13 RX ADMIN — FENTANYL CITRATE 50 MCG: 50 INJECTION, SOLUTION INTRAMUSCULAR; INTRAVENOUS at 07:31

## 2018-04-13 RX ADMIN — ONDANSETRON 4 MG: 2 INJECTION INTRAMUSCULAR; INTRAVENOUS at 07:59

## 2018-04-13 RX ADMIN — SODIUM CHLORIDE, SODIUM LACTATE, POTASSIUM CHLORIDE, CALCIUM CHLORIDE: 600; 310; 30; 20 INJECTION, SOLUTION INTRAVENOUS at 06:31

## 2018-04-13 RX ADMIN — PROPOFOL 30 MG: 10 INJECTION, EMULSION INTRAVENOUS at 07:51

## 2018-04-13 RX ADMIN — PROPOFOL 100 MCG/KG/MIN: 10 INJECTION, EMULSION INTRAVENOUS at 07:31

## 2018-04-13 NOTE — ANESTHESIA POSTPROCEDURE EVALUATION
Patient: Darnell Grant    Procedure(s):  Rezum Procedure - Wound Class: II-Clean Contaminated    Diagnosis:Bladder Cancer  Diagnosis Additional Information: No value filed.    Anesthesia Type:  General, LMA    Note:  Anesthesia Post Evaluation    Patient location during evaluation: Phase 2  Patient participation: Able to fully participate in evaluation  Level of consciousness: awake  Pain management: adequate  Airway patency: patent  Cardiovascular status: acceptable  Respiratory status: acceptable  Hydration status: acceptable  PONV: none     Anesthetic complications: None          Last vitals:  Vitals:    04/13/18 0810 04/13/18 0825 04/13/18 0840   BP: 102/64 114/76 121/74   Pulse: 85 82 85   Resp: 16 16    Temp: 36.6  C (97.8  F)  36.6  C (97.8  F)   SpO2: 94% 94% 94%         Electronically Signed By: Mario Wilder MD  April 13, 2018  9:29 AM

## 2018-04-13 NOTE — IP AVS SNAPSHOT
[facility]    [address] [place] [id number]    Phone:  [phone]    Fax:  [phone]                                       After Visit Summary   4/13/2018    Darnell Grant    MRN: [id number]           After Visit Summary Signature Page     I have received my discharge instructions, and my questions have been answered. I have discussed any challenges I see with this plan with the nurse or doctor.    ..........................................................................................................................................  Patient/Patient Representative Signature      ..........................................................................................................................................  Patient Representative Print Name and Relationship to Patient    ..................................................               ................................................  Date                                            Time    ..........................................................................................................................................  Reviewed by Signature/Title    ...................................................              ..............................................  Date                                                            Time

## 2018-04-13 NOTE — ANESTHESIA CARE TRANSFER NOTE
Patient: Darnell Grant    Procedure(s):  Rezum Procedure - Wound Class: II-Clean Contaminated    Diagnosis: Bladder Cancer  Diagnosis Additional Information: No value filed.    Anesthesia Type:   General, LMA     Note:  Airway :Room Air  Patient transferred to:Phase II  Comments: 102/64 94%  97.8-55-18  Handoff Report: Identifed the Patient, Identified the Reponsible Provider, Reviewed the pertinent medical history, Discussed the surgical course, Reviewed Intra-OP anesthesia mangement and issues during anesthesia, Set expectations for post-procedure period and Allowed opportunity for questions and acknowledgement of understanding      Vitals: (Last set prior to Anesthesia Care Transfer)    CRNA VITALS  4/13/2018 0736 - 4/13/2018 0811      4/13/2018             Pulse: 89    SpO2: 95 %    Resp Rate (set): 10                Electronically Signed By: CHRISTIN Palm CRNA  April 13, 2018  8:11 AM

## 2018-04-13 NOTE — DISCHARGE INSTRUCTIONS
[facility]  Home Care Following Anesthesia  For 24 hours after surgery:  1. Get plenty of rest.  A responsible adult must stay with you for at least 24 hours after you leave the surgery center.  2. Do not drive or use heavy equipment.  If you have weakness or tingling, don't drive or use heavy equipment until this feeling goes away.   3. Do not drink alcohol.   4. Avoid strenuous or risky activities.  Ask for help when climbing stairs.  5. You may feel lightheaded.  IF so, sit for a few minutes before standing.  Have someone help you get up.   6. If you have nausea (feel sick to your stomach): Drink only clear liquids such as apple juice, ginger ale, broth or 7-Up.  Rest may also help.  Be sure to drink enough fluids.  Move to a regular diet as you feel able.   7. You may have a slight fever.  Call the doctor if your fever is over 100 F (37.7 C) (taken under the tongue) or lasts longer than 24 hours.  8. You may have a dry mouth, a sore throat, muscle aches or trouble sleeping. These should go away after 24 hours.  9. Do not make important or legal decisions.       Tips for taking pain medications  To get the best pain relief possible, remember these points:    Take pain medications as directed, before pain becomes severe.    Pain medication can upset your stomach: taking it with food may help.    Constipation is a common side effect of pain medication. Drink plenty of  fluids.    Eat foods high in fiber. Take a stool softener if recommended by your doctor or pharmacist.    Do not drink alcohol, drive or operate machinery while taking pain medications.    Ask about other ways to control pain, such as with heat, ice or relaxation.    Tylenol/Acetaminophen Consumption Last dose: 975 mg at 6:30 AM  To help encourage the safe use of acetaminophen, the makers of TYLENOL  have lowered the maximum daily dose for single-ingredient Extra Strength TYLENOL  (acetaminophen) products sold in  the [place] from 8 pills per day (4,000 mg) to 6 pills per day (3,000 mg). The dosing interval has also changed from 2 pills every 4-6 hours to 2 pills every 6 hours.    If you feel your pain relief is insufficient, you may take Tylenol/Acetaminophen in addition to your narcotic pain medication.     Be careful not to exceed 3,000 mg of Tylenol/Acetaminophen in a 24 hour period from all sources.    If you are taking extra strength Tylenol/acetaminophen (500 mg), the maximum dose is 6 tablets in 24 hours.    If you are taking regular strength acetaminophen (325 mg), the maximum dose is 9 tablets in 24 hours.    Call a doctor for any of the followin. Signs of infection (fever, growing tenderness at the surgery site, a large amount of drainage or bleeding, severe pain, foul-smelling drainage, redness, swelling).  2. Headache for over 24 hours.  Your doctor is:  Dr. Yaya Lomeli, Prostate and Urology: [phone]                  Or dial [phone] and ask for the resident on call for:  Prostate Urology  For emergency care, call the:  [facility]:  [phone] (TTY for hearing impaired: [phone])

## 2018-04-13 NOTE — BRIEF OP NOTE
[facility]    Brief Operative Note    Pre-operative diagnosis: Bladder Cancer  Post-operative diagnosis * No post-op diagnosis entered *  Procedure: Procedure(s):  Rezum Procedure - Wound Class: II-Clean Contaminated  Surgeon: Surgeon(s) and Role:     * Yaya Lomeli MD - Primary  Anesthesia: General   Estimated blood loss: None  Drains: Krause catheter  Specimens: * No specimens in log *  Findings:   5 total treatments (2 each lateral lobe; 1 bladder neck)  Complications: None.  Implants: None.

## 2018-04-13 NOTE — IP AVS SNAPSHOT
MRN:[id number]                      After Visit Summary   4/13/2018    Darnell Grant    MRN: [id number]           Thank you!     Thank you for choosing [facility] for your care. Our goal is always to provide you with excellent care. Hearing back from our patients is one way we can continue to improve our services. Please take a few minutes to complete the written survey that you may receive in the mail after you visit with us. Thank you!        Patient Information     Date Of Birth          1955        About your hospital stay     You were admitted on:  April 13, 2018 [facility]    You were discharged on:  April 13, 2018       Who to Call     For medical emergencies, please call 911.  For non-urgent questions about your medical care, please call your primary care provider or clinic, [phone]  For questions related to your surgery, please call your surgery clinic        Attending Provider     Provider Specialty    Yaya Lomeli MD Urology       Primary Care Provider Office Phone # Fax #    Raul Basil Dutta [phone]      After Care Instructions     Discharge Instructions       Activity  - No strenuous exercise for 3 weeks.  - No lifting, pushing, pulling more than 10 pounds for 3 weeks.   - Do not strain with bowel movements.  - Do not drive until you can press the brake pedal quickly and fully without pain.   - Do not operate a motor vehicle while taking narcotic pain medications.     Urination  - Some urinary bleeding is expected in the upcoming 7-10days.   - If having hematuria (blood in the urine), make sure to increase your water intake and monitor for improvement  - If your catheter stops draining you should return urgently to the ED or call clinic to try to arrange for an urgent visit same day.   - You are going home with the following tubes or drains: acosta catheter.  Nurse/ to write  "care and instructions.  Treat the catheter like an extension of your body; do not let it get caught or snagged on anything.  Leave the catheter in place until next Friday at which time you may remove it at home.  Your nurse will instruct you on how to remove the catheter.  Be sure to remove all 10 cc of fluid from the balloon prior to pulling on the catheter    Medications  1) Pain  - Transition from narcotic pain medications to tylenol (acetaminophen) as you are able.  Wean yourself off all pain medications as you are able.  - Some pain medications contain both tylenol (acetaminophen) and a narcotic (Norco, vicodin, percocet), do not take more than 4,000mg of Tylenol (acetaminophen) from all sources in any 24 hour period.  - Narcotics can make you constipated.  Take over the counter fiber (metamucil or benefiber) and stool softeners (miralax, docusate or senna) while taking narcotic pain medications, but stop if you develop diarrhea.  - No driving or operating machinery while taking narcotic pain medications     2) Antibiotics  -You have been prescribe a 1 week course of antibiotics    Follow-Up:  - Call your primary care provider to touch base regarding your recent admission.    - Call or return sooner than your regularly scheduled visit if you develop any of the following: fever (greater than 101.5), uncontrolled pain, uncontrolled nausea or vomiting, as well as increased redness, swelling, or drainage from your wound.     Phone numbers:   - Monday through Friday 8am to 4:30pm: Call [phone] with questions or to schedule or confirm appointment.    - Nights or weekends: call the after hours emergency pager - [phone] and tell the  \"I would like to page the Urology Resident on call.\"  - For emergencies, call 911                  Your next 10 appointments already scheduled     Apr 25, 2018  3:00 PM CDT   (Arrive by 2:45 PM)   Post-Op with Yaya Lomeli MD   [facility] Prostate [facility] Urologic [facility] ([facility])    [address] [place] [id number]   [phone]            Oct 10, 2018  7:00 AM CDT   LAB with  LAB   [facility] ([facility])    [address] [place] [id number] [phone]           Please do not eat 10-12 hours before your appointment if you are coming in fasting for labs on lipids, cholesterol, or glucose (sugar). This does not apply to pregnant women. Water, hot tea and black coffee (with nothing added) are okay. Do not drink other fluids, diet soda or chew gum.            Oct 15, 2018 11:00 AM CDT   (Arrive by 10:45 AM)   Return Visit with Raul Dutta MD   [facility] ([facility])    [address] [place] [id number]   [phone]            Oct 17, 2018  4:00 PM CDT   (Arrive by 3:45 PM)   Cystoscopy with Yaya Lomeli MD   [facility] and [facility] for Prostate and Urologic Cancers ([facility])    [address] [place] [id number] [phone]              Further instructions from your care team       [facility]  Home Care Following Anesthesia  For 24 hours after surgery:  1. Get plenty of rest.  A responsible adult must stay with you for at least 24 hours after you leave the surgery center.  2. Do not drive or use heavy equipment.  If you have weakness or tingling, don't drive or use heavy equipment until this feeling goes away.   3. Do not drink alcohol.   4. Avoid strenuous or risky activities.  Ask for help when climbing stairs.  5. You may feel lightheaded.  IF so, sit for a few minutes before standing.  Have someone help you get up.   6. If you have nausea (feel sick to your stomach): Drink only clear liquids such as apple juice, ginger ale, broth or 7-Up.  Rest may also help.   Be sure to drink enough fluids.  Move to a regular diet as you feel able.   7. You may have a slight fever.  Call the doctor if your fever is over 100 F (37.7 C) (taken under the tongue) or lasts longer than 24 hours.  8. You may have a dry mouth, a sore throat, muscle aches or trouble sleeping. These should go away after 24 hours.  9. Do not make important or legal decisions.       Tips for taking pain medications  To get the best pain relief possible, remember these points:    Take pain medications as directed, before pain becomes severe.    Pain medication can upset your stomach: taking it with food may help.    Constipation is a common side effect of pain medication. Drink plenty of  fluids.    Eat foods high in fiber. Take a stool softener if recommended by your doctor or pharmacist.    Do not drink alcohol, drive or operate machinery while taking pain medications.    Ask about other ways to control pain, such as with heat, ice or relaxation.    Tylenol/Acetaminophen Consumption Last dose: 975 mg at 6:30 AM  To help encourage the safe use of acetaminophen, the makers of TYLENOL  have lowered the maximum daily dose for single-ingredient Extra Strength TYLENOL  (acetaminophen) products sold in the [place] from 8 pills per day (4,000 mg) to 6 pills per day (3,000 mg). The dosing interval has also changed from 2 pills every 4-6 hours to 2 pills every 6 hours.    If you feel your pain relief is insufficient, you may take Tylenol/Acetaminophen in addition to your narcotic pain medication.     Be careful not to exceed 3,000 mg of Tylenol/Acetaminophen in a 24 hour period from all sources.    If you are taking extra strength Tylenol/acetaminophen (500 mg), the maximum dose is 6 tablets in 24 hours.    If you are taking regular strength acetaminophen (325 mg), the maximum dose is 9 tablets in 24 hours.    Call a doctor for any of the followin. Signs of infection (fever, growing tenderness at the surgery site, a large  "amount of drainage or bleeding, severe pain, foul-smelling drainage, redness, swelling).  2. Headache for over 24 hours.  Your doctor is:  Dr. Yaya Lomeli, Prostate and Urology: [phone]                  Or dial [phone] and ask for the resident on call for:  Prostate Urology  For emergency care, call the:  [facility]:  [phone] (TTY for hearing impaired: [phone])    Pending Results     No orders found from 4/11/2018 to 4/14/2018.            Admission Information     Date & Time Provider Department Dept. Phone    4/13/2018 Yaya Lomeli MD [facility] [phone]      Your Vitals Were     Blood Pressure Pulse Temperature Respirations Height Weight    102/64 85 97.8  F (36.6  C) (Temporal) 16 1.88 m (6' 2\") 124.7 kg (275 lb)    Pulse Oximetry BMI (Body Mass Index)                94% 35.31 kg/m2          [employer] Information     [employer] gives you secure access to your electronic health record. If you see a primary care provider, you can also send messages to your care team and make appointments. If you have questions, please call your primary care clinic.  If you do not have a primary care provider, please call [phone] and they will assist you.      [employer]in is an electronic gateway that provides easy, online access to your medical records. With [employer], you can request a clinic appointment, read your test results, renew a prescription or communicate with your care team.     To access your existing account, please contact your [facility] or call [phone] for assistance.        Care EveryWhere ID     This is your Care EveryWhere ID. This could be used by other organizations to access your [facility] medical records  [id number]        Equal Access to Services     BRIGITTE BOWENS : Paxton Amaro, olamide guerrero, ric huber. So [facility] " [phone].    ATENCIÓN: Si nidhi dong, tiene a mulligan disposición servicios gratuitos de asistencia lingüística. Alexandra wetzel [phone].    We comply with applicable federal civil rights laws and [place] laws. We do not discriminate on the basis of race, color, national origin, age, disability, sex, sexual orientation, or gender identity.               Review of your medicines      START taking        Dose / Directions    oxyCODONE IR 5 MG tablet   Commonly known as:  ROXICODONE   Used for:  Postoperative pain        Dose:  5 mg   Take 1 tablet (5 mg) by mouth every 6 hours as needed for severe pain maximum 6 tablet(s) per day   Quantity:  6 tablet   Refills:  0       sulfamethoxazole-trimethoprim 800-160 MG per tablet   Commonly known as:  BACTRIM DS/SEPTRA DS   Used for:  Urinary tract infection without hematuria, site unspecified        Dose:  1 tablet   Take 1 tablet by mouth 2 times daily   Quantity:  14 tablet   Refills:  0         CONTINUE these medicines which may have CHANGED, or have new prescriptions. If we are uncertain of the size of tablets/capsules you have at home, strength may be listed as something that might have changed.        Dose / Directions    allopurinol 100 MG tablet   Commonly known as:  ZYLOPRIM   This may have changed:    - how much to take  - how to take this  - when to take this  - additional instructions   Used for:  Gout, unspecified, Essential hypertension, Gastroesophageal reflux disease without esophagitis, Benign non-nodular prostatic hyperplasia without lower urinary tract symptoms, Routine general medical examination at a health care facility, Essential hypertension, benign, Nocturia, Need for hepatitis C screening test, Screening for diabetic peripheral neuropathy, Other fatigue, Hyperlipidemia LDL goal <100        1 qday   Quantity:  90 tablet   Refills:  3       amLODIPine 10 MG tablet   Commonly known as:  NORVASC   This may have changed:  when to take this   Used for:   Essential hypertension, Gout, unspecified, Gastroesophageal reflux disease without esophagitis, Benign non-nodular prostatic hyperplasia without lower urinary tract symptoms, Routine general medical examination at a health care facility, Essential hypertension, benign, Nocturia, Need for hepatitis C screening test, Screening for diabetic peripheral neuropathy, Other fatigue, Hyperlipidemia LDL goal <100        Dose:  10 mg   Take 1 tablet (10 mg) by mouth daily   Quantity:  90 tablet   Refills:  3       dutasteride 0.5 MG capsule   Commonly known as:  AVODART   This may have changed:  when to take this   Used for:  Benign non-nodular prostatic hyperplasia without lower urinary tract symptoms, Essential hypertension, Gout, unspecified, Gastroesophageal reflux disease without esophagitis, Routine general medical examination at a health care facility, Essential hypertension, benign, Nocturia, Need for hepatitis C screening test, Screening for diabetic peripheral neuropathy, Other fatigue, Hyperlipidemia LDL goal <100        Dose:  0.5 mg   Take 1 capsule (0.5 mg) by mouth daily   Quantity:  90 capsule   Refills:  3       metFORMIN 1000 MG tablet   Commonly known as:  GLUCOPHAGE   This may have changed:    - how much to take  - additional instructions   Used for:  Essential hypertension, Gout, unspecified, Gastroesophageal reflux disease without esophagitis, Benign non-nodular prostatic hyperplasia without lower urinary tract symptoms, Routine general medical examination at a health care facility, Essential hypertension, benign, Nocturia, Need for hepatitis C screening test, Screening for diabetic peripheral neuropathy, Other fatigue, Hyperlipidemia LDL goal <100        Take one tablet daily at bedtime   Quantity:  90 tablet   Refills:  3       rosuvastatin 10 MG tablet   Commonly known as:  CRESTOR   This may have changed:  when to take this   Used for:  Essential hypertension, Gout, unspecified, Gastroesophageal  reflux disease without esophagitis, Benign non-nodular prostatic hyperplasia without lower urinary tract symptoms, Routine general medical examination at a health care facility, Essential hypertension, benign, Nocturia, Need for hepatitis C screening test, Screening for diabetic peripheral neuropathy, Other fatigue, Hyperlipidemia LDL goal <100        Dose:  40 mg   Take 4 tablets (40 mg) by mouth daily   Quantity:  90 tablet   Refills:  3       tamsulosin 0.4 MG capsule   Commonly known as:  FLOMAX   This may have changed:  when to take this   Used for:  Benign non-nodular prostatic hyperplasia without lower urinary tract symptoms, Nocturia, Routine general medical examination at a health care facility, Essential hypertension, benign, Gout, unspecified, Essential hypertension, Gastroesophageal reflux disease without esophagitis, Need for hepatitis C screening test, Screening for diabetic peripheral neuropathy, Other fatigue, Hyperlipidemia LDL goal <100        Dose:  0.4 mg   Take 1 capsule (0.4 mg) by mouth daily   Quantity:  90 capsule   Refills:  3       valsartan 320 MG tablet   Commonly known as:  DIOVAN   This may have changed:  when to take this   Used for:  Essential hypertension, benign, Routine general medical examination at a health care facility, Essential hypertension, Gastroesophageal reflux disease without esophagitis, Benign non-nodular prostatic hyperplasia without lower urinary tract symptoms, Nocturia, Need for hepatitis C screening test, Screening for diabetic peripheral neuropathy, Other fatigue, Hyperlipidemia LDL goal <100        Dose:  320 mg   Take 1 tablet (320 mg) by mouth daily   Quantity:  90 tablet   Refills:  3         CONTINUE these medicines which have NOT CHANGED        Dose / Directions    Multi-vitamin Tabs tablet        Dose:  1 tablet   Take 1 tablet by mouth At Bedtime   Refills:  0       order for DME        Respironics Dreamstation AutoPap 7-15 cm with Respironics Leidy mask.    Refills:  0       phentermine 37.5 MG tablet   Commonly known as:  ADIPEX-P        Dose:  56 mg   Take 56 mg by mouth every morning (before breakfast)   Refills:  0       ranitidine 150 MG tablet   Commonly known as:  ZANTAC   Used for:  Gastroesophageal reflux disease without esophagitis, Screening for diabetic peripheral neuropathy, Dysmetabolic syndrome X, Pure hypercholesterolemia, Essential hypertension, benign, Obstructive sleep apnea hypopnea, moderate, Agatston coronary artery calcium score greater than 400, Microscopic hematuria, Vitamin D deficiency        Dose:  150 mg   Take 1 tablet (150 mg) by mouth 2 times daily   Quantity:  180 tablet   Refills:  3            Where to get your medicines      These medications were sent to [facility] [address] [facility] [address]  [address] [place] [address] [address] [address]    Hours:  TRANSPLANT PHONE NUMBER [phone] Phone:  [phone]     sulfamethoxazole-trimethoprim 800-160 MG per tablet         Some of these will need a paper prescription and others can be bought over the counter. Ask your nurse if you have questions.     Bring a paper prescription for each of these medications     oxyCODONE IR 5 MG tablet                Protect others around you: Learn how to safely use, store and throw away your medicines at www.disposemymeds.org.        ANTIBIOTIC INSTRUCTION     You've Been Prescribed an Antibiotic - Now What?  Your healthcare team thinks that you or your loved one might have an infection. Some infections can be treated with antibiotics, which are powerful, life-saving drugs. Like all medications, antibiotics have side effects and should only be used when necessary. There are some important things you should know about your antibiotic treatment.      Your healthcare team may run tests before you start taking an antibiotic.    Your team may take samples (e.g., from your blood, urine or other areas) to run  tests to look for bacteria. These test can be important to determine if you need an antibiotic at all and, if you do, which antibiotic will work best.      Within a few days, your healthcare team might change or even stop your antibiotic.    Your team may start you on an antibiotic while they are working to find out what is making you sick.    Your team might change your antibiotic because test results show that a different antibiotic would be better to treat your infection.    In some cases, once your team has more information, they learn that you do not need an antibiotic at all. They may find out that you don't have an infection, or that the antibiotic you're taking won't work against your infection. For example, an infection caused by a virus can't be treated with antibiotics. Staying on an antibiotic when you don't need it is more likely to be harmful than helpful.      You may experience side effects from your antibiotic.    Like all medications, antibiotics have side effects. Some of these can be serious.    Let you healthcare team know if you have any known allergies when you are admitted to the hospital.    One significant side effect of nearly all antibiotics is the risk of severe and sometimes deadly diarrhea caused by Clostridium difficile (C. Difficile). This occurs when a person takes antibiotics because some good germs are destroyed. Antibiotic use allows C. diificile to take over, putting patients at high risk for this serious infection.    As a patient or caregiver, it is important to understand your or your loved one's antibiotic treatment. It is especially important for caregivers to speak up when patients can't speak for themselves. Here are some important questions to ask your healthcare team.    What infection is this antibiotic treating and how do you know I have that infection?    What side effects might occur from this antibiotic?    How long will I need to take this antibiotic?    Is it  safe to take this antibiotic with other medications or supplements (e.g., vitamins) that I am taking?     Are there any special directions I need to know about taking this antibiotic? For example, should I take it with food?    How will I be monitored to know whether my infection is responding to the antibiotic?    What tests may help to make sure the right antibiotic is prescribed for me?      Information provided by:  www.cdc.gov/getsmart  U.S. Department of Health and Human Services  Centers for disease Control and Prevention  National Center for Emerging and Zoonotic Infectious Diseases  Division of Healthcare Quality Promotion        Information about OPIOIDS     PRESCRIPTION OPIOIDS: WHAT YOU NEED TO KNOW    Prescription opioids can be used to help relieve moderate to severe pain and are often prescribed following a surgery or injury, or for certain health conditions. These medications can be an important part of treatment but also come with serious risks. It is important to work with your health care provider to make sure you are getting the safest, most effective care.    WHAT ARE THE RISKS AND SIDE EFFECTS OF OPIOID USE?  Prescription opioids carry serious risks of addiction and overdose, especially with prolonged use. An opioid overdose, often marked by slowed breathing can cause sudden death. The use of prescription opioids can have a number of side effects as well, even when taken as directed:      Tolerance - meaning you might need to take more of a medication for the same pain relief    Physical dependence - meaning you have symptoms of withdrawal when a medication is stopped    Increased sensitivity to pain    Constipation    Nausea, vomiting, and dry mouth    Sleepiness and dizziness    Confusion    Depression    Low levels of testosterone that can result in lower sex drive, energy, and strength    Itching and sweating    RISKS ARE GREATER WITH:    History of drug misuse, substance use disorder, or  overdose    Mental health conditions (such as depression or anxiety)    Sleep apnea    Older age (65 years or older)    Pregnancy    Avoid alcohol while taking prescription opioids.   Also, unless specifically advised by your health care provider, medications to avoid include:    Benzodiazepines (such as Xanax or Valium)    Muscle relaxants (such as Soma or Flexeril)    Hypnotics (such as Ambien or Lunesta)    Other prescription opioids    KNOW YOUR OPTIONS:  Talk to your health care provider about ways to manage your pain that do not involve prescription opioids. Some of these options may actually work better and have fewer risks and side effects:    Pain relievers such as acetaminophen, ibuprofen, and naproxen    Some medications that are also used for depression or seizures    Physical therapy and exercise    Cognitive behavioral therapy, a psychological, goal-directed approach, in which patients learn how to modify physical, behavioral, and emotional triggers of pain and stress    IF YOU ARE PRESCRIBED OPIOIDS FOR PAIN:    Never take opioids in greater amounts or more often than prescribed    Follow up with your primary health care provider and work together to create a plan on how to manage your pain.    Talk about ways to help manage your pain that do not involve prescription opioids    Talk about all concerns and side effects    Help prevent misuse and abuse    Never sell or share prescription opioids    Never use another person's prescription opioids    Store prescription opioids in a secure place and out of reach of others (this may include visitors, children, friends, and family)    Visit www.cdc.gov/drugoverdose to learn about risks of opioid abuse and overdose    If you believe you may be struggling with addiction, tell your health care provider and ask for guidance or call [facility]'s National Helpline at 1-770-776-HELP    LEARN MORE / www.cdc.gov/drugoverdose/prescribing/guideline.html    Safely dispose  of unused prescription opioids: Find your local drug take-back programs and more information about the importance of safe disposal at www.doseofreality.mn.gov             Medication List: This is a list of all your medications and when to take them. Check marks below indicate your daily home schedule. Keep this list as a reference.      Medications           Morning Afternoon Evening Bedtime As Needed    allopurinol 100 MG tablet   Commonly known as:  ZYLOPRIM   1 qday                                amLODIPine 10 MG tablet   Commonly known as:  NORVASC   Take 1 tablet (10 mg) by mouth daily                                dutasteride 0.5 MG capsule   Commonly known as:  AVODART   Take 1 capsule (0.5 mg) by mouth daily                                metFORMIN 1000 MG tablet   Commonly known as:  GLUCOPHAGE   Take one tablet daily at bedtime                                Multi-vitamin Tabs tablet   Take 1 tablet by mouth At Bedtime                                order for DME   Respironics Dreamstation AutoPap 7-15 cm with Respironics Leidy mask.                                oxyCODONE IR 5 MG tablet   Commonly known as:  ROXICODONE   Take 1 tablet (5 mg) by mouth every 6 hours as needed for severe pain maximum 6 tablet(s) per day   Last time this was given:  5 mg on 4/13/2018  8:20 AM                                phentermine 37.5 MG tablet   Commonly known as:  ADIPEX-P   Take 56 mg by mouth every morning (before breakfast)                                ranitidine 150 MG tablet   Commonly known as:  ZANTAC   Take 1 tablet (150 mg) by mouth 2 times daily                                rosuvastatin 10 MG tablet   Commonly known as:  CRESTOR   Take 4 tablets (40 mg) by mouth daily                                sulfamethoxazole-trimethoprim 800-160 MG per tablet   Commonly known as:  BACTRIM DS/SEPTRA DS   Take 1 tablet by mouth 2 times daily                                tamsulosin 0.4 MG capsule   Commonly known  as:  FLOMAX   Take 1 capsule (0.4 mg) by mouth daily                                valsartan 320 MG tablet   Commonly known as:  DIOVAN   Take 1 tablet (320 mg) by mouth daily

## 2018-04-14 NOTE — OP NOTE
Operative Report    Pre-operative diagnosis: BPH with LUTS    Post-operative diagnosis: Same    Procedure: Convective radiofrequency thermal ablation of the prostate    Notable Findings: Unremarkable ablation of the prostate    Indications: Mr Grant is a 62 year old man with a history of bothersome LUTS who presents for convective radiofrequency thermal ablation of the prostate.  The patient was advised of the risks of the procedure along with the risks/benefits of alternative procedures.  He exhibited an understanding of these considerations and opted to proceed.    Procedure:   Once the patient was adequately sedated he was positioned in dorsal lithotomy and prepped and draped in the normal sterile fashion.    The Rezum handpiece was introduced into a well lubricated urethra under direct vision.  Evaluation of the prostate revealed mild-to-moderate lateral lobe hypertrophy with no significant median lobe.  5 total targeted treatments delivered into the prostate (1 bladder neck, 2 left lateral lobe, 2 right lateral lobe).  The patient tolerated the procedure well with no apparent complications.  A acosta catheter was placed at the end of the case.    Postoperative Plan:  -DC to home  -Follow up as scheduled

## 2018-04-17 ENCOUNTER — PRE VISIT (OUTPATIENT)
Dept: UROLOGY | Facility: CLINIC | Age: 63
End: 2018-04-17

## 2018-04-17 NOTE — TELEPHONE ENCOUNTER
Reason for visit: Rezum post op     Relevant information: NA    Records/imaging/labs: all records available    Pt called: Yes    Rooming: flow/pvr

## 2018-04-25 ENCOUNTER — OFFICE VISIT (OUTPATIENT)
Dept: UROLOGY | Facility: CLINIC | Age: 63
End: 2018-04-25
Payer: COMMERCIAL

## 2018-04-25 VITALS
WEIGHT: 275 LBS | SYSTOLIC BLOOD PRESSURE: 131 MMHG | HEIGHT: 74 IN | DIASTOLIC BLOOD PRESSURE: 87 MMHG | BODY MASS INDEX: 35.29 KG/M2 | HEART RATE: 105 BPM

## 2018-04-25 DIAGNOSIS — N13.8 BENIGN PROSTATIC HYPERPLASIA WITH URINARY OBSTRUCTION: Primary | ICD-10-CM

## 2018-04-25 DIAGNOSIS — N40.1 BENIGN PROSTATIC HYPERPLASIA WITH URINARY OBSTRUCTION: Primary | ICD-10-CM

## 2018-04-25 ASSESSMENT — PAIN SCALES - GENERAL: PAINLEVEL: NO PAIN (0)

## 2018-04-25 NOTE — PATIENT INSTRUCTIONS
Return as planned.    It was a pleasure meeting with you today.  Thank you for allowing me and my team the privilege of caring for you today.  YOU are the reason we are here, and I truly hope we provided you with the excellent service you deserve.  Please let us know if there is anything else we can do for you so that we can be sure you are leaving completely satisfied with your care experience.

## 2018-04-25 NOTE — NURSING NOTE
Chief Complaint   Patient presents with     RECHECK     2 week post rezume        Ashley Jeronimo MA

## 2018-04-25 NOTE — PROGRESS NOTES
Diagnosis: BPH  History: Dr. Avendaño 62-year-old gentleman with outlet obstructive symptoms due to BPH who recently underwent REZUM procedure.  Comes in today for routine follow-up.  States he is slowly improving feels he empties completely and has a good flow does have some irritative voiding symptoms.  On exam he is afebrile blood pressure 131/87 pulse 105 weight 124 kg  I PSS is 9  He is still on his Flomax and finasteride.  Assessment and plan: Patient slowly improving after his REZUM procedure.  Uroflow and PVR obtained today still suggested a higher PVR of 140 cc.  We will continue to observe for another 2-3 weeks and reassess the PVR later on.  He will also stop his medications Flomax and finasteride.

## 2018-04-25 NOTE — LETTER
4/25/2018       RE: Darnell Grant  [address]  [facility] [address]     Dear Colleague,    Thank you for referring your patient, Darnell Grant, to the [facility] UROLOGY AND INST FOR PROSTATE AND UROLOGIC CANCERS at [facility]. Please see a copy of my visit note below.    Diagnosis: BPH  History: Dr. Avendaño 62-year-old gentleman with outlet obstructive symptoms due to BPH who recently underwent REZUM procedure.  Comes in today for routine follow-up.  States he is slowly improving feels he empties completely and has a good flow does have some irritative voiding symptoms.  On exam he is afebrile blood pressure 131/87 pulse 105 weight 124 kg  I PSS is 9  He is still on his Flomax and finasteride.  Assessment and plan: Patient slowly improving after his REZUM procedure.  Uroflow and PVR obtained today still suggested a higher PVR of 140 cc.  We will continue to observe for another 2-3 weeks and reassess the PVR later on.  He will also stop his medications Flomax and finasteride.    Again, thank you for allowing me to participate in the care of your patient.      Sincerely,    Yaya Lomeli MD

## 2018-04-25 NOTE — MR AVS SNAPSHOT
After Visit Summary   4/25/2018    Darnell Grant    MRN: [id number]           Patient Information     Date Of Birth          1955        Visit Information        Provider Department      4/25/2018 12:00 PM Yaya Lomeli MD [facility]rolog[facility]        Today's Diagnoses     Benign prostatic hyperplasia with urinary obstruction    -  1      Care Instructions    Return as planned.    It was a pleasure meeting with you today.  Thank you for allowing me and my team the privilege of caring for you today.  YOU are the reason we are here, and I truly hope we provided you with the excellent service you deserve.  Please let us know if there is anything else we can do for you so that we can be sure you are leaving completely satisfied with your care experience.                  Follow-ups after your visit        Follow-up notes from your care team     Return in about 3 months (around 7/25/2018).      Your next 10 appointments already scheduled     Oct 17, 2018  4:00 PM CDT   (Arrive by 3:45 PM)   Cystoscopy with Yaya Lomeli MD   [facility]rolog[facility] ([facility])    [address] [place] [id number] [phone]            Nov 13, 2018  7:00 AM CST   LAB with  LAB   [facility] ([facility])    [address] [place] [id number] [phone]           Please do not eat 10-12 hours before your appointment if you are coming in fasting for labs on lipids, cholesterol, or glucose (sugar). This does not apply to pregnant women. Water, hot tea and black coffee (with nothing added) are okay. Do not drink other fluids, diet soda or chew gum.            Nov 19, 2018 11:30 AM CST   (Arrive by 11:15 AM)   Return Visit with Raul Dutta MD   [facility] ([facility])    [facility]  "[address] [id number] [phone]              Who to contact     Please call your clinic at [phone] to:    Ask questions about your health    Make or cancel appointments    Discuss your medicines    Learn about your test results    Speak to your doctor            Additional Information About Your Visit        MyChart Information     [employer] gives you secure access to your electronic health record. If you see a primary care provider, you can also send messages to your care team and make appointments. If you have questions, please call your primary care clinic.  If you do not have a primary care provider, please call [phone] and they will assist you.      [employer] is an electronic gateway that provides easy, online access to your medical records. With [employer], you can request a clinic appointment, read your test results, renew a prescription or communicate with your care team.     To access your existing account, please contact your [facility] or call [phone] for assistance.        Care EveryWhere ID     This is your Care EveryWhere ID. This could be used by other organizations to access your [facility] medical records  [id number]        Your Vitals Were     Pulse Height BMI (Body Mass Index)             105 1.88 m (6' 2\") 35.31 kg/m2          Blood Pressure from Last 3 Encounters:   04/25/18 131/87   04/13/18 121/74   03/30/18 118/74    Weight from Last 3 Encounters:   04/25/18 124.7 kg (275 lb)   04/13/18 124.7 kg (275 lb)   03/30/18 125.9 kg (277 lb 8 oz)              We Performed the Following     COMPLEX UROFLOWMETRY     POST-VOID RESIDUAL BLADDER SCAN          Today's Medication Changes          These changes are accurate as of 4/25/18 11:59 PM.  If you have any questions, ask your nurse or doctor.               These medicines have changed or have updated prescriptions.        Dose/Directions    allopurinol 100 MG tablet "   Commonly known as:  ZYLOPRIM   This may have changed:    - how much to take  - how to take this  - when to take this  - additional instructions   Used for:  Gout, unspecified, Essential hypertension, Gastroesophageal reflux disease without esophagitis, Benign non-nodular prostatic hyperplasia without lower urinary tract symptoms, Routine general medical examination at a health care facility, Essential hypertension, benign, Nocturia, Need for hepatitis C screening test, Screening for diabetic peripheral neuropathy, Other fatigue, Hyperlipidemia LDL goal <100        1 qday   Quantity:  90 tablet   Refills:  3       amLODIPine 10 MG tablet   Commonly known as:  NORVASC   This may have changed:  when to take this   Used for:  Essential hypertension, Gout, unspecified, Gastroesophageal reflux disease without esophagitis, Benign non-nodular prostatic hyperplasia without lower urinary tract symptoms, Routine general medical examination at a health care facility, Essential hypertension, benign, Nocturia, Need for hepatitis C screening test, Screening for diabetic peripheral neuropathy, Other fatigue, Hyperlipidemia LDL goal <100        Dose:  10 mg   Take 1 tablet (10 mg) by mouth daily   Quantity:  90 tablet   Refills:  3       dutasteride 0.5 MG capsule   Commonly known as:  AVODART   This may have changed:  when to take this   Used for:  Benign non-nodular prostatic hyperplasia without lower urinary tract symptoms, Essential hypertension, Gout, unspecified, Gastroesophageal reflux disease without esophagitis, Routine general medical examination at a health care facility, Essential hypertension, benign, Nocturia, Need for hepatitis C screening test, Screening for diabetic peripheral neuropathy, Other fatigue, Hyperlipidemia LDL goal <100        Dose:  0.5 mg   Take 1 capsule (0.5 mg) by mouth daily   Quantity:  90 capsule   Refills:  3       metFORMIN 1000 MG tablet   Commonly known as:  GLUCOPHAGE   This may have  changed:    - how much to take  - additional instructions   Used for:  Essential hypertension, Gout, unspecified, Gastroesophageal reflux disease without esophagitis, Benign non-nodular prostatic hyperplasia without lower urinary tract symptoms, Routine general medical examination at a health care facility, Essential hypertension, benign, Nocturia, Need for hepatitis C screening test, Screening for diabetic peripheral neuropathy, Other fatigue, Hyperlipidemia LDL goal <100        Take one tablet daily at bedtime   Quantity:  90 tablet   Refills:  3       rosuvastatin 10 MG tablet   Commonly known as:  CRESTOR   This may have changed:  when to take this   Used for:  Essential hypertension, Gout, unspecified, Gastroesophageal reflux disease without esophagitis, Benign non-nodular prostatic hyperplasia without lower urinary tract symptoms, Routine general medical examination at a health care facility, Essential hypertension, benign, Nocturia, Need for hepatitis C screening test, Screening for diabetic peripheral neuropathy, Other fatigue, Hyperlipidemia LDL goal <100        Dose:  40 mg   Take 4 tablets (40 mg) by mouth daily   Quantity:  90 tablet   Refills:  3       tamsulosin 0.4 MG capsule   Commonly known as:  FLOMAX   This may have changed:  when to take this   Used for:  Benign non-nodular prostatic hyperplasia without lower urinary tract symptoms, Nocturia, Routine general medical examination at a health care facility, Essential hypertension, benign, Gout, unspecified, Essential hypertension, Gastroesophageal reflux disease without esophagitis, Need for hepatitis C screening test, Screening for diabetic peripheral neuropathy, Other fatigue, Hyperlipidemia LDL goal <100        Dose:  0.4 mg   Take 1 capsule (0.4 mg) by mouth daily   Quantity:  90 capsule   Refills:  3       valsartan 320 MG tablet   Commonly known as:  DIOVAN   This may have changed:  when to take this   Used for:  Essential hypertension,  benign, Routine general medical examination at a health care facility, Essential hypertension, Gastroesophageal reflux disease without esophagitis, Benign non-nodular prostatic hyperplasia without lower urinary tract symptoms, Nocturia, Need for hepatitis C screening test, Screening for diabetic peripheral neuropathy, Other fatigue, Hyperlipidemia LDL goal <100        Dose:  320 mg   Take 1 tablet (320 mg) by mouth daily   Quantity:  90 tablet   Refills:  3         Stop taking these medicines if you haven't already. Please contact your care team if you have questions.     oxyCODONE IR 5 MG tablet   Commonly known as:  ROXICODONE   Stopped by:  Yaya Lomeli MD                    Primary Care Provider Office Phone # Fax #    Raul Basil Dutta [phone] [address] [address]        Equal Access to Services     BRIGITTE BOWENS : Paxton deleono Soeva, waaxda luqadaha, qaybta kaalmada adeegyada, ric farias . So [facility] [phone].    ATENCIÓN: Si habla español, tiene a mulligan disposición servicios gratuitos de asistencia lingüística. Llame al [phone].    We comply with applicable federal civil rights laws and [place] laws. We do not discriminate on the basis of race, color, national origin, age, disability, sex, sexual orientation, or gender identity.            Thank you!     Thank you for choosing [facility] UROLOGY AND [facility] FOR PROSTATE AND UROLOGIC CANCERS  for your care. Our goal is always to provide you with excellent care. Hearing back from our patients is one way we can continue to improve our services. Please take a few minutes to complete the written survey that you may receive in the mail after your visit with us. Thank you!             Your Updated Medication List - Protect others around you: Learn how to safely use, store and throw away your medicines at www.disposemymeds.org.          This list is accurate as of  4/25/18 11:59 PM.  Always use your most recent med list.                   Brand Name Dispense Instructions for use Diagnosis    allopurinol 100 MG tablet    ZYLOPRIM    90 tablet    1 qday    Gout, unspecified, Essential hypertension, Gastroesophageal reflux disease without esophagitis, Benign non-nodular prostatic hyperplasia without lower urinary tract symptoms, Routine general medical examination at a health care facility, Essential hypertension, benign, Nocturia, Need for hepatitis C screening test, Screening for diabetic peripheral neuropathy, Other fatigue, Hyperlipidemia LDL goal <100       amLODIPine 10 MG tablet    NORVASC    90 tablet    Take 1 tablet (10 mg) by mouth daily    Essential hypertension, Gout, unspecified, Gastroesophageal reflux disease without esophagitis, Benign non-nodular prostatic hyperplasia without lower urinary tract symptoms, Routine general medical examination at a health care facility, Essential hypertension, benign, Nocturia, Need for hepatitis C screening test, Screening for diabetic peripheral neuropathy, Other fatigue, Hyperlipidemia LDL goal <100       dutasteride 0.5 MG capsule    AVODART    90 capsule    Take 1 capsule (0.5 mg) by mouth daily    Benign non-nodular prostatic hyperplasia without lower urinary tract symptoms, Essential hypertension, Gout, unspecified, Gastroesophageal reflux disease without esophagitis, Routine general medical examination at a health care facility, Essential hypertension, benign, Nocturia, Need for hepatitis C screening test, Screening for diabetic peripheral neuropathy, Other fatigue, Hyperlipidemia LDL goal <100       metFORMIN 1000 MG tablet    GLUCOPHAGE    90 tablet    Take one tablet daily at bedtime    Essential hypertension, Gout, unspecified, Gastroesophageal reflux disease without esophagitis, Benign non-nodular prostatic hyperplasia without lower urinary tract symptoms, Routine general medical examination at a health care facility,  Essential hypertension, benign, Nocturia, Need for hepatitis C screening test, Screening for diabetic peripheral neuropathy, Other fatigue, Hyperlipidemia LDL goal <100       Multi-vitamin Tabs tablet      Take 1 tablet by mouth At Bedtime        order for DME      Respironics Dreamstation AutoPap 7-15 cm with Respironics Leidy mask.        phentermine 37.5 MG tablet    ADIPEX-P     Take 56 mg by mouth every morning (before breakfast)        ranitidine 150 MG tablet    ZANTAC    180 tablet    Take 1 tablet (150 mg) by mouth 2 times daily    Gastroesophageal reflux disease without esophagitis, Screening for diabetic peripheral neuropathy, Dysmetabolic syndrome X, Pure hypercholesterolemia, Essential hypertension, benign, Obstructive sleep apnea hypopnea, moderate, Agatston coronary artery calcium score greater than 400, Microscopic hematuria, Vitamin D deficiency       rosuvastatin 10 MG tablet    CRESTOR    90 tablet    Take 4 tablets (40 mg) by mouth daily    Essential hypertension, Gout, unspecified, Gastroesophageal reflux disease without esophagitis, Benign non-nodular prostatic hyperplasia without lower urinary tract symptoms, Routine general medical examination at a health care facility, Essential hypertension, benign, Nocturia, Need for hepatitis C screening test, Screening for diabetic peripheral neuropathy, Other fatigue, Hyperlipidemia LDL goal <100       sulfamethoxazole-trimethoprim 800-160 MG per tablet    BACTRIM DS/SEPTRA DS    14 tablet    Take 1 tablet by mouth 2 times daily    Urinary tract infection without hematuria, site unspecified       tamsulosin 0.4 MG capsule    FLOMAX    90 capsule    Take 1 capsule (0.4 mg) by mouth daily    Benign non-nodular prostatic hyperplasia without lower urinary tract symptoms, Nocturia, Routine general medical examination at a health care facility, Essential hypertension, benign, Gout, unspecified, Essential hypertension, Gastroesophageal reflux disease without  esophagitis, Need for hepatitis C screening test, Screening for diabetic peripheral neuropathy, Other fatigue, Hyperlipidemia LDL goal <100       valsartan 320 MG tablet    DIOVAN    90 tablet    Take 1 tablet (320 mg) by mouth daily    Essential hypertension, benign, Routine general medical examination at a health care facility, Essential hypertension, Gastroesophageal reflux disease without esophagitis, Benign non-nodular prostatic hyperplasia without lower urinary tract symptoms, Nocturia, Need for hepatitis C screening test, Screening for diabetic peripheral neuropathy, Other fatigue, Hyperlipidemia LDL goal <100

## 2018-06-29 ENCOUNTER — ALLIED HEALTH/NURSE VISIT (OUTPATIENT)
Dept: INTERNAL MEDICINE | Facility: CLINIC | Age: 63
End: 2018-06-29
Payer: COMMERCIAL

## 2018-06-29 DIAGNOSIS — Z23 NEED FOR SHINGLES VACCINE: Primary | ICD-10-CM

## 2018-06-29 NOTE — MR AVS SNAPSHOT
After Visit Summary   6/29/2018    Darnell Grant    MRN: [id number]           Patient Information     Date Of Birth          1955        Visit Information        Provider Department      6/29/2018 2:00 PM Nurse, [facility]        Today's Diagnoses     Need for shingles vaccine    -  1       Follow-ups after your visit        Your next 10 appointments already scheduled     Oct 17, 2018  4:00 PM CDT   (Arrive by 3:45 PM)   Cystoscopy with Yaya Lomeli MD   [facility] ([facility])    [address] [place] [address] [id number] [phone]            Nov 13, 2018  7:00 AM CST   LAB with  LAB   [facility] ([facility])    [address] [place] [address] [id number] [phone]           Please do not eat 10-12 hours before your appointment if you are coming in fasting for labs on lipids, cholesterol, or glucose (sugar). This does not apply to pregnant women. Water, hot tea and black coffee (with nothing added) are okay. Do not drink other fluids, diet soda or chew gum.            Nov 19, 2018 11:30 AM CST   (Arrive by 11:15 AM)   Return Visit with Raul Dutta MD   [facility] ([facility])    [address] [place] [address] [id number] [phone]              Who to contact     Please call your clinic at [phone] to:    Ask questions about your health    Make or cancel appointments    Discuss your medicines    Learn about your test results    Speak to your doctor            Additional Information About Your Visit        MyChart Information     [employer] gives you secure access to your electronic health record. If you see a primary care provider, you can also send messages to your care team and make appointments. If you have questions, please call your primary care  clinic.  If you do not have a primary care provider, please call [phone] and they will assist you.      [employer] is an electronic gateway that provides easy, online access to your medical records. With [employer], you can request a clinic appointment, read your test results, renew a prescription or communicate with your care team.     To access your existing account, please contact your [facility] or call [phone] for assistance.        Care EveryWhere ID     This is your Care EveryWhere ID. This could be used by other organizations to access your [place] medical records  [id number]         Blood Pressure from Last 3 Encounters:   04/25/18 131/87   04/13/18 121/74   03/30/18 118/74    Weight from Last 3 Encounters:   04/25/18 124.7 kg (275 lb)   04/13/18 124.7 kg (275 lb)   03/30/18 125.9 kg (277 lb 8 oz)              We Performed the Following     ZOSTER VACCINE RECOMBINANT ADJUVANTED IM NJX          Today's Medication Changes          These changes are accurate as of 6/29/18  2:27 PM.  If you have any questions, ask your nurse or doctor.               These medicines have changed or have updated prescriptions.        Dose/Directions    allopurinol 100 MG tablet   Commonly known as:  ZYLOPRIM   This may have changed:    - how much to take  - how to take this  - when to take this  - additional instructions   Used for:  Gout, unspecified, Essential hypertension, Gastroesophageal reflux disease without esophagitis, Benign non-nodular prostatic hyperplasia without lower urinary tract symptoms, Routine general medical examination at a health care facility, Essential hypertension, benign, Nocturia, Need for hepatitis C screening test, Screening for diabetic peripheral neuropathy, Other fatigue, Hyperlipidemia LDL goal <100        1 qday   Quantity:  90 tablet   Refills:  3       amLODIPine 10 MG tablet   Commonly known as:  NORVASC   This may have changed:  when to take this   Used  for:  Essential hypertension, Gout, unspecified, Gastroesophageal reflux disease without esophagitis, Benign non-nodular prostatic hyperplasia without lower urinary tract symptoms, Routine general medical examination at a health care facility, Essential hypertension, benign, Nocturia, Need for hepatitis C screening test, Screening for diabetic peripheral neuropathy, Other fatigue, Hyperlipidemia LDL goal <100        Dose:  10 mg   Take 1 tablet (10 mg) by mouth daily   Quantity:  90 tablet   Refills:  3       dutasteride 0.5 MG capsule   Commonly known as:  AVODART   This may have changed:  when to take this   Used for:  Benign non-nodular prostatic hyperplasia without lower urinary tract symptoms, Essential hypertension, Gout, unspecified, Gastroesophageal reflux disease without esophagitis, Routine general medical examination at a health care facility, Essential hypertension, benign, Nocturia, Need for hepatitis C screening test, Screening for diabetic peripheral neuropathy, Other fatigue, Hyperlipidemia LDL goal <100        Dose:  0.5 mg   Take 1 capsule (0.5 mg) by mouth daily   Quantity:  90 capsule   Refills:  3       metFORMIN 1000 MG tablet   Commonly known as:  GLUCOPHAGE   This may have changed:    - how much to take  - additional instructions   Used for:  Essential hypertension, Gout, unspecified, Gastroesophageal reflux disease without esophagitis, Benign non-nodular prostatic hyperplasia without lower urinary tract symptoms, Routine general medical examination at a health care facility, Essential hypertension, benign, Nocturia, Need for hepatitis C screening test, Screening for diabetic peripheral neuropathy, Other fatigue, Hyperlipidemia LDL goal <100        Take one tablet daily at bedtime   Quantity:  90 tablet   Refills:  3       rosuvastatin 10 MG tablet   Commonly known as:  CRESTOR   This may have changed:  when to take this   Used for:  Essential hypertension, Gout, unspecified, Gastroesophageal  reflux disease without esophagitis, Benign non-nodular prostatic hyperplasia without lower urinary tract symptoms, Routine general medical examination at a health care facility, Essential hypertension, benign, Nocturia, Need for hepatitis C screening test, Screening for diabetic peripheral neuropathy, Other fatigue, Hyperlipidemia LDL goal <100        Dose:  40 mg   Take 4 tablets (40 mg) by mouth daily   Quantity:  90 tablet   Refills:  3       tamsulosin 0.4 MG capsule   Commonly known as:  FLOMAX   This may have changed:  when to take this   Used for:  Benign non-nodular prostatic hyperplasia without lower urinary tract symptoms, Nocturia, Routine general medical examination at a health care facility, Essential hypertension, benign, Gout, unspecified, Essential hypertension, Gastroesophageal reflux disease without esophagitis, Need for hepatitis C screening test, Screening for diabetic peripheral neuropathy, Other fatigue, Hyperlipidemia LDL goal <100        Dose:  0.4 mg   Take 1 capsule (0.4 mg) by mouth daily   Quantity:  90 capsule   Refills:  3       valsartan 320 MG tablet   Commonly known as:  DIOVAN   This may have changed:  when to take this   Used for:  Essential hypertension, benign, Routine general medical examination at a health care facility, Essential hypertension, Gastroesophageal reflux disease without esophagitis, Benign non-nodular prostatic hyperplasia without lower urinary tract symptoms, Nocturia, Need for hepatitis C screening test, Screening for diabetic peripheral neuropathy, Other fatigue, Hyperlipidemia LDL goal <100        Dose:  320 mg   Take 1 tablet (320 mg) by mouth daily   Quantity:  90 tablet   Refills:  3                Primary Care Provider Office Phone # Fax #    Raul Basil Dutta [phone] [address] [address]        Equal Access to Services     BRIGITTE BOWENS AH: Paxton Amaro, olamide guerrero, marichuy ordaz  ric martinezrobson aracelikwadwo bullockaan ah. So [facility] [phone].    ATENCIÓN: Si habla iker, tiene a mulligan disposición servicios gratuitos de asistencia lingüística. Alexandra al [phone].    We comply with applicable federal civil rights laws and [place] laws. We do not discriminate on the basis of race, color, national origin, age, disability, sex, sexual orientation, or gender identity.            Thank you!     Thank you for choosing [facility]  for your care. Our goal is always to provide you with excellent care. Hearing back from our patients is one way we can continue to improve our services. Please take a few minutes to complete the written survey that you may receive in the mail after your visit with us. Thank you!             Your Updated Medication List - Protect others around you: Learn how to safely use, store and throw away your medicines at www.disposemymeds.org.          This list is accurate as of 6/29/18  2:27 PM.  Always use your most recent med list.                   Brand Name Dispense Instructions for use Diagnosis    allopurinol 100 MG tablet    ZYLOPRIM    90 tablet    1 qday    Gout, unspecified, Essential hypertension, Gastroesophageal reflux disease without esophagitis, Benign non-nodular prostatic hyperplasia without lower urinary tract symptoms, Routine general medical examination at a health care facility, Essential hypertension, benign, Nocturia, Need for hepatitis C screening test, Screening for diabetic peripheral neuropathy, Other fatigue, Hyperlipidemia LDL goal <100       amLODIPine 10 MG tablet    NORVASC    90 tablet    Take 1 tablet (10 mg) by mouth daily    Essential hypertension, Gout, unspecified, Gastroesophageal reflux disease without esophagitis, Benign non-nodular prostatic hyperplasia without lower urinary tract symptoms, Routine general medical examination at a health care facility, Essential hypertension, benign, Nocturia, Need for  hepatitis C screening test, Screening for diabetic peripheral neuropathy, Other fatigue, Hyperlipidemia LDL goal <100       dutasteride 0.5 MG capsule    AVODART    90 capsule    Take 1 capsule (0.5 mg) by mouth daily    Benign non-nodular prostatic hyperplasia without lower urinary tract symptoms, Essential hypertension, Gout, unspecified, Gastroesophageal reflux disease without esophagitis, Routine general medical examination at a health care facility, Essential hypertension, benign, Nocturia, Need for hepatitis C screening test, Screening for diabetic peripheral neuropathy, Other fatigue, Hyperlipidemia LDL goal <100       metFORMIN 1000 MG tablet    GLUCOPHAGE    90 tablet    Take one tablet daily at bedtime    Essential hypertension, Gout, unspecified, Gastroesophageal reflux disease without esophagitis, Benign non-nodular prostatic hyperplasia without lower urinary tract symptoms, Routine general medical examination at a health care facility, Essential hypertension, benign, Nocturia, Need for hepatitis C screening test, Screening for diabetic peripheral neuropathy, Other fatigue, Hyperlipidemia LDL goal <100       Multi-vitamin Tabs tablet      Take 1 tablet by mouth At Bedtime        order for DME      Respironics Dreamstation AutoPap 7-15 cm with Respironics Leidy mask.        phentermine 37.5 MG tablet    ADIPEX-P     Take 56 mg by mouth every morning (before breakfast)        ranitidine 150 MG tablet    ZANTAC    180 tablet    Take 1 tablet (150 mg) by mouth 2 times daily    Gastroesophageal reflux disease without esophagitis, Screening for diabetic peripheral neuropathy, Dysmetabolic syndrome X, Pure hypercholesterolemia, Essential hypertension, benign, Obstructive sleep apnea hypopnea, moderate, Agatston coronary artery calcium score greater than 400, Microscopic hematuria, Vitamin D deficiency       rosuvastatin 10 MG tablet    CRESTOR    90 tablet    Take 4 tablets (40 mg) by mouth daily    Essential  hypertension, Gout, unspecified, Gastroesophageal reflux disease without esophagitis, Benign non-nodular prostatic hyperplasia without lower urinary tract symptoms, Routine general medical examination at a health care facility, Essential hypertension, benign, Nocturia, Need for hepatitis C screening test, Screening for diabetic peripheral neuropathy, Other fatigue, Hyperlipidemia LDL goal <100       sulfamethoxazole-trimethoprim 800-160 MG per tablet    BACTRIM DS/SEPTRA DS    14 tablet    Take 1 tablet by mouth 2 times daily    Urinary tract infection without hematuria, site unspecified       tamsulosin 0.4 MG capsule    FLOMAX    90 capsule    Take 1 capsule (0.4 mg) by mouth daily    Benign non-nodular prostatic hyperplasia without lower urinary tract symptoms, Nocturia, Routine general medical examination at a health care facility, Essential hypertension, benign, Gout, unspecified, Essential hypertension, Gastroesophageal reflux disease without esophagitis, Need for hepatitis C screening test, Screening for diabetic peripheral neuropathy, Other fatigue, Hyperlipidemia LDL goal <100       valsartan 320 MG tablet    DIOVAN    90 tablet    Take 1 tablet (320 mg) by mouth daily    Essential hypertension, benign, Routine general medical examination at a health care facility, Essential hypertension, Gastroesophageal reflux disease without esophagitis, Benign non-nodular prostatic hyperplasia without lower urinary tract symptoms, Nocturia, Need for hepatitis C screening test, Screening for diabetic peripheral neuropathy, Other fatigue, Hyperlipidemia LDL goal <100

## 2018-06-29 NOTE — NURSING NOTE
Darnell Grant comes into clinic today at the request of Dr. Dutta Ordering Provider for Shingrix Vaccine    Chief Complaint   Patient presents with     Imm/Inj     Pt. here for second shingrix vaccine.       This service provided today was under the supervising provider of the day  Dr. Rizvi, who was available if needed.    Ashley Mejias LPN at 2:24 PM on 6/29/2018

## 2018-07-22 ENCOUNTER — MYC MEDICAL ADVICE (OUTPATIENT)
Dept: INTERNAL MEDICINE | Facility: CLINIC | Age: 63
End: 2018-07-22

## 2018-07-22 DIAGNOSIS — I10 ESSENTIAL HYPERTENSION, BENIGN: Primary | ICD-10-CM

## 2018-07-23 RX ORDER — CANDESARTAN 32 MG/1
32 TABLET ORAL DAILY
Qty: 90 TABLET | Refills: 0 | Status: SHIPPED | OUTPATIENT
Start: 2018-07-23 | End: 2018-09-11

## 2018-08-16 DIAGNOSIS — J45.990 EXERCISE-INDUCED BRONCHOSPASM: Primary | ICD-10-CM

## 2018-08-16 DIAGNOSIS — J45.21 MILD INTERMITTENT ASTHMA WITH ACUTE EXACERBATION: ICD-10-CM

## 2018-08-16 RX ORDER — ALBUTEROL SULFATE 90 UG/1
2 AEROSOL, METERED RESPIRATORY (INHALATION) EVERY 4 HOURS PRN
Qty: 1 INHALER | Refills: 11 | Status: SHIPPED | OUTPATIENT
Start: 2018-08-16 | End: 2019-01-08

## 2018-08-20 DIAGNOSIS — J20.9 ACUTE INFECTIVE TRACHEOBRONCHITIS: Primary | ICD-10-CM

## 2018-08-20 RX ORDER — BENZONATATE 200 MG/1
200 CAPSULE ORAL 3 TIMES DAILY PRN
Qty: 42 CAPSULE | Refills: 1 | Status: SHIPPED | OUTPATIENT
Start: 2018-08-20 | End: 2018-09-03

## 2018-08-20 RX ORDER — AZITHROMYCIN 500 MG/1
500 TABLET, FILM COATED ORAL DAILY
Qty: 14 TABLET | Refills: 3 | Status: SHIPPED | OUTPATIENT
Start: 2018-08-20 | End: 2018-09-03

## 2018-08-31 DIAGNOSIS — J98.01 BRONCHOSPASM: Primary | ICD-10-CM

## 2018-08-31 DIAGNOSIS — R05.9 COUGH: ICD-10-CM

## 2018-08-31 RX ORDER — METHYLPREDNISOLONE 4 MG
TABLET, DOSE PACK ORAL
Qty: 21 TABLET | Refills: 3 | Status: SHIPPED | OUTPATIENT
Start: 2018-08-31 | End: 2018-11-19

## 2018-09-07 ENCOUNTER — TELEPHONE (OUTPATIENT)
Dept: PULMONOLOGY | Facility: CLINIC | Age: 63
End: 2018-09-07

## 2018-09-07 ENCOUNTER — RADIANT APPOINTMENT (OUTPATIENT)
Dept: GENERAL RADIOLOGY | Facility: CLINIC | Age: 63
End: 2018-09-07
Attending: INTERNAL MEDICINE
Payer: COMMERCIAL

## 2018-09-07 DIAGNOSIS — R05.9 COUGH: Primary | ICD-10-CM

## 2018-09-07 DIAGNOSIS — R05.9 COUGH: ICD-10-CM

## 2018-09-07 RX ORDER — PREDNISONE 10 MG/1
TABLET ORAL
Qty: 50 TABLET | Refills: 0 | Status: SHIPPED | OUTPATIENT
Start: 2018-09-07 | End: 2018-11-19

## 2018-09-07 NOTE — TELEPHONE ENCOUNTER
Contacted by Dr Simons to place orders for CXR and Prednisone to dispense 50 tabs. Orders placed. CXR completed and gave Dr Simons normal results. He will call patient with plan for Prednisone dosing.

## 2018-09-09 ENCOUNTER — MYC MEDICAL ADVICE (OUTPATIENT)
Dept: INTERNAL MEDICINE | Facility: CLINIC | Age: 63
End: 2018-09-09

## 2018-09-09 DIAGNOSIS — I10 ESSENTIAL HYPERTENSION: ICD-10-CM

## 2018-09-09 DIAGNOSIS — Z11.59 NEED FOR HEPATITIS C SCREENING TEST: ICD-10-CM

## 2018-09-09 DIAGNOSIS — R53.83 OTHER FATIGUE: ICD-10-CM

## 2018-09-09 DIAGNOSIS — I10 ESSENTIAL HYPERTENSION, BENIGN: ICD-10-CM

## 2018-09-09 DIAGNOSIS — K21.9 GASTROESOPHAGEAL REFLUX DISEASE WITHOUT ESOPHAGITIS: ICD-10-CM

## 2018-09-09 DIAGNOSIS — Z13.89 SCREENING FOR DIABETIC PERIPHERAL NEUROPATHY: ICD-10-CM

## 2018-09-09 DIAGNOSIS — N40.0 BENIGN NON-NODULAR PROSTATIC HYPERPLASIA WITHOUT LOWER URINARY TRACT SYMPTOMS: ICD-10-CM

## 2018-09-09 DIAGNOSIS — Z00.00 ROUTINE GENERAL MEDICAL EXAMINATION AT A HEALTH CARE FACILITY: ICD-10-CM

## 2018-09-09 DIAGNOSIS — E78.5 HYPERLIPIDEMIA LDL GOAL <100: ICD-10-CM

## 2018-09-09 DIAGNOSIS — R35.1 NOCTURIA: ICD-10-CM

## 2018-09-11 RX ORDER — AMLODIPINE BESYLATE 10 MG/1
10 TABLET ORAL DAILY
Qty: 90 TABLET | Refills: 1 | Status: SHIPPED | OUTPATIENT
Start: 2018-09-11 | End: 2018-11-19

## 2018-09-11 RX ORDER — ROSUVASTATIN CALCIUM 10 MG/1
40 TABLET, COATED ORAL DAILY
Qty: 120 TABLET | Refills: 6 | Status: SHIPPED | OUTPATIENT
Start: 2018-09-11 | End: 2018-11-19

## 2018-09-11 RX ORDER — ALLOPURINOL 100 MG/1
TABLET ORAL
Qty: 90 TABLET | Refills: 0 | Status: SHIPPED | OUTPATIENT
Start: 2018-09-11 | End: 2018-11-19

## 2018-09-11 RX ORDER — CANDESARTAN 32 MG/1
32 TABLET ORAL DAILY
Qty: 90 TABLET | Refills: 1 | Status: SHIPPED | OUTPATIENT
Start: 2018-09-11 | End: 2018-11-19

## 2018-09-11 NOTE — TELEPHONE ENCOUNTER
Patient is requesting refills on the refill site of the following medications:  Rosuvastin, allopurinol and amlodipine.  Patient will need candesartan prior to 11/19 appointment.  Will notify refill team.  Luis Alfredo Gonzalez LPN at 12:32 PM on 9/11/2018

## 2018-09-11 NOTE — TELEPHONE ENCOUNTER
allopurinol (ZYLOPRIM) 100 MG tablet   Last Written Prescription Date:  8/2/17  Last Fill Quantity: 90,   # refills: 3  Last Office Visit : 3/12/18  Future Office visit:  11/19/18      Routing because: uric acid.  90 day to pharmacy

## 2018-09-13 DIAGNOSIS — J45.901 EXACERBATION OF ASTHMA, UNSPECIFIED ASTHMA SEVERITY, UNSPECIFIED WHETHER PERSISTENT: ICD-10-CM

## 2018-09-13 DIAGNOSIS — R05.9 COUGH: Primary | ICD-10-CM

## 2018-09-13 DIAGNOSIS — R05.8 POST-VIRAL COUGH SYNDROME: ICD-10-CM

## 2018-09-14 ENCOUNTER — RADIANT APPOINTMENT (OUTPATIENT)
Dept: CT IMAGING | Facility: CLINIC | Age: 63
End: 2018-09-14
Attending: INTERNAL MEDICINE
Payer: COMMERCIAL

## 2018-09-14 DIAGNOSIS — R05.9 COUGH: ICD-10-CM

## 2018-09-14 DIAGNOSIS — R05.8 POST-VIRAL COUGH SYNDROME: ICD-10-CM

## 2018-09-14 DIAGNOSIS — J45.901 EXACERBATION OF ASTHMA, UNSPECIFIED ASTHMA SEVERITY, UNSPECIFIED WHETHER PERSISTENT: ICD-10-CM

## 2018-09-14 LAB
DLCOUNC-%PRED-PRE: 143 %
DLCOUNC-PRE: 42.28 ML/MIN/MMHG
DLCOUNC-PRED: 29.43 ML/MIN/MMHG
ERV-%PRED-PRE: 86 %
ERV-PRE: 1.01 L
ERV-PRED: 1.17 L
EXPTIME-PRE: 7.19 SEC
FEF2575-%PRED-PRE: 173 %
FEF2575-PRE: 5.02 L/SEC
FEF2575-PRED: 2.9 L/SEC
FEFMAX-%PRED-PRE: 110 %
FEFMAX-PRE: 11.11 L/SEC
FEFMAX-PRED: 10.01 L/SEC
FEV1-%PRED-PRE: 121 %
FEV1-PRE: 4.51 L
FEV1FEV6-PRE: 87 %
FEV1FEV6-PRED: 78 %
FEV1FVC-PRE: 87 %
FEV1FVC-PRED: 75 %
FEV1SVC-PRE: 90 %
FEV1SVC-PRED: 64 %
FIFMAX-PRE: 8.7 L/SEC
FRCPLETH-%PRED-PRE: 96 %
FRCPLETH-PRE: 3.82 L
FRCPLETH-PRED: 3.95 L
FVC-%PRED-PRE: 107 %
FVC-PRE: 5.2 L
FVC-PRED: 4.85 L
IC-%PRED-PRE: 86 %
IC-PRE: 3.99 L
IC-PRED: 4.59 L
RVPLETH-%PRED-PRE: 104 %
RVPLETH-PRE: 2.81 L
RVPLETH-PRED: 2.69 L
TLCPLETH-%PRED-PRE: 95 %
TLCPLETH-PRE: 7.81 L
TLCPLETH-PRED: 8.14 L
VA-%PRED-PRE: 89 %
VA-PRE: 7.01 L
VC-%PRED-PRE: 86 %
VC-PRE: 5 L
VC-PRED: 5.76 L

## 2018-10-15 ENCOUNTER — PRE VISIT (OUTPATIENT)
Dept: UROLOGY | Facility: CLINIC | Age: 63
End: 2018-10-15

## 2018-10-15 NOTE — TELEPHONE ENCOUNTER
Reason for visit: Cystoscopy     Relevant information: history of LUTS    Records/imaging/labs: all records available    Pt called: generic message left asking patient to come with a comfortably full bladder    Rooming: flow/pvr

## 2018-11-13 DIAGNOSIS — E55.9 VITAMIN D DEFICIENCY: ICD-10-CM

## 2018-11-13 DIAGNOSIS — R31.29 MICROSCOPIC HEMATURIA: ICD-10-CM

## 2018-11-13 DIAGNOSIS — Z13.89 SCREENING FOR DIABETIC PERIPHERAL NEUROPATHY: ICD-10-CM

## 2018-11-13 DIAGNOSIS — I10 ESSENTIAL HYPERTENSION, BENIGN: ICD-10-CM

## 2018-11-13 DIAGNOSIS — G47.33 OBSTRUCTIVE SLEEP APNEA HYPOPNEA, MODERATE: ICD-10-CM

## 2018-11-13 DIAGNOSIS — N40.1 BENIGN PROSTATIC HYPERPLASIA WITH URINARY OBSTRUCTION: Primary | ICD-10-CM

## 2018-11-13 DIAGNOSIS — N13.8 BENIGN PROSTATIC HYPERPLASIA WITH URINARY OBSTRUCTION: Primary | ICD-10-CM

## 2018-11-13 DIAGNOSIS — K21.9 GASTROESOPHAGEAL REFLUX DISEASE WITHOUT ESOPHAGITIS: ICD-10-CM

## 2018-11-13 DIAGNOSIS — E78.00 PURE HYPERCHOLESTEROLEMIA: ICD-10-CM

## 2018-11-13 DIAGNOSIS — R93.1 AGATSTON CORONARY ARTERY CALCIUM SCORE GREATER THAN 400: ICD-10-CM

## 2018-11-13 DIAGNOSIS — E88.810 DYSMETABOLIC SYNDROME X: ICD-10-CM

## 2018-11-13 LAB
ALBUMIN SERPL-MCNC: 3.9 G/DL (ref 3.4–5)
ALP SERPL-CCNC: 61 U/L (ref 40–150)
ALT SERPL W P-5'-P-CCNC: 45 U/L (ref 0–70)
ANION GAP SERPL CALCULATED.3IONS-SCNC: 4 MMOL/L (ref 3–14)
AST SERPL W P-5'-P-CCNC: 21 U/L (ref 0–45)
BILIRUB SERPL-MCNC: 0.6 MG/DL (ref 0.2–1.3)
BUN SERPL-MCNC: 16 MG/DL (ref 7–30)
CALCIUM SERPL-MCNC: 8.9 MG/DL (ref 8.5–10.1)
CHLORIDE SERPL-SCNC: 107 MMOL/L (ref 94–109)
CO2 SERPL-SCNC: 29 MMOL/L (ref 20–32)
CREAT SERPL-MCNC: 0.94 MG/DL (ref 0.66–1.25)
DEPRECATED CALCIDIOL+CALCIFEROL SERPL-MC: 39 UG/L (ref 20–75)
GFR SERPL CREATININE-BSD FRML MDRD: 81 ML/MIN/1.7M2
GLUCOSE SERPL-MCNC: 86 MG/DL (ref 70–99)
HBA1C MFR BLD: 5.7 % (ref 0–5.6)
POTASSIUM SERPL-SCNC: 4.1 MMOL/L (ref 3.4–5.3)
PROT SERPL-MCNC: 7.3 G/DL (ref 6.8–8.8)
PSA SERPL-ACNC: 4.51 UG/L (ref 0–4)
PTH-INTACT SERPL-MCNC: 91 PG/ML (ref 18–80)
SODIUM SERPL-SCNC: 140 MMOL/L (ref 133–144)

## 2018-11-14 ENCOUNTER — TELEPHONE (OUTPATIENT)
Dept: CARDIOLOGY | Facility: CLINIC | Age: 63
End: 2018-11-14

## 2018-11-14 DIAGNOSIS — E78.00 PURE HYPERCHOLESTEROLEMIA: Primary | ICD-10-CM

## 2018-11-14 NOTE — TELEPHONE ENCOUNTER
[facility] Call Center    Phone Message    May a detailed message be left on voicemail: yes    Reason for Call: Other: Pt requesting call back from Jessica. Pt stated he was instructed to contact her if he needed to be seen sooner for an appt with Dr. Dutta.      Action Taken: Message routed to:  [facility] cardio

## 2018-11-19 ENCOUNTER — OFFICE VISIT (OUTPATIENT)
Dept: INTERNAL MEDICINE | Facility: CLINIC | Age: 63
End: 2018-11-19
Payer: COMMERCIAL

## 2018-11-19 VITALS
DIASTOLIC BLOOD PRESSURE: 87 MMHG | RESPIRATION RATE: 20 BRPM | OXYGEN SATURATION: 96 % | HEART RATE: 99 BPM | WEIGHT: 261 LBS | SYSTOLIC BLOOD PRESSURE: 129 MMHG | BODY MASS INDEX: 33.51 KG/M2

## 2018-11-19 DIAGNOSIS — R97.20 ELEVATED PROSTATE SPECIFIC ANTIGEN (PSA): Primary | ICD-10-CM

## 2018-11-19 DIAGNOSIS — E78.5 HYPERLIPIDEMIA LDL GOAL <100: ICD-10-CM

## 2018-11-19 DIAGNOSIS — K21.9 GASTROESOPHAGEAL REFLUX DISEASE WITHOUT ESOPHAGITIS: ICD-10-CM

## 2018-11-19 DIAGNOSIS — Z00.00 ROUTINE GENERAL MEDICAL EXAMINATION AT A HEALTH CARE FACILITY: ICD-10-CM

## 2018-11-19 DIAGNOSIS — Z13.89 SCREENING FOR DIABETIC PERIPHERAL NEUROPATHY: ICD-10-CM

## 2018-11-19 DIAGNOSIS — M10.9 GOUT, UNSPECIFIED CAUSE, UNSPECIFIED CHRONICITY, UNSPECIFIED SITE: ICD-10-CM

## 2018-11-19 DIAGNOSIS — I10 ESSENTIAL HYPERTENSION: ICD-10-CM

## 2018-11-19 DIAGNOSIS — E21.3 HYPERPARATHYROIDISM (H): ICD-10-CM

## 2018-11-19 DIAGNOSIS — Z11.59 NEED FOR HEPATITIS C SCREENING TEST: ICD-10-CM

## 2018-11-19 DIAGNOSIS — N40.0 BENIGN NON-NODULAR PROSTATIC HYPERPLASIA WITHOUT LOWER URINARY TRACT SYMPTOMS: ICD-10-CM

## 2018-11-19 DIAGNOSIS — R35.1 NOCTURIA: ICD-10-CM

## 2018-11-19 DIAGNOSIS — M1A.9XX0 CHRONIC GOUT WITHOUT TOPHUS, UNSPECIFIED CAUSE, UNSPECIFIED SITE: ICD-10-CM

## 2018-11-19 DIAGNOSIS — I10 ESSENTIAL HYPERTENSION, BENIGN: ICD-10-CM

## 2018-11-19 DIAGNOSIS — R53.83 OTHER FATIGUE: ICD-10-CM

## 2018-11-19 RX ORDER — ROSUVASTATIN CALCIUM 40 MG/1
40 TABLET, COATED ORAL DAILY
Qty: 90 TABLET | Refills: 3 | Status: SHIPPED | OUTPATIENT
Start: 2018-11-19 | End: 2019-05-06

## 2018-11-19 RX ORDER — CANDESARTAN 32 MG/1
32 TABLET ORAL DAILY
Qty: 90 TABLET | Refills: 3 | Status: SHIPPED | OUTPATIENT
Start: 2018-11-19 | End: 2019-05-06

## 2018-11-19 RX ORDER — AMLODIPINE BESYLATE 10 MG/1
10 TABLET ORAL DAILY
Qty: 90 TABLET | Refills: 3 | Status: SHIPPED | OUTPATIENT
Start: 2018-11-19 | End: 2019-05-06

## 2018-11-19 RX ORDER — ALLOPURINOL 100 MG/1
TABLET ORAL
Qty: 90 TABLET | Refills: 3 | Status: SHIPPED | OUTPATIENT
Start: 2018-11-19 | End: 2019-05-06

## 2018-11-19 ASSESSMENT — PAIN SCALES - GENERAL: PAINLEVEL: NO PAIN (0)

## 2018-11-19 NOTE — NURSING NOTE
"Chief Complaint   Patient presents with     RECHECK     \" I see him every 6 months\"   Lakeisha Garcia LPN 11:47 AM on 11/19/2018    Rooming Note  Health Maintenance   Health Maintenance Due   Topic Date Due     ASTHMA ACTION PLAN Q1 YR  12/12/1960     ASTHMA CONTROL TEST Q6 MOS  12/12/1958     DEPRESSION ACTION PLAN Q1 YR  12/12/1971     ADVANCE DIRECTIVE PLANNING Q5 YRS  12/12/2008     PHQ-9 Q6 MONTHS  11/17/2013     MICROALBUMIN Q1 YEAR  11/30/2017    All health maintenance items discussed and pended.Lakeisha Garcia LPN 11:49 AM on 11/19/2018    Will have Health Directive scanned into chart.Lakeisha Garcia LPN 11:49 AM on 11/19/2018    "

## 2018-11-20 ENCOUNTER — TELEPHONE (OUTPATIENT)
Dept: ENDOCRINOLOGY | Facility: CLINIC | Age: 63
End: 2018-11-20

## 2018-11-20 ENCOUNTER — PRE VISIT (OUTPATIENT)
Dept: UROLOGY | Facility: CLINIC | Age: 63
End: 2018-11-20

## 2018-11-20 NOTE — TELEPHONE ENCOUNTER
Reason for visit: Cystoscopy                 Relevant information: history of LUTS     Records/imaging/labs: all records available     Pt called: avolution message sent     Rooming: flow/pvr

## 2018-11-20 NOTE — TELEPHONE ENCOUNTER
[facility] Call Center    Phone Message    May a detailed message be left on voicemail: yes    Reason for Call: Other: This is a new pt referral from Dr Christian Dutta for Hyperparathyroidism to Dr Cornejo specifically per pt. NURSE TRIAGE Please contact the pt-thanks.     Action Taken: Message routed to:  [facility] sharda alexandre

## 2018-11-28 ENCOUNTER — OFFICE VISIT (OUTPATIENT)
Dept: UROLOGY | Facility: CLINIC | Age: 63
End: 2018-11-28
Payer: COMMERCIAL

## 2018-11-28 VITALS
SYSTOLIC BLOOD PRESSURE: 131 MMHG | HEART RATE: 92 BPM | DIASTOLIC BLOOD PRESSURE: 98 MMHG | HEIGHT: 74 IN | BODY MASS INDEX: 33.5 KG/M2 | WEIGHT: 261 LBS

## 2018-11-28 DIAGNOSIS — N40.0 BENIGN PROSTATIC HYPERPLASIA, UNSPECIFIED WHETHER LOWER URINARY TRACT SYMPTOMS PRESENT: Primary | ICD-10-CM

## 2018-11-28 ASSESSMENT — PAIN SCALES - GENERAL: PAINLEVEL: NO PAIN (0)

## 2018-11-28 NOTE — LETTER
"11/28/2018       RE: Darnell Grant  [address]  [facility] [address]     Dear Colleague,    Thank you for referring your patient, Darnell Grant, to the [facility] UROLOG[facility] AND INST FOR PROSTATE AND UROLOGIC CANCERS at [facility]. Please see a copy of my visit note below.    Urology Clinic Note      Date: 11/28/2018  Time: 11:01 AM  Patient: Darnell Grant  MRN: [id number]    Reason for Visit:     HPI/Subjective: Darnell Grant is a 62 year old male with outlet obstructive symptoms due to BPH who recently underwent REZUM procedure.  Comes in today for routine follow-up.  He seems to be doing better and is in for a possible cystoscopy pending a Uroflow and PVR assesment    Objective:  BP (!) 131/98  Pulse 92  Ht 1.88 m (6' 2\")  Wt 118.4 kg (261 lb)  BMI 33.51 kg/m2  Gen: In NAD, conversant.  Resp: Breathing non-labored  CV: Extremities warm, .  Abd: Soft, non-distended, non-tender.      Uroflowmetry:    Voided: 570ml  Qmax: 23.6ml/s  PVR 43cc    Assessment & Plan: Darnell Grant is a 62 year old male doing better post REZUM.  His Uroflowmetry and Post Void residue assessment seems to be reasonable and we will defer the planned cystoscopy for now.     In view of the elevated PSA we will repeat the PSA in 4 weeks and follow up with him.          Venkat Lazo MD  Fellow      Patient seen and examined with the fellow.  Visit time 15 minutes and >50% spent in counseling.  I agree with the resident's note and plan of care.       Yaya Lomeli MD  Urology Staff    CC  Patient Care Team:  Logan Dutta MD as PCP - General (Internal Medicine)  Robby Guzman, RN as Nurse Coordinator (Cardiology)  Yaya Lomeli MD as MD (Urology)  Sherry Monzon, RN as Registered Nurse (Urology)  Logan Dutta MD as Referring Physician (Internal Medicine)  LOGAN DUTTA    Copy to patient  DARNELL GRANT  [address]  [facility] [address]    "

## 2018-11-28 NOTE — NURSING NOTE
"Chief Complaint   Patient presents with     Cystoscopy     3 mo  Rezume F/U       Blood pressure (!) 131/98, pulse 92, height 1.88 m (6' 2\"), weight 118.4 kg (261 lb). Body mass index is 33.51 kg/(m^2).    Patient Active Problem List   Diagnosis     Adjustment disorder with mixed anxiety and depressed mood     Prostate nodule     Pure hypercholesterolemia     Essential hypertension, benign     Chronic cough     Diarrhea     Obstructive sleep apnea hypopnea, moderate [AHI 21 on CPAP 5-15]     Agatston coronary artery calcium score greater than 400     Mass of urinary bladder     Microscopic hematuria     Benign prostatic hyperplasia with urinary obstruction       Allergies   Allergen Reactions     Ragweeds      Watery eyes, itchy nose       Current Outpatient Prescriptions   Medication Sig Dispense Refill     albuterol (PROAIR HFA/PROVENTIL HFA/VENTOLIN HFA) 108 (90 Base) MCG/ACT inhaler Inhale 2 puffs into the lungs every 4 hours as needed for shortness of breath / dyspnea or wheezing 1 Inhaler 11     allopurinol (ZYLOPRIM) 100 MG tablet take by mouth 1 tab ( 100 mg)  at bedtime 90 tablet 3     amLODIPine (NORVASC) 10 MG tablet Take 1 tablet (10 mg) by mouth daily 90 tablet 3     candesartan cilexetil 32 MG TABS Take 32 mg by mouth daily 90 tablet 3     metFORMIN (GLUCOPHAGE) 1000 MG tablet Take 2 tablets (2,000 mg) by mouth daily (with dinner) 180 tablet 3     multivitamin, therapeutic with minerals (MULTI-VITAMIN) TABS tablet Take 1 tablet by mouth At Bedtime       phentermine (ADIPEX-P) 37.5 MG tablet Take 56 mg by mouth every morning (before breakfast)       ranitidine (ZANTAC) 150 MG tablet Take 1 tablet (150 mg) by mouth 2 times daily 180 tablet 3     rosuvastatin (CRESTOR) 40 MG tablet Take 1 tablet (40 mg) by mouth daily 90 tablet 3     order for DME Respironics Dreamstation AutoPap 7-15 cm with Respironics Leidy mask.         Social History   Substance Use Topics     Smoking status: Never Smoker     " Smokeless tobacco: Never Used     Alcohol use No       Millie Corbett LPN  11/28/2018  9:46 AM

## 2018-11-28 NOTE — MR AVS SNAPSHOT
After Visit Summary   11/28/2018    Darnell Grant    MRN: [id number]           Patient Information     Date Of Birth          1955        Visit Information        Provider Department      11/28/2018 9:40 AM aYya Lomeli MD [facility] and [facility]        Care Instructions    Schedule 6 month follow up with Dr. Armani Lomeli will call you to discuss your PSA results         It was a pleasure meeting with you today.  Thank you for allowing me and my team the privilege of caring for you today.  YOU are the reason we are here, and I truly hope we provided you with the excellent service you deserve.  Please let us know if there is anything else we can do for you so that we can be sure you are leaving completely satisfied with your care experience.            Isabela Paulino MA          Follow-ups after your visit        Your next 10 appointments already scheduled     Dec 17, 2018  6:15 AM CST   LAB with  LAB   [facility] ([facility])    [address] [place] [address] [phone]           Please do not eat 10-12 hours before your appointment if you are coming in fasting for labs on lipids, cholesterol, or glucose (sugar). This does not apply to pregnant women. Water, hot tea and black coffee (with nothing added) are okay. Do not drink other fluids, diet soda or chew gum.            Jan 08, 2019  8:30 AM CST   (Arrive by 8:15 AM)   Return Visit with Leo Dutta MD   [facility] ([facility])    [address]  [address]  [place] [facility] [address] [phone]            Jan 08, 2019  6:00 PM CST   (Arrive by 5:45 PM)   NEW ENDOCRINE with Aditi Cornejo MD   [facility] ([facility])    [address]  3rd Floor  [place] [facility] [address] [phone]              Who to contact     Please call  "your clinic at [phone] to:    Ask questions about your health    Make or cancel appointments    Discuss your medicines    Learn about your test results    Speak to your doctor            Additional Information About Your Visit        [employer] Information     [employer] gives you secure access to your electronic health record. If you see a primary care provider, you can also send messages to your care team and make appointments. If you have questions, please call your primary care clinic.  If you do not have a primary care provider, please call [phone] and they will assist you.      [employer] is an electronic gateway that provides easy, online access to your medical records. With [employer], you can request a clinic appointment, read your test results, renew a prescription or communicate with your care team.     To access your existing account, please contact your [facility] or call [phone] for assistance.        Care EveryWhere ID     This is your Care EveryWhere ID. This could be used by other organizations to access your [place] medical records  [id number]        Your Vitals Were     Pulse Height BMI (Body Mass Index)             92 1.88 m (6' 2\") 33.51 kg/m2          Blood Pressure from Last 3 Encounters:   11/28/18 (!) 131/98   11/19/18 129/87   04/25/18 131/87    Weight from Last 3 Encounters:   11/28/18 118.4 kg (261 lb)   11/19/18 118.4 kg (261 lb)   04/25/18 124.7 kg (275 lb)              Today, you had the following     No orders found for display       Primary Care Provider Office Phone # Fax #    Raul Basil Dutta [phone]        [address] [address]        Equal Access to Services     BRIGITTE BOWENS : olamide Ramirez, ric huber. [facility] [phone].    ATENCIÓN: Si habla español, tiene a mulligan disposición servicios gratuitos de " asistencia lingüística. Alexandra al [phone].    We comply with applicable federal civil rights laws and [place] laws. We do not discriminate on the basis of race, color, national origin, age, disability, sex, sexual orientation, or gender identity.            Thank you!     Thank you for choosing [facility] AND [facility] [[email or web address]] FOR PROSTATE AND UROLOGIC CANCERS  for your care. Our goal is always to provide you with excellent care. Hearing back from our patients is one way we can continue to improve our services. Please take a few minutes to complete the written survey that you may receive in the mail after your visit with us. Thank you!             Your Updated Medication List - Protect others around you: Learn how to safely use, store and throw away your medicines at www.disposemymeds.org.          This list is accurate as of 11/28/18 11:00 AM.  Always use your most recent med list.                   Brand Name Dispense Instructions for use Diagnosis    albuterol 108 (90 Base) MCG/ACT inhaler    PROAIR HFA/PROVENTIL HFA/VENTOLIN HFA    1 Inhaler    Inhale 2 puffs into the lungs every 4 hours as needed for shortness of breath / dyspnea or wheezing    Mild intermittent asthma with acute exacerbation, Exercise-induced bronchospasm       allopurinol 100 MG tablet    ZYLOPRIM    90 tablet    take by mouth 1 tab ( 100 mg)  at bedtime    Essential hypertension, Gastroesophageal reflux disease without esophagitis, Benign non-nodular prostatic hyperplasia without lower urinary tract symptoms, Routine general medical examination at a health care facility, Essential hypertension, benign, Nocturia, Need for hepatitis C screening test, Screening for diabetic peripheral neuropathy, Other fatigue, Hyperlipidemia LDL goal <100       amLODIPine 10 MG tablet    NORVASC    90 tablet    Take 1 tablet (10 mg) by mouth daily    Essential hypertension, Gastroesophageal reflux disease without esophagitis, Benign non-nodular prostatic  hyperplasia without lower urinary tract symptoms, Routine general medical examination at a health care facility, Essential hypertension, benign, Nocturia, Need for hepatitis C screening test, Screening for diabetic peripheral neuropathy, Other fatigue, Hyperlipidemia LDL goal <100       candesartan cilexetil 32 MG Tabs     90 tablet    Take 32 mg by mouth daily    Essential hypertension, benign       metFORMIN 1000 MG tablet    GLUCOPHAGE    180 tablet    Take 2 tablets (2,000 mg) by mouth daily (with dinner)    Essential hypertension, Gout, unspecified cause, unspecified chronicity, unspecified site, Gastroesophageal reflux disease without esophagitis, Benign non-nodular prostatic hyperplasia without lower urinary tract symptoms, Routine general medical examination at a health care facility, Essential hypertension, benign, Nocturia, Need for hepatitis C screening test, Screening for diabetic peripheral neuropathy, Other fatigue, Hyperlipidemia LDL goal <100       Multi-vitamin tablet      Take 1 tablet by mouth At Bedtime        order for DME      RespirEurotris Dreamstation AutoPap 7-15 cm with Respironics Leidy mask.        phentermine 37.5 MG tablet    ADIPEX-P     Take 56 mg by mouth every morning (before breakfast)        ranitidine 150 MG tablet    ZANTAC    180 tablet    Take 1 tablet (150 mg) by mouth 2 times daily    Gastroesophageal reflux disease without esophagitis, Screening for diabetic peripheral neuropathy, Dysmetabolic syndrome X, Pure hypercholesterolemia, Essential hypertension, benign, Obstructive sleep apnea hypopnea, moderate, Agatston coronary artery calcium score greater than 400, Microscopic hematuria, Vitamin D deficiency       rosuvastatin 40 MG tablet    CRESTOR    90 tablet    Take 1 tablet (40 mg) by mouth daily    Essential hypertension, Gastroesophageal reflux disease without esophagitis, Benign non-nodular prostatic hyperplasia without lower urinary tract symptoms, Routine general  medical examination at a health care facility, Essential hypertension, benign, Nocturia, Need for hepatitis C screening test, Screening for diabetic peripheral neuropathy, Other fatigue, Hyperlipidemia LDL goal <100

## 2018-11-28 NOTE — PROGRESS NOTES
"Urology Clinic Note      Date: 11/28/2018  Time: 11:01 AM  Patient: Darnell Grant  MRN: [id number]    Reason for Visit:     HPI/Subjective: Darnell Grant is a 62 year old male with outlet obstructive symptoms due to BPH who recently underwent REZUM procedure.  Comes in today for routine follow-up.  He seems to be doing better and is in for a possible cystoscopy pending a Uroflow and PVR assesment    Objective:  BP (!) 131/98  Pulse 92  Ht 1.88 m (6' 2\")  Wt 118.4 kg (261 lb)  BMI 33.51 kg/m2  Gen: In NAD, conversant.  Resp: Breathing non-labored  CV: Extremities warm, .  Abd: Soft, non-distended, non-tender.      Uroflowmetry:    Voided: 570ml  Qmax: 23.6ml/s  PVR 43cc    Assessment & Plan: Darnell Grant is a 62 year old male doing better post REZUM.  His Uroflowmetry and Post Void residue assessment seems to be reasonable and we will defer the planned cystoscopy for now.     In view of the elevated PSA we will repeat the PSA in 4 weeks and follow up with him.          Venkat Lazo MD  Fellow      Patient seen and examined with the fellow.  Visit time 15 minutes and >50% spent in counseling.  I agree with the resident's note and plan of care.       Yaya Lomeli MD  Urology Staff    CC  Patient Care Team:  Logan Dutta MD as PCP - General (Internal Medicine)  Robby Guzman RN as Nurse Coordinator (Cardiology)  Yaya Lomeli MD as MD (Urology)  Sherry Monzon, RN as Registered Nurse (Urology)  Logan Dutta MD as Referring Physician (Internal Medicine)  LOGAN DUTTA    Copy to patient  DARNELL GRANT  [address] [address]    "

## 2018-11-28 NOTE — PATIENT INSTRUCTIONS
Schedule 6 month follow up with Dr. Armani Lomeli will call you to discuss your PSA results         It was a pleasure meeting with you today.  Thank you for allowing me and my team the privilege of caring for you today.  YOU are the reason we are here, and I truly hope we provided you with the excellent service you deserve.  Please let us know if there is anything else we can do for you so that we can be sure you are leaving completely satisfied with your care experience.            Isabela Paulino MA

## 2018-11-29 ENCOUNTER — TELEPHONE (OUTPATIENT)
Dept: INTERNAL MEDICINE | Facility: CLINIC | Age: 63
End: 2018-11-29

## 2018-11-29 DIAGNOSIS — I10 ESSENTIAL HYPERTENSION, BENIGN: Primary | ICD-10-CM

## 2018-11-29 RX ORDER — HYDROCHLOROTHIAZIDE 12.5 MG/1
12.5 TABLET ORAL DAILY
Qty: 90 TABLET | Refills: 1 | Status: SHIPPED | OUTPATIENT
Start: 2018-11-29 | End: 2019-05-06

## 2018-11-29 NOTE — TELEPHONE ENCOUNTER
Patient contacted Dr. Dutta , reported continued elevated BPs of 130-140s/90s consistently. Dr. Dutta recommended adding hydrochlorothiazide 12.5mg daily, rechecking BMP in 1-2 weeks. Orders placed, patient notified by e-mail of recommendations by Dr. Dutta.    Bina Hartmann RN

## 2018-12-19 DIAGNOSIS — R97.20 ELEVATED PROSTATE SPECIFIC ANTIGEN (PSA): ICD-10-CM

## 2018-12-19 DIAGNOSIS — M1A.9XX0 CHRONIC GOUT WITHOUT TOPHUS, UNSPECIFIED CAUSE, UNSPECIFIED SITE: ICD-10-CM

## 2018-12-19 DIAGNOSIS — I10 ESSENTIAL HYPERTENSION, BENIGN: ICD-10-CM

## 2018-12-19 LAB
ANION GAP SERPL CALCULATED.3IONS-SCNC: 7 MMOL/L (ref 3–14)
BUN SERPL-MCNC: 16 MG/DL (ref 7–30)
CALCIUM SERPL-MCNC: 9.1 MG/DL (ref 8.5–10.1)
CHLORIDE SERPL-SCNC: 106 MMOL/L (ref 94–109)
CO2 SERPL-SCNC: 30 MMOL/L (ref 20–32)
CREAT SERPL-MCNC: 0.86 MG/DL (ref 0.66–1.25)
GFR SERPL CREATININE-BSD FRML MDRD: >90 ML/MIN/{1.73_M2}
GLUCOSE SERPL-MCNC: 105 MG/DL (ref 70–99)
POTASSIUM SERPL-SCNC: 3.8 MMOL/L (ref 3.4–5.3)
SODIUM SERPL-SCNC: 143 MMOL/L (ref 133–144)
URATE SERPL-MCNC: 5 MG/DL (ref 3.5–7.2)

## 2018-12-21 LAB
PSA FREE MFR SERPL: 19 %
PSA FREE SERPL-MCNC: 0.3 NG/ML
PSA SERPL-MCNC: 1.6 NG/ML (ref 0–4)

## 2019-01-08 ENCOUNTER — OFFICE VISIT (OUTPATIENT)
Dept: ENDOCRINOLOGY | Facility: CLINIC | Age: 64
End: 2019-01-08
Payer: COMMERCIAL

## 2019-01-08 ENCOUNTER — OFFICE VISIT (OUTPATIENT)
Dept: CARDIOLOGY | Facility: CLINIC | Age: 64
End: 2019-01-08
Attending: INTERNAL MEDICINE
Payer: COMMERCIAL

## 2019-01-08 VITALS
SYSTOLIC BLOOD PRESSURE: 127 MMHG | WEIGHT: 262.3 LBS | OXYGEN SATURATION: 98 % | HEART RATE: 99 BPM | DIASTOLIC BLOOD PRESSURE: 86 MMHG | HEIGHT: 75 IN | BODY MASS INDEX: 32.61 KG/M2

## 2019-01-08 VITALS
SYSTOLIC BLOOD PRESSURE: 127 MMHG | DIASTOLIC BLOOD PRESSURE: 86 MMHG | WEIGHT: 262.35 LBS | BODY MASS INDEX: 32.62 KG/M2 | HEIGHT: 75 IN | HEART RATE: 99 BPM

## 2019-01-08 DIAGNOSIS — R79.89 HIGH SERUM PARATHYROID HORMONE (PTH): ICD-10-CM

## 2019-01-08 DIAGNOSIS — I49.3 PVC'S (PREMATURE VENTRICULAR CONTRACTIONS): Primary | ICD-10-CM

## 2019-01-08 DIAGNOSIS — E78.00 PURE HYPERCHOLESTEROLEMIA: ICD-10-CM

## 2019-01-08 DIAGNOSIS — N20.0 KIDNEY STONE: ICD-10-CM

## 2019-01-08 DIAGNOSIS — R79.89 HIGH SERUM PARATHYROID HORMONE (PTH): Primary | ICD-10-CM

## 2019-01-08 LAB
CA-I SERPL ISE-MCNC: 4.9 MG/DL (ref 4.4–5.2)
PHOSPHATE SERPL-MCNC: 3.7 MG/DL (ref 2.5–4.5)

## 2019-01-08 PROCEDURE — 99213 OFFICE O/P EST LOW 20 MIN: CPT | Mod: ZP | Performed by: INTERNAL MEDICINE

## 2019-01-08 PROCEDURE — G0463 HOSPITAL OUTPT CLINIC VISIT: HCPCS | Mod: ZF

## 2019-01-08 RX ORDER — CLOMIPHENE CITRATE 50 MG/1
50 TABLET ORAL EVERY OTHER DAY
COMMUNITY
Start: 2019-01-08 | End: 2020-01-06

## 2019-01-08 ASSESSMENT — PAIN SCALES - GENERAL
PAINLEVEL: NO PAIN (0)
PAINLEVEL: NO PAIN (0)

## 2019-01-08 ASSESSMENT — MIFFLIN-ST. JEOR
SCORE: 2060.41
SCORE: 2060.63

## 2019-01-08 NOTE — PROGRESS NOTES
01/08/19    CARDIOLOGY CONSULTATION    Referring Provider:  Raul Dutta  Primary Care Provider:   Raul Dutta  Indication for Consultation:  PVCs    HPI: Darnell Grant is a 63 year old male being seen today for evaluation of ventricular ectopy.    The patient's risk factor profile is: (+) HTN, (+) glucose intolerance, (+) hypercholesterolemia, (-) tobacco use, (+) fam Hx premature CAD [paternal CABG in 60s]  The patient has no history of cardiovascular disease (CAD, CHF, arrhythmia, valvular heart disease).  The patient has no Hx of PAD or cerebrovascular disease.      He has had intermittent cardiac studies over the past several years.  He had an ECHO in 2013 that showed normal LV function and no valvular heart disease.  He had a stress nuclear study in Feb 2017 that was completely normal.  He has not had a prior coronary angiogram.  He had a coronary calcium score of 772 (90% percentile) in 2017.    He had a recent routine clinic visit and had a pulse 100-110.  On auscultation, irregularity was noted.  An ECG showed NSR with LVH and ventricular ectopy.  The patient had no symptoms at that time.    Today he returns to clinic for one-year follow-up appointment.  Overall he says he is feeling no different than prior.  He denies any chest pain anginal equivalents shortness of breath beyond his baseline palpitations lightheadedness dizziness or syncopal episodes.  He denies any lower extremity swelling.  He reports tolerating his medications well.    PAST MEDICAL HISTORY:  Past Medical History:   Diagnosis Date     Bladder mass      BPH (benign prostatic hyperplasia)      GERD (gastroesophageal reflux disease)      Hypertension      LISETTE (obstructive sleep apnea)        CURRENT MEDICATIONS:  Current Outpatient Medications   Medication Sig Dispense Refill     allopurinol (ZYLOPRIM) 100 MG tablet take by mouth 1 tab ( 100 mg)  at bedtime 90 tablet 3     amLODIPine (NORVASC) 10 MG tablet Take 1 tablet (10  mg) by mouth daily 90 tablet 3     candesartan cilexetil 32 MG TABS Take 32 mg by mouth daily 90 tablet 3     hydrochlorothiazide (HYDRODIURIL) 12.5 MG tablet Take 1 tablet (12.5 mg) by mouth daily 90 tablet 1     metFORMIN (GLUCOPHAGE) 1000 MG tablet Take 2 tablets (2,000 mg) by mouth daily (with dinner) 180 tablet 3     multivitamin, therapeutic with minerals (MULTI-VITAMIN) TABS tablet Take 1 tablet by mouth At Bedtime       phentermine (ADIPEX-P) 37.5 MG tablet Take 56 mg by mouth every morning (before breakfast)       ranitidine (ZANTAC) 150 MG tablet Take 1 tablet (150 mg) by mouth 2 times daily 180 tablet 3     rosuvastatin (CRESTOR) 40 MG tablet Take 1 tablet (40 mg) by mouth daily 90 tablet 3     albuterol (PROAIR HFA/PROVENTIL HFA/VENTOLIN HFA) 108 (90 Base) MCG/ACT inhaler Inhale 2 puffs into the lungs every 4 hours as needed for shortness of breath / dyspnea or wheezing (Patient not taking: Reported on 1/8/2019) 1 Inhaler 11     order for DME RespirStreaks Dreamstation AutoPap 7-15 cm with Respironics Leidy mask.         PAST SURGICAL HISTORY:  Past Surgical History:   Procedure Laterality Date     BACK SURGERY      repair disc     CYSTOSCOPY, TRANSURETHRAL RESECTION (TUR) TUMOR BLADDER, COMBINED N/A 1/31/2018    Procedure: COMBINED CYSTOSCOPY, TRANSURETHRAL RESECTION (TUR) TUMOR BLADDER;  Transurethral Biopsy and Resection of Bladder Tumor;  Surgeon: Yaya Lomeli MD;  Location: [facility] OR     HC BREATH HYDROGEN TEST  5/22/2013    Procedure: HYDROGEN BREATH TEST;  Surgeon: Donny Paris MD;  Location: [facility] GI     TRANSURETHERAL DESTRUCTION OF PROSTATE BY THERMOTHERAPY N/A 4/13/2018    Procedure: TRANSURETHERAL DESTRUCTION OF PROSTATE BY THERMOTHERAPY;  Rezum Procedure;  Surgeon: Yaya Lomeli MD;  Location: [facility] OR       ALLERGIES  Ragweeds    FAMILY HX:  Family History   Problem Relation Age of Onset     Asthma Brother        SOCIAL HX:  Social History     Social History     Marital status:  "     Spouse name: N/A     Number of children: N/A     Years of education: N/A     Social History Main Topics     Smoking status: Never Smoker     Smokeless tobacco: Never Used     Alcohol use No     Drug use: No     Sexual activity: Not Asked     Other Topics Concern     None     Social History Narrative       ROS:  Thorough 14 point review of systems is obtained for the patient with pertinent positives and negatives as detailed above HPI.  All others are assessment was negative or noncontributory.    VITAL SIGNS:  There were no vitals taken for this visit.  There is no height or weight on file to calculate BMI.  Wt Readings from Last 2 Encounters:   11/28/18 118.4 kg (261 lb)   11/19/18 118.4 kg (261 lb)       PHYSICAL EXAM  /86 (BP Location: Right arm, Patient Position: Chair, Cuff Size: Adult Large)   Pulse 99   Ht 1.905 m (6' 3\")   Wt 119 kg (262 lb 4.8 oz)   SpO2 98%   BMI 32.79 kg/m    Darnell Grant is a 62 year old male.in no acute distress.  Overweight.  HEENT: Eyes Nonicteric.  Neck: JVP normal.  Carotids +3/3 bilaterally without bruits.  Lungs: CTA.  Cor: RRR. Normal S1 and S2.  No murmur, rub, or gallop.  PMI in Lf 5th ICS.  Abd: Soft, nontender, nondistended.  NABS.  No pulsatile mass.  Extremities: No C/C/E.  Pulses +3/3 symmetric in upper and lower extremities.  Neuro: Grossly intact.  Psych: A&O x 3.  Skin: No rash.    LABS  Recent Labs   Lab Test  07/14/14   0801  05/20/13   1208   WBC  5.9  6.5   HGB  15.9  15.6   HCT  45.8  44.7   PLT  167  187     Recent Labs   Lab Test  01/16/17   0811  11/21/16   0714  04/18/16   0808   NA   --   142  140   POTASSIUM   --   4.0  3.8   CHLORIDE   --   105  104   CO2   --   28  28   GLC   --   114*  108*   BUN   --   14  10   CR   --   0.86  0.91   ZHANE  9.3  8.9  9.3     Recent Labs   Lab Test  11/21/16   0714  04/18/16   0808  08/31/15   0741  07/14/14   0801   CHOL  120  98  117  120   HDL  37*  29*  34*  32*   LDL  40  35  45  59   TRIG  219* "  168*  190*  146   CHOLHDLRATIO   --    --   3.4  3.7   NHDL  83  68   --    --         EK18  12:31 pm  NSR.  LVH.  Nonspecific QRS widening.  Two separate PVCs, different sources.    EK18  2:37 pm  NSR.  LVH.  Nonspecific QRS widening.  No ventricular ectopy.    HOLTER (18): Frequent ventricular ectopy, 2 dominant morphologies for PVCs with 14% burden.  107 NSVT, max 5 beats.  I left message for Darnell that I would like to see him in the clinic.  Consider stress cardiac MRI.  ECHO: 13  Global and regional left ventricular function is normal with an EF of 55-60%.   Global right ventricular function is normal. Pulmonary artery systolic pressure is normal. No pericardial effusion is present.    Coronary Calcium Score:  2017  Score 772.  90% percentile    LEXISCAN STRESS TEST:  17  Findings:  1. Overall quality of the study: Slightly reduced due to patient motion artifact.   2. Left ventricular cavity is normal on the rest and stress studies.  3. SPECT images demonstrate overall homogeneous radiotracer uptake in all myocardial segments at both stress and rest The summed stress score is 0.   4. Gated SPECT images reveal normal left ventricular systolic function without wall motion abnormalities. Left ventricular ejection fraction is 62%. Left ventricular end-diastolic volume is 205 mL. End-systolic volume is 78 mL.       Impression:  1. Normal myocardial SPECT study with a summed stress score of 0. A summed stress score of <4 is associated with an annual event rate of 0.8% and 0.9% for myocardial infarction and cardiac death, respectively (Home. Circulation 1998;98:535-43).  2. Normal left ventricular systolic function as described above.  3. No significant perfusion abnormalities.  4. No prior study available for comparison.      Stress Cardiac MRI (18):  1. The LV is normal in cavity size and wall thickness. The global systolic function is normal. The LVEF is  55%. There  are no regional wall motion abnormalities.  2. The RV is normal in cavity size. The global systolic function is normal. The RVEF is 61%.   3. Both atria are normal in size.  4. There is no significant valvular disease.   5. Late gadolinium enhancement imaging shows no MI, fibrosis or infiltrative disease.   6. Regadenoson stress perfusion imaging shows no ischemia.    CONCLUSIONS: No ischemia or infarction. Normal LV and RV function, LVEF of 55% and RVEF of 61%. No obvious substrate for arrhythmia.    ZIOPATCH: (Jan 2018):    ADDENDUM (2/8/18): Ziopatch showed only 1 episode of NSVT (4 beats) in comparison to prior Holter.   PVC burden was 11% in comparison to 14% on the prior Holter.  I would not treat these asymptomatic arrhythmias.  A beta blocker would not be unreasonable if current antihypertensive regime (Ca blocker, ARB) becomes less effective.      CARDIAC CATH: None    ASSESSMENT AND PLAN:   1. Ventricular ectopy, NSVT.  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  He has no other symptoms to suggest CHF or typical angina.  His physical exam is unremarkable.  His ECG shows PVCs with two dominant morphologies and his Holter showed 14% PVC burden with two dominant morphologies.  He had 107 runs of NSVT with no more than 5 consecutive beats.  He has no symptoms with the PVCs or NSVT.  He has normal LV function on prior ECHO and this is confirmed with recent Lexiscan and on cardiac MRI today.  He has no evidence of prior MI or ischemia by Lexiscan or stress cardiac MRI.      From a coronary standpoint he does not appear to have any anginal equivalents therefore we have elected to proceed with medication optimization.  He is on aspirin 81 mg daily and Crestor 40 mg daily.  His blood pressure is well controlled.    From a PVC standpoint he is largely is a symptomatic.  At this time he does not appear that he had any reduction in his LVEF based on his symptoms  and examination.  He has had multiple assessments of his LV function in the past year that has been without findings therefore at this time we would proceed with regular follow-up and assessment of LVEF every 2-3 years or sooner based on any changes in his symptoms.  Will reassess PVC burden today with repeat ZioPatch.    2. HTN.  Continue Norvasc 10 mg every day, Candesartan 32 mg every day, hydrochlorothiazide 12.5 every day..  This is well controlled today    3. Hypercholesterolemia.  LDL well controlled.  Continue Crestor 10 mg qd.    4. Metabolic syndrome / Glucose Intolerance. Continue Metformin.  Last A1c is 5.7.       FOLLOW UP:  PCP      ATTENDING NOTE:  Patient has been seen and evaluated by me on 01/08/2019. I have reviewed the documentation above.  I have reviewed today's vital signs, medications, labs, and imaging results.  I have reviewed and edited, as necessary, the history, review of systems, physical examination, and assessment and plan.  I have discussed my assessment and plan with the cardiology fellow.  Darnell Grant is a 63 year old male with risk factor profile (+) HTN, Pre DM, (+) hypercholesterolemia, (-) tobacco use, (+) fam Hx premature CAD, no established CAD, Hx ventricular ectopy and NSVT with 145 PVC burden on Holter, normal LV function by ECHO, normal Lexiscan (2017), normal stress cardiac MRI (2018), returns for routine follow up.  His only symptoms is LOPEZ with strenuous activity; he has no symptoms attributable to the ventricular ectopy.  BP improved on triple therapy.  Recheck Ziopatch to ensure PVC burden has not increased.  If higher, repeat imaging.  Otherwise, defer for one year.    Ziopatch (Jan 2019): NSR.  Single SVT, 4 beats, rate 156.  PVC burden 5.4%.  Two 3 beat VT episodes.    No change in therapy.    Leo Dutta MD     Cardiovascular Division    CC  Patient Care Team:  Raul Dutta MD as PCP - General (Internal Medicine)  Heidi Abraham  MD Hector as PCP - Assigned PCP  Robby Guzman RN as Nurse Coordinator (Cardiology)  Yaya Lomeli MD as MD (Urology)  Sherry Monzon, RN as Registered Nurse (Urology)  Logan Dutta MD as Referring Physician (Internal Medicine)  LOGAN DUTTA

## 2019-01-08 NOTE — LETTER
1/8/2019       RE: Darnell Grant  [address]  [facility] [address]     Dear Colleague,    Thank you for referring your patient, Darnell Grant, to the [facility] ENDOCRINOLOGY at [facility]. Please see a copy of my visit note below.    Endocrine Consult note    Attending Assessment/Plan :     1.  High PTH with normal calcium.   Differential includes secondary hyperparathyroidism vs normocalcemic primary hyperparathyroidism.  He is on high dose vitamin D2 monthly but vitamin D related labs have been measuring D3 . He is not on calcium and his dietary calcium intake is low.   Labs to include phosphorus and ICA  24 hour urine calcium    2.  Kidney stone seen on 9/14/18 CT . The kidney stone history raises the concern of primary hyperparathyroidism.   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 I have reviewed Care Everywhere including [facility],lab reports, imaging reports and provider notes as indicated.   He also read me some data from his cell phone linked in to [facility] [place] reporting.  I do not have any of the actual [facility] records.  Beyond  this, I do not have records from [facility]    HISTORY OF PRESENT ILLNESS High PTH has been measured a few time since 1/17. All associated calcium levels have been normal. He has had ? 2 episodes of kidney stone passage -one he recalls had  very severe pain, 5-7 years ago, required ambulance.  A kidney stone is seen on the 9/18 chest CT.  He has never captured a stone for analysis.  He has no bone pain.  He has never fractured a bone . Bone density was normal on DXA 3/18.      He recalls a diagnosis of secondary hyperparathyroidism from Dr Bentley dating to 4/17, whom he has been seeing for weight management.   He recalls having low vitamin D.  He has been taking weekly ergocaliferol < 2 years.  He is not taking calcium.      We have the following pertinent labs:   1/16/17: PTH 87, Ca 9.3, vitamin D 30  3/31/17: , vitamin D 38  4/27/17: FSH 3, LH 4.7, prolactin 7  6/18 per what he read me off his phone: LH 12.5, FSH 5.6, testosterone 580, 25 OHD 57, Ca 9.8, PTH 69.9?   11/13/18: PTH 91, Ca 8.9, albumin 3.9, creatinine 0.94, vitamin D 39, hgbA1c 5.7%  12/19/18: Ca 9.1, creatinine 0.86    DXA 3/16/18 showed lowest T-score 1.6 at the 33% radius.    9/14/18 chest CT(image as reviewed by me on PACS): thyroid appears normal; sub 6 mm nodules bilaterally; 3 mm right kidney calculus    His dietary calcium includes the following:  Milk in coffee and cereal  Occasional yogurt and ice cream  I can't remember what he said about cheese    Past Medical History  Past Medical History:   Diagnosis Date     Bladder mass      BPH (benign prostatic hyperplasia)      GERD (gastroesophageal reflux disease)      High serum parathyroid hormone (PTH) 2017     Hypertension      Metabolic syndrome      Nephrolithiasis      Obesity      LISETTE (obstructive sleep apnea)      Prediabetes      Past Surgical History:   Procedure Laterality Date     BACK SURGERY      repair disc     CYSTOSCOPY, TRANSURETHRAL RESECTION (TUR) TUMOR BLADDER, COMBINED  N/A 1/31/2018    Procedure: COMBINED CYSTOSCOPY, TRANSURETHRAL RESECTION (TUR) TUMOR BLADDER;  Transurethral Biopsy and Resection of Bladder Tumor;  Surgeon: Yaya Lomeli MD;  [facility]     HC BREATH HYDROGEN TEST  5/22/2013    Procedure: HYDROGEN BREATH TEST;  Surgeon: Donny Paris MD;  [facility]: [facility] GI     TRANSURETHERAL DESTRUCTION OF PROSTATE BY THERMOTHERAPY N/A 4/13/2018    Procedure: TRANSURETHERAL DESTRUCTION OF PROSTATE BY THERMOTHERAPY;  [facility] Procedure;  Surgeon: Yaya Lomeli MD;  [facility]     Medications  I had to make multiple corrections to the med list.   buproprion started a few weeks ago-   jardiance - x months. -- added for weight loss effect  hydrochlorothiazide has been since 1 month.  Ergocalciferol Vitamin D is 39409/week - x < 2 years.  Per Mc    Current Outpatient Medications   Medication Sig Dispense Refill     allopurinol (ZYLOPRIM) 100 MG tablet take by mouth 1 tab ( 100 mg)  at bedtime 90 tablet 3     amLODIPine (NORVASC) 10 MG tablet Take 1 tablet (10 mg) by mouth daily 90 tablet 3     aspirin (ASA) 81 MG EC tablet Take 1 tablet (81 mg) by mouth daily 90 tablet 3     buPROPion (WELLBUTRIN XL) 150 MG 24 hr tablet Take 1 tablet (150 mg) by mouth every morning       candesartan cilexetil 32 MG TABS Take 32 mg by mouth daily 90 tablet 3     clomiPHENE (CLOMID) 50 MG tablet Take 1 tablet (50 mg) by mouth every other day       empagliflozin (JARDIANCE) 25 MG TABS tablet Take 1 tablet (25 mg) by mouth daily       ergocalciferol (ERGOCALCIFEROL) 37480 units capsule Take 1 capsule (50,000 Units) by mouth once a week       hydrochlorothiazide (HYDRODIURIL) 12.5 MG tablet Take 1 tablet (12.5 mg) by mouth daily 90 tablet 1     metFORMIN (GLUCOPHAGE) 1000 MG tablet Take 2 tablets (2,000 mg) by mouth daily (with dinner) 180 tablet 3     multivitamin, therapeutic with minerals (MULTI-VITAMIN) TABS tablet Take 1 tablet by mouth At Bedtime       order for DME  "Respironics Dreamstation AutoPap 7-15 cm with Respironics Leidy mask.       phentermine (ADIPEX-P) 37.5 MG tablet Take 56 mg by mouth every morning (before breakfast)       ranitidine (ZANTAC) 150 MG tablet Take 1 tablet (150 mg) by mouth 2 times daily 180 tablet 3     rosuvastatin (CRESTOR) 40 MG tablet Take 1 tablet (40 mg) by mouth daily 90 tablet 3     Phentermine x a couple of years    Allergies  Allergies   Allergen Reactions     Ragweeds      Watery eyes, itchy nose       Family History  family history includes Asthma in his brother; Diabetes in his maternal grandmother.   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  GENERAL :  Pleasant man In no apparent distress  SKIN: Normal color, normal temperature, texture.      EYES: PERRL, EOMI, No scleral icterus,  No proptosis, conjunctival redness, stare, retraction  MOUTH: Moist, pink; pharynx clear  NECK: No visible masses. No palpable adenopathy, or masses. No carotid bruits.   THYROID:  Not palpable  RESP: Lungs clear to auscultation bilaterally  CARDIAC: Regular rate and rhythm, normal S1 S2, without murmurs, rubs or gallops    ABDOMEN: Normal bowel sounds; soft, nontender, no HSM or masses       NEURO: awake, alert, responds appropriately to questions.  Cranial nerves intact.  Moves all extremities; Gait normal.  No tremor of the outstretched hand.  DTRs  0 /4 ,   EXTREMITIES: No clubbing, cyanosis or edema.    DATA REVIEW    ENDO CALCIUM LABS-[facility] Latest Ref Rng & Units 1/8/2019   CALCIUM 8.5 - 10.1 mg/dL    CALCIUM IONIZED 4.4 - 5.2 mg/dL 4.9   PHOSPHOROUS " 2.5 - 4.5 mg/dL 3.7   ALBUMIN 3.4 - 5.0 g/dL    BUN 7 - 30 mg/dL    CREATININE 0.66 - 1.25 mg/dL    PARATHYROID HORMONE INTACT 18 - 80 pg/mL    ALKPHOS 40 - 150 U/L    VITAMIN D DEFICIENCY SCREENING 20 - 75 ug/L    PROTEIN, TOTAL 6.8 - 8.8 g/dL      ENDO CALCIUM LABS-[facility] Latest Ref Rng & Units 12/19/2018   CALCIUM 8.5 - 10.1 mg/dL 9.1   CALCIUM IONIZED 4.4 - 5.2 mg/dL    PHOSPHOROUS 2.5 - 4.5 mg/dL    ALBUMIN 3.4 - 5.0 g/dL    BUN 7 - 30 mg/dL 16   CREATININE 0.66 - 1.25 mg/dL 0.86   PARATHYROID HORMONE INTACT 18 - 80 pg/mL    ALKPHOS 40 - 150 U/L    VITAMIN D DEFICIENCY SCREENING 20 - 75 ug/L    PROTEIN, TOTAL 6.8 - 8.8 g/dL      ENDO CALCIUM LABS-[facility] Latest Ref Rng & Units 11/13/2018   CALCIUM 8.5 - 10.1 mg/dL 8.9   CALCIUM IONIZED 4.4 - 5.2 mg/dL    PHOSPHOROUS 2.5 - 4.5 mg/dL    ALBUMIN 3.4 - 5.0 g/dL 3.9   BUN 7 - 30 mg/dL 16   CREATININE 0.66 - 1.25 mg/dL 0.94   PARATHYROID HORMONE INTACT 18 - 80 pg/mL 91 (H)   ALKPHOS 40 - 150 U/L 61   VITAMIN D DEFICIENCY SCREENING 20 - 75 ug/L 39   PROTEIN, TOTAL 6.8 - 8.8 g/dL 7.3     ENDO CALCIUM LABS-[facility] Latest Ref Rng & Units 3/6/2018 3/31/2017   CALCIUM 8.5 - 10.1 mg/dL 9.4    CALCIUM IONIZED 4.4 - 5.2 mg/dL     PHOSPHOROUS 2.5 - 4.5 mg/dL     ALBUMIN 3.4 - 5.0 g/dL 4.1    BUN 7 - 30 mg/dL 12    CREATININE 0.66 - 1.25 mg/dL 0.74    PARATHYROID HORMONE INTACT 18 - 80 pg/mL  108 (H)   ALKPHOS 40 - 150 U/L 58    VITAMIN D DEFICIENCY SCREENING 20 - 75 ug/L  38   PROTEIN, TOTAL 6.8 - 8.8 g/dL 7.3      ENDO CALCIUM LABS-[facility] Latest Ref Rng & Units 1/16/2017   CALCIUM 8.5 - 10.1 mg/dL 9.3   CALCIUM IONIZED 4.4 - 5.2 mg/dL    PHOSPHOROUS 2.5 - 4.5 mg/dL    ALBUMIN 3.4 - 5.0 g/dL    BUN 7 - 30 mg/dL    CREATININE 0.66 - 1.25 mg/dL    PARATHYROID HORMONE INTACT 18 - 80 pg/mL 87 (H)   ALKPHOS 40 - 150 U/L    VITAMIN D DEFICIENCY SCREENING 20 - 75 ug/L 30   PROTEIN, TOTAL 6.8 - 8.8 g/dL      Again, thank you for allowing me to participate in the care of your patient.       Sincerely,    Aditi Cornejo MD

## 2019-01-08 NOTE — LETTER
1/8/2019      RE: Darnell Grant  [address]  [facility] [address]       Dear Colleague,    Thank you for the opportunity to participate in the care of your patient, Darnell Grant, at the [facility] at [facility]. Please see a copy of my visit note below.    01/08/19    CARDIOLOGY CONSULTATION    Referring Provider:  Raul Dutta  Primary Care Provider:   Raul Dutta  Indication for Consultation:  PVCs    HPI: Darnell Grant is a 63 year old male being seen today for evaluation of ventricular ectopy.    The patient's risk factor profile is: (+) HTN, (+) glucose intolerance, (+) hypercholesterolemia, (-) tobacco use, (+) fam Hx premature CAD [paternal CABG in 60s]  The patient has no history of cardiovascular disease (CAD, CHF, arrhythmia, valvular heart disease).  The patient has no Hx of PAD or cerebrovascular disease.      He has had intermittent cardiac studies over the past several years.  He had an ECHO in 2013 that showed normal LV function and no valvular heart disease.  He had a stress nuclear study in Feb 2017 that was completely normal.  He has not had a prior coronary angiogram.  He had a coronary calcium score of 772 (90% percentile) in 2017.    He had a recent routine clinic visit and had a pulse 100-110.  On auscultation, irregularity was noted.  An ECG showed NSR with LVH and ventricular ectopy.  The patient had no symptoms at that time.    Today he returns to clinic for one-year follow-up appointment.  Overall he says he is feeling no different than prior.  He denies any chest pain anginal equivalents shortness of breath beyond his baseline palpitations lightheadedness dizziness or syncopal episodes.  He denies any lower extremity swelling.  He reports tolerating his medications well.    PAST MEDICAL HISTORY:  Past Medical History:   Diagnosis Date     Bladder mass      BPH (benign prostatic hyperplasia)      GERD  (gastroesophageal reflux disease)      Hypertension      LISETTE (obstructive sleep apnea)      CURRENT MEDICATIONS:  Current Outpatient Medications   Medication Sig Dispense Refill     allopurinol (ZYLOPRIM) 100 MG tablet take by mouth 1 tab ( 100 mg)  at bedtime 90 tablet 3     amLODIPine (NORVASC) 10 MG tablet Take 1 tablet (10 mg) by mouth daily 90 tablet 3     candesartan cilexetil 32 MG TABS Take 32 mg by mouth daily 90 tablet 3     hydrochlorothiazide (HYDRODIURIL) 12.5 MG tablet Take 1 tablet (12.5 mg) by mouth daily 90 tablet 1     metFORMIN (GLUCOPHAGE) 1000 MG tablet Take 2 tablets (2,000 mg) by mouth daily (with dinner) 180 tablet 3     multivitamin, therapeutic with minerals (MULTI-VITAMIN) TABS tablet Take 1 tablet by mouth At Bedtime       phentermine (ADIPEX-P) 37.5 MG tablet Take 56 mg by mouth every morning (before breakfast)       ranitidine (ZANTAC) 150 MG tablet Take 1 tablet (150 mg) by mouth 2 times daily 180 tablet 3     rosuvastatin (CRESTOR) 40 MG tablet Take 1 tablet (40 mg) by mouth daily 90 tablet 3     albuterol (PROAIR HFA/PROVENTIL HFA/VENTOLIN HFA) 108 (90 Base) MCG/ACT inhaler Inhale 2 puffs into the lungs every 4 hours as needed for shortness of breath / dyspnea or wheezing (Patient not taking: Reported on 1/8/2019) 1 Inhaler 11     order for DME [employer] Dreamstation AutoPap 7-15 cm with Respironics Leidy mask.         PAST SURGICAL HISTORY:  Past Surgical History:   Procedure Laterality Date     BACK SURGERY      repair disc     CYSTOSCOPY, TRANSURETHRAL RESECTION (TUR) TUMOR BLADDER, COMBINED N/A 1/31/2018    Procedure: COMBINED CYSTOSCOPY, TRANSURETHRAL RESECTION (TUR) TUMOR BLADDER;  Transurethral Biopsy and Resection of Bladder Tumor;  Surgeon: Yaya Lomeli MD;  [facility]     HC BREATH HYDROGEN TEST  5/22/2013    Procedure: HYDROGEN BREATH TEST;  Surgeon: Donny Paris MD;  [facility]: [facility] GI     TRANSURETHERAL DESTRUCTION OF PROSTATE BY THERMOTHERAPY N/A  "4/13/2018    Procedure: TRANSURETHERAL DESTRUCTION OF PROSTATE BY THERMOTHERAPY;  [facility] Procedure;  Surgeon: Yaya Lomeli MD;  [facility] OR     ALLERGIES  Ragweeds    FAMILY HX:  Family History   Problem Relation Age of Onset     Asthma Brother        SOCIAL HX:  Social History     Social History     Marital status:      Spouse name: N/A     Number of children: N/A     Years of education: N/A     Social History Main Topics     Smoking status: Never Smoker     Smokeless tobacco: Never Used     Alcohol use No     Drug use: No     Sexual activity: Not Asked     Other Topics Concern     None     Social History Narrative     VITAL SIGNS:  There were no vitals taken for this visit.  There is no height or weight on file to calculate BMI.  Wt Readings from Last 2 Encounters:   11/28/18 118.4 kg (261 lb)   11/19/18 118.4 kg (261 lb)     PHYSICAL EXAM  /86 (BP Location: Right arm, Patient Position: Chair, Cuff Size: Adult Large)   Pulse 99   Ht 1.905 m (6' 3\")   Wt 119 kg (262 lb 4.8 oz)   SpO2 98%   BMI 32.79 kg/m     Darnell Grant is a 62 year old male.in no acute distress.  Overweight.  HEENT: Eyes Nonicteric.  Neck: JVP normal.  Carotids +3/3 bilaterally without bruits.  Lungs: CTA.  Cor: RRR. Normal S1 and S2.  No murmur, rub, or gallop.  PMI in Lf 5th ICS.  Abd: Soft, nontender, nondistended.  NABS.  No pulsatile mass.  Extremities: No C/C/E.  Pulses +3/3 symmetric in upper and lower extremities.  Neuro: Grossly intact.  Psych: A&O x 3.  Skin: No rash.    LABS  Recent Labs   Lab Test  07/14/14   0801  05/20/13   1208   WBC  5.9  6.5   HGB  15.9  15.6   HCT  45.8  44.7   PLT  167  187     Recent Labs   Lab Test  01/16/17   0811  11/21/16   0714  04/18/16   0808   NA   --   142  140   POTASSIUM   --   4.0  3.8   CHLORIDE   --   105  104   CO2   --   28  28   GLC   --   114*  108*   BUN   --   14  10   CR   --   0.86  0.91   ZHANE  9.3  8.9  9.3     Recent Labs   Lab Test  11/21/16   0714  " 16   0808  08/31/15   0741  14   0801   CHOL  120  98  117  120   HDL  37*  29*  34*  32*   LDL  40  35  45  59   TRIG  219*  168*  190*  146   CHOLHDLRATIO   --    --   3.4  3.7   NHDL  83  68   --    --      EK18  12:31 pm  NSR.  LVH.  Nonspecific QRS widening.  Two separate PVCs, different sources.    EK18  2:37 pm  NSR.  LVH.  Nonspecific QRS widening.  No ventricular ectopy.    HOLTER (18): Frequent ventricular ectopy, 2 dominant morphologies for PVCs with 14% burden.  107 NSVT, max 5 beats.  I left message for Darnell that I would like to see him in the clinic.  Consider stress cardiac MRI.  ECHO: 13  Global and regional left ventricular function is normal with an EF of 55-60%.   Global right ventricular function is normal. Pulmonary artery systolic pressure is normal. No pericardial effusion is present.    Coronary Calcium Score:  2017  Score 772.  90% percentile    LEXISCAN STRESS TEST:  17  Findings:  1. Overall quality of the study: Slightly reduced due to patient motion artifact.   2. Left ventricular cavity is normal on the rest and stress studies.  3. SPECT images demonstrate overall homogeneous radiotracer uptake in all myocardial segments at both stress and rest The summed stress score is 0.   4. Gated SPECT images reveal normal left ventricular systolic function without wall motion abnormalities. Left ventricular ejection fraction is 62%. Left ventricular end-diastolic volume is 205 mL. End-systolic volume is 78 mL.    Impression:  1. Normal myocardial SPECT study with a summed stress score of 0. A summed stress score of <4 is associated with an annual event rate of 0.8% and 0.9% for myocardial infarction and cardiac death, respectively (Home. Circulation 1998;98:535-43).  2. Normal left ventricular systolic function as described above.  3. No significant perfusion abnormalities.  4. No prior study available for comparison.    Stress Cardiac MRI  (1/19/18):  1. The LV is normal in cavity size and wall thickness. The global systolic function is normal. The LVEF is  55%. There are no regional wall motion abnormalities.  2. The RV is normal in cavity size. The global systolic function is normal. The RVEF is 61%.   3. Both atria are normal in size.  4. There is no significant valvular disease.   5. Late gadolinium enhancement imaging shows no MI, fibrosis or infiltrative disease.   6. Regadenoson stress perfusion imaging shows no ischemia.    CONCLUSIONS: No ischemia or infarction. Normal LV and RV function, LVEF of 55% and RVEF of 61%. No obvious substrate for arrhythmia.    ZIOPATCH: (Jan 2018):    ADDENDUM (2/8/18): Ziopatch showed only 1 episode of NSVT (4 beats) in comparison to prior Holter.   PVC burden was 11% in comparison to 14% on the prior Holter.  I would not treat these asymptomatic arrhythmias.  A beta blocker would not be unreasonable if current antihypertensive regime (Ca blocker, ARB) becomes less effective.    CARDIAC CATH: None    ASSESSMENT AND PLAN:   1. Ventricular ectopy, NSVT.  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  He has no other symptoms to suggest CHF or typical angina.  His physical exam is unremarkable.  His ECG shows PVCs with two dominant morphologies and his Holter showed 14% PVC burden with two dominant morphologies.  He had 107 runs of NSVT with no more than 5 consecutive beats.  He has no symptoms with the PVCs or NSVT.  He has normal LV function on prior ECHO and this is confirmed with recent Lexiscan and on cardiac MRI today.  He has no evidence of prior MI or ischemia by Lexiscan or stress cardiac MRI.      From a coronary standpoint he does not appear to have any anginal equivalents therefore we have elected to proceed with medication optimization.  He is on aspirin 81 mg daily and Crestor 40 mg daily.  His blood pressure is well controlled.    From a PVC  standpoint he is largely is a symptomatic.  At this time he does not appear that he had any reduction in his LVEF based on his symptoms and examination.  He has had multiple assessments of his LV function in the past year that has been without findings therefore at this time we would proceed with regular follow-up and assessment of LVEF every 2-3 years or sooner based on any changes in his symptoms.  Will reassess PVC burden today with repeat ZioPatch.    2. HTN.  Continue Norvasc 10 mg every day, Candesartan 32 mg every day, hydrochlorothiazide 12.5 every day..  This is well controlled today    3. Hypercholesterolemia.  LDL well controlled.  Continue Crestor 10 mg qd.    4. Metabolic syndrome / Glucose Intolerance. Continue Metformin.  Last A1c is 5.7.     FOLLOW UP:  PCP    ATTENDING NOTE:  Patient has been seen and evaluated by me on 01/08/2019. I have reviewed the documentation above.  I have reviewed today's vital signs, medications, labs, and imaging results.  I have reviewed and edited, as necessary, the history, review of systems, physical examination, and assessment and plan.  I have discussed my assessment and plan with the cardiology fellow.  Darnell Grant is a 63 year old male with risk factor profile (+) HTN, Pre DM, (+) hypercholesterolemia, (-) tobacco use, (+) fam Hx premature CAD, no established CAD, Hx ventricular ectopy and NSVT with 145 PVC burden on Holter, normal LV function by ECHO, normal Lexiscan (2017), normal stress cardiac MRI (2018), returns for routine follow up.  His only symptoms is LOPEZ with strenuous activity; he has no symptoms attributable to the ventricular ectopy.  BP improved on triple therapy.  Recheck Ziopatch to ensure PVC burden has not increased.  If higher, repeat imaging.  Otherwise, defer for one year.    Leo Dutta MD     Cardiovascular Division    CC  Patient Care Team:  Raul Dutta MD as PCP - General (Internal Medicine)  Heidi Abraham  MD Hector as PCP - Assigned PCP  Robby Guzman RN as Nurse Coordinator (Cardiology)  Yaya Lomeli MD as MD (Urology)  Sherry Monzon, RN as Registered Nurse (Urology)  Logan Dutta MD as Referring Physician (Internal Medicine)  LOGAN DUTTA

## 2019-01-08 NOTE — NURSING NOTE
Cardiac Monitors: Patient was instructed regarding the indication, function, care and prompt return of a holter  monitor. The monitor was placed on the patient with instructions regarding care of the skin electrodes and monitor, as well as documentation in the patient diary. Patient demonstrated understanding of this information and agreed to call with further questions or concerns.  Med Reconcile: Reviewed and verified all current medications with the patient. The updated medication list was printed and given to the patient.  Return Appointment: Patient given instructions regarding scheduling next clinic visit. Patient demonstrated understanding of this information and agreed to call with further questions or concerns.  Patient stated he understood all health information given and agreed to call with further questions or concerns.

## 2019-01-08 NOTE — PROGRESS NOTES
Endocrine Consult note    Attending Assessment/Plan :     1.  High PTH with normal calcium.   Differential includes secondary hyperparathyroidism vs normocalcemic primary hyperparathyroidism.  He is on high dose vitamin D2 monthly but vitamin D related labs have been measuring D3 . He is not on calcium and his dietary calcium intake is low.   Labs to include phosphorus and ICA  24 hour urine calcium    Addendum: 24 hour urine calcium is 290 mg/24 hours while on HCTZ.  Vitamin D level is higher than normal due to the high dose D2.    Would work on dietary calcium     2.  Kidney stone seen on 9/14/18 CT . The kidney stone history raises the concern of primary hyperparathyroidism.   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 I have reviewed Care Everywhere including [facility],lab reports, imaging reports and provider notes as indicated.   He also read me some data from his cell phone linked in to [facility] electronic reporting.  I do not have any of the actual [facility] records.  Beyond this, I do not have records from [facility]    HISTORY OF PRESENT ILLNESS High PTH has been measured a few time since 1/17.  All associated calcium levels have been normal. He has had ? 2 episodes of kidney stone passage -one he recalls had  very severe pain, 5-7 years ago, required ambulance.  A kidney stone is seen on the 9/18 chest CT.  He has never captured a stone for analysis.  He has no bone pain.  He has never fractured a bone . Bone density was normal on DXA 3/18.      He recalls a diagnosis of secondary hyperparathyroidism from Dr Bentley dating to 4/17, whom he has been seeing for weight management.   He recalls having low vitamin D.  He has been taking weekly ergocaliferol < 2 years.  He is not taking calcium.      We have the following pertinent labs:   1/16/17: PTH 87, Ca 9.3, vitamin D 30  3/31/17: , vitamin D 38  4/27/17: FSH 3, LH 4.7, prolactin 7  6/18 per what he read me off his phone: LH 12.5, FSH 5.6, testosterone 580, 25 OHD 57, Ca 9.8, PTH 69.9?   11/13/18: PTH 91, Ca 8.9, albumin 3.9, creatinine 0.94, vitamin D 39, hgbA1c 5.7%  12/19/18: Ca 9.1, creatinine 0.86    DXA 3/16/18 showed lowest T-score 1.6 at the 33% radius.    9/14/18 chest CT(image as reviewed by me on PACS): thyroid appears normal; sub 6 mm nodules bilaterally; 3 mm right kidney calculus    His dietary calcium includes the following:  Milk in coffee and cereal  Occasional yogurt and ice cream  I can't remember what he said about cheese    REVIEW OF SYSTEMS  37# weight loss in the last < 1 year-- better diet; weight has fluctuated over time  Feels normal   Energy oK  sleep is on and off - not continuous -wakes up a lot -   Cardiac: arrhythmia - asymptmatic - Dr Dutta follows; 11-14 extra beats/ minute - upcoming holter; thallium was normal   Respiratory: had CPAP  face mask in the past;  Now has  less snoring with weight loss - stopped CPAP years ago; had virus induced asthma - got prednisone 30 mg/day for this recently ; stress induced asthma;   GI: negative  No bone pain  No fractures  Nocturia x ??   10 system ROS otherwise as per the HPI  or negative    Past Medical History  Past Medical History:   Diagnosis Date     Bladder mass      BPH (benign prostatic hyperplasia)      GERD (gastroesophageal reflux disease)      High serum parathyroid hormone (PTH) 2017     Hypertension      Metabolic syndrome      Nephrolithiasis      Obesity      LISETTE (obstructive sleep apnea)      Prediabetes      Past Surgical History:   Procedure Laterality Date     BACK SURGERY      repair disc     CYSTOSCOPY, TRANSURETHRAL RESECTION (TUR) TUMOR BLADDER, COMBINED N/A 1/31/2018    Procedure: COMBINED CYSTOSCOPY, TRANSURETHRAL RESECTION (TUR) TUMOR BLADDER;  Transurethral Biopsy and Resection of Bladder Tumor;  Surgeon: Yaya Lomeli MD;  Location: [facility] OR     HC BREATH HYDROGEN TEST  5/22/2013    Procedure: HYDROGEN BREATH TEST;  Surgeon: Donny Paris MD;  Location: [facility] GI     TRANSURETHERAL DESTRUCTION OF PROSTATE BY THERMOTHERAPY N/A 4/13/2018    Procedure: TRANSURETHERAL DESTRUCTION OF PROSTATE BY THERMOTHERAPY;  [facility] Procedure;  Surgeon: Yaya Lomeli MD;  Location: [facility] OR         Medications  I had to make multiple corrections to the med list.   buproprion started a few weeks ago-   jardiance - x months. -- added for weight loss effect  hydrochlorothiazide has been since 1 month.  Ergocalciferol Vitamin D is 00195/week - x < 2 years.  Per Mc    Current Outpatient Medications   Medication Sig Dispense Refill     allopurinol (ZYLOPRIM) 100 MG tablet take by mouth 1 tab ( 100 mg)  at bedtime 90 tablet 3     amLODIPine (NORVASC) 10 MG tablet Take 1 tablet (10 mg) by mouth daily 90 tablet 3     aspirin (ASA) 81 MG EC tablet Take 1 tablet (81 mg) by mouth daily 90 tablet 3     buPROPion (WELLBUTRIN XL) 150 MG 24 hr tablet Take 1 tablet (150 mg) by mouth every morning       candesartan cilexetil 32 MG TABS Take 32 mg by mouth daily 90 tablet 3     clomiPHENE (CLOMID) 50 MG tablet Take 1 tablet (50 mg) by mouth every other day       empagliflozin  "(JARDIANCE) 25 MG TABS tablet Take 1 tablet (25 mg) by mouth daily       ergocalciferol (ERGOCALCIFEROL) 09344 units capsule Take 1 capsule (50,000 Units) by mouth once a week       hydrochlorothiazide (HYDRODIURIL) 12.5 MG tablet Take 1 tablet (12.5 mg) by mouth daily 90 tablet 1     metFORMIN (GLUCOPHAGE) 1000 MG tablet Take 2 tablets (2,000 mg) by mouth daily (with dinner) 180 tablet 3     multivitamin, therapeutic with minerals (MULTI-VITAMIN) TABS tablet Take 1 tablet by mouth At Bedtime       order for DME Respironics Dreamstation AutoPap 7-15 cm with Respironics Leidy mask.       phentermine (ADIPEX-P) 37.5 MG tablet Take 56 mg by mouth every morning (before breakfast)       ranitidine (ZANTAC) 150 MG tablet Take 1 tablet (150 mg) by mouth 2 times daily 180 tablet 3     rosuvastatin (CRESTOR) 40 MG tablet Take 1 tablet (40 mg) by mouth daily 90 tablet 3     Phentermine x a couple of years    Allergies  Allergies   Allergen Reactions     Ragweeds      Watery eyes, itchy nose       Family History  family history includes Asthma in his brother; Diabetes in his maternal grandmother.   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  GENERAL :  Pleasant man In no apparent distress  SKIN: Normal color, normal temperature, texture.      EYES: PERRL, EOMI, No scleral icterus,  No proptosis, conjunctival redness, stare, retraction  MOUTH: Moist, pink; pharynx clear  NECK: No visible masses. No palpable adenopathy, or masses. No carotid bruits.   THYROID:  Not palpable  RESP: Lungs clear to auscultation bilaterally  CARDIAC: " Regular rate and rhythm, normal S1 S2, without murmurs, rubs or gallops    ABDOMEN: Normal bowel sounds; soft, nontender, no HSM or masses       NEURO: awake, alert, responds appropriately to questions.  Cranial nerves intact.  Moves all extremities; Gait normal.  No tremor of the outstretched hand.  DTRs  0 /4 ,   EXTREMITIES: No clubbing, cyanosis or edema.    DATA REVIEW    Results for PATSY BROWN (MRN [id number]) as of 1/28/2019 22:10   Ref. Range 1/14/2019 04:45   Calcium Urine g/24 h Latest Ref Range: 0.10 - 0.30 g/24 h 0.29   Calcium Urine g/g Cr Latest Units: g/g Cr 0.15   Calcium Urine mg/dL Latest Units: mg/dL 11.7   Creatinine Urine Timed Latest Ref Range: 1.00 - 2.00  1.95   Creatinine Urine Latest Units: mg/dL 79       ENDO CALCIUM LABS-UMP Latest Ref Rng & Units 1/8/2019   CALCIUM 8.5 - 10.1 mg/dL    CALCIUM IONIZED 4.4 - 5.2 mg/dL 4.9   PHOSPHOROUS 2.5 - 4.5 mg/dL 3.7   ALBUMIN 3.4 - 5.0 g/dL    BUN 7 - 30 mg/dL    CREATININE 0.66 - 1.25 mg/dL    PARATHYROID HORMONE INTACT 18 - 80 pg/mL    ALKPHOS 40 - 150 U/L    VITAMIN D DEFICIENCY SCREENING 20 - 75 ug/L    PROTEIN, TOTAL 6.8 - 8.8 g/dL      ENDO CALCIUM LABS-UMP Latest Ref Rng & Units 12/19/2018   CALCIUM 8.5 - 10.1 mg/dL 9.1   CALCIUM IONIZED 4.4 - 5.2 mg/dL    PHOSPHOROUS 2.5 - 4.5 mg/dL    ALBUMIN 3.4 - 5.0 g/dL    BUN 7 - 30 mg/dL 16   CREATININE 0.66 - 1.25 mg/dL 0.86   PARATHYROID HORMONE INTACT 18 - 80 pg/mL    ALKPHOS 40 - 150 U/L    VITAMIN D DEFICIENCY SCREENING 20 - 75 ug/L    PROTEIN, TOTAL 6.8 - 8.8 g/dL      ENDO CALCIUM LABS-UMP Latest Ref Rng & Units 11/13/2018   CALCIUM 8.5 - 10.1 mg/dL 8.9   CALCIUM IONIZED 4.4 - 5.2 mg/dL    PHOSPHOROUS 2.5 - 4.5 mg/dL    ALBUMIN 3.4 - 5.0 g/dL 3.9   BUN 7 - 30 mg/dL 16   CREATININE 0.66 - 1.25 mg/dL 0.94   PARATHYROID HORMONE INTACT 18 - 80 pg/mL 91 (H)   ALKPHOS 40 - 150 U/L 61   VITAMIN D DEFICIENCY SCREENING 20 - 75 ug/L 39   PROTEIN, TOTAL 6.8 - 8.8 g/dL 7.3     ENDO CALCIUM LABS-UMP  Latest Ref Rng & Units 3/6/2018 3/31/2017   CALCIUM 8.5 - 10.1 mg/dL 9.4    CALCIUM IONIZED 4.4 - 5.2 mg/dL     PHOSPHOROUS 2.5 - 4.5 mg/dL     ALBUMIN 3.4 - 5.0 g/dL 4.1    BUN 7 - 30 mg/dL 12    CREATININE 0.66 - 1.25 mg/dL 0.74    PARATHYROID HORMONE INTACT 18 - 80 pg/mL  108 (H)   ALKPHOS 40 - 150 U/L 58    VITAMIN D DEFICIENCY SCREENING 20 - 75 ug/L  38   PROTEIN, TOTAL 6.8 - 8.8 g/dL 7.3      ENDO CALCIUM LABS-UMP Latest Ref Rng & Units 1/16/2017   CALCIUM 8.5 - 10.1 mg/dL 9.3   CALCIUM IONIZED 4.4 - 5.2 mg/dL    PHOSPHOROUS 2.5 - 4.5 mg/dL    ALBUMIN 3.4 - 5.0 g/dL    BUN 7 - 30 mg/dL    CREATININE 0.66 - 1.25 mg/dL    PARATHYROID HORMONE INTACT 18 - 80 pg/mL 87 (H)   ALKPHOS 40 - 150 U/L    VITAMIN D DEFICIENCY SCREENING 20 - 75 ug/L 30   PROTEIN, TOTAL 6.8 - 8.8 g/dL

## 2019-01-08 NOTE — NURSING NOTE
Chief Complaint   Patient presents with     Follow Up     discuss Ziopatch results     Vitals were taken and medications were reconciled.     Shira Aiken MA    8:30 AM

## 2019-01-08 NOTE — PATIENT INSTRUCTIONS
Patient Instructions:  It was a pleasure to see you in the cardiology clinic today.      If you have any questions, call  Jolie Rogers RN, at [phone].  Press Option #1 for the [facility], and then press Option #3 for nursing.  We are encouraging the use of [employer] to communicate with your HealthCare Provider    Note the new medications: none  Stop the following medications: none    The results from today include:  Pending Ziopatch  Please follow up with Dr. Leo Dutta in one year      If you have an urgent need after hours (8:00 am to 4:30 pm) please call [phone] and ask for the cardiology fellow on call.

## 2019-01-09 RX ORDER — ERGOCALCIFEROL 1.25 MG/1
50000 CAPSULE ORAL
COMMUNITY
Start: 2019-01-09 | End: 2019-01-09

## 2019-01-09 RX ORDER — ERGOCALCIFEROL 1.25 MG/1
50000 CAPSULE ORAL WEEKLY
COMMUNITY
Start: 2019-01-09 | End: 2019-12-03

## 2019-01-09 RX ORDER — BUPROPION HYDROCHLORIDE 150 MG/1
150 TABLET ORAL EVERY MORNING
COMMUNITY
Start: 2019-01-09 | End: 2019-05-06

## 2019-01-10 LAB
DEPRECATED CALCIDIOL+CALCIFEROL SERPL-MC: 86 UG/L (ref 20–75)
VITAMIN D2 SERPL-MCNC: 69 UG/L
VITAMIN D3 SERPL-MCNC: 17 UG/L

## 2019-01-11 ENCOUNTER — DOCUMENTATION ONLY (OUTPATIENT)
Dept: CARDIOLOGY | Facility: CLINIC | Age: 64
End: 2019-01-11

## 2019-01-11 NOTE — PROGRESS NOTES
Patient came in to  a new ziopatch monitor as his other one fell off.    Registered new serial number.  [id number]

## 2019-01-14 ENCOUNTER — MEDICAL CORRESPONDENCE (OUTPATIENT)
Dept: CARDIOLOGY | Facility: CLINIC | Age: 64
End: 2019-01-14
Payer: COMMERCIAL

## 2019-01-14 ENCOUNTER — MYC MEDICAL ADVICE (OUTPATIENT)
Dept: INTERNAL MEDICINE | Facility: CLINIC | Age: 64
End: 2019-01-14

## 2019-01-14 DIAGNOSIS — I10 ESSENTIAL HYPERTENSION, BENIGN: ICD-10-CM

## 2019-01-14 DIAGNOSIS — R79.89 HIGH SERUM PARATHYROID HORMONE (PTH): ICD-10-CM

## 2019-01-14 DIAGNOSIS — R97.20 ELEVATED PROSTATE SPECIFIC ANTIGEN (PSA): ICD-10-CM

## 2019-01-14 DIAGNOSIS — N40.1 BENIGN PROSTATIC HYPERPLASIA WITH URINARY OBSTRUCTION: ICD-10-CM

## 2019-01-14 DIAGNOSIS — E78.00 PURE HYPERCHOLESTEROLEMIA: Primary | ICD-10-CM

## 2019-01-14 DIAGNOSIS — R73.03 PREDIABETES: ICD-10-CM

## 2019-01-14 DIAGNOSIS — N13.8 BENIGN PROSTATIC HYPERPLASIA WITH URINARY OBSTRUCTION: ICD-10-CM

## 2019-01-14 LAB
CALCIUM 24H UR-MRATE: 0.29 G/24 H (ref 0.1–0.3)
CALCIUM UR-MCNC: 11.7 MG/DL
CALCIUM/CREAT UR: 0.15 G/G CR
COLLECT DURATION TIME UR: 24 H
CREAT 24H UR-MRATE: 1.95 G/(24.H) (ref 1–2)
CREAT UR-MCNC: 79 MG/DL
SPECIMEN VOL UR: 2450 ML

## 2019-01-14 PROCEDURE — 0298T ZZC EXT ECG > 48HR TO 21 DAY REVIEW AND INTERPRETATN: CPT | Mod: ZP | Performed by: INTERNAL MEDICINE

## 2019-01-28 DIAGNOSIS — I49.3 PVC'S (PREMATURE VENTRICULAR CONTRACTIONS): ICD-10-CM

## 2019-01-29 DIAGNOSIS — R79.89 HIGH SERUM PARATHYROID HORMONE (PTH): Primary | ICD-10-CM

## 2019-04-30 DIAGNOSIS — R79.89 HIGH SERUM PARATHYROID HORMONE (PTH): ICD-10-CM

## 2019-04-30 DIAGNOSIS — I10 ESSENTIAL HYPERTENSION, BENIGN: ICD-10-CM

## 2019-04-30 DIAGNOSIS — R97.20 ELEVATED PROSTATE SPECIFIC ANTIGEN (PSA): ICD-10-CM

## 2019-04-30 DIAGNOSIS — R73.03 PREDIABETES: ICD-10-CM

## 2019-04-30 DIAGNOSIS — E78.00 PURE HYPERCHOLESTEROLEMIA: ICD-10-CM

## 2019-04-30 LAB
CALCIUM SERPL-MCNC: 9.7 MG/DL (ref 8.5–10.1)
CHOLEST SERPL-MCNC: 79 MG/DL
CREAT SERPL-MCNC: 1.01 MG/DL (ref 0.66–1.25)
CREAT UR-MCNC: 118 MG/DL
GFR SERPL CREATININE-BSD FRML MDRD: 77 ML/MIN/{1.73_M2}
HBA1C MFR BLD: 5.6 % (ref 0–5.6)
HDLC SERPL-MCNC: 34 MG/DL
LDLC SERPL CALC-MCNC: 21 MG/DL
MICROALBUMIN UR-MCNC: 34 MG/L
MICROALBUMIN/CREAT UR: 29.07 MG/G CR (ref 0–17)
NONHDLC SERPL-MCNC: 45 MG/DL
PHOSPHATE SERPL-MCNC: 2.8 MG/DL (ref 2.5–4.5)
PSA SERPL-ACNC: 1.9 UG/L (ref 0–4)
PTH-INTACT SERPL-MCNC: 39 PG/ML (ref 18–80)
TRIGL SERPL-MCNC: 120 MG/DL
TSH SERPL DL<=0.005 MIU/L-ACNC: 2.49 MU/L (ref 0.4–4)

## 2019-05-06 ENCOUNTER — OFFICE VISIT (OUTPATIENT)
Dept: INTERNAL MEDICINE | Facility: CLINIC | Age: 64
End: 2019-05-06
Payer: COMMERCIAL

## 2019-05-06 VITALS
OXYGEN SATURATION: 96 % | DIASTOLIC BLOOD PRESSURE: 75 MMHG | SYSTOLIC BLOOD PRESSURE: 114 MMHG | WEIGHT: 253.9 LBS | HEART RATE: 94 BPM | BODY MASS INDEX: 31.74 KG/M2

## 2019-05-06 DIAGNOSIS — Z00.00 ROUTINE GENERAL MEDICAL EXAMINATION AT A HEALTH CARE FACILITY: ICD-10-CM

## 2019-05-06 DIAGNOSIS — Z13.89 SCREENING FOR DIABETIC PERIPHERAL NEUROPATHY: ICD-10-CM

## 2019-05-06 DIAGNOSIS — R35.1 NOCTURIA: ICD-10-CM

## 2019-05-06 DIAGNOSIS — M10.9 GOUT, UNSPECIFIED CAUSE, UNSPECIFIED CHRONICITY, UNSPECIFIED SITE: ICD-10-CM

## 2019-05-06 DIAGNOSIS — E78.5 HYPERLIPIDEMIA LDL GOAL <100: ICD-10-CM

## 2019-05-06 DIAGNOSIS — K21.9 GASTROESOPHAGEAL REFLUX DISEASE WITHOUT ESOPHAGITIS: ICD-10-CM

## 2019-05-06 DIAGNOSIS — I10 ESSENTIAL HYPERTENSION, BENIGN: ICD-10-CM

## 2019-05-06 DIAGNOSIS — I10 ESSENTIAL HYPERTENSION: ICD-10-CM

## 2019-05-06 DIAGNOSIS — N40.0 BENIGN NON-NODULAR PROSTATIC HYPERPLASIA WITHOUT LOWER URINARY TRACT SYMPTOMS: ICD-10-CM

## 2019-05-06 DIAGNOSIS — R53.83 OTHER FATIGUE: ICD-10-CM

## 2019-05-06 DIAGNOSIS — Z11.59 NEED FOR HEPATITIS C SCREENING TEST: ICD-10-CM

## 2019-05-06 RX ORDER — HYDROCHLOROTHIAZIDE 12.5 MG/1
12.5 TABLET ORAL DAILY
Qty: 90 TABLET | Refills: 3 | Status: SHIPPED | OUTPATIENT
Start: 2019-05-06 | End: 2020-01-06

## 2019-05-06 RX ORDER — ROSUVASTATIN CALCIUM 40 MG/1
40 TABLET, COATED ORAL DAILY
Qty: 90 TABLET | Refills: 3 | Status: SHIPPED | OUTPATIENT
Start: 2019-05-06 | End: 2020-01-06

## 2019-05-06 RX ORDER — ALLOPURINOL 100 MG/1
TABLET ORAL
Qty: 90 TABLET | Refills: 3 | Status: SHIPPED | OUTPATIENT
Start: 2019-05-06 | End: 2020-01-06

## 2019-05-06 RX ORDER — BUPROPION HYDROCHLORIDE 150 MG/1
150 TABLET ORAL EVERY MORNING
Qty: 90 TABLET | Refills: 3 | Status: SHIPPED | OUTPATIENT
Start: 2019-05-06 | End: 2020-01-06

## 2019-05-06 RX ORDER — AMLODIPINE BESYLATE 10 MG/1
10 TABLET ORAL DAILY
Qty: 90 TABLET | Refills: 3 | Status: SHIPPED | OUTPATIENT
Start: 2019-05-06 | End: 2020-01-06

## 2019-05-06 RX ORDER — CANDESARTAN 32 MG/1
32 TABLET ORAL DAILY
Qty: 90 TABLET | Refills: 3 | Status: SHIPPED | OUTPATIENT
Start: 2019-05-06 | End: 2020-01-06

## 2019-05-06 ASSESSMENT — PAIN SCALES - GENERAL: PAINLEVEL: NO PAIN (0)

## 2019-05-06 NOTE — PATIENT INSTRUCTIONS
Primary Care Center Phone Number: [phone]   Primary Care Center Medication Refill Request Information:  * Please contact your pharmacy regarding ANY request for medication refills.  ** [facility] Prescription Fax = [phone]  * Please allow 3 business days for routine medication refills.  * Please allow 5 business days for controlled substance medication refills.     Primary [facility] Test Result notification information:  *You will be notified with in 7-10 days of your appointment day regarding the results of your test.  If you are on MyChart you will be notified as soon as the provider has reviewed the results and signed off on them.

## 2019-05-06 NOTE — PROGRESS NOTES
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 Doing well with ongoing wt loss. PTH normalized on Vit D and calcium replacement. No chest pain or dyspnea. Had chronic cough after URI but better after inhaled steroids. No gout flares. No med SEs.     Current Outpatient Medications   Medication     allopurinol (ZYLOPRIM) 100 MG tablet     amLODIPine (NORVASC) 10 MG tablet     aspirin (ASA) 81 MG EC tablet     buPROPion (WELLBUTRIN XL) 150 MG 24 hr tablet     candesartan cilexetil 32 MG TABS     clomiPHENE (CLOMID) 50 MG tablet     empagliflozin (JARDIANCE) 25 MG TABS tablet     ergocalciferol (ERGOCALCIFEROL) 82749 units capsule     hydrochlorothiazide (HYDRODIURIL) 12.5 MG tablet     metFORMIN (GLUCOPHAGE) 1000 MG tablet     multivitamin, therapeutic with minerals (MULTI-VITAMIN) TABS tablet     order for DME     phentermine (ADIPEX-P) 37.5 MG tablet     ranitidine (ZANTAC) 150 MG tablet     rosuvastatin (CRESTOR) 40 MG tablet     No current facility-administered medications for this visit.        O) /75   Pulse 94   Wt 115.2 kg (253 lb 14.4 oz)   SpO2 96%   BMI 31.74 kg/m    Well appearing  Cor RRR no MRG  Lungs clear  R TM with cerumen impaction  Nl mentation  Nl gait    A/P)  1) HCM. . Colon screening UTD. Immunizations UTD. Wt loss ongoing in healthy manner.  2) HTN. BP controlled with amlodipine 10 mg/d, candesartan 32 mg/d, hydrochlorothiazide 12.5 mg.   3) ASCVD Risk. Max RF reduction with statin, BP control. Coronary calcium score elevated but Nuc Med stress test with Lexiscan showed summed stress score of 0. Subsequent Stress MRI done 1/18/2019 and no evidence of ischemia, fibrosis or other abnormalities. Continue current regimen/strategy. Labs reviewed.   4) BPH. Recently PSA testing elevated with significant increase in PSA velocity (0.96 to 4.5). He discussed with Ellie Lomeli who  recommended repeat in 4-6 weeks with MRI or biopsy thereafter if still elevated--> subsequent reading was down to 1.90.  5) LISETTE. Not tolerating CPAP but wt loss has been great- now down 45 lbs.  6) Obesity. Currently seeing a wt loss provider and using phentermine for pharmacologic treatment. 45 lb loss in past year.  7) Gout. No flares. Urate <6 on allopurinol. No changes.  8) GERD. Zantac working well. No PPI.   9) Secondary Hyperparathyroidism secondary to vitamin D deficiency. Ongoing vitamin D and calcium replacement. PTH 39.   10) Metabolic syndrome. Continue metformin, wt loss, exercise. Last A1C at 5.7%    Over 15 min of 25 min visit spent in care coordination and counseling related to the above issues.    Raul Dutta MD, FACP, FAAP

## 2019-05-06 NOTE — NURSING NOTE
Chief Complaint   Patient presents with     Recheck Medication     Pt is here for a follow up       Xiomara Garcia, Clinic EMT at 2:39 PM on 5/6/2019

## 2019-08-17 ENCOUNTER — OFFICE VISIT (OUTPATIENT)
Dept: OPHTHALMOLOGY | Facility: CLINIC | Age: 64
End: 2019-08-17
Payer: COMMERCIAL

## 2019-08-17 DIAGNOSIS — H52.4 MYOPIA OF BOTH EYES WITH ASTIGMATISM AND PRESBYOPIA: ICD-10-CM

## 2019-08-17 DIAGNOSIS — H52.203 MYOPIA OF BOTH EYES WITH ASTIGMATISM AND PRESBYOPIA: ICD-10-CM

## 2019-08-17 DIAGNOSIS — H52.13 MYOPIA OF BOTH EYES WITH ASTIGMATISM AND PRESBYOPIA: ICD-10-CM

## 2019-08-17 DIAGNOSIS — H25.813 COMBINED FORMS OF AGE-RELATED CATARACT OF BOTH EYES: ICD-10-CM

## 2019-08-17 ASSESSMENT — REFRACTION_MANIFEST
OD_AXIS: 120
OD_SPHERE: -1.00
OS_SPHERE: -2.50
OS_SPHERE: -1.00
OS_CYLINDER: +2.00
OD_CYLINDER: +0.75
METHOD_AUTOREFRACTION: 1
OD_CYLINDER: +0.25
OD_AXIS: 103
OD_SPHERE: -1.50
OS_ADD: +2.50
OS_AXIS: 079
OD_ADD: +2.50
OS_AXIS: 075
OS_CYLINDER: +1.25

## 2019-08-17 ASSESSMENT — CUP TO DISC RATIO
OD_RATIO: 0.1
OS_RATIO: 0.1

## 2019-08-17 ASSESSMENT — REFRACTION_WEARINGRX
OS_ADD: +2.50
OS_SPHERE: -1.25
OD_ADD: +2.50
OD_SPHERE: -0.75
OD_CYLINDER: +1.25
OS_AXIS: 058
OD_AXIS: 146
OS_CYLINDER: +1.25
SPECS_TYPE: PAL

## 2019-08-17 ASSESSMENT — TONOMETRY
OD_IOP_MMHG: 20
IOP_METHOD: ICARE
OS_IOP_MMHG: 17

## 2019-08-17 ASSESSMENT — VISUAL ACUITY
METHOD: SNELLEN - LINEAR
OS_CC: 20/25
CORRECTION_TYPE: GLASSES
OS_CC+: +2
OD_CC: 20/20
OD_CC+: -

## 2019-08-17 ASSESSMENT — CONF VISUAL FIELD
OS_NORMAL: 1
METHOD: COUNTING FINGERS
OD_NORMAL: 1

## 2019-08-17 ASSESSMENT — EXTERNAL EXAM - RIGHT EYE: OD_EXAM: NORMAL

## 2019-08-17 ASSESSMENT — EXTERNAL EXAM - LEFT EYE: OS_EXAM: NORMAL

## 2019-08-17 ASSESSMENT — SLIT LAMP EXAM - LIDS
COMMENTS: NORMAL
COMMENTS: NORMAL

## 2019-08-17 NOTE — NURSING NOTE
Chief Complaints and History of Present Illnesses   Patient presents with     Annual Eye Exam     Chief Complaint(s) and History of Present Illness(es)     Annual Eye Exam     Laterality: both eyes    Onset: gradual    Onset: years ago    Frequency: constantly    Course: stable    Treatments tried: no treatments    Pain scale: 0/10              Comments     Patient states vision has been stable since last eye exam, both eyes. Denies eye pain or irritation. Does not take eye drops.    Ling Callaway COT 8:49 AM August 17, 2019

## 2019-08-17 NOTE — PROGRESS NOTES
Assessment & Plan       Darnell Grant is a 63 year old male with the following diagnoses:   1. Combined forms of age-related cataract of both eyes    2. Myopia of both eyes with astigmatism and presbyopia        New prescription for glasses     Yearly exam      Patient disposition:   Return in about 1 year (around 8/17/2020).          Complete documentation of historical and exam elements from today's encounter can be found in the full encounter summary report (not reduplicated in this progress note). I personally obtained the chief complaint(s) and history of present illness.  I confirmed and edited as necessary the review of systems, past medical/surgical history, family history, social history, and examination findings as documented by others; and I examined the patient myself. I personally reviewed the relevant tests, images, and reports as documented above. I formulated and edited as necessary the assessment and plan and discussed the findings and management plan with the patient and family.  Dr. Abhishek Lujan

## 2019-09-11 DIAGNOSIS — K21.9 GASTROESOPHAGEAL REFLUX DISEASE, ESOPHAGITIS PRESENCE NOT SPECIFIED: Primary | ICD-10-CM

## 2019-09-23 DIAGNOSIS — R13.10 DYSPHAGIA, UNSPECIFIED TYPE: Primary | ICD-10-CM

## 2019-10-02 ENCOUNTER — HEALTH MAINTENANCE LETTER (OUTPATIENT)
Age: 64
End: 2019-10-02

## 2019-10-08 ENCOUNTER — THERAPY VISIT (OUTPATIENT)
Dept: SPEECH THERAPY | Facility: CLINIC | Age: 64
End: 2019-10-08
Attending: OTOLARYNGOLOGY
Payer: COMMERCIAL

## 2019-10-08 ENCOUNTER — ANCILLARY PROCEDURE (OUTPATIENT)
Dept: GENERAL RADIOLOGY | Facility: CLINIC | Age: 64
End: 2019-10-08
Attending: OTOLARYNGOLOGY
Payer: COMMERCIAL

## 2019-10-08 DIAGNOSIS — R13.10 DYSPHAGIA, UNSPECIFIED TYPE: ICD-10-CM

## 2019-10-08 RX ORDER — BARIUM SULFATE 400 MG/ML
SUSPENSION ORAL ONCE
Status: COMPLETED | OUTPATIENT
Start: 2019-10-08 | End: 2019-10-08

## 2019-10-08 RX ORDER — BARIUM SULFATE 400 MG/ML
SUSPENSION ORAL ONCE
Status: ACTIVE | OUTPATIENT
Start: 2019-10-08

## 2019-10-08 RX ADMIN — BARIUM SULFATE: 400 SUSPENSION ORAL at 10:09

## 2019-10-08 NOTE — PROGRESS NOTES
10/08/19 1000   General Information   Type Of Visit Initial   Start Of Care Date 10/08/19   Referring Physician Dr. Myles Cleary   Orders Evaluate And Treat   Orders Comment Video Swallow Study   Medical Diagnosis Dysphagia   Precautions/limitations No Known Precautions/limitations   Hearing Adequate in quiet 1:1 setting   Pertinent History of Current Problem/OT: Additional Occupational Profile Info Dr. Grant reports intermittent difficulty with swallowing pills. He feels like these episodes are getting more frequent. He notes a difficulty with pills getting stuck in his mid throat. He generally eats softer foods so he doesn't notice this problem with solid foods as much as pills. He also takes all of his pills at once. He never feels like he can't pass fluid around the globus sensation. He has a history of reflux for which he has been on omeprizole for the past 10 years or so. No recent pneumonia reported. No history of cancer, TBI or CVA reported.    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    Patient/family Goals Pt would like to determine the cause of pills getting stuck in his throat.    Clinical Swallow Evaluation   Oral Musculature generally intact   Structural Abnormalities none present   Dentition present and adequate   Mucosal Quality good   Mandibular Strength and Mobility intact   Oral Labial Strength and Mobility WFL   Lingual Strength and Mobility WFL   Velar Elevation intact   Buccal Strength and Mobility intact   Laryngeal Function Throat clear;Cough;Swallow;Voicing initiated   VFSS Eval: Radiology   Radiologist Resident   Views Taken left lateral;A/P   Physical Location of  Procedure [facility]   VFSS Eval: Thin Liquid Texture Trial   Mode of Presentation, Thin Liquid cup;self-fed   Order of Presentation 1, 2, 8(A/P)   Preparatory Phase WFL   Oral Phase, Thin Liquid WFL   Pharyngeal Phase, Thin Liquid WFL   Rosenbek's Penetration Aspiration Scale: Thin Liquid Trial Results 2 - contrast enters airway, remains above the vocal cords, no residue remains (penetration)   Diagnostic Statement Single episode of flash penetration noted on thin liquid trials. All penetrated material clears the laryngeal vestibule spontaneously.    VFSS Eval: Nectar Thick Liquid Texture Trial   Mode of Presentation, Nectar cup;self-fed   Order of Presentation 3   Preparatory Phase WFL   Oral Phase, Nectar WFL   Pharyngeal Phase, [place] WFL   Rosenbek's Penetration Aspiration Scale: Nectar-Thick Liquid Trial Results 1 - no aspiration, contrast does not enter airway   Diagnostic Statement No aspiration/penetration noted on nectar thick liquid trial.    VFSS Eval: Puree Solid Texture Trial   Mode of Presentation, Puree self-fed;spoon   Order of Presentation 4   Preparatory Phase WFL   Oral Phase, Puree WFL   Pharyngeal Phase, Puree WFL   Rosenbek's Penetration Aspiration Scale: Puree Food Trial Results 1 - no aspiration, contrast does not enter airway   Diagnostic Statement No aspiration/penetration noted on puree consistency trial.    VFSS Eval: Solid Food Texture Trial   Mode of Presentation, Solid self-fed   Order of Presentation 5   Preparatory Phase WFL   Oral Phase, Solid WFL   Pharyngeal Phase, Solid WFL   Rosenbek's Penetration Aspiration Scale: Solid Food Trial Results 1 - no aspiration, contrast does not enter airway   Diagnostic Statement No aspiration/penetration noted on solid cosistency.    Swallow Compensations   Swallow Compensations No compensations were used   Educational Assessment   Barriers to Learning No barriers   Esophageal Phase of Swallow   Patient reports or presents with symptoms of  esophageal dysphagia No   Swallow Eval: Clinical Impressions   Skilled Criteria for Therapy Intervention No problems identified which require skilled intervention   Functional Assessment Scale (FAS) 6   Dysphagia Outcome Severity Scale (KARTHIK) Level 6 - KARTHIK   Treatment Diagnosis Minimal pharyngeal dysphagia   Diet texture recommendations Regular diet;Thin liquids   Recommended Feeding/Eating Techniques maintain upright posture during/after eating for 30 mins;alternate between small bites and sips of food/liquid;small sips/bites  (Pills in puree)   Predicted Duration of Therapy Intervention (days/wks) Evaluation only   Risks and Benefits of Treatment have been explained. Yes   Patient, family and/or staff in agreement with Plan of Care Yes   Clinical Impression Comments Dr. Grant demonstrates minimal pharyngeal dysphagia as characterized by single episode of flash penetration on thin liquid trial which spotaneously cleared the laryngeal vestibule. No aspiration noted on any consistency. No pharyngeal residue noted on any consistency. Pt does have prominant cervical spinal osteophyte at C3-4.  This osteophyte likely results in the hindrance of bolus flow occasionally on pills.  He also may scense this as it is quite prominent in his pharynx.  Recommend continue regular consistency solids and thin liquids.  Pills with purée as to minimize them becoming lodged in the vallecula.  Small bites, small sips and slow rate.  He would benefit from further evaluation of the cervical osteophyte.  No further speech pathology services indicated at this time.   Total Session Time   SLP Eval: VideoFluoroscopic Swallow function Minutes (57384) 15   Total Evaluation Time 15     Thank you for the referral of Darnell Grant. If you have any questions about this report, please contact me using the information below.      Margaret Whitman MS, CCC-SLP  Speech-Language Pathology  [facility]  [facility]&T - 4th floor  Pager: [phone]  Phone: [phone]  Email: [email or web address]

## 2019-10-22 DIAGNOSIS — R13.10 DYSPHAGIA, UNSPECIFIED TYPE: Primary | ICD-10-CM

## 2019-10-29 PROBLEM — R13.19 ESOPHAGEAL DYSPHAGIA: Status: ACTIVE | Noted: 2019-10-29

## 2019-10-29 PROBLEM — M25.78 OSTEOPHYTE OF CERVICAL SPINE: Status: ACTIVE | Noted: 2019-10-29

## 2019-10-29 NOTE — PROGRESS NOTES
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 Myles Cleary.    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 He was referred to me by Dr. Cleary of ENT after a modified barium swallow study 10/8/19 showed that anterior cervical osteophytes most notably at C2-3 and to a lesser extent at C3-4 were causing compression and deformation of the esophagus and in Dr. Cleary's opinion were the likely etiology of his dysphagia.    Dr. Avendaño feels his dysphagia is causing some silent aspiration because he has had coughing after liquid intake frequently. He feels his upper esophagus is where the sensation of the problem is.            Review of Systems   Constitutional: Negative for activity change, appetite change, chills, fatigue, fever and unexpected weight change.   HENT: positive for trouble swallowing.    Eyes: Negative for visual disturbance.   Respiratory: Negative for shortness of breath.    Cardiovascular: Negative for leg swelling.  Gastrointestinal: Negative for abdominal pain, nausea and vomiting.   Endocrine: Negative for cold intolerance.  Genitourinary: Negative for incontinence, frequency and urgency.   Musculoskeletal: Negative for gait problem, neck pain and neck stiffness.  Skin: Negative for color change  Allergic/Immunologic: Negative for immunocompromised state.  Neurological: Negative for tremors, speech difficulty, weakness, numbness and headaches.   Hematological: Does not bruise/bleed easily.   Psychiatric/Behavioral: The patient is not nervous/anxious.     Past Medical History:   Diagnosis Date     Bladder mass      BPH (benign prostatic hyperplasia)      GERD (gastroesophageal reflux disease)      High serum parathyroid hormone (PTH) 2017     Hypertension      Metabolic syndrome       Nephrolithiasis      Obesity      LISETTE (obstructive sleep apnea)      Prediabetes        Past Surgical History:   Procedure Laterality Date     BACK SURGERY      repair disc     CYSTOSCOPY, TRANSURETHRAL RESECTION (TUR) TUMOR BLADDER, COMBINED N/A 1/31/2018    Procedure: COMBINED CYSTOSCOPY, TRANSURETHRAL RESECTION (TUR) TUMOR BLADDER;  Transurethral Biopsy and Resection of Bladder Tumor;  Surgeon: Yaya Lomeli MD;  Location: [facility] OR     HC BREATH HYDROGEN TEST  5/22/2013    Procedure: HYDROGEN BREATH TEST;  Surgeon: Donny Paris MD;  Location: [facility] GI     TRANSURETHERAL DESTRUCTION OF PROSTATE BY THERMOTHERAPY N/A 4/13/2018    Procedure: TRANSURETHERAL DESTRUCTION OF PROSTATE BY THERMOTHERAPY;  Re[facility]um Procedure;  Surgeon: Yaya Lomeli MD;  Location: [facility] OR       Social History     Socioeconomic History     Marital status:      Spouse name: Not on file     Number of children: Not on file     Years of education: Not on file     Highest education level: Not on file   Occupational History     Not on file   Social Needs     Financial resource strain: Not on file     Food insecurity:     Worry: Not on file     Inability: Not on file     Transportation needs:     Medical: Not on file     Non-medical: Not on file   Tobacco Use     Smoking status: Never Smoker     Smokeless tobacco: Never Used   Substance and Sexual Activity     Alcohol use: No     Drug use: No     Sexual activity: Not on file   Lifestyle     Physical activity:     Days per week: Not on file     Minutes per session: Not on file     Stress: Not on file   Relationships     Social connections:     Talks on phone: Not on file     Gets together: Not on file     Attends [employer] service: Not on file     Active member of club or organization: Not on file     Attends meetings of clubs or organizations: Not on file     Relationship status: Not on file     Intimate partner violence:     Fear of current or ex partner: Not on file      Emotionally abused: Not on file     Physically abused: Not on file     Forced sexual activity: Not on file   Other Topics Concern     Parent/sibling w/ CABG, MI or angioplasty before 65F 55M? Not Asked   Social History Narrative     Not on file       family history includes Asthma in his brother; Diabetes in his maternal grandmother.        IMAGING:            Resulted Imaging/Labs:  Xr Video Swallow W/o Esophagram - Order With Speech Therapy Referral    Result Date: 10/8/2019  EXAMINATION:  Modified Barium Swallow Study with Speech Pathology on 10/8/2019. INDICATION: Dysphagia COMPARISON: CT chest 9/14/2019 Fluoroscopy time: 0.8 minutes. FINDINGS: Under fluoroscopic guidance, the patient was given orally administered barium of varying consistencies in the presence of the speech pathology service. The oral phase was normal. There is no delay in swallowing or pooling of contrast. Several episodes of flash penetration with thin liquid. No evidence of aspiration with any consistency. There is a prominent C2-C3 anterior osteophyte causing mild esophageal narrowing. Additional C3-C4 mild esophageal narrowing which may be related to anterior forming osteophytes versus chronic a ventral bar. No evidence for aspiration with nectar thickened barium, pudding consistency, or barium coated cracker.     IMPRESSION: 1. Prominent anterior C2-C3 osteophyte causing narrowing of the esophagus. 2. Flash penetration with thin liquid. No evidence of aspiration. Please see the speech pathologist report for further details regarding the modified barium swallow study portion of the examination. I have personally reviewed the examination and initial interpretation and I agree with the findings. COBY HERNANDEZ MD    Dexa Hip/pelvis/spine*    Result Date: 3/20/2018  [facility] [address] [place] [id number] Phone: [phone]   Fax: [phone]   FINAL REPORT Patient name:    PATSY BROWN ([id number] ) Patient demographics:  64.2 year old White Male of 75.0 in. height and 275.0 lbs. weight Ordering provider:  LOGAN BOWMAN History:  family hx of hip fractures, HIGH CALCIUM, Hyperparathyroid, LOW TESTOSTERONE, LOW VITAMIN D Current treatments:  CHOLESTEROL MEDS, TESTOSTERONE, Vitamin D Scan:    DXA exam ([id number] ): SmartMenuCard Exam date:   03/16/2018 Dual energy x-ray absorptiometry results:  Region BMD T - score Z - score L1-L3 1.557 g/cm  2.9 2.6     Neck Left 1.289 g/cm  1.7 2.2 Total Left 1.502 g/cm  2.8 2.9     Neck Right 1.313 g/cm  1.9 2.4 Total Right 1.424 g/cm  2.2 2.3     Radius 33% 0.927 g/cm  1.6 2.2         Conclusions: The most negative and valid T-score of 1.6 at the level of the wrist, corresponds with  normal.  The risk of osteoporotic fracture increases approximately 2-fold for each 1.0 SD decrease in T-score.  Low bone density is not the only risk factor for fracture;  consider factors such as patient's age, fall risk, injury risk, previous osteoporotic fracture, family history of osteoporosis, etc.  People with elevated risk of fracture should be regularly evaluated for low bone mineral density.  For patients eligible for Medicare, routine testing is allowed once every 2 years.  Testing frequency can be increased for patients on corticosteroids.  Clinical correlation is recommended.   Bone densitometry cannot rule out secondary causes of bone loss. Therefore, further metabolic testing to look for secondary causes of low BMD should be performed if indicated. Clinical correlation is recommended Lateral spine imaging with standard radiography or densitometric Vertebral Fracture Assessment (VFA) may be performed when T-score is < -1.0 AND -historical height loss > 4 cm (>1.5 inches); or -glucocorticoid therapy of prednisone ? 5 mg/day or equivalent for ? 3 months is present. Clinical correlation is strongly recommended   Feel free to contact DXA  services if you have any questions or comments.  Thank you for the opportunity to be of service to you and your patient. Principal result : Michelle Ramirez MD, CCD  of Medicine Division of Endocrinology References: 1.  ISCD position statements:  www.iscd.org  (includes the report of the 2015  Position Development Conference) Template revised 5/24/2017 Technical comments:  Satisfactory.  Wrists As per ISCD, so that 2 valid regions are available for analysis,  results of the scan of the wrist were taken into consideration for determining WHO bone health diagnosis for the following reason: Spine As per the ISCD Position Statements, abnormal vertebrae may be excluded from analysis if there is more than a 1.0 T-score difference between the vertebrae in question and adjacent vertebrae. Note the wide range in BMD values and  T-scores  within the lumbar spine L3 - L4.  In the circumstances, the lumbar spine is represented by L1 - L3.      Dx Wrist Heel Radius    Result Date: 3/20/2018  Results are reported under DXA scan report for the hip/spine performed on the same day. Principal result : Michelle Ramirez MD, CCD Division of Diabetes, Endocrinology and Metabolism [facility] [phone]      Vitamin D:  Vitamin D Deficiency Screening Results:  Lab Results   Component Value Date    VITDT 39 11/13/2018    VITDT 38 03/31/2017    VITDT 30 01/16/2017     25 OH Vit D2   Date Value Ref Range Status   01/08/2019 69 ug/L Final     25 OH Vit D3   Date Value Ref Range Status   01/08/2019 17 ug/L Final     25 OH Vit D total   Date Value Ref Range Status   01/08/2019 86 (H) 20 - 75 ug/L Final     Comment:     Season, race, dietary intake, and treatment affect the concentration of   25-hydroxy-Vitamin D. Values may decrease during winter months and increase   during summer months. Values 20-29 ug/L may indicate Vitamin D insufficiency   and values <20  "ug/L may indicate Vitamin D deficiency.  This test was developed and its performance characteristics determined by the   [facility],  Special Chemistry Laboratory. It has   not been cleared or approved by the FDA. The laboratory is regulated under   CLIA as qualified to perform high-complexity testing. This test is used for   clinical purposes. It should not be regarded as investigational or for   research.           Nutritional Status:  Estimated body mass index is 31.74 kg/m  as calculated from the following:    Height as of 1/8/19: 1.905 m (6' 3\").    Weight as of 5/6/19: 115.2 kg (253 lb 14.4 oz).    Lab Results   Component Value Date    ALBUMIN 3.9 11/13/2018       Diabetes Screening:  Lab Results   Component Value Date    A1C 5.6 04/30/2019    A1C 5.7 11/13/2018    A1C 5.2 01/16/2017    A1C 5.4 11/21/2016    A1C 5.2 04/18/2016       Nicotine Usage:    No                Physical Exam   /88   Pulse 101   Resp 15   Ht 1.905 m (6' 3\")   Wt 117 kg (258 lb)   SpO2 99%   BMI 32.25 kg/m    Constitutional: Oriented to person, place, and time. Appears well-developed and well-nourished. Cooperative. No distress.   HENT:   Head: Normocephalic and atraumatic.   Eyes: Conjunctivae are normal.  Neck: Normal range of motion. Neck supple. No spinous process tenderness and no muscular tenderness present. No tracheal deviation present.  Cardiovascular: Normal rate    Pulmonary/Chest: Effort normal  Abdominal:  Exhibits no distension  Musculoskeletal:   Cervical flexion-extension range of motion: normal  Lumbar flexion/extension range of motion: normal    Neurological: alert and oriented to person, place, and time.   No cranial nerve deficit   sensory deficit none  Gait normal      Reflex Scores:        Tricep reflexes are 2+ on the right side and 2+ on the left side.       Bicep reflexes are 2+ on the right side and 2+ on the left side.       Brachioradialis reflexes are 2+ on the right " side and 2+ on the left side.       Patellar reflexes are 2+ on the right side and 2+ on the left side.       Achilles reflexes are 2+ on the right side and 2+ on the left side.    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     PLAN:    I discussed with Dr. Grant that Dr. Cleary of ENT felt it was likely that the anterior cervical osteophytes were causing his dysphagia. We reviewed his swallow study. 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 This is associated with bony overgrowth. I discussed that we can resect the anterior osteophytes at C2-3 and C3-4. Surgical risk would be esophageal injury with potential need for feeding tube or further surgery if this occurred, dysphonia temporary or permanent, dysphagia worsening temporarily or permanently, injury to carotid artery with potential for bleeding or stroke, anterior cervical hematoma with potential for airway compromise and respiratory arrest or death, or wound infection.   The osteophytes may regrow and become symptomatic despite resection and bone wax placement to discourage further growth. Additional levels might become symptomatic.  Additional risks inherent to general anesthesia including heart attack, pneumonia, stroke, or death.    I discussed all of this with Dr. Grant. He would like to proceed with surgery.    Surgery order placed for right approach anterior cervical 2-3, 3-4 resection of anterior cervical osteophytes causing dysphagia    Patient prefers Tues  Dec 10 2019 for surgery due to busy schedule with national travel          María Fitzgerald MD    [facility] Department of Neurosurgery  Complex Spinal Deformity, Scoliosis, and Minimally Invasive Spine Surgery Specialist  Office: [phone]      I spent 20 minutes in patient care with greater than 50% spent in counseling and/or coordination of care.

## 2019-10-31 ENCOUNTER — ANCILLARY PROCEDURE (OUTPATIENT)
Dept: GENERAL RADIOLOGY | Facility: CLINIC | Age: 64
End: 2019-10-31
Attending: NEUROLOGICAL SURGERY
Payer: COMMERCIAL

## 2019-10-31 ENCOUNTER — OFFICE VISIT (OUTPATIENT)
Dept: NEUROSURGERY | Facility: CLINIC | Age: 64
End: 2019-10-31
Payer: COMMERCIAL

## 2019-10-31 ENCOUNTER — PREP FOR PROCEDURE (OUTPATIENT)
Dept: NEUROSURGERY | Facility: CLINIC | Age: 64
End: 2019-10-31

## 2019-10-31 VITALS
WEIGHT: 258 LBS | DIASTOLIC BLOOD PRESSURE: 88 MMHG | HEIGHT: 75 IN | RESPIRATION RATE: 15 BRPM | BODY MASS INDEX: 32.08 KG/M2 | OXYGEN SATURATION: 99 % | SYSTOLIC BLOOD PRESSURE: 139 MMHG | HEART RATE: 101 BPM

## 2019-10-31 DIAGNOSIS — M25.78 OSTEOPHYTE OF CERVICAL SPINE: ICD-10-CM

## 2019-10-31 DIAGNOSIS — R13.19 ESOPHAGEAL DYSPHAGIA: Primary | ICD-10-CM

## 2019-10-31 DIAGNOSIS — R13.19 ESOPHAGEAL DYSPHAGIA: ICD-10-CM

## 2019-10-31 ASSESSMENT — MIFFLIN-ST. JEOR: SCORE: 2040.91

## 2019-10-31 ASSESSMENT — PAIN SCALES - GENERAL: PAINLEVEL: NO PAIN (0)

## 2019-10-31 NOTE — NURSING NOTE
Chief Complaint   Patient presents with     Consult     [facility] NEW CERVICAL BONE OSSETOPHYTE       Joe Rees, EMT   Patient is here for ear cleaning He builds up cerumen and debris that occludes his left ear and stops his hearing aid from functioning. He has been using the hydrocortisone in his left ear with good results until this morning, when the ear started to plug up.     EXAM: His right ear is clear. The left ear canal was again filled with soft cerumen and debris that was suctioned using the microscope. Cleaning was fairly straightforward and took 4 minutes.     IMPRESSION: Recurrent impacted cerumen in his left ear due to chronic otitis externa.     PLAN: Return for cleaning as needed, which seems to be every 3 to 4 weeks. He was encouraged to continue the hydrocortisone as well.

## 2019-10-31 NOTE — LETTER
10/31/2019       RE: Darnell Grant  [address]  [facility] [address]     Dear Colleague,    Thank you for referring your patient, Darnell Grant, to the [facility] NEUROSURGERY at [facility]. Please see a copy of my visit note below.        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 Myles Cleary.    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 He was referred to me by Dr. Cleary of ENT after a modified barium swallow study 10/8/19 showed that anterior cervical osteophytes most notably at C2-3 and to a lesser extent at C3-4 were causing compression and deformation of the esophagus and in Dr. Cleary's opinion were the likely etiology of his dysphagia.    Dr. Avendaño feels his dysphagia is causing some silent aspiration because he has had coughing after liquid intake frequently. He feels his upper esophagus is where the sensation of the problem is.            Review of Systems   Constitutional: Negative for activity change, appetite change, chills, fatigue, fever and unexpected weight change.   HENT: positive for trouble swallowing.    Eyes: Negative for visual disturbance.   Respiratory: Negative for shortness of breath.    Cardiovascular: Negative for leg swelling.  Gastrointestinal: Negative for abdominal pain, nausea and vomiting.   Endocrine: Negative for cold intolerance.  Genitourinary: Negative for incontinence, frequency and urgency.   Musculoskeletal: Negative for gait problem, neck pain and neck stiffness.  Skin: Negative for color change  Allergic/Immunologic: Negative for immunocompromised state.  Neurological: Negative for tremors, speech difficulty, weakness, numbness and headaches.   Hematological: Does not bruise/bleed easily.    Psychiatric/Behavioral: The patient is not nervous/anxious.     Past Medical History:   Diagnosis Date     Bladder mass      BPH (benign prostatic hyperplasia)      GERD (gastroesophageal reflux disease)      High serum parathyroid hormone (PTH) 2017     Hypertension      Metabolic syndrome      Nephrolithiasis      Obesity      LISETTE (obstructive sleep apnea)      Prediabetes        Past Surgical History:   Procedure Laterality Date     BACK SURGERY      repair disc     CYSTOSCOPY, TRANSURETHRAL RESECTION (TUR) TUMOR BLADDER, COMBINED N/A 1/31/2018    Procedure: COMBINED CYSTOSCOPY, TRANSURETHRAL RESECTION (TUR) TUMOR BLADDER;  Transurethral Biopsy and Resection of Bladder Tumor;  Surgeon: Yaya Lomeli MD;  Location: [facility] OR     HC BREATH HYDROGEN TEST  5/22/2013    Procedure: HYDROGEN BREATH TEST;  Surgeon: Donny Paris MD;  Location:  GI     TRANSURETHERAL DESTRUCTION OF PROSTATE BY THERMOTHERAPY N/A 4/13/2018    Procedure: TRANSURETHERAL DESTRUCTION OF PROSTATE BY THERMOTHERAPY;  Re[facility]um Procedure;  Surgeon: Yaya Lomeli MD;  Location:  OR       Social History     Socioeconomic History     Marital status:      Spouse name: Not on file     Number of children: Not on file     Years of education: Not on file     Highest education level: Not on file   Occupational History     Not on file   Social Needs     Financial resource strain: Not on file     Food insecurity:     Worry: Not on file     Inability: Not on file     Transportation needs:     Medical: Not on file     Non-medical: Not on file   Tobacco Use     Smoking status: Never Smoker     Smokeless tobacco: Never Used   Substance and Sexual Activity     Alcohol use: No     Drug use: No     Sexual activity: Not on file   Lifestyle     Physical activity:     Days per week: Not on file     Minutes per session: Not on file     Stress: Not on file   Relationships     Social connections:     Talks on phone: Not on file     Gets  together: Not on file     Attends Religion service: Not on file     Active member of club or organization: Not on file     Attends meetings of clubs or organizations: Not on file     Relationship status: Not on file     Intimate partner violence:     Fear of current or ex partner: Not on file     Emotionally abused: Not on file     Physically abused: Not on file     Forced sexual activity: Not on file   Other Topics Concern     Parent/sibling w/ CABG, MI or angioplasty before 65F 55M? Not Asked   Social History Narrative     Not on file       family history includes Asthma in his brother; Diabetes in his maternal grandmother.        IMAGING:            Resulted Imaging/Labs:  Xr Video Swallow W/o Esophagram - Order With Speech Therapy Referral    Result Date: 10/8/2019  EXAMINATION:  Modified Barium Swallow Study with Speech Pathology on 10/8/2019. INDICATION: Dysphagia COMPARISON: CT chest 9/14/2019 Fluoroscopy time: 0.8 minutes. FINDINGS: Under fluoroscopic guidance, the patient was given orally administered barium of varying consistencies in the presence of the speech pathology service. The oral phase was normal. There is no delay in swallowing or pooling of contrast. Several episodes of flash penetration with thin liquid. No evidence of aspiration with any consistency. There is a prominent C2-C3 anterior osteophyte causing mild esophageal narrowing. Additional C3-C4 mild esophageal narrowing which may be related to anterior forming osteophytes versus chronic a ventral bar. No evidence for aspiration with nectar thickened barium, pudding consistency, or barium coated cracker.     IMPRESSION: 1. Prominent anterior C2-C3 osteophyte causing narrowing of the esophagus. 2. Flash penetration with thin liquid. No evidence of aspiration. Please see the speech pathologist report for further details regarding the modified barium swallow study portion of the examination. I have personally reviewed the examination and  initial interpretation and I agree with the findings. COBY HERNANDEZ MD    Dexa Hip/pelvis/spine*    Result Date: 3/20/2018  [facility] [address] [place] [id number] Phone: [phone]   Fax: [phone]   FINAL REPORT Patient name:   PATSY BROWN ([id number] ) Patient demographics:  64.2 year old White Male of 75.0 in. height and 275.0 lbs. weight Ordering provider:  LOGAN BOWMAN History:  family hx of hip fractures, HIGH CALCIUM, Hyperparathyroid, LOW TESTOSTERONE, LOW VITAMIN D Current treatments:  CHOLESTEROL MEDS, TESTOSTERONE, Vitamin D Scan:    DXA exam ([id number] ): [employer] Exam date:   03/16/2018 Dual energy x-ray absorptiometry results:  Region BMD T - score Z - score L1-L3 1.557 g/cm  2.9 2.6     Neck Left 1.289 g/cm  1.7 2.2 Total Left 1.502 g/cm  2.8 2.9     Neck Right 1.313 g/cm  1.9 2.4 Total Right 1.424 g/cm  2.2 2.3     Radius 33% 0.927 g/cm  1.6 2.2         Conclusions: The most negative and valid T-score of 1.6 at the level of the wrist, corresponds with  normal.  The risk of osteoporotic fracture increases approximately 2-fold for each 1.0 SD decrease in T-score.  Low bone density is not the only risk factor for fracture;  consider factors such as patient's age, fall risk, injury risk, previous osteoporotic fracture, family history of osteoporosis, etc.  People with elevated risk of fracture should be regularly evaluated for low bone mineral density.  For patients eligible for Medicare, routine testing is allowed once every 2 years.  Testing frequency can be increased for patients on corticosteroids.  Clinical correlation is recommended.   Bone densitometry cannot rule out secondary causes of bone loss. Therefore, further metabolic testing to look for secondary causes of low BMD should be performed if indicated. Clinical correlation is recommended Lateral spine imaging with standard radiography or  densitometric Vertebral Fracture Assessment (VFA) may be performed when T-score is < -1.0 AND -historical height loss > 4 cm (>1.5 inches); or -glucocorticoid therapy of prednisone ? 5 mg/day or equivalent for ? 3 months is present. Clinical correlation is strongly recommended   Feel free to contact DXA services if you have any questions or comments.  Thank you for the opportunity to be of service to you and your patient. Principal result : Michelle Ramirez MD, CCD  of Medicine Division of Endocrinology References: 1.  ISCD position statements:  www.iscd.org  (includes the report of the 2015  Position Development Conference) Template revised 5/24/2017 Technical comments:  Satisfactory.  Wrists As per ISCD, so that 2 valid regions are available for analysis,  results of the scan of the wrist were taken into consideration for determining WHO bone health diagnosis for the following reason: Spine As per the ISCD Position Statements, abnormal vertebrae may be excluded from analysis if there is more than a 1.0 T-score difference between the vertebrae in question and adjacent vertebrae. Note the wide range in BMD values and  T-scores  within the lumbar spine L3 - L4.  In the circumstances, the lumbar spine is represented by L1 - L3.      Dx Wrist Heel Radius    Result Date: 3/20/2018  Results are reported under DXA scan report for the hip/spine performed on the same day. Principal result : Michelle Ramirez MD, CCD Division of Diabetes, Endocrinology and Metabolism [facility] [phone]      Vitamin D:  Vitamin D Deficiency Screening Results:  Lab Results   Component Value Date    VITDT 39 11/13/2018    VITDT 38 03/31/2017    VITDT 30 01/16/2017     25 OH Vit D2   Date Value Ref Range Status   01/08/2019 69 ug/L Final     25 OH Vit D3   Date Value Ref Range Status   01/08/2019 17 ug/L Final     25 OH Vit D total   Date Value Ref Range  "Status   01/08/2019 86 (H) 20 - 75 ug/L Final     Comment:     Season, race, dietary intake, and treatment affect the concentration of   25-hydroxy-Vitamin D. Values may decrease during winter months and increase   during summer months. Values 20-29 ug/L may indicate Vitamin D insufficiency   and values <20 ug/L may indicate Vitamin D deficiency.  This test was developed and its performance characteristics determined by the   [facility],  Special Chemistry Laboratory. It has   not been cleared or approved by the FDA. The laboratory is regulated under   CLIA as qualified to perform high-complexity testing. This test is used for   clinical purposes. It should not be regarded as investigational or for   research.       Nutritional Status:  Estimated body mass index is 31.74 kg/m  as calculated from the following:    Height as of 1/8/19: 1.905 m (6' 3\").    Weight as of 5/6/19: 115.2 kg (253 lb 14.4 oz).    Lab Results   Component Value Date    ALBUMIN 3.9 11/13/2018     Diabetes Screening:  Lab Results   Component Value Date    A1C 5.6 04/30/2019    A1C 5.7 11/13/2018    A1C 5.2 01/16/2017    A1C 5.4 11/21/2016    A1C 5.2 04/18/2016       Nicotine Usage:  No      Physical Exam   /88   Pulse 101   Resp 15   Ht 1.905 m (6' 3\")   Wt 117 kg (258 lb)   SpO2 99%   BMI 32.25 kg/m     Constitutional: Oriented to person, place, and time. Appears well-developed and well-nourished. Cooperative. No distress.   HENT:   Head: Normocephalic and atraumatic.   Eyes: Conjunctivae are normal.  Neck: Normal range of motion. Neck supple. No spinous process tenderness and no muscular tenderness present. No tracheal deviation present.  Cardiovascular: Normal rate    Pulmonary/Chest: Effort normal  Abdominal:  Exhibits no distension  Musculoskeletal:   Cervical flexion-extension range of motion: normal  Lumbar flexion/extension range of motion: normal    Neurological: alert and oriented to person, " place, and time.   No cranial nerve deficit   sensory deficit none  Gait normal      Reflex Scores:        Tricep reflexes are 2+ on the right side and 2+ on the left side.       Bicep reflexes are 2+ on the right side and 2+ on the left side.       Brachioradialis reflexes are 2+ on the right side and 2+ on the left side.       Patellar reflexes are 2+ on the right side and 2+ on the left side.       Achilles reflexes are 2+ on the right side and 2+ on the left side.    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     PLAN:  I discussed with Dr. Grant that Dr. Cleary of ENT felt it was likely that the anterior cervical osteophytes were causing his dysphagia. We reviewed his swallow study. 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 This is associated with bony overgrowth. I discussed that we can resect the anterior osteophytes at C2-3 and C3-4. Surgical risk would be esophageal injury with potential need for feeding tube or further surgery if this occurred, dysphonia temporary or permanent, dysphagia worsening temporarily or permanently, injury to carotid artery with potential for bleeding or stroke, anterior cervical hematoma with potential for airway compromise and respiratory arrest or death, or wound infection.   The osteophytes may regrow and become symptomatic despite resection and bone wax placement to discourage further growth. Additional levels might become symptomatic.  Additional risks inherent to general anesthesia  including heart attack, pneumonia, stroke, or death.    I discussed all of this with Dr. Grant. He would like to proceed with surgery.    Surgery order placed for right approach anterior cervical 2-3, 3-4 resection of anterior cervical osteophytes causing dysphagia    Patient prefers Tues Dec 10 2019 for surgery due to busy schedule with national travel      María Fitzgerald MD    [facility] Department of Neurosurgery  Complex Spinal Deformity, Scoliosis, and Minimally Invasive Spine Surgery Specialist  Office: [phone]    I spent 20 minutes in patient care with greater than 50% spent in counseling and/or coordination of care.

## 2019-11-06 ENCOUNTER — PRE VISIT (OUTPATIENT)
Dept: SURGERY | Facility: CLINIC | Age: 64
End: 2019-11-06

## 2019-11-06 NOTE — TELEPHONE ENCOUNTER
FUTURE VISIT INFORMATION      SURGERY INFORMATION:    Date: 12/10/19    Location: [facility] OR    Surgeon:  María Fitzgerald    Anesthesia Type:  General    RECORDS REQUESTED FROM:       Primary Care Provider: Raul Dutta- [facility]    Pertinent Medical History: Hypertension, LISETTE    Most recent EKG+ Tracin18    Most recent ECHO: 13    Most recent Cardiac Stress Test: 17    Most recent PFT's: 18    Most recent Sleep Study:  10/23/15

## 2019-11-11 ENCOUNTER — DOCUMENTATION ONLY (OUTPATIENT)
Dept: CARE COORDINATION | Facility: CLINIC | Age: 64
End: 2019-11-11

## 2019-11-23 ENCOUNTER — OFFICE VISIT (OUTPATIENT)
Dept: CARDIOLOGY | Facility: CLINIC | Age: 64
End: 2019-11-23
Attending: INTERNAL MEDICINE
Payer: COMMERCIAL

## 2019-11-23 VITALS
HEART RATE: 91 BPM | SYSTOLIC BLOOD PRESSURE: 116 MMHG | OXYGEN SATURATION: 100 % | DIASTOLIC BLOOD PRESSURE: 74 MMHG | HEIGHT: 75 IN | BODY MASS INDEX: 31.58 KG/M2 | WEIGHT: 254 LBS

## 2019-11-23 DIAGNOSIS — I49.3 PVC'S (PREMATURE VENTRICULAR CONTRACTIONS): Primary | ICD-10-CM

## 2019-11-23 LAB — INTERPRETATION ECG - MUSE: NORMAL

## 2019-11-23 PROCEDURE — 99213 OFFICE O/P EST LOW 20 MIN: CPT | Mod: ZP | Performed by: INTERNAL MEDICINE

## 2019-11-23 PROCEDURE — 93010 ELECTROCARDIOGRAM REPORT: CPT | Mod: ZP | Performed by: INTERNAL MEDICINE

## 2019-11-23 PROCEDURE — G0463 HOSPITAL OUTPT CLINIC VISIT: HCPCS | Mod: 25,ZF

## 2019-11-23 PROCEDURE — 93005 ELECTROCARDIOGRAM TRACING: CPT | Mod: ZF

## 2019-11-23 ASSESSMENT — PAIN SCALES - GENERAL: PAINLEVEL: NO PAIN (0)

## 2019-11-23 ASSESSMENT — MIFFLIN-ST. JEOR: SCORE: 2014.83

## 2019-11-23 NOTE — LETTER
11/23/2019      RE: Darnell Grant  [address]  [facility] [address]       Dear Colleague,    Thank you for the opportunity to participate in the care of your patient, Darnell Grant, at the [facility] at [facility]. Please see a copy of my visit note below.    CARDIOLOGY CLINIC FOLLOW UP    Referring Provider:  Raul Dutta  Primary Care Provider:   Raul Dutta  Indication for Consultation:  PVCs    HPI: Darnell Grant is a 63 year old male being seen today for evaluation of ventricular ectopy.    The patient's risk factor profile is: (+) HTN, (+) metabolic syndrome, (+) hypercholesterolemia, (-) tobacco use, (+) fam Hx premature CAD [paternal CABG in 60s]  The patient has no history of cardiovascular disease (CAD, CHF, arrhythmia, valvular heart disease).  The patient has no Hx of PAD or cerebrovascular disease.      He has had intermittent cardiac studies over the past several years.  He had an ECHO in 2013 that showed normal LV function and no valvular heart disease.  He had a stress nuclear study in Feb 2017 that was completely normal.  He has not had a prior coronary angiogram.  He had a coronary calcium score of 772 (90% percentile) in 2017.    He had a .routine clinic visit and had a pulse 100-110.  On auscultation, irregularity was noted.  An ECG showed NSR with LVH and ventricular ectopy.  The patient had no symptoms at that time.  A Ziopatch (Feb 2018) showed 1 episode of NSVT (4 beats) in comparison to prior Holter.   PVC burden was 11% in comparison to 14% on the prior Holter.  I would not treat these asymptomatic arrhythmias.  A beta blocker would not be unreasonable if current antihypertensive regime (Ca blocker, ARB) becomes less effective.    The patient has metabolic syndrome and is on Metformin with well controlled glucose values.  He takes phentermine and Jardiance for weight loss.  He is on triple therapy (Ca blocker, ARB,  diuretic) for HTN with well controlled BP values.    The patient denies chest pain, SOB, PND, orthopnea, pedal edema, palpitations, lightheadedness, and syncope.    The patient is not routinely exercising.  He is walking 5000-10,000 steps on average day.      The patient had a 50 lb weight loss over the past 18 months.    PAST MEDICAL HISTORY:  Past Medical History:   Diagnosis Date     Bladder mass      BPH (benign prostatic hyperplasia)      GERD (gastroesophageal reflux disease)      High serum parathyroid hormone (PTH) 2017     Hypertension      Metabolic syndrome      Nephrolithiasis      Obesity      LISETTE (obstructive sleep apnea)      Prediabetes        CURRENT MEDICATIONS:  Current Outpatient Medications   Medication Sig     allopurinol (ZYLOPRIM) 100 MG tablet take by mouth 1 tab ( 100 mg)  at bedtime     amLODIPine (NORVASC) 10 MG tablet Take 1 tablet (10 mg) by mouth daily     aspirin (ASA) 81 MG EC tablet Take 1 tablet (81 mg) by mouth daily     buPROPion (WELLBUTRIN XL) 150 MG 24 hr tablet Take 1 tablet (150 mg) by mouth every morning     candesartan (ATACAND) 32 MG tablet Take 32 mg by mouth daily     clomiPHENE (CLOMID) 50 MG tablet Take 1 tablet (50 mg) by mouth every other day     empagliflozin (JARDIANCE) 25 MG TABS tablet Take 1 tablet (25 mg) by mouth daily     ergocalciferol (ERGOCALCIFEROL) 83872 units capsule Take 1 capsule (50,000 Units) by mouth once a week     esomeprazole (NEXIUM) 20 MG DR capsule Take 1 capsule (20 mg) by mouth every morning (before breakfast) Take 30-60 minutes before eating.     hydrochlorothiazide (HYDRODIURIL) 12.5 MG tablet Take 1 tablet (12.5 mg) by mouth daily     metFORMIN (GLUCOPHAGE) 1000 MG tablet Take 2 tablets (2,000 mg) by mouth daily (with dinner)     multivitamin, therapeutic with minerals (MULTI-VITAMIN) TABS tablet Take 1 tablet by mouth At Bedtime     order for DME RespirWamis Dreamstation AutoPap 7-15 cm with Respironics Leidy mask.     phentermine  (ADIPEX-P) 37.5 MG tablet Take 56 mg by mouth every morning (before breakfast)     ranitidine (ZANTAC) 150 MG tablet Take 1 tablet (150 mg) by mouth 2 times daily     rosuvastatin (CRESTOR) 40 MG tablet Take 1 tablet (40 mg) by mouth daily     No current facility-administered medications for this visit.      Facility-Administered Medications Ordered in Other Visits   Medication     barium sulfate 40% (VARIBAR THIN HONEY) oral suspension           PAST SURGICAL HISTORY:  Past Surgical History:   Procedure Laterality Date     BACK SURGERY      repair disc     CYSTOSCOPY, TRANSURETHRAL RESECTION (TUR) TUMOR BLADDER, COMBINED N/A 1/31/2018    Procedure: COMBINED CYSTOSCOPY, TRANSURETHRAL RESECTION (TUR) TUMOR BLADDER;  Transurethral Biopsy and Resection of Bladder Tumor;  Surgeon: Yaya Lomeli MD;  Location: [facility] OR     HC BREATH HYDROGEN TEST  5/22/2013    Procedure: HYDROGEN BREATH TEST;  Surgeon: Donny Paris MD;  Location: [facility] GI     TRANSURETHERAL DESTRUCTION OF PROSTATE BY THERMOTHERAPY N/A 4/13/2018    Procedure: TRANSURETHERAL DESTRUCTION OF PROSTATE BY THERMOTHERAPY;  [facility] Procedure;  Surgeon: Yaya Lomeli MD;  Location: [facility] OR       ALLERGIES  Ragweeds    FAMILY HX:  Family History   Problem Relation Age of Onset     Asthma Brother      Diabetes Maternal Grandmother      Nephrolithiasis No family hx of      Parathyroid Disorders No family hx of      Thyroid Disease No family hx of      Glaucoma No family hx of      Macular Degeneration No family hx of        SOCIAL HX:  Social History     Social History     Marital status:      Spouse name: N/A     Number of children: N/A     Years of education: N/A     Social History Main Topics     Smoking status: Never Smoker     Smokeless tobacco: Never Used     Alcohol use No     Drug use: No     Sexual activity: Not Asked     Other Topics Concern     None     Social History Narrative     VITAL SIGNS:  /74 (BP Location: Right arm,  "Patient Position: Chair, Cuff Size: Adult Large)   Pulse 91   Ht 1.892 m (6' 2.5\")   Wt 115.2 kg (254 lb)   SpO2 100%   BMI 32.18 kg/m     Body mass index is 32.18 kg/m .  Wt Readings from Last 2 Encounters:   10/31/19 117 kg (258 lb)   19 115.2 kg (253 lb 14.4 oz)       PHYSICAL EXAM  /74 (BP Location: Right arm, Patient Position: Chair, Cuff Size: Adult Large)   Pulse 91   Ht 1.892 m (6' 2.5\")   Wt 115.2 kg (254 lb)   SpO2 100%   BMI 32.18 kg/m     Darnell Grant is a 62 year old male.in no acute distress.  Overweight.  HEENT: Eyes Nonicteric.  Neck: JVP normal.  Carotids +3/3 bilaterally without bruits.  Lungs: CTA.  Cor: RRR. Normal S1 and S2.  No murmur, rub, or gallop.  PMI in Lf 5th ICS.  Abd: Soft, nontender, nondistended.  NABS.  No pulsatile mass.  Extremities: No C/C/E.  Pulses +3/3 symmetric in upper and lower extremities.  Neuro: Grossly intact.  Psych: A&O x 3.  Skin: No rash.    LABS  Recent Labs   Lab Test 18  0714 14  0801   WBC 6.4 5.9   HGB 16.2 15.9   HCT 47.7 45.8   * 167     Recent Labs   Lab Test 19  0608 18  0626 18  0603   NA  --  143 140   POTASSIUM  --  3.8 4.1   CHLORIDE  --  106 107   CO2  --  30 29   GLC  --  105* 86   BUN  --  16 16   CR 1.01 0.86 0.94   ZHANE 9.7 9.1 8.9     Recent Labs   Lab Test 19  0608 18  0714  08/31/15  0741 14  0801   CHOL 79 77   < > 117 120   HDL 34* 32*   < > 34* 32*   LDL 21 22   < > 45 59   TRIG 120 119   < > 190* 146   CHOLHDLRATIO  --   --   --  3.4 3.7   NHDL 45 45   < >  --   --     < > = values in this interval not displayed.        EK19    NSR 86.  0.12/0.126/0.46  Axis -50 degrees.  LVH.  Nonspecific QRS widening.  Two separate PVCs, different sources.    EK18  12:31 pm  NSR.  LVH.  Nonspecific QRS widening.  Two separate PVCs, different sources.    EK18  2:37 pm  NSR.  LVH.  Nonspecific QRS widening.  No ventricular ectopy.    HOLTER (18): " Frequent ventricular ectopy, 2 dominant morphologies for PVCs with 14% burden.  107 NSVT, max 5 beats.  I left message for Darnell that I would like to see him in the clinic.  Consider stress cardiac MRI.  ECHO: 6/4/13  Global and regional left ventricular function is normal with an EF of 55-60%.   Global right ventricular function is normal. Pulmonary artery systolic pressure is normal. No pericardial effusion is present.    ZIOPATCH  (Jan 2019): NSR.  Single SVT, 4 beats, rate 156.  PVC burden 5.4%.  Two 3 beat VT episodes.    No change in therapy.    ZIOPATCH  (Feb 2018) showed 1 episode of NSVT (4 beats) in comparison to prior Holter.   PVC burden was 11% in comparison to 14% on the prior Holter.     Coronary Calcium Score:  Jan 2017  Score 772.  90% percentile    LEXISCAN STRESS TEST:  2/7/17  Findings:  1. Overall quality of the study: Slightly reduced due to patient motion artifact.   2. Left ventricular cavity is normal on the rest and stress studies.  3. SPECT images demonstrate overall homogeneous radiotracer uptake in all myocardial segments at both stress and rest The summed stress score is 0.   4. Gated SPECT images reveal normal left ventricular systolic function without wall motion abnormalities. Left ventricular ejection fraction is 62%. Left ventricular end-diastolic volume is 205 mL. End-systolic volume is 78 mL.       Impression:  1. Normal myocardial SPECT study with a summed stress score of 0. A summed stress score of <4 is associated with an annual event rate of 0.8% and 0.9% for myocardial infarction and cardiac death, respectively (Bretmodell. Circulation 1998;98:535-43).  2. Normal left ventricular systolic function as described above.  3. No significant perfusion abnormalities.  4. No prior study available for comparison.    Stress Cardiac MRI (1/19/18):  1. The LV is normal in cavity size and wall thickness. The global systolic function is normal. The LVEF is 55%. There are no regional  wall motion abnormalities.  2. The RV is normal in cavity size. The global systolic function is normal. The RVEF is 61%.   3. Both atria are normal in size.  4. There is no significant valvular disease.   5. Late gadolinium enhancement imaging shows no MI, fibrosis or infiltrative disease.   6. Regadenoson stress perfusion imaging shows no ischemia.    CONCLUSIONS: No ischemia or infarction. Normal LV and RV function, LVEF of 55% and RVEF of 61%. No obvious substrate for arrhythmia.    CARDIAC CATH: None    ASSESSMENT AND PLAN:   1. Ventricular ectopy, NSVT.  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  He has no other symptoms to suggest CHF or typical angina.  His physical exam is unremarkable.  His ECG shows PVCs with two dominant morphologies and his Holter in 2018 showed 14% PVC burden with two dominant morphologies.  He had 107 runs of NSVT with no more than 5 consecutive beats.  He has no symptoms with the PVCs or NSVT.  He has normal LV function on prior ECHO and this is confirmed with recent Lexiscan and on cardiac MRI today.  He has no evidence of prior MI or ischemia by Lexiscan or stress cardiac MRI.      From a coronary standpoint he does not appear to have any anginal equivalents therefore we have elected to proceed with medication optimization.  He is on aspirin 81 mg daily and Crestor 40 mg daily.  His blood pressure is well controlled.   Similarly, his glucose intolerance is well controlled.    From a PVC standpoint he is largely is a symptomatic.  In addition, follow up Ziopatch earlier this year showed a reduction in PVCs to 4%.  At this time he does not appear that he had any reduction in his LVEF based on his symptoms and examination.  He has had multiple assessments of his LV function in the past year that has been without findings therefore at this time we would proceed with regular follow-up and assessment of LVEF every 2-3 years or sooner  based on any changes in his symptoms.      2. HTN.  BP well controlled/  Continue Norvasc 10 mg every day, Candesartan 32 mg every day, hydrochlorothiazide 12.5 every day..  This is well controlled today    3. Hypercholesterolemia.  LDL well controlled.  Continue Crestor 40 mg qd.    4. Metabolic syndrome / Glucose Intolerance. Continue Metformin.  Last A1c on file was 5.5 (July 2014).  Outside [facility], HgbA1c 5.3 (2018)       FOLLOW UP:  PCP    Leo Dutta MD     Cardiovascular Division    CC  Patient Care Team:  Logan Dutta MD as PCP - General (Internal Medicine)  Heidi Abraham MD as Assigned PCP  Yaya Lomeli MD as MD (Urology)  Sherry Monzon, RN as Registered Nurse (Urology)  Logan Dutta MD as Referring Physician (Internal Medicine)  Abhishek Lujan OD (Optometry)  LOGAN DUTTA

## 2019-11-23 NOTE — PATIENT INSTRUCTIONS
It was a pleasure to see you in the cardiology clinic today.    If you have any questions, you can reach my nurse, Jolie Rogers, at [phone].  Press Option #1 for the [facility], and then press Option #f 4 or nursing.    Note the new medications: None    Stop the following medications: None    The results from today include: None    Tests ordered today: None    I would like you to follow up with Dr. Dutta in one year.    Sincerely,      Leo Dutta MD     [facility]

## 2019-11-23 NOTE — PROGRESS NOTES
CARDIOLOGY CLINIC FOLLOW UP    Referring Provider:  Raul Dutta  Primary Care Provider:   Raul Dutta  Indication for Consultation:  PVCs    HPI: Darnell Grant is a 63 year old male being seen today for evaluation of ventricular ectopy.    The patient's risk factor profile is: (+) HTN, (+) metabolic syndrome, (+) hypercholesterolemia, (-) tobacco use, (+) fam Hx premature CAD [paternal CABG in 60s]  The patient has no history of cardiovascular disease (CAD, CHF, arrhythmia, valvular heart disease).  The patient has no Hx of PAD or cerebrovascular disease.      He has had intermittent cardiac studies over the past several years.  He had an ECHO in 2013 that showed normal LV function and no valvular heart disease.  He had a stress nuclear study in Feb 2017 that was completely normal.  He has not had a prior coronary angiogram.  He had a coronary calcium score of 772 (90% percentile) in 2017.    He had a .routine clinic visit and had a pulse 100-110.  On auscultation, irregularity was noted.  An ECG showed NSR with LVH and ventricular ectopy.  The patient had no symptoms at that time.  A Ziopatch (Feb 2018) showed 1 episode of NSVT (4 beats) in comparison to prior Holter.   PVC burden was 11% in comparison to 14% on the prior Holter.  I would not treat these asymptomatic arrhythmias.  A beta blocker would not be unreasonable if current antihypertensive regime (Ca blocker, ARB) becomes less effective.    The patient has metabolic syndrome and is on Metformin with well controlled glucose values.  He takes phentermine and Jardiance for weight loss.  He is on triple therapy (Ca blocker, ARB, diuretic) for HTN with well controlled BP values.    The patient denies chest pain, SOB, PND, orthopnea, pedal edema, palpitations, lightheadedness, and syncope.    The patient is not routinely exercising.  He is walking 5000-10,000 steps on average day.      The patient had a 50 lb weight loss over the past 18  months.    PAST MEDICAL HISTORY:  Past Medical History:   Diagnosis Date     Bladder mass      BPH (benign prostatic hyperplasia)      GERD (gastroesophageal reflux disease)      High serum parathyroid hormone (PTH) 2017     Hypertension      Metabolic syndrome      Nephrolithiasis      Obesity      LISETTE (obstructive sleep apnea)      Prediabetes        CURRENT MEDICATIONS:  Current Outpatient Medications   Medication Sig     allopurinol (ZYLOPRIM) 100 MG tablet take by mouth 1 tab ( 100 mg)  at bedtime     amLODIPine (NORVASC) 10 MG tablet Take 1 tablet (10 mg) by mouth daily     aspirin (ASA) 81 MG EC tablet Take 1 tablet (81 mg) by mouth daily     buPROPion (WELLBUTRIN XL) 150 MG 24 hr tablet Take 1 tablet (150 mg) by mouth every morning     candesartan (ATACAND) 32 MG tablet Take 32 mg by mouth daily     clomiPHENE (CLOMID) 50 MG tablet Take 1 tablet (50 mg) by mouth every other day     empagliflozin (JARDIANCE) 25 MG TABS tablet Take 1 tablet (25 mg) by mouth daily     ergocalciferol (ERGOCALCIFEROL) 25749 units capsule Take 1 capsule (50,000 Units) by mouth once a week     esomeprazole (NEXIUM) 20 MG DR capsule Take 1 capsule (20 mg) by mouth every morning (before breakfast) Take 30-60 minutes before eating.     hydrochlorothiazide (HYDRODIURIL) 12.5 MG tablet Take 1 tablet (12.5 mg) by mouth daily     metFORMIN (GLUCOPHAGE) 1000 MG tablet Take 2 tablets (2,000 mg) by mouth daily (with dinner)     multivitamin, therapeutic with minerals (MULTI-VITAMIN) TABS tablet Take 1 tablet by mouth At Bedtime     order for DME Respironics Dreamstation AutoPap 7-15 cm with Respironics Leidy mask.     phentermine (ADIPEX-P) 37.5 MG tablet Take 56 mg by mouth every morning (before breakfast)     ranitidine (ZANTAC) 150 MG tablet Take 1 tablet (150 mg) by mouth 2 times daily     rosuvastatin (CRESTOR) 40 MG tablet Take 1 tablet (40 mg) by mouth daily     No current facility-administered medications for this visit.       Facility-Administered Medications Ordered in Other Visits   Medication     barium sulfate 40% (VARIBAR THIN HONEY) oral suspension           PAST SURGICAL HISTORY:  Past Surgical History:   Procedure Laterality Date     BACK SURGERY      repair disc     CYSTOSCOPY, TRANSURETHRAL RESECTION (TUR) TUMOR BLADDER, COMBINED N/A 1/31/2018    Procedure: COMBINED CYSTOSCOPY, TRANSURETHRAL RESECTION (TUR) TUMOR BLADDER;  Transurethral Biopsy and Resection of Bladder Tumor;  Surgeon: Yaya Lomeli MD;  [facility]     HC BREATH HYDROGEN TEST  5/22/2013    Procedure: HYDROGEN BREATH TEST;  Surgeon: Donny Paris MD;  Location: [facility]     TRANSURETHERAL DESTRUCTION OF PROSTATE BY THERMOTHERAPY N/A 4/13/2018    Procedure: TRANSURETHERAL DESTRUCTION OF PROSTATE BY THERMOTHERAPY;  [facility] Procedure;  Surgeon: Yaya Lomeli MD;  [facility]: [facility]       ALLERGIES  [place]    FAMILY HX:  Family History   Problem Relation Age of Onset     Asthma Brother      Diabetes Maternal Grandmother      Nephrolithiasis No family hx of      Parathyroid Disorders No family hx of      Thyroid Disease No family hx of      Glaucoma No family hx of      Macular Degeneration No family hx of        SOCIAL HX:  Social History     Social History     Marital status:      Spouse name: N/A     Number of children: N/A     Years of education: N/A     Social History Main Topics     Smoking status: Never Smoker     Smokeless tobacco: Never Used     Alcohol use No     Drug use: No     Sexual activity: Not Asked     Other Topics Concern     None     Social History Narrative       ROS:  Answers for HPI/ROS submitted by the patient on 11/23/2019   General Symptoms: No  Skin Symptoms: No  HENT Symptoms: No  EYE SYMPTOMS: No  HEART SYMPTOMS: No  LUNG SYMPTOMS: No  INTESTINAL SYMPTOMS: No  URINARY SYMPTOMS: No  REPRODUCTIVE SYMPTOMS: No  SKELETAL SYMPTOMS: No  BLOOD SYMPTOMS: No  NERVOUS SYSTEM SYMPTOMS: No  MENTAL HEALTH SYMPTOMS:  "No    VITAL SIGNS:  /74 (BP Location: Right arm, Patient Position: Chair, Cuff Size: Adult Large)   Pulse 91   Ht 1.892 m (6' 2.5\")   Wt 115.2 kg (254 lb)   SpO2 100%   BMI 32.18 kg/m    Body mass index is 32.18 kg/m .  Wt Readings from Last 2 Encounters:   10/31/19 117 kg (258 lb)   19 115.2 kg (253 lb 14.4 oz)       PHYSICAL EXAM  /74 (BP Location: Right arm, Patient Position: Chair, Cuff Size: Adult Large)   Pulse 91   Ht 1.892 m (6' 2.5\")   Wt 115.2 kg (254 lb)   SpO2 100%   BMI 32.18 kg/m    Darnell Grant is a 62 year old male.in no acute distress.  Overweight.  HEENT: Eyes Nonicteric.  Neck: JVP normal.  Carotids +3/3 bilaterally without bruits.  Lungs: CTA.  Cor: RRR. Normal S1 and S2.  No murmur, rub, or gallop.  PMI in Lf 5th ICS.  Abd: Soft, nontender, nondistended.  NABS.  No pulsatile mass.  Extremities: No C/C/E.  Pulses +3/3 symmetric in upper and lower extremities.  Neuro: Grossly intact.  Psych: A&O x 3.  Skin: No rash.    LABS  Recent Labs   Lab Test 18  0714 14  0801   WBC 6.4 5.9   HGB 16.2 15.9   HCT 47.7 45.8   * 167     Recent Labs   Lab Test 19  0608 18  0626 18  0603   NA  --  143 140   POTASSIUM  --  3.8 4.1   CHLORIDE  --  106 107   CO2  --  30 29   GLC  --  105* 86   BUN  --  16 16   CR 1.01 0.86 0.94   ZHANE 9.7 9.1 8.9     Recent Labs   Lab Test 19  0608 18  0714  08/31/15  0741 14  0801   CHOL 79 77   < > 117 120   HDL 34* 32*   < > 34* 32*   LDL 21 22   < > 45 59   TRIG 120 119   < > 190* 146   CHOLHDLRATIO  --   --   --  3.4 3.7   NHDL 45 45   < >  --   --     < > = values in this interval not displayed.        EK19    NSR 86.  0.12/0.126/0.46  Axis -50 degrees.  LVH.  Nonspecific QRS widening.  Two separate PVCs, different sources.    EK18  12:31 pm  NSR.  LVH.  Nonspecific QRS widening.  Two separate PVCs, different sources.    EK18  2:37 pm  NSR.  LVH.  Nonspecific QRS " widening.  No ventricular ectopy.    HOLTER (1/17/18): Frequent ventricular ectopy, 2 dominant morphologies for PVCs with 14% burden.  107 NSVT, max 5 beats.  I left message for Darnell that I would like to see him in the clinic.  Consider stress cardiac MRI.  ECHO: 6/4/13  Global and regional left ventricular function is normal with an EF of 55-60%.   Global right ventricular function is normal. Pulmonary artery systolic pressure is normal. No pericardial effusion is present.    ZIOPATCH  (Jan 2019): NSR.  Single SVT, 4 beats, rate 156.  PVC burden 5.4%.  Two 3 beat VT episodes.    No change in therapy.    ZIOPATCH  (Feb 2018) showed 1 episode of NSVT (4 beats) in comparison to prior Holter.   PVC burden was 11% in comparison to 14% on the prior Holter.     Coronary Calcium Score:  Jan 2017  Score 772.  90% percentile    LEXISCAN STRESS TEST:  2/7/17  Findings:  1. Overall quality of the study: Slightly reduced due to patient motion artifact.   2. Left ventricular cavity is normal on the rest and stress studies.  3. SPECT images demonstrate overall homogeneous radiotracer uptake in all myocardial segments at both stress and rest The summed stress score is 0.   4. Gated SPECT images reveal normal left ventricular systolic function without wall motion abnormalities. Left ventricular ejection fraction is 62%. Left ventricular end-diastolic volume is 205 mL. End-systolic volume is 78 mL.       Impression:  1. Normal myocardial SPECT study with a summed stress score of 0. A summed stress score of <4 is associated with an annual event rate of 0.8% and 0.9% for myocardial infarction and cardiac death, respectively (Home. Circulation 1998;98:535-43).  2. Normal left ventricular systolic function as described above.  3. No significant perfusion abnormalities.  4. No prior study available for comparison.    Stress Cardiac MRI (1/19/18):  1. The LV is normal in cavity size and wall thickness. The global systolic  function is normal. The LVEF is 55%. There are no regional wall motion abnormalities.  2. The RV is normal in cavity size. The global systolic function is normal. The RVEF is 61%.   3. Both atria are normal in size.  4. There is no significant valvular disease.   5. Late gadolinium enhancement imaging shows no MI, fibrosis or infiltrative disease.   6. Regadenoson stress perfusion imaging shows no ischemia.    CONCLUSIONS: No ischemia or infarction. Normal LV and RV function, LVEF of 55% and RVEF of 61%. No obvious substrate for arrhythmia.    CARDIAC CATH: None    ASSESSMENT AND PLAN:   1. Ventricular ectopy, NSVT.  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  He has no other symptoms to suggest CHF or typical angina.  His physical exam is unremarkable.  His ECG shows PVCs with two dominant morphologies and his Holter in 2018 showed 14% PVC burden with two dominant morphologies.  He had 107 runs of NSVT with no more than 5 consecutive beats.  He has no symptoms with the PVCs or NSVT.  He has normal LV function on prior ECHO and this is confirmed with recent Lexiscan and on cardiac MRI today.  He has no evidence of prior MI or ischemia by Lexiscan or stress cardiac MRI.      From a coronary standpoint he does not appear to have any anginal equivalents therefore we have elected to proceed with medication optimization.  He is on aspirin 81 mg daily and Crestor 40 mg daily.  His blood pressure is well controlled.   Similarly, his glucose intolerance is well controlled.    From a PVC standpoint he is largely is a symptomatic.  In addition, follow up Ziopatch earlier this year showed a reduction in PVCs to 4%.  At this time he does not appear that he had any reduction in his LVEF based on his symptoms and examination.  He has had multiple assessments of his LV function in the past year that has been without findings therefore at this time we would proceed with regular  follow-up and assessment of LVEF every 2-3 years or sooner based on any changes in his symptoms.      2. HTN.  BP well controlled/  Continue Norvasc 10 mg every day, Candesartan 32 mg every day, hydrochlorothiazide 12.5 every day..  This is well controlled today    3. Hypercholesterolemia.  LDL well controlled.  Continue Crestor 40 mg qd.    4. Metabolic syndrome / Glucose Intolerance. Continue Metformin.  Last A1c on file was 5.5 (July 2014).  Outside [facility], HgbA1c 5.3 (2018)       FOLLOW UP:  PCP    Leo Dutta MD     Cardiovascular Division    CC  Patient Care Team:  Logan Dutta MD as PCP - General (Internal Medicine)  Heidi Abraham MD as Assigned PCP  Yaya Lomeli MD as MD (Urology)  Sherry Monzon, RN as Registered Nurse (Urology)  Logan Dutta MD as Referring Physician (Internal Medicine)  Abhishek Lujan OD (Optometry)  LOGAN DUTTA

## 2019-11-29 ENCOUNTER — PATIENT OUTREACH (OUTPATIENT)
Dept: NEUROSURGERY | Facility: CLINIC | Age: 64
End: 2019-11-29

## 2019-11-29 DIAGNOSIS — R79.1 COAGULATION PROFILE, ABNORMAL: ICD-10-CM

## 2019-11-29 DIAGNOSIS — Z01.818 PREOP EXAMINATION: Primary | ICD-10-CM

## 2019-11-29 DIAGNOSIS — E11.65 HYPERGLYCEMIA DUE TO TYPE 2 DIABETES MELLITUS (H): ICD-10-CM

## 2019-11-29 NOTE — PROGRESS NOTES
Writer reached out to pt to let him know that he is to hold his ASA 81mg starting 12/3.  Also, writer suggested that he stop on the third floor after his PAC visit for surgical teaching.  Message left.

## 2019-12-02 ENCOUNTER — PATIENT OUTREACH (OUTPATIENT)
Dept: NEUROSURGERY | Facility: CLINIC | Age: 64
End: 2019-12-02

## 2019-12-02 NOTE — PROGRESS NOTES
Pt declined surgical teaching.  Note put in the surgical folder about holding ASA 81mg.  Also, to hold Metformin and Hydrochlorothiazide the morning of surgery.

## 2019-12-03 ENCOUNTER — OFFICE VISIT (OUTPATIENT)
Dept: SURGERY | Facility: CLINIC | Age: 64
End: 2019-12-03
Payer: COMMERCIAL

## 2019-12-03 ENCOUNTER — ANESTHESIA EVENT (OUTPATIENT)
Dept: SURGERY | Facility: CLINIC | Age: 64
End: 2019-12-03
Payer: COMMERCIAL

## 2019-12-03 VITALS
OXYGEN SATURATION: 97 % | DIASTOLIC BLOOD PRESSURE: 87 MMHG | WEIGHT: 256.3 LBS | SYSTOLIC BLOOD PRESSURE: 149 MMHG | RESPIRATION RATE: 19 BRPM | BODY MASS INDEX: 32.89 KG/M2 | HEIGHT: 74 IN | HEART RATE: 104 BPM | TEMPERATURE: 98.6 F

## 2019-12-03 DIAGNOSIS — Z01.818 PREOP EXAMINATION: Primary | ICD-10-CM

## 2019-12-03 DIAGNOSIS — N30.00 ACUTE CYSTITIS WITHOUT HEMATURIA: ICD-10-CM

## 2019-12-03 DIAGNOSIS — N40.0 BENIGN PROSTATIC HYPERPLASIA, UNSPECIFIED WHETHER LOWER URINARY TRACT SYMPTOMS PRESENT: ICD-10-CM

## 2019-12-03 DIAGNOSIS — Z01.818 PREOP EXAMINATION: ICD-10-CM

## 2019-12-03 DIAGNOSIS — E11.65 HYPERGLYCEMIA DUE TO TYPE 2 DIABETES MELLITUS (H): ICD-10-CM

## 2019-12-03 DIAGNOSIS — R79.1 COAGULATION PROFILE, ABNORMAL: ICD-10-CM

## 2019-12-03 LAB
ALBUMIN UR-MCNC: NEGATIVE MG/DL
ANION GAP SERPL CALCULATED.3IONS-SCNC: 4 MMOL/L (ref 3–14)
APPEARANCE UR: CLEAR
APTT PPP: 39 SEC (ref 22–37)
BASOPHILS # BLD AUTO: 0.1 10E9/L (ref 0–0.2)
BASOPHILS NFR BLD AUTO: 0.7 %
BILIRUB UR QL STRIP: NEGATIVE
BUN SERPL-MCNC: 22 MG/DL (ref 7–30)
CALCIUM SERPL-MCNC: 10.1 MG/DL (ref 8.5–10.1)
CHLORIDE SERPL-SCNC: 105 MMOL/L (ref 94–109)
CO2 SERPL-SCNC: 28 MMOL/L (ref 20–32)
COLOR UR AUTO: YELLOW
CREAT SERPL-MCNC: 0.83 MG/DL (ref 0.66–1.25)
DIFFERENTIAL METHOD BLD: NORMAL
EOSINOPHIL # BLD AUTO: 0.2 10E9/L (ref 0–0.7)
EOSINOPHIL NFR BLD AUTO: 2.2 %
ERYTHROCYTE [DISTWIDTH] IN BLOOD BY AUTOMATED COUNT: 14.2 % (ref 10–15)
GFR SERPL CREATININE-BSD FRML MDRD: >90 ML/MIN/{1.73_M2}
GLUCOSE SERPL-MCNC: 96 MG/DL (ref 70–99)
GLUCOSE UR STRIP-MCNC: >499 MG/DL
HBA1C MFR BLD: 5.2 % (ref 0–5.6)
HCT VFR BLD AUTO: 51.5 % (ref 40–53)
HGB BLD-MCNC: 17.2 G/DL (ref 13.3–17.7)
HGB UR QL STRIP: NEGATIVE
IMM GRANULOCYTES # BLD: 0.1 10E9/L (ref 0–0.4)
IMM GRANULOCYTES NFR BLD: 0.6 %
INR PPP: 0.91 (ref 0.86–1.14)
KETONES UR STRIP-MCNC: NEGATIVE MG/DL
LEUKOCYTE ESTERASE UR QL STRIP: ABNORMAL
LYMPHOCYTES # BLD AUTO: 1.4 10E9/L (ref 0.8–5.3)
LYMPHOCYTES NFR BLD AUTO: 15.4 %
MCH RBC QN AUTO: 31.2 PG (ref 26.5–33)
MCHC RBC AUTO-ENTMCNC: 33.4 G/DL (ref 31.5–36.5)
MCV RBC AUTO: 94 FL (ref 78–100)
MONOCYTES # BLD AUTO: 0.7 10E9/L (ref 0–1.3)
MONOCYTES NFR BLD AUTO: 7.2 %
MUCOUS THREADS #/AREA URNS LPF: PRESENT /LPF
NEUTROPHILS # BLD AUTO: 6.7 10E9/L (ref 1.6–8.3)
NEUTROPHILS NFR BLD AUTO: 73.9 %
NITRATE UR QL: NEGATIVE
NRBC # BLD AUTO: 0 10*3/UL
NRBC BLD AUTO-RTO: 0 /100
PH UR STRIP: 6 PH (ref 5–7)
PLATELET # BLD AUTO: 170 10E9/L (ref 150–450)
PLATELET # BLD EST: NORMAL 10*3/UL
POTASSIUM SERPL-SCNC: 4 MMOL/L (ref 3.4–5.3)
PSA SERPL-MCNC: 2.08 UG/L (ref 0–4)
RBC # BLD AUTO: 5.51 10E12/L (ref 4.4–5.9)
RBC #/AREA URNS AUTO: 3 /HPF (ref 0–2)
SODIUM SERPL-SCNC: 138 MMOL/L (ref 133–144)
SOURCE: ABNORMAL
SP GR UR STRIP: 1.02 (ref 1–1.03)
UROBILINOGEN UR STRIP-MCNC: 0 MG/DL (ref 0–2)
WBC # BLD AUTO: 9.1 10E9/L (ref 4–11)
WBC #/AREA URNS AUTO: 90 /HPF (ref 0–5)

## 2019-12-03 ASSESSMENT — ENCOUNTER SYMPTOMS: DYSRHYTHMIAS: 1

## 2019-12-03 ASSESSMENT — MIFFLIN-ST. JEOR: SCORE: 2017.32

## 2019-12-03 ASSESSMENT — LIFESTYLE VARIABLES: TOBACCO_USE: 0

## 2019-12-03 ASSESSMENT — PATIENT HEALTH QUESTIONNAIRE - PHQ9: SUM OF ALL RESPONSES TO PHQ QUESTIONS 1-9: 0

## 2019-12-03 ASSESSMENT — PAIN SCALES - GENERAL: PAINLEVEL: NO PAIN (0)

## 2019-12-03 NOTE — ANESTHESIA PREPROCEDURE EVALUATION
Anesthesia Pre-Procedure Evaluation    Patient: Darnell Grant   MRN:     [id number] Gender:   male   Age:    63 year old :      1955        Preoperative Diagnosis: Esophageal dysphagia [R13.10]   Procedure(s):  right approach anterior cervical 2-3, 3-4 resection of anterior cervical osteophytes causing dysphagia, with microscope     Past Medical History:   Diagnosis Date     Bladder mass      BPH (benign prostatic hyperplasia)      GERD (gastroesophageal reflux disease)      High serum parathyroid hormone (PTH) 2017     Hypertension      Metabolic syndrome      Nephrolithiasis      Obesity      LISETTE (obstructive sleep apnea)      Prediabetes       Past Surgical History:   Procedure Laterality Date     BACK SURGERY      repair disc     CYSTOSCOPY, TRANSURETHRAL RESECTION (TUR) TUMOR BLADDER, COMBINED N/A 2018    Procedure: COMBINED CYSTOSCOPY, TRANSURETHRAL RESECTION (TUR) TUMOR BLADDER;  Transurethral Biopsy and Resection of Bladder Tumor;  Surgeon: Yaya Lomeli MD;  [facility]     HC BREATH HYDROGEN TEST  2013    Procedure: HYDROGEN BREATH TEST;  Surgeon: Donyn Paris MD;  Location: [facility]     TRANSURETHERAL DESTRUCTION OF PROSTATE BY THERMOTHERAPY N/A 2018    Procedure: TRANSURETHERAL DESTRUCTION OF PROSTATE BY THERMOTHERAPY;  [facility] Procedure;  Surgeon: Yaya Lomeli MD;  Location: [facility] OR          Anesthesia Evaluation     . Pt has had prior anesthetic.     No history of anesthetic complications          ROS/MED HX    ENT/Pulmonary: Comment: Prior sleep study showed LISETTE.  Pt lost 40 pounds since.  Does not use CPAP    (+)sleep apnea, LISETTE risk factors snores loudly, hypertension, obese, Intermittent asthma (stress induced - cold, exercise) Last exacerbation: over two years,Treatment: Nebulizer prn,  doesn't use CPAP , . .   (-) tobacco use and recent URI   Neurologic:  - neg neurologic ROS     Cardiovascular:     (+) Dyslipidemia, hypertension----. : . . . :.  dysrhythmias PVCs, . Previous cardiac testing date:results:Stress Testdate:2/7/2017 results:ECG reviewed date:11/23/2019 results: date: results:          METS/Exercise Tolerance:  >4 METS   Hematologic:        (-) history of blood clots and History of Transfusion   Musculoskeletal: Comment: gout - neg musculoskeletal ROS       GI/Hepatic:     (+) GERD Asymptomatic on medication,       Renal/Genitourinary:     (+) Nephrolithiasis , BPH,       Endo:     (+) Obesity, Other Endocrine Disorder metabolic syndrome.      Psychiatric:     (+) psychiatric history depression      Infectious Disease:  - neg infectious disease ROS       Malignancy:      - no malignancy   Other:    (+) no H/O Chronic Pain,                       PHYSICAL EXAM:   Mental Status/Neuro: A/A/O; Age Appropriate   Airway: Facies: Feasible  Mallampati: III  Mouth/Opening: Full  TM distance: > 6 cm  Neck ROM: Full   Respiratory: Auscultation: CTAB     Resp. Rate: Normal     Resp. Effort: Normal      CV: Rhythm: Regular  Rate: Age appropriate  Heart: Normal Sounds  Edema: None   Comments: Right front tooth is an implant     Dental: Normal Dentition                LABS:  CBC:   Lab Results   Component Value Date    WBC 6.4 03/06/2018    WBC 5.9 07/14/2014    HGB 16.2 03/06/2018    HGB 15.9 07/14/2014    HCT 47.7 03/06/2018    HCT 45.8 07/14/2014     (L) 03/06/2018     07/14/2014     BMP:   Lab Results   Component Value Date     12/19/2018     11/13/2018    POTASSIUM 3.8 12/19/2018    POTASSIUM 4.1 11/13/2018    CHLORIDE 106 12/19/2018    CHLORIDE 107 11/13/2018    CO2 30 12/19/2018    CO2 29 11/13/2018    BUN 16 12/19/2018    BUN 16 11/13/2018    CR 1.01 04/30/2019    CR 0.86 12/19/2018     (H) 12/19/2018    GLC 86 11/13/2018     COAGS:   Lab Results   Component Value Date    PTT 39 (H) 12/25/2010    INR 0.90 12/25/2010     POC: No results found for: BGM, HCG, HCGS  OTHER:   Lab Results   Component Value Date    A1C 5.6  "04/30/2019    ZHANE 9.7 04/30/2019    PHOS 2.8 04/30/2019    ALBUMIN 3.9 11/13/2018    PROTTOTAL 7.3 11/13/2018    ALT 45 11/13/2018    AST 21 11/13/2018    ALKPHOS 61 11/13/2018    BILITOTAL 0.6 11/13/2018    TSH 2.49 04/30/2019    CRP <5.0 05/20/2013    SED 5 05/20/2013        Preop Vitals    BP Readings from Last 3 Encounters:   11/23/19 116/74   10/31/19 139/88   05/06/19 114/75    Pulse Readings from Last 3 Encounters:   11/23/19 91   10/31/19 101   05/06/19 94      Resp Readings from Last 3 Encounters:   10/31/19 15   11/19/18 20   04/13/18 16    SpO2 Readings from Last 3 Encounters:   11/23/19 100%   10/31/19 99%   05/06/19 96%      Temp Readings from Last 1 Encounters:   04/13/18 97.8  F (36.6  C) (Temporal)    Ht Readings from Last 1 Encounters:   11/23/19 1.892 m (6' 2.5\")      Wt Readings from Last 1 Encounters:   11/23/19 115.2 kg (254 lb)    Estimated body mass index is 32.18 kg/m  as calculated from the following:    Height as of 11/23/19: 1.892 m (6' 2.5\").    Weight as of 11/23/19: 115.2 kg (254 lb).     LDA:  Urethral Catheter Latex (Active)   Number of days: 671       Urethral Catheter (Active)   Number of days: 599        Assessment:   ASA SCORE: 3            Plan:   Anes. Type:  General   Pre-Medication: None   Induction:  IV (Standard)   Airway: ETT; Oral   Access/Monitoring: PIV   Maintenance: Balanced     Postop Plan:   Postop Pain: Opioids  Postop Sedation/Airway: Not planned     PONV Management:   Adult Risk Factors:, Postop Opioids   Prevention: Ondansetron     CONSENT: Direct conversation   Plan and risks discussed with: Patient          Comments for Plan/Consent:  C-MAC in the room -              PAC Discussion and Assessment    ASA Classification: 3  Case is suitable for: [place]  Anesthetic techniques and relevant risks discussed: GA  Invasive monitoring and risk discussed: No  Types:   Possibility and Risk of blood transfusion discussed: Yes  NPO instructions given:   Additional " anesthetic preparation and risks discussed:   Needs early admission to pre-op area:   Other:     PAC Resident/NP Anesthesia Assessment:  Darnell Grant is a 63 year old male scheduled for right approach anterior cervical 2-3, 3-4 resection of anterior cervical osteophytes causing dysphagia, with microscope on 12/10/2019 by Dr. Fitzgerald in treatment of esophageal dysphagia.  PAC referral for risk assessment and optimization for anesthesia with comorbid conditions of hypertension, hyperlipidemia (metabolic syndrome), GERD, gout, and BPH:    Pre-operative considerations:  1.  Cardiac:  Functional status- METS >4.  Pt does not exercise but can climb stairs and walk >2 blocks.  He gets [phone] steps per day.  Intermediate risk surgery with 0.4% (RCRI #) risk of major adverse cardiac event.   -denies CP, SOB, LOPEZ, palpitations  -Pt seen by Dr. Dutta, cardiology 11/23/2019.  EKG then showed SR with PVC, LAFB, LVH with QRS widening and repolarization abnormality.  -Holter monitor 1/2019 with frequent PVCs, 5.4% burden, no symptoms associated with PVC  -Lexiscan 2/7/2017: no wall motion abnormalities, no perfusion abnormalities.  -cardiac MRI 1/19/2018: EF of 55%, no wall motion abnormalities, no ischemia or infarct  -hypertension, amlodipine and cadasartan at bedtime.  Will hold hydrochlorothiazide DOS.  -will hold ASA 7 days prior to DOS.  -hyperlipidemia, Crestor at bedtime    2.  Pulm:  Airway feasible.  LISETTE risk: Pt with known LISETTE, no CPAP  -never smoker    3.  GI:  Risk of PONV score = 2.  If > 2, anti-emetic intervention recommended.  -GERD treated with Nexium    4.  Endo:  -metabolic syndrome. jardiance and metformin at bedtime.  -phentermine for weight loss - will hold 7 days prior to DOS  -BMI of 32    5.  MSK  -gout, allopurinol at bedtime    6.    -h/o BPH.  Pt requested a PSA level today as he is seeing his urologist soon.  Ordered  -Pt taking Clomid every other day for hypogonadism per endocrinology    7.   Psych  -h/o depression.  Will take Wellbutrin DOS.    VTE risk: 1.8%        **For further details of assessment, testing, and physical exam please see H and P completed on same date.          rCistina iVla PA-C, [facility]      Reviewed and Signed by PAC Mid-Level Provider/Resident  Mid-Level Provider/Resident: Cristina Vila  Date: 12/3/2019  Time:     Attending Anesthesiologist Anesthesia Assessment:  63 year old for right side ACDF for osteophytes. Active without cardiopulmonary disease. Just saw cardiology, some PVCs, no need for further workup. Patient/case discussed with SONIA/resident; agree with above assessment. No need to see patient. Patient is appropriate for the planned procedure without further work-up or medical management.    Kelli Brenner MD        Anesthesiologist:   Date:   Time:   Pass/Fail:   Disposition:     PAC Pharmacist Assessment:        Pharmacist:   Date:   Time:    Cristina Vila PA-C

## 2019-12-03 NOTE — H&P
Pre-Operative H & P     CC:  Preoperative exam to assess for increased cardiopulmonary risk while undergoing surgery and anesthesia.    Date of Encounter: 12/3/2019  Primary Care Physician:  Raul Dutta  Associated Diagnosis:  Esophageal dysphagia    HPI  Darnell Grant is a 63 year old male who presents for pre-operative H & P in preparation for right approach anterior cervical 2-3, 3-4 resection of anterior cervical osteophytes causing dysphagia, with microscope  with Dr. Fitzgerald on 12/10/2019 at [facility]. General Anesthesia.    This is a 63-year-old man with history of hypertension, hyperlipidemia, LISETTE, metabolic syndrome, PVCs, GERD, BPH, and gout.  He was seen in the ENT dysphagia.  A barium swallow study done on 10/8/2019 showed anterior cervical osteophytes at C2-3 and C3-4 were compressing the esophagus.  He was subsequently referred to Dr. Fitzgerald.  The above procedure is planned.    History is obtained from the patient.     Past Medical History  Past Medical History:   Diagnosis Date     Bladder mass      BPH (benign prostatic hyperplasia)      GERD (gastroesophageal reflux disease)      High serum parathyroid hormone (PTH) 2017     Hypertension      Metabolic syndrome      Nephrolithiasis      Obesity      LISETTE (obstructive sleep apnea)      Prediabetes        Past Surgical History  Past Surgical History:   Procedure Laterality Date     BACK SURGERY      repair disc     CYSTOSCOPY, TRANSURETHRAL RESECTION (TUR) TUMOR BLADDER, COMBINED N/A 1/31/2018    Procedure: COMBINED CYSTOSCOPY, TRANSURETHRAL RESECTION (TUR) TUMOR BLADDER;  Transurethral Biopsy and Resection of Bladder Tumor;  Surgeon: Yaya Lomeli MD;  Location:  OR     HC BREATH HYDROGEN TEST  5/22/2013    Procedure: HYDROGEN BREATH TEST;  Surgeon: Donny Paris MD;  Location: [facility] GI     TRANSURETHERAL DESTRUCTION OF PROSTATE BY THERMOTHERAPY N/A 4/13/2018    Procedure:  TRANSURETHERAL DESTRUCTION OF PROSTATE BY THERMOTHERAPY;  [facility] Procedure;  Surgeon: Yaya Lomeli MD;  Location: [facility] OR       Hx of Blood transfusions/reactions: none     Hx of abnormal bleeding or anti-platelet use: ASA 81mg    Menstrual history: No LMP for male patient.:     Steroid use in the last year: none    Personal or FH with difficulty with Anesthesia:  none    Prior to Admission Medications  Current Outpatient Medications   Medication Sig Dispense Refill     allopurinol (ZYLOPRIM) 100 MG tablet take by mouth 1 tab ( 100 mg)  at bedtime (Patient taking differently: Take 100 mg by mouth At Bedtime take by mouth 1 tab ( 100 mg)  at bedtime) 90 tablet 3     amLODIPine (NORVASC) 10 MG tablet Take 1 tablet (10 mg) by mouth daily (Patient taking differently: Take 10 mg by mouth At Bedtime ) 90 tablet 3     aspirin (ASA) 81 MG EC tablet Take 1 tablet (81 mg) by mouth daily (Patient taking differently: Take 81 mg by mouth At Bedtime ) 90 tablet 3     buPROPion (WELLBUTRIN XL) 150 MG 24 hr tablet Take 1 tablet (150 mg) by mouth every morning (Patient taking differently: Take 300 mg by mouth every morning ) 90 tablet 3     candesartan (ATACAND) 32 MG tablet Take 32 mg by mouth daily (Patient taking differently: Take 32 mg by mouth At Bedtime ) 90 tablet 3     clomiPHENE (CLOMID) 50 MG tablet Take 50 mg by mouth every other day Pt can't recall if medication was taken today or yesterday 12/3/19       empagliflozin (JARDIANCE) 25 MG TABS tablet Take 25 mg by mouth every morning        esomeprazole (NEXIUM) 20 MG DR capsule Take 1 capsule (20 mg) by mouth every morning (before breakfast) Take 30-60 minutes before eating. (Patient taking differently: Take 20 mg by mouth At Bedtime Take 30-60 minutes before eating.) 90 capsule 3     hydrochlorothiazide (HYDRODIURIL) 12.5 MG tablet Take 1 tablet (12.5 mg) by mouth daily (Patient taking differently: Take 12.5 mg by mouth every morning ) 90 tablet 3     metFORMIN  (GLUCOPHAGE) 1000 MG tablet Take 2 tablets (2,000 mg) by mouth daily (with dinner) (Patient taking differently: Take 2,000 mg by mouth At Bedtime ) 180 tablet 3     multivitamin, therapeutic with minerals (MULTI-VITAMIN) TABS tablet Take 1 tablet by mouth At Bedtime       phentermine (ADIPEX-P) 37.5 MG tablet Take 56 mg by mouth every morning (before breakfast)       rosuvastatin (CRESTOR) 40 MG tablet Take 1 tablet (40 mg) by mouth daily (Patient taking differently: Take 40 mg by mouth At Bedtime ) 90 tablet 3     order for DME Respironics Dreamstation AutoPap 7-15 cm with Respironics Leidy mask.         Allergies  Allergies   Allergen Reactions     Ragweeds      Watery eyes, itchy nose       Social History  Social History     Socioeconomic History     Marital status:      Spouse name: Not on file     Number of children: Not on file     Years of education: Not on file     Highest education level: Not on file   Occupational History     Not on file   Social Needs     Financial resource strain: Not on file     Food insecurity:     Worry: Not on file     Inability: Not on file     Transportation needs:     Medical: Not on file     Non-medical: Not on file   Tobacco Use     Smoking status: Never Smoker     Smokeless tobacco: Never Used   Substance and Sexual Activity     Alcohol use: No     Drug use: No     Sexual activity: Not on file   Lifestyle     Physical activity:     Days per week: Not on file     Minutes per session: Not on file     Stress: Not on file   Relationships     Social connections:     Talks on phone: Not on file     Gets together: Not on file     Attends [employer] service: Not on file     Active member of club or organization: Not on file     Attends meetings of clubs or organizations: Not on file     Relationship status: Not on file     Intimate partner violence:     Fear of current or ex partner: Not on file     Emotionally abused: Not on file     Physically abused: Not on file     Forced  sexual activity: Not on file   Other Topics Concern     Parent/sibling w/ CABG, MI or angioplasty before 65F 55M? Not Asked   Social History Narrative     Not on file       Family History  Family History   Problem Relation Age of Onset     Asthma Brother      Diabetes Maternal Grandmother      Nephrolithiasis No family hx of      Parathyroid Disorders No family hx of      Thyroid Disease No family hx of      Glaucoma No family hx of      Macular Degeneration No family hx of              Anesthesia Evaluation     . Pt has had prior anesthetic.     No history of anesthetic complications          ROS/MED HX  The complete review of systems is negative other than noted in the HPI or here.   ENT/Pulmonary: Comment: Prior sleep study showed LISETTE.  Pt lost 40 pounds since.  Does not use CPAP    (+)sleep apnea, LISETTE risk factors snores loudly, hypertension, obese, Intermittent asthma (stress induced - cold, exercise) Last exacerbation: over two years,Treatment: Nebulizer prn,  doesn't use CPAP , . .   (-) tobacco use and recent URI   Neurologic:  - neg neurologic ROS     Cardiovascular:     (+) Dyslipidemia, hypertension----. : . . . :. dysrhythmias PVCs, . Previous cardiac testing date:results:Stress Testdate:2/7/2017 results:ECG reviewed date:11/23/2019 results: date: results:          METS/Exercise Tolerance:  >4 METS   Hematologic:        (-) history of blood clots and History of Transfusion   Musculoskeletal: Comment: gout - neg musculoskeletal ROS       GI/Hepatic:     (+) GERD Asymptomatic on medication,       Renal/Genitourinary:     (+) Nephrolithiasis , BPH,       Endo:     (+) Obesity, Other Endocrine Disorder metabolic syndrome.      Psychiatric:     (+) psychiatric history depression      Infectious Disease:  - neg infectious disease ROS       Malignancy:      - no malignancy   Other:    (+) no H/O Chronic Pain,           PHYSICAL EXAM:   Mental Status/Neuro: A/A/O; Age Appropriate   Airway: Facies:  "Feasible  Mallampati: III  Mouth/Opening: Full  TM distance: > 6 cm  Neck ROM: Full   Respiratory: Auscultation: CTAB     Resp. Rate: Normal     Resp. Effort: Normal      CV: Rhythm: Regular  Rate: Age appropriate  Heart: Normal Sounds  Edema: None   Comments: Right front tooth is an implant     Dental: Normal Dentition                Temp: 98.6  F (37  C) Temp src: Oral BP: (!) 149/87 Pulse: 104   Resp: 19 SpO2: 97 %         256 lbs 4.8 oz  6' 2\"   Body mass index is 32.91 kg/m .       Physical Exam  Constitutional: Awake, alert, cooperative, no apparent distress, and appears stated age.  Eyes: Pupils equal, round and reactive to light, extra ocular muscles intact, sclera clear, conjunctiva normal.  HENT: Normocephalic, oral pharynx with moist mucus membranes, good dentition. No goiter appreciated.   Respiratory: Clear to auscultation bilaterally, no crackles or wheezing.  Cardiovascular: Regular rate and rhythm, normal S1 and S2, and no murmur noted.  Carotids +2, no bruits. No edema. Palpable pulses to radial  DP and PT arteries.   GI: Normal bowel sounds, soft  Lymph/Hematologic: No cervical lymphadenopathy and no supraclavicular lymphadenopathy.  Genitourinary:  deferred  Skin: Warm and dry.  No rashes at anticipated surgical site.   Musculoskeletal: Full ROM of neck. There is no redness, warmth, or swelling of the joints. Gross motor strength is normal.    Neurologic: Awake, alert, oriented to name, place and time. Cranial nerves II-XII are grossly intact. Gait is normal.   Neuropsychiatric: Calm, cooperative. Normal affect.     Labs: (personally reviewed)  Component      Latest Ref Rng & Units 12/3/2019   Sodium      133 - 144 mmol/L 138   Potassium      3.4 - 5.3 mmol/L 4.0   Chloride      94 - 109 mmol/L 105   Carbon Dioxide      20 - 32 mmol/L 28   Anion Gap      3 - 14 mmol/L 4   Glucose      70 - 99 mg/dL 96   Urea Nitrogen      7 - 30 mg/dL 22   Creatinine      0.66 - 1.25 mg/dL 0.83   GFR Estimate      " >60 mL/min/1.73:m2 >90   GFR Estimate If Black      >60 mL/min/1.73:m2 >90   Calcium      8.5 - 10.1 mg/dL 10.1     Component      Latest Ref Rng & Units 12/3/2019   INR      0.86 - 1.14 0.91     Component      Latest Ref Rng & Units 12/3/2019   PTT      22 - 37 sec 39 (H)     Component      Latest Ref Rng & Units 12/3/2019   WBC      4.0 - 11.0 10e9/L 9.1   RBC Count      4.4 - 5.9 10e12/L 5.51   Hemoglobin      13.3 - 17.7 g/dL 17.2   Hematocrit      40.0 - 53.0 % 51.5   MCV      78 - 100 fl 94   MCH      26.5 - 33.0 pg 31.2   MCHC      31.5 - 36.5 g/dL 33.4   RDW      10.0 - 15.0 % 14.2   Platelet Count      150 - 450 10e9/L 170   Diff Method       Automated Method   % Neutrophils      % 73.9   % Lymphocytes      % 15.4   % Monocytes      % 7.2   % Eosinophils      % 2.2   % Basophils      % 0.7   % Immature Granulocytes      % 0.6   Nucleated RBCs      0 /100 0   Absolute Neutrophil      1.6 - 8.3 10e9/L 6.7   Absolute Lymphocytes      0.8 - 5.3 10e9/L 1.4   Absolute Monocytes      0.0 - 1.3 10e9/L 0.7   Absolute Eosinophils      0.0 - 0.7 10e9/L 0.2   Absolute Basophils      0.0 - 0.2 10e9/L 0.1   Abs Immature Granulocytes      0 - 0.4 10e9/L 0.1   Absolute Nucleated RBC       0.0   Platelet Estimate       Confirming automated cell count     Component      Latest Ref Rng & Units 12/3/2019   Hemoglobin A1C      0 - 5.6 % 5.2     EKG: Personally reviewed but formal cardiology read pendin2019  Sinus rhythm with occasional Premature ventricular complexes and Fusion complexes  Left anterior fascicular block  Left ventricular hypertrophy with QRS widening and repolarization abnormality  Abnormal ECG  When compared with ECG of 2018 15:47,  Fusion complexes are now Present    Stress test: Lexiscan 2017  Impression:  1. Normal myocardial SPECT study with a summed stress score of 0. A  summed stress score of <4 is associated with an annual event rate of  0.8% and 0.9% for myocardial infarction and  cardiac death,  respectively (Home. Circulation 1998;98:535-43).  2. Normal left ventricular systolic function as described above.  3. No significant perfusion abnormalities.  4. No prior study available for comparison.    Cardiac MRI: 1/19/2018:  1. The LV is normal in cavity size and wall thickness. The global systolic function is normal. The LVEF is  55%. There are no regional wall motion abnormalities.     2. The RV is normal in cavity size. The global systolic function is normal. The RVEF is 61%.      3. Both atria are normal in size.     4. There is no significant valvular disease.      5. Late gadolinium enhancement imaging shows no MI, fibrosis or infiltrative disease.      6. Regadenoson stress perfusion imaging shows no ischemia.     CONCLUSIONS: No ischemia or infarction. Normal LV and RV function, LVEF of 55% and RVEF of 61%. No obvious  substrate for arrhythmia.    EXAMINATION:  Modified Barium Swallow Study with Speech Pathology on 10/8/2019.  INDICATION: Dysphagia COMPARISON: CT chest 9/14/2019 Fluoroscopy time: 0.8 minutes. FINDINGS: Under fluoroscopic guidance, the patient was given orally administered barium of varying consistencies in the presence of the speech pathology service. The oral phase was normal. There is no delay in swallowing or pooling of contrast. Several episodes of flash penetration with thin liquid. No evidence of aspiration with any consistency. There is a prominent C2-C3 anterior osteophyte causing mild esophageal narrowing. Additional C3-C4 mild esophageal narrowing which may be related to anterior forming osteophytes versus chronic a ventral bar. No evidence for aspiration with nectar thickened barium, pudding consistency, or barium coated cracker.      IMPRESSION: 1. Prominent anterior C2-C3 osteophyte causing narrowing of the esophagus. 2. Flash penetration with thin liquid. No evidence of aspiration. Please see the speech pathologist report for further details  regarding the modified barium swallow study portion of the examination.  Outside records reviewed from: care everywhere    ASSESSMENT and PLAN  Darnell Grant is a 63 year old male scheduled for right approach anterior cervical 2-3, 3-4 resection of anterior cervical osteophytes causing dysphagia, with microscope on 12/10/2019 by Dr. Fitzgerald in treatment of esophageal dysphagia.  PAC referral for risk assessment and optimization for anesthesia with comorbid conditions of hypertension, hyperlipidemia (metabolic syndrome), GERD, gout, and BPH:    Pre-operative considerations:  1.  Cardiac:  Functional status- METS >4.  Pt does not exercise but can climb stairs and walk >2 blocks.  He gets [id number] steps per day.  Intermediate risk surgery with 0.4% (RCRI #) risk of major adverse cardiac event.   -denies CP, SOB, LOPEZ, palpitations  -Pt seen by Dr. Dutta, cardiology 11/23/2019.  EKG then showed SR with PVC, LAFB, LVH with QRS widening and repolarization abnormality.  -Holter monitor 1/2019 with frequent PVCs, 5.4% burden, no symptoms associated with PVC  -Lexiscan 2/7/2017: no wall motion abnormalities, no perfusion abnormalities.  -cardiac MRI 1/19/2018: EF of 55%, no wall motion abnormalities, no ischemia or infarct  -hypertension, amlodipine and cadasartan at bedtime.  Will hold hydrochlorothiazide DOS.  -will hold ASA 7 days prior to DOS.  -hyperlipidemia, Crestor at bedtime    2.  Pulm:  Airway feasible.  LISETTE risk: Pt with known LISETTE, no CPAP  -never smoker    3.  GI:  Risk of PONV score = 2.  If > 2, anti-emetic intervention recommended.  -GERD treated with Nexium  -dysphagia, procedure as above    4.  Endo:  -metabolic syndrome. jardiance and metformin at bedtime.  -phentermine for weight loss - will hold 7 days prior to DOS  -BMI of 32    5.  MSK  -gout, allopurinol at bedtime    6.    -h/o BPH.  Pt requested a PSA level today as he is seeing his urologist soon.  Ordered  -Pt taking Clomid every other day for  hypogonadism per endocrinology    7.  Psych  -h/o depression.  Will take Wellbutrin DOS.    VTE risk: 1.8%    Addendum:   returned 50,000-100,000 colonies coag joe clemente.  Called patient.  Will treat with Bactrim DS, on tab BID for 5 days.  Rx sent to pharmacy.      Cristina Vila PA-C  Preoperative Assessment Center  [facility]  Clinic and Surgery Center  Phone: [phone]  Fax: [phone]

## 2019-12-03 NOTE — PATIENT INSTRUCTIONS
Preparing for Your Surgery      Name:  Darnell Grant   MRN:  [id number]   :  1955   Today's Date:  12/3/2019     Arriving for surgery:  Surgery date:  12/10/19  Arrival time:  6AM  Please come to:     [facility] [address]  [address] [place]  [address]    - parking is available in front of [facility] from 5:15AM to 8:00PM. If you prefer, park your car in the Green Lot.    -Proceed to the 3rd floor, check in at the Adult Surgery Waiting Lounge. [phone]    If an escort is needed stop at the Information Desk in the lobby. Inform the information person that you are here for surgery. An escort to the Adult Surgery Waiting Lounge will be provided.        What can I eat or drink?  -  You may have solid food or milk products until 8 hours prior to your surgery. Midnight  -  You may have water, apple juice or 7up/Sprite until 2 hours prior to your surgery. 12/10/19, 6AM    Which medicines can I take?  Stop Aspirin, Phentermine(Adipex) and Multivitamins one week prior to surgery.  Hold Ibuprofen for 24 hours and/or Naproxen for 48 hours prior to surgery.   -  Do NOT take these medications in the morning, the day of surgery:    Empagliflozin(Jardiance)  Hydrochlorothiazide    Metformin(Glucophage)      -  Please take these medications the day of surgery:    Bupropion(Wellbutrin)   Clomiphene(Clomid)    Esomeprazole(Nexium)      How do I prepare myself?  -  Take two showers: one the night before surgery; and one the morning of surgery.         Use Scrubcare or Hibiclens to wash from neck down, leave soap on your skin for up to one minute.  Do not get soap in your eyes or ears.  You may use your own shampoo and conditioner; no other hair products.   -  Do NOT use lotion, powder, deodorant, or antiperspirant the day of your surgery.  -  Do NOT wear jewelry.  - Do not bring your own medications to the hospital, except for inhalers and eye   drops.  -   Bring your ID and insurance card.    -If you are scheduled to go home the Same Day as surgery you must have a responsible adult as a  and to stay with you overnight the first 24 hours after surgery.     Questions or Concerns:  -If you are scheduled on the East or West campus and have questions or concerns regarding the day of surgery, please call Preadmission Nursing at [phone].     -For questions after surgery please call your surgeons office.           AFTER YOUR SURGERY  Breathing exercises   Breathing exercises help you recover faster. Take deep breaths and let the air out slowly. This will:     Help you wake up after surgery.    Help prevent complications like pneumonia.  Preventing complications will help you go home sooner.   We may give you a breathing device (incentive spirometer) to encourage you to breathe deeply.   Nausea and vomiting   You may feel sick to your stomach after surgery; if so, let your nurse know.    Pain control:  After surgery, you may have pain. Our goal is to help you manage your pain. Pain medicine will help you feel comfortable enough to do activities that will help you heal.  These activities may include breathing exercises, walking and physical therapy.   To help your health care team treat your pain we will ask: 1) If you have pain  2) where it is located 3) describe your pain in your words  Methods of pain control include medications given by mouth, vein or by nerve block for some surgeries.  Sequential Compression Device (SCD) or Pneumo Boots:  You may need to wear SCD S on your legs or feet. These are wraps connected to a machine that pumps in air and releases it. The repeated pumping helps prevent blood clots from forming.

## 2019-12-05 LAB
BACTERIA SPEC CULT: ABNORMAL
Lab: ABNORMAL
SPECIMEN SOURCE: ABNORMAL

## 2019-12-05 RX ORDER — SULFAMETHOXAZOLE/TRIMETHOPRIM 800-160 MG
1 TABLET ORAL 2 TIMES DAILY
Qty: 10 TABLET | Refills: 0 | Status: SHIPPED | OUTPATIENT
Start: 2019-12-05 | End: 2020-01-06

## 2019-12-05 NOTE — ADDENDUM NOTE
Addendum  created 12/05/19 1110 by Cristina Vila PA-C    Diagnosis association updated, Order list changed, Visit diagnoses modified

## 2019-12-05 NOTE — PROGRESS NOTES
Writer was under the impression that the pt was going to stop on the third floor the day of his PAC appt to  his surgical book.  Writer called pt and left a message.  Writer will send the surgical book to him over night.

## 2019-12-10 ENCOUNTER — PATIENT OUTREACH (OUTPATIENT)
Dept: CARE COORDINATION | Facility: CLINIC | Age: 64
End: 2019-12-10

## 2019-12-10 ENCOUNTER — ANESTHESIA (OUTPATIENT)
Dept: SURGERY | Facility: CLINIC | Age: 64
End: 2019-12-10
Payer: COMMERCIAL

## 2019-12-10 ENCOUNTER — HOSPITAL ENCOUNTER (OUTPATIENT)
Facility: CLINIC | Age: 64
Setting detail: OBSERVATION
Discharge: HOME | End: 2019-12-10
Attending: NEUROLOGICAL SURGERY | Admitting: NEUROLOGICAL SURGERY
Payer: COMMERCIAL

## 2019-12-10 ENCOUNTER — APPOINTMENT (OUTPATIENT)
Dept: GENERAL RADIOLOGY | Facility: CLINIC | Age: 64
End: 2019-12-10
Attending: NEUROLOGICAL SURGERY
Payer: COMMERCIAL

## 2019-12-10 VITALS
WEIGHT: 252.87 LBS | DIASTOLIC BLOOD PRESSURE: 74 MMHG | TEMPERATURE: 97.5 F | SYSTOLIC BLOOD PRESSURE: 136 MMHG | HEIGHT: 74 IN | HEART RATE: 82 BPM | OXYGEN SATURATION: 94 % | RESPIRATION RATE: 17 BRPM | BODY MASS INDEX: 32.45 KG/M2

## 2019-12-10 DIAGNOSIS — R13.19 ESOPHAGEAL DYSPHAGIA: ICD-10-CM

## 2019-12-10 DIAGNOSIS — M25.78 OSTEOPHYTE OF CERVICAL SPINE: Primary | ICD-10-CM

## 2019-12-10 PROBLEM — R13.10 DYSPHAGIA: Status: ACTIVE | Noted: 2019-12-10

## 2019-12-10 LAB
ABO + RH BLD: NORMAL
ABO + RH BLD: NORMAL
BLD GP AB SCN SERPL QL: NORMAL
BLOOD BANK CMNT PATIENT-IMP: NORMAL
BLOOD BANK CMNT PATIENT-IMP: NORMAL
GLUCOSE SERPL-MCNC: 88 MG/DL (ref 70–99)
POTASSIUM SERPL-SCNC: 3.9 MMOL/L (ref 3.4–5.3)
SPECIMEN EXP DATE BLD: NORMAL

## 2019-12-10 PROCEDURE — 25000128 H RX IP 250 OP 636: Performed by: NURSE ANESTHETIST, CERTIFIED REGISTERED

## 2019-12-10 PROCEDURE — 25800030 ZZH RX IP 258 OP 636: Performed by: NURSE ANESTHETIST, CERTIFIED REGISTERED

## 2019-12-10 PROCEDURE — 25000566 ZZH SEVOFLURANE, EA 15 MIN: Performed by: NEUROLOGICAL SURGERY

## 2019-12-10 PROCEDURE — 71000014 ZZH RECOVERY PHASE 1 LEVEL 2 FIRST HR: Performed by: NEUROLOGICAL SURGERY

## 2019-12-10 PROCEDURE — 40000278 XR SURGERY CARM FLUORO LESS THAN 5 MIN: Mod: TC

## 2019-12-10 PROCEDURE — 40000170 ZZH STATISTIC PRE-PROCEDURE ASSESSMENT II: Performed by: NEUROLOGICAL SURGERY

## 2019-12-10 PROCEDURE — 25000125 ZZHC RX 250: Performed by: NURSE ANESTHETIST, CERTIFIED REGISTERED

## 2019-12-10 PROCEDURE — 36000070 ZZH SURGERY LEVEL 5 EA 15 ADDTL MIN - UMMC: Performed by: NEUROLOGICAL SURGERY

## 2019-12-10 PROCEDURE — 36415 COLL VENOUS BLD VENIPUNCTURE: CPT | Performed by: ANESTHESIOLOGY

## 2019-12-10 PROCEDURE — 27210995 ZZH RX 272: Performed by: NEUROLOGICAL SURGERY

## 2019-12-10 PROCEDURE — 37000008 ZZH ANESTHESIA TECHNICAL FEE, 1ST 30 MIN: Performed by: NEUROLOGICAL SURGERY

## 2019-12-10 PROCEDURE — G0378 HOSPITAL OBSERVATION PER HR: HCPCS

## 2019-12-10 PROCEDURE — 82947 ASSAY GLUCOSE BLOOD QUANT: CPT | Performed by: ANESTHESIOLOGY

## 2019-12-10 PROCEDURE — 36000072 ZZH SURGERY LEVEL 5 W FLUORO 1ST 30 MIN - UMMC: Performed by: NEUROLOGICAL SURGERY

## 2019-12-10 PROCEDURE — 37000009 ZZH ANESTHESIA TECHNICAL FEE, EACH ADDTL 15 MIN: Performed by: NEUROLOGICAL SURGERY

## 2019-12-10 PROCEDURE — 25000128 H RX IP 250 OP 636: Performed by: NEUROLOGICAL SURGERY

## 2019-12-10 PROCEDURE — 27210794 ZZH OR GENERAL SUPPLY STERILE: Performed by: NEUROLOGICAL SURGERY

## 2019-12-10 PROCEDURE — 25800030 ZZH RX IP 258 OP 636: Performed by: NEUROLOGICAL SURGERY

## 2019-12-10 PROCEDURE — 25000125 ZZHC RX 250: Performed by: NEUROLOGICAL SURGERY

## 2019-12-10 PROCEDURE — 25000132 ZZH RX MED GY IP 250 OP 250 PS 637: Performed by: NEUROLOGICAL SURGERY

## 2019-12-10 PROCEDURE — 84132 ASSAY OF SERUM POTASSIUM: CPT | Performed by: ANESTHESIOLOGY

## 2019-12-10 RX ORDER — ONDANSETRON 2 MG/ML
4 INJECTION INTRAMUSCULAR; INTRAVENOUS EVERY 6 HOURS PRN
Status: DISCONTINUED | OUTPATIENT
Start: 2019-12-10 | End: 2019-12-10 | Stop reason: HOSPADM

## 2019-12-10 RX ORDER — NALOXONE HYDROCHLORIDE 0.4 MG/ML
.1-.4 INJECTION, SOLUTION INTRAMUSCULAR; INTRAVENOUS; SUBCUTANEOUS
Status: DISCONTINUED | OUTPATIENT
Start: 2019-12-10 | End: 2019-12-10 | Stop reason: HOSPADM

## 2019-12-10 RX ORDER — CEFAZOLIN SODIUM 2 G/100ML
2 INJECTION, SOLUTION INTRAVENOUS
Status: COMPLETED | OUTPATIENT
Start: 2019-12-10 | End: 2019-12-10

## 2019-12-10 RX ORDER — ALBUTEROL SULFATE 0.83 MG/ML
2.5 SOLUTION RESPIRATORY (INHALATION) EVERY 4 HOURS PRN
Status: DISCONTINUED | OUTPATIENT
Start: 2019-12-10 | End: 2019-12-10 | Stop reason: HOSPADM

## 2019-12-10 RX ORDER — METHOCARBAMOL 500 MG/1
500 TABLET, FILM COATED ORAL 3 TIMES DAILY PRN
Status: DISCONTINUED | OUTPATIENT
Start: 2019-12-10 | End: 2019-12-10 | Stop reason: HOSPADM

## 2019-12-10 RX ORDER — LABETALOL HYDROCHLORIDE 5 MG/ML
10 INJECTION, SOLUTION INTRAVENOUS
Status: DISCONTINUED | OUTPATIENT
Start: 2019-12-10 | End: 2019-12-10 | Stop reason: HOSPADM

## 2019-12-10 RX ORDER — ONDANSETRON 2 MG/ML
4 INJECTION INTRAMUSCULAR; INTRAVENOUS EVERY 30 MIN PRN
Status: DISCONTINUED | OUTPATIENT
Start: 2019-12-10 | End: 2019-12-10 | Stop reason: HOSPADM

## 2019-12-10 RX ORDER — BUPIVACAINE HYDROCHLORIDE AND EPINEPHRINE 5; 5 MG/ML; UG/ML
INJECTION, SOLUTION PERINEURAL PRN
Status: DISCONTINUED | OUTPATIENT
Start: 2019-12-10 | End: 2019-12-10 | Stop reason: HOSPADM

## 2019-12-10 RX ORDER — METOCLOPRAMIDE 5 MG/1
5 TABLET ORAL EVERY 6 HOURS PRN
Status: DISCONTINUED | OUTPATIENT
Start: 2019-12-10 | End: 2019-12-10 | Stop reason: HOSPADM

## 2019-12-10 RX ORDER — METOCLOPRAMIDE HYDROCHLORIDE 5 MG/ML
5 INJECTION INTRAMUSCULAR; INTRAVENOUS EVERY 6 HOURS PRN
Status: DISCONTINUED | OUTPATIENT
Start: 2019-12-10 | End: 2019-12-10 | Stop reason: HOSPADM

## 2019-12-10 RX ORDER — OXYCODONE HYDROCHLORIDE 5 MG/1
5 TABLET ORAL
Status: DISCONTINUED | OUTPATIENT
Start: 2019-12-10 | End: 2019-12-10 | Stop reason: HOSPADM

## 2019-12-10 RX ORDER — ONDANSETRON 4 MG/1
4 TABLET, ORALLY DISINTEGRATING ORAL EVERY 6 HOURS PRN
Status: DISCONTINUED | OUTPATIENT
Start: 2019-12-10 | End: 2019-12-10 | Stop reason: HOSPADM

## 2019-12-10 RX ORDER — AMOXICILLIN 250 MG
2 CAPSULE ORAL 2 TIMES DAILY PRN
Qty: 28 TABLET | Refills: 0 | Status: SHIPPED | OUTPATIENT
Start: 2019-12-10 | End: 2020-01-06

## 2019-12-10 RX ORDER — METHYLPREDNISOLONE 4 MG
TABLET, DOSE PACK ORAL
Qty: 21 TABLET | Refills: 0 | Status: SHIPPED | OUTPATIENT
Start: 2019-12-10 | End: 2020-01-06

## 2019-12-10 RX ORDER — ONDANSETRON 4 MG/1
4 TABLET, ORALLY DISINTEGRATING ORAL EVERY 30 MIN PRN
Status: DISCONTINUED | OUTPATIENT
Start: 2019-12-10 | End: 2019-12-10 | Stop reason: HOSPADM

## 2019-12-10 RX ORDER — ACETAMINOPHEN 325 MG/1
650 TABLET ORAL EVERY 4 HOURS PRN
Qty: 100 TABLET | Refills: 0 | Status: SHIPPED | OUTPATIENT
Start: 2019-12-10 | End: 2020-09-14

## 2019-12-10 RX ORDER — SODIUM CHLORIDE 9 MG/ML
INJECTION, SOLUTION INTRAVENOUS
Status: DISCONTINUED
Start: 2019-12-10 | End: 2019-12-10 | Stop reason: HOSPADM

## 2019-12-10 RX ORDER — SODIUM CHLORIDE, SODIUM LACTATE, POTASSIUM CHLORIDE, CALCIUM CHLORIDE 600; 310; 30; 20 MG/100ML; MG/100ML; MG/100ML; MG/100ML
INJECTION, SOLUTION INTRAVENOUS CONTINUOUS PRN
Status: DISCONTINUED | OUTPATIENT
Start: 2019-12-10 | End: 2019-12-10

## 2019-12-10 RX ORDER — LIDOCAINE 40 MG/G
CREAM TOPICAL
Status: DISCONTINUED | OUTPATIENT
Start: 2019-12-10 | End: 2019-12-10 | Stop reason: HOSPADM

## 2019-12-10 RX ORDER — FENTANYL CITRATE 50 UG/ML
INJECTION, SOLUTION INTRAMUSCULAR; INTRAVENOUS PRN
Status: DISCONTINUED | OUTPATIENT
Start: 2019-12-10 | End: 2019-12-10

## 2019-12-10 RX ORDER — LIDOCAINE HYDROCHLORIDE 20 MG/ML
INJECTION, SOLUTION INFILTRATION; PERINEURAL PRN
Status: DISCONTINUED | OUTPATIENT
Start: 2019-12-10 | End: 2019-12-10

## 2019-12-10 RX ORDER — PROCHLORPERAZINE MALEATE 5 MG
5 TABLET ORAL EVERY 6 HOURS PRN
Status: DISCONTINUED | OUTPATIENT
Start: 2019-12-10 | End: 2019-12-10 | Stop reason: HOSPADM

## 2019-12-10 RX ORDER — SODIUM CHLORIDE, SODIUM LACTATE, POTASSIUM CHLORIDE, CALCIUM CHLORIDE 600; 310; 30; 20 MG/100ML; MG/100ML; MG/100ML; MG/100ML
INJECTION, SOLUTION INTRAVENOUS CONTINUOUS
Status: DISCONTINUED | OUTPATIENT
Start: 2019-12-10 | End: 2019-12-10 | Stop reason: HOSPADM

## 2019-12-10 RX ORDER — HYDROMORPHONE HYDROCHLORIDE 1 MG/ML
.3-.5 INJECTION, SOLUTION INTRAMUSCULAR; INTRAVENOUS; SUBCUTANEOUS EVERY 5 MIN PRN
Status: DISCONTINUED | OUTPATIENT
Start: 2019-12-10 | End: 2019-12-10 | Stop reason: HOSPADM

## 2019-12-10 RX ORDER — ACETAMINOPHEN 325 MG/1
975 TABLET ORAL EVERY 6 HOURS PRN
Status: DISCONTINUED | OUTPATIENT
Start: 2019-12-10 | End: 2019-12-10 | Stop reason: HOSPADM

## 2019-12-10 RX ORDER — OXYCODONE HYDROCHLORIDE 5 MG/1
5 TABLET ORAL EVERY 6 HOURS PRN
Qty: 28 TABLET | Refills: 0 | Status: SHIPPED | OUTPATIENT
Start: 2019-12-10 | End: 2020-01-06

## 2019-12-10 RX ORDER — FENTANYL CITRATE 50 UG/ML
25-50 INJECTION, SOLUTION INTRAMUSCULAR; INTRAVENOUS
Status: DISCONTINUED | OUTPATIENT
Start: 2019-12-10 | End: 2019-12-10 | Stop reason: HOSPADM

## 2019-12-10 RX ORDER — SODIUM CHLORIDE 9 MG/ML
INJECTION, SOLUTION INTRAVENOUS CONTINUOUS
Status: DISCONTINUED | OUTPATIENT
Start: 2019-12-10 | End: 2019-12-10 | Stop reason: HOSPADM

## 2019-12-10 RX ORDER — PROPOFOL 10 MG/ML
INJECTION, EMULSION INTRAVENOUS PRN
Status: DISCONTINUED | OUTPATIENT
Start: 2019-12-10 | End: 2019-12-10

## 2019-12-10 RX ORDER — NALOXONE HYDROCHLORIDE 0.4 MG/ML
.1-.4 INJECTION, SOLUTION INTRAMUSCULAR; INTRAVENOUS; SUBCUTANEOUS
Status: DISCONTINUED | OUTPATIENT
Start: 2019-12-10 | End: 2019-12-10

## 2019-12-10 RX ORDER — DEXAMETHASONE SODIUM PHOSPHATE 4 MG/ML
INJECTION, SOLUTION INTRA-ARTICULAR; INTRALESIONAL; INTRAMUSCULAR; INTRAVENOUS; SOFT TISSUE PRN
Status: DISCONTINUED | OUTPATIENT
Start: 2019-12-10 | End: 2019-12-10

## 2019-12-10 RX ORDER — ONDANSETRON 2 MG/ML
INJECTION INTRAMUSCULAR; INTRAVENOUS PRN
Status: DISCONTINUED | OUTPATIENT
Start: 2019-12-10 | End: 2019-12-10

## 2019-12-10 RX ORDER — CEFAZOLIN SODIUM 1 G/3ML
1 INJECTION, POWDER, FOR SOLUTION INTRAMUSCULAR; INTRAVENOUS SEE ADMIN INSTRUCTIONS
Status: DISCONTINUED | OUTPATIENT
Start: 2019-12-10 | End: 2019-12-10 | Stop reason: HOSPADM

## 2019-12-10 RX ADMIN — PHENYLEPHRINE HYDROCHLORIDE 100 MCG: 10 INJECTION INTRAVENOUS at 08:37

## 2019-12-10 RX ADMIN — CEFAZOLIN SODIUM 2 G: 2 INJECTION, SOLUTION INTRAVENOUS at 08:09

## 2019-12-10 RX ADMIN — ACETAMINOPHEN 650 MG: 325 TABLET, FILM COATED ORAL at 14:42

## 2019-12-10 RX ADMIN — ROCURONIUM BROMIDE 20 MG: 10 INJECTION INTRAVENOUS at 08:25

## 2019-12-10 RX ADMIN — ONDANSETRON 4 MG: 2 INJECTION INTRAMUSCULAR; INTRAVENOUS at 09:49

## 2019-12-10 RX ADMIN — PROPOFOL 170 MG: 10 INJECTION, EMULSION INTRAVENOUS at 08:00

## 2019-12-10 RX ADMIN — PHENYLEPHRINE HYDROCHLORIDE 100 MCG: 10 INJECTION INTRAVENOUS at 08:03

## 2019-12-10 RX ADMIN — SODIUM CHLORIDE, POTASSIUM CHLORIDE, SODIUM LACTATE AND CALCIUM CHLORIDE: 600; 310; 30; 20 INJECTION, SOLUTION INTRAVENOUS at 09:32

## 2019-12-10 RX ADMIN — ROCURONIUM BROMIDE 50 MG: 10 INJECTION INTRAVENOUS at 08:00

## 2019-12-10 RX ADMIN — SODIUM CHLORIDE, POTASSIUM CHLORIDE, SODIUM LACTATE AND CALCIUM CHLORIDE: 600; 310; 30; 20 INJECTION, SOLUTION INTRAVENOUS at 07:54

## 2019-12-10 RX ADMIN — PHENYLEPHRINE HYDROCHLORIDE 100 MCG: 10 INJECTION INTRAVENOUS at 08:39

## 2019-12-10 RX ADMIN — SODIUM CHLORIDE: 9 INJECTION, SOLUTION INTRAVENOUS at 14:07

## 2019-12-10 RX ADMIN — HYDROMORPHONE HYDROCHLORIDE 0.5 MG: 1 INJECTION, SOLUTION INTRAMUSCULAR; INTRAVENOUS; SUBCUTANEOUS at 09:00

## 2019-12-10 RX ADMIN — PHENYLEPHRINE HYDROCHLORIDE 100 MCG: 10 INJECTION INTRAVENOUS at 08:31

## 2019-12-10 RX ADMIN — FENTANYL CITRATE 100 MCG: 50 INJECTION, SOLUTION INTRAMUSCULAR; INTRAVENOUS at 08:00

## 2019-12-10 RX ADMIN — MIDAZOLAM 1 MG: 1 INJECTION INTRAMUSCULAR; INTRAVENOUS at 08:00

## 2019-12-10 RX ADMIN — SUGAMMADEX 200 MG: 100 INJECTION, SOLUTION INTRAVENOUS at 10:04

## 2019-12-10 RX ADMIN — FENTANYL CITRATE 50 MCG: 50 INJECTION, SOLUTION INTRAMUSCULAR; INTRAVENOUS at 09:40

## 2019-12-10 RX ADMIN — LIDOCAINE HYDROCHLORIDE 100 MG: 20 INJECTION, SOLUTION INFILTRATION; PERINEURAL at 08:00

## 2019-12-10 RX ADMIN — FENTANYL CITRATE 100 MCG: 50 INJECTION, SOLUTION INTRAMUSCULAR; INTRAVENOUS at 08:02

## 2019-12-10 RX ADMIN — DEXAMETHASONE SODIUM PHOSPHATE 10 MG: 4 INJECTION, SOLUTION INTRAMUSCULAR; INTRAVENOUS at 08:24

## 2019-12-10 ASSESSMENT — MIFFLIN-ST. JEOR: SCORE: 2002

## 2019-12-10 NOTE — PLAN OF CARE
Pt. discharged at 1550 to home, was accompanied by significant other, and left with personal belongings. Pt. received complete discharge paperwork and medications as filled by discharge pharmacy. Pt. was given times of last dose for all discharge medications in writing on discharge medication sheets. Discharge teaching included medication administration, pain management, activity restrictions and signs and symptoms of infection. Pt. had no further questions at the time of discharge and no unmet needs were identified.

## 2019-12-10 NOTE — PROGRESS NOTES
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 On 12/10/19, he underwent a right anterior cervical approach for C2-3, C3-4 osteophyte resection.     In PACU, he is awake and alert with full strength is his extremities, vocal tone within normal range for immediately post-extubation. He denies significant pain with swallowing.  Because of his significant preoperative dysphagia and the need to retract the esophagus in order to perform surgery today, we will keep him in observation on 10A until he is able to swallow liquids and pills without difficulty as his observation goals. Once he has met observation goals, he will be discharged to home.    I spoke with the charge nurse on 10A and relayed the plan, and I discussed this at length with patient and his wife prior to surgery.      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 Discharge order placed.    María Fitzgerald MD    [facility] Department of Neurosurgery  Office: [phone]    12/10/2019  3:01 PM

## 2019-12-10 NOTE — ANESTHESIA CARE TRANSFER NOTE
Patient: Darnell Grant    Procedure(s):  right approach anterior cervical 2-3, 3-4 resection of anterior cervical osteophytes causing dysphagia, with microscope    Diagnosis: Esophageal dysphagia [R13.10]  Diagnosis Additional Information: No value filed.    Anesthesia Type:   General     Note:  Airway :Face Mask  Patient transferred to:PACU  Handoff Report: Identifed the Patient, Identified the Reponsible Provider, Reviewed the pertinent medical history, Discussed the surgical course, Reviewed Intra-OP anesthesia mangement and issues during anesthesia, Set expectations for post-procedure period and Allowed opportunity for questions and acknowledgement of understanding      Vitals: (Last set prior to Anesthesia Care Transfer)    CRNA VITALS  12/10/2019 0941 - 12/10/2019 1018      12/10/2019             Resp Rate (observed):  14    EKG:  Sinus rhythm;PVC's                Electronically Signed By: Erlinda Garcia CRNA, CHRISTIN RODRÍGUEZ  December 10, 2019  10:18 AM

## 2019-12-10 NOTE — OP NOTE
Pre-operative diagnosis: Esophageal dysphagia [R13.10]  Post-operative diagnosis Same as pre-operative diagnosis    Procedure: Procedure(s):  right approach anterior cervical 2-3, 3-4 resection of anterior cervical osteophytes causing dysphagia, with microscope  Surgeon: Surgeon(s) and Role:     * María Fitzgerald MD - Primary     Anesthesia: General   Estimated blood loss: Less than 10 ml  Drains: None  Specimens: None  Findings:   large osteophyte at C2-C3 with significant compression of esophagus prior to  resection.  Complications: None.  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 He has been suffering from progressive esophageal dysphagia, and was evaluated with a swallow study showing large compressive osteophytes indenting the esophagus at C2-3 and C3-4, thought by Dr. Cleary of ENT to be causing the dysphagia. Dr. Grant was then referred to me to discuss surgical intervention. Please see my clinic notes for full details. We reviewed risks/benefits together, including bleeding, carotid artery injury and stroke, esophageal injury and need for  Feeding tube/further potential surgery, regrowth of osteophytes necessitating further surgery, or failure to relieve symptoms, or worsening of dysphagia because of necessary esophageal retraction during surgery. Dr. Grant wished to proceed with surgery.    Operative course/Intraoperative findings:  Dr. Darnell Grant was identified and informed consent was verified. The patient was taken to the operating room where general anesthesia was induced. The patient's arms and hands were padded and secured by his sides. His neck was placed in a slightly extended position with positioning performed under C-Arm to ensure adequate alignment for approach to C2-C3.    The patient's right anterior neck was prepped and draped in standard fashion. An  intraoperative timeout was performed. Fluoroscopy was brought into the field in standard sterile fashion to localize optimal level for skin incision. Transverse incision was made superficial to the C3 vertebral body, from midline to the medial border of sternal head of right sternocleidomastoid muscle, with a 10-blade scalpel and the platysma muscle was divided with monopolar cautery. All further dissection was carried out with Metzenbaum scissors and bipolar electrocautery. The anterior cervical fascial planes were followed to the ventral cervical spine, the carotid artery was palpated laterally, the OG tube in the esophagus was palpated medially, and the longus colli fascia was identified in the midline. The patient had large transverse traversing venous branches in his subfascial planes that were identified and carefully dissected out and protected, and preserved. There was a very large palpable osteophyte at C2-C3, and this was pushing upward into the esophagus. The esophagus was gently mobilized towards the patient's left in order to adequately visualize the spine. Care was taken to minimize retraction pressure, and I communicated with our anesthesia team who then deflated the endotracheal tube cuff when we placed our self-retaining retractors to minimize pharyngeal pressure, in hopes to minimize retraction pressure on the esophagus.    A localizing xray was taken to confirm C2-C3 level with a probe over the C2-3 disc space. The osteophyte was quite prominent in this space. The microscope was brought into the field in sterile fashion. The high-speed electric drill was used to carefully drill the osteophyte down to the level of the vertebral body, confirming correct depth with xray sequentially to avoid over-resection. A Penfield 4 was utilized to dissect laterally to the osteophyte to establish a safe plane between the esophagus and vertebral osteophyte, followed by placement of a smooth-tip Langenbeck  retractor to protect the esophagus while the osteophyte was resected. The C2 vertebral body osteophyte was challenging to resect due to the depth of the patient's soft tissue and the extreme prominence of the osteophyte. This was accomplished by drilling slowly from the midline towards lateral, and creating an egg-shell thinned lateral wall of the osteophyte bilaterally and cranially/caudally,  Which was then carefully resected under direct visualization with a Lempert rongeur, taking care to protect the esophagus with a retractor during all osteophyte resection, again because the osteophyte was so largely prominent and compressive against the esophagus. We then verified with xray that we had resected the osteophyte flat with the vertebral body, and applied bone wax to the entire ventral vertebral surface to discourage further bone growth. We then replaced our retractors more distally and using fluoroscopic visualization to resect the C3-4 osteophyte, which was far less prominent than that at C2-3. We used identical technique, using retractors to protect the pharyngeal soft tissue and esophagus medially, and retractors to protect the carotid sheath laterally. The osteophyte was resected at C3-4 and confirmed to be flush with the vertebral body on xray. Again bone wax was applied to the surface of the vertebral bodies to maintain hemostasis and discourage further bone growth.     The incision was irrigated. Hemostasis was verified to be meticulous. The incision was observed for hemostasis prior to closing with no concerns about ongoing bleeding.    The incision was closed with 0 vicryl running suture in the platysma, followed by 2.0 running suture in the subcutaneous layer, followed by 4.0 monocryl subcuticular suture in the skin, Exofin skin adhesive, and sterile dressing.    All sponge and needle counts were correct. There were no known intraoperative complications. I performed the entire procedure from start to  finish.     was extubated and taken to PACU in stable condition. When he meets observation goals for discharge, he can discharge home.

## 2019-12-10 NOTE — DISCHARGE INSTRUCTIONS
Keep incision covered and dry until 24 hours following your surgery, then you may remove the surgical bandage, shower and let soapy water wash over the incision, and allow the incision to fully dry, then replace a dry bandage over incision to prevent clothing from rubbing incision. If clothing does not rub incision, ok to leave incision uncovered after 24 hours post-op.  Do not scrub or rub incision.   Do not submerge incision underwater- no tub baths, no swimming pools, no hot tubs until cleared by Neurosurgery. Do not put creams or ointments on incision.     If dressing becomes soiled or wet, change dressing immediately to dry dressing. Do not let wet dressing lay on incision. Keep incision dry at all times except while showering.    If incision ever develops consistent drainage, call [phone] to alert surgeon's team immediately.  If you are unable to swallow water, alert surgeon's team immediately.  If you feel difficulty taking in air/breathing due to throat swelling, call 911 immediately to be transported to emergency room.    Follow up with Ema Bazzi CNP on 12/24/19 at 8am in the [facility], [address] [place] [address], in Neurosurgery clinic on the 3rd floor.  Call Dr. Fitzgerald' nurse Alfredo Milligan at [phone] with questions.      No lifting above 10 pounds for 2 weeks after surgery.    No aspirin or fish oil until 7 days after surgery (12/17/19).  No NSAIDs (I.e. Advil, Motrin) for 48 hours after surgery.    Your throat will be most sore for the first 48 hours following surgery. Eat mainly liquids and soft foods that do not require much chewing. Do not try to eat bread or meat until you are able to swallow normally.     Take steroid prescription as needed to lessen dysphagia in first 5-7 following surgery.

## 2019-12-10 NOTE — PLAN OF CARE
"      VS:   /74 (BP Location: Right arm)   Pulse 82   Temp 97.5  F (36.4  C) (Oral)   Resp 17   Ht 1.88 m (6' 2.02\")   Wt 114.7 kg (252 lb 13.9 oz)   SpO2 94%   BMI 32.45 kg/m    VSS on RA    Output:   Spontaneously voiding    Lungs LS clear    Activity:   Independent    Skin: Intact ex: Anterior neck incision    Pain:   Denies pain intervention    Neuro/CMS:   A&Ox4, CMS intact    Dressing(s):   CDI    Diet:   Tolerated broth and fluids without difficulty   LDA:   PIV Infusing NS   Equipment:      Plan:   Continue POC, Able to make needs known    Additional Info:   Pt very adamant about discharging ASAP.       OBS GOALS: MET   -ABLE TO SWALLOW PILLS W/O DIFFICULTY  -ABLE TO DRINK FLUIDS W/O DIFFICULTY          "

## 2019-12-10 NOTE — OR NURSING
PACU to Inpatient Nursing Handoff    Patient Darnell Grant is a 63 year old male who speaks English.   Procedure Procedure(s):  right approach anterior cervical 2-3, 3-4 resection of anterior cervical osteophytes causing dysphagia, with microscope   Surgeon(s) Primary: María Fitzgerald MD     Allergies   Allergen Reactions     Ragweeds      Watery eyes, itchy nose       Isolation  [unfilled]     Past Medical History   has a past medical history of Bladder mass, BPH (benign prostatic hyperplasia), GERD (gastroesophageal reflux disease), High serum parathyroid hormone (PTH) (2017), Hypertension, Metabolic syndrome, Nephrolithiasis, Obesity, LISETTE (obstructive sleep apnea), and Prediabetes.    Anesthesia General   Dermatome Level     Preop Meds Not applicable      Nerve block Not applicable   Intraop Meds dexamethasone (Decadron)  fentanyl (Sublimaze): 250 mcg total  hydromorphone (Dilaudid): 0.5 mg total  ondansetron (Zofran): last given at 0949   Local Meds No   Antibiotics cefazolin (Ancef) - last given at 0809     Pain Patient Currently in Pain: yes  Comfort: comfortably manageable   PACU meds  Not applicable   PCA / epidural No   Capnography     Telemetry ECG Rhythm: Sinus rhythm   Inpatient Telemetry Monitor Ordered? No        Labs Glucose Lab Results   Component Value Date    GLC 88 12/10/2019       Hgb Lab Results   Component Value Date    HGB 17.2 12/03/2019       INR Lab Results   Component Value Date    INR 0.91 12/03/2019      PACU Imaging Not applicable     Wound/Incision Incision/Surgical Site 01/31/18 Penis (Active)   Number of days: 678       Incision/Surgical Site 12/10/19 Right Neck (Active)   Incision Assessment UTV 12/10/2019 10:14 AM   Closure ANNIE 12/10/2019 10:14 AM   Incision Drainage Amount UTV 12/10/2019 10:14 AM   Dressing Intervention Clean, dry, intact 12/10/2019 10:14 AM   Number of days: 0      CMS        Equipment Not applicable   Other LDA       IV Access Peripheral IV 12/10/19  Left Hand (Active)   Site Assessment WDL 12/10/2019 10:14 AM   Line Status Infusing 12/10/2019 10:14 AM   Phlebitis Scale 0-->no symptoms 12/10/2019 10:14 AM   Infiltration Scale 0 12/10/2019 10:14 AM   Infiltration Site Treatment Method  None 12/10/2019 10:14 AM   Number of days: 0       Peripheral IV 12/10/19 Right Hand (Active)   Site Assessment [facility] 12/10/2019 10:14 AM   Line Status Saline locked 12/10/2019 10:14 AM   Phlebitis Scale 0-->no symptoms 12/10/2019 10:14 AM   Infiltration Scale 0 12/10/2019 10:14 AM   Infiltration Site Treatment Method  None 12/10/2019 10:14 AM   Number of days: 0      Blood Products Not applicable EBL 5   mL   Intake/Output Date 12/10/19 0700 - 12/11/19 0659   Shift 3435-9438 4867-4150 7599-3364 24 Hour Total   INTAKE   I.V. 1300   1300   Shift Total(mL/kg) 1300(11.33)   1300(11.33)   OUTPUT   Urine 0   0   Blood 5   5   Shift Total(mL/kg) 5(0.04)   5(0.04)   Weight (kg) 114.7 114.7 114.7 114.7      Drains / Krause Urethral Catheter Latex (Active)   Number of days: 678       Urethral Catheter (Active)   Number of days: 606      Time of void PreOp Void Prior to Procedure: 0430 (12/10/19 0639)    PostOp      Diapered? No   Bladder Scan     PO    tolerating sips and ice chips     Vitals    B/P: 124/70  T: 98.8  F (37.1  C)    Temp src: Axillary  P:  Pulse: 74 (12/10/19 1045)    Heart Rate: 74 (12/10/19 1045)     R: 15  O2:  SpO2: 95 %    O2 Device: Nasal cannula (12/10/19 1045)    Oxygen Delivery: 2 LPM (12/10/19 1045)         Family/support present significant other   Patient belongings     Patient transported on cart   DC meds/scripts (obs/outpt) Not applicable   Inpatient Pain Meds Released? Yes       Special needs/considerations None   Tasks needing completion None       Alyssa Escobar, RN  [facility] [id number]

## 2019-12-10 NOTE — OR NURSING
"Pt awake and alert.  States \"Just a little discomfort and would like to get going\".  Neuro checks unchanged  Neck appearance unchanged.  States \"no problem with swallowing\".  Unable to wean oxygen.  Oxygen saturations stable on 2 liters nasal canula.  Meets discharge criteria to move to phase 2.  "

## 2019-12-10 NOTE — OR NURSING
Dr walls here stating pt to be admitted to observation.  Pt talking to Dr Walls.  Wife updated.  Meets criteria to be discharged from phase 1.

## 2019-12-10 NOTE — PROVIDER NOTIFICATION
Pt swallowing pills, water, broth with no difficulty.  Pt is wanting to discontinue to home asap.  Dr. Fitzgerald notified and discontinue orders received.

## 2019-12-10 NOTE — ANESTHESIA POSTPROCEDURE EVALUATION
Anesthesia POST Procedure Evaluation    Patient: Darnell Grant   MRN:     [id number] Gender:   male   Age:    63 year old :      1955        Preoperative Diagnosis: Esophageal dysphagia [R13.10]   Procedure(s):  right approach anterior cervical 2-3, 3-4 resection of anterior cervical osteophytes causing dysphagia, with microscope   Postop Comments: No value filed.       Anesthesia Type:  Not documented  General    Reportable Event: NO     PAIN: Uncomplicated   Sign Out status: Comfortable, Well controlled pain     PONV: No PONV   Sign Out status:  No Nausea or Vomiting     Neuro/Psych: Uneventful perioperative course   Sign Out Status: Preoperative baseline; Age appropriate mentation     Airway/Resp.: Uneventful perioperative course   Sign Out Status: Non labored breathing, age appropriate RR; Resp. Status within EXPECTED Parameters     CV: Uneventful perioperative course   Sign Out status: Appropriate BP and perfusion indices; Appropriate HR/Rhythm     Disposition:   Sign Out in:  PACU  Disposition:  Phase II; Home  Recovery Course: Uneventful  Follow-Up: Not required           Last Anesthesia Record Vitals:  CRNA VITALS  12/10/2019 0941 - 12/10/2019 1041      12/10/2019             Resp Rate (observed):  14    EKG:  Sinus rhythm;PVC's          Last PACU Vitals:  Vitals Value Taken Time   /75 12/10/2019 11:00 AM   Temp 37.1  C (98.8  F) 12/10/2019 10:14 AM   Pulse 70 12/10/2019 11:00 AM   Resp 9 12/10/2019 11:06 AM   SpO2 94 % 12/10/2019 11:06 AM   Temp src     NIBP     Pulse     SpO2     Resp     Temp     Ht Rate     Temp 2     Vitals shown include unvalidated device data.      Electronically Signed By: Kenneth Israel DO, December 10, 2019, 11:33 AM

## 2019-12-10 NOTE — OR NURSING
"Arrived PACU per cart.  Pt wakes up to voice and moves all fours.  Denies pain and asking \"what time it is\".  Neck dressing dry and intact.  Trachea in alignment.  Back to sleep.  "

## 2019-12-10 NOTE — BRIEF OP NOTE
[facility], [place]    Brief Operative Note    Pre-operative diagnosis: Esophageal dysphagia [R13.10]  Post-operative diagnosis Same as pre-operative diagnosis    Procedure: Procedure(s):  right approach anterior cervical 2-3, 3-4 resection of anterior cervical osteophytes causing dysphagia, with microscope  Surgeon: Surgeon(s) and Role:     * María Fitzgerald MD - Primary     Anesthesia: General   Estimated blood loss: Less than 10 ml  Drains: None  Specimens: None  Findings:   large osteophyte at C2-C3 with significant compression of esophagus prior to  resection.  Complications: None.  Implants: none

## 2019-12-16 ENCOUNTER — HEALTH MAINTENANCE LETTER (OUTPATIENT)
Age: 64
End: 2019-12-16

## 2019-12-16 ENCOUNTER — DOCUMENTATION ONLY (OUTPATIENT)
Dept: NEUROSURGERY | Facility: CLINIC | Age: 64
End: 2019-12-16

## 2019-12-16 NOTE — PROGRESS NOTES
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  He did very well for the first 3 days after surgery, then in the afternoon of POD 4, developed the sensation of dysphagia again, this time a little bit worse than prior to surgery. No pain with swallowing or fevers. He started a Medrol DosePak at that time, currently is POD 6. His voice has had some waxing and waning hoarseness since POD 4. Voice was initially strong first 3 days after surgery.  On exam, neck is flat, no abnormal erythema, no drainage from incision. Voice is mildly hoarse.  We discussed red flag symptoms including any pain developing while swallowing or any fevers, which he has not had. He is planning on finishing the Medrol DosePak and we will reassess symptomatology at that time. If any new or worsening symptoms occur, we will proceed with imaging evaluation.    María Fitzgerald MD    [facility] Department of Neurosurgery  Office: [phone]    12/16/2019  7:04 AM

## 2019-12-17 NOTE — PROGRESS NOTES
Writer reached to pt for follow up call.  Pt met with MD already and declined further conversation.

## 2019-12-23 ENCOUNTER — MYC MEDICAL ADVICE (OUTPATIENT)
Dept: INTERNAL MEDICINE | Facility: CLINIC | Age: 64
End: 2019-12-23

## 2019-12-23 DIAGNOSIS — E78.5 HYPERLIPIDEMIA: ICD-10-CM

## 2019-12-23 DIAGNOSIS — E78.00 PURE HYPERCHOLESTEROLEMIA: Primary | ICD-10-CM

## 2019-12-24 NOTE — TELEPHONE ENCOUNTER
Patient sent e-mail message regarding labs to Dr. Dutta. Dr. Dutta would like a fasting lipid panel checked. Lab ordered.    Bina Milligan RN ([facility])

## 2019-12-27 DIAGNOSIS — E78.00 PURE HYPERCHOLESTEROLEMIA: ICD-10-CM

## 2019-12-27 DIAGNOSIS — E78.5 HYPERLIPIDEMIA: ICD-10-CM

## 2019-12-27 LAB
CHOLEST SERPL-MCNC: 87 MG/DL
HDLC SERPL-MCNC: 36 MG/DL
LDLC SERPL CALC-MCNC: 18 MG/DL
NONHDLC SERPL-MCNC: 51 MG/DL
TRIGL SERPL-MCNC: 166 MG/DL

## 2020-01-06 ENCOUNTER — OFFICE VISIT (OUTPATIENT)
Dept: INTERNAL MEDICINE | Facility: CLINIC | Age: 65
End: 2020-01-06
Payer: COMMERCIAL

## 2020-01-06 VITALS
DIASTOLIC BLOOD PRESSURE: 83 MMHG | HEART RATE: 88 BPM | BODY MASS INDEX: 31.83 KG/M2 | SYSTOLIC BLOOD PRESSURE: 126 MMHG | OXYGEN SATURATION: 97 % | WEIGHT: 248 LBS

## 2020-01-06 DIAGNOSIS — Z00.00 ROUTINE GENERAL MEDICAL EXAMINATION AT A HEALTH CARE FACILITY: ICD-10-CM

## 2020-01-06 DIAGNOSIS — Z13.89 SCREENING FOR DIABETIC PERIPHERAL NEUROPATHY: ICD-10-CM

## 2020-01-06 DIAGNOSIS — I10 ESSENTIAL HYPERTENSION, BENIGN: ICD-10-CM

## 2020-01-06 DIAGNOSIS — K21.9 GASTROESOPHAGEAL REFLUX DISEASE, ESOPHAGITIS PRESENCE NOT SPECIFIED: ICD-10-CM

## 2020-01-06 DIAGNOSIS — E78.5 HYPERLIPIDEMIA LDL GOAL <100: ICD-10-CM

## 2020-01-06 DIAGNOSIS — Z11.59 NEED FOR HEPATITIS C SCREENING TEST: ICD-10-CM

## 2020-01-06 DIAGNOSIS — E66.811 CLASS 1 OBESITY WITH SERIOUS COMORBIDITY AND BODY MASS INDEX (BMI) OF 31.0 TO 31.9 IN ADULT, UNSPECIFIED OBESITY TYPE: Primary | ICD-10-CM

## 2020-01-06 DIAGNOSIS — M10.9 GOUT, UNSPECIFIED CAUSE, UNSPECIFIED CHRONICITY, UNSPECIFIED SITE: ICD-10-CM

## 2020-01-06 DIAGNOSIS — R35.1 NOCTURIA: ICD-10-CM

## 2020-01-06 DIAGNOSIS — R53.83 OTHER FATIGUE: ICD-10-CM

## 2020-01-06 DIAGNOSIS — N40.0 BENIGN NON-NODULAR PROSTATIC HYPERPLASIA WITHOUT LOWER URINARY TRACT SYMPTOMS: ICD-10-CM

## 2020-01-06 DIAGNOSIS — I10 ESSENTIAL HYPERTENSION: ICD-10-CM

## 2020-01-06 DIAGNOSIS — E88.810 METABOLIC SYNDROME X: ICD-10-CM

## 2020-01-06 DIAGNOSIS — K21.9 GASTROESOPHAGEAL REFLUX DISEASE WITHOUT ESOPHAGITIS: ICD-10-CM

## 2020-01-06 RX ORDER — CANDESARTAN 32 MG/1
32 TABLET ORAL AT BEDTIME
Qty: 90 TABLET | Refills: 3 | Status: SHIPPED | OUTPATIENT
Start: 2020-01-06 | End: 2020-01-06

## 2020-01-06 RX ORDER — CANDESARTAN 32 MG/1
32 TABLET ORAL AT BEDTIME
Qty: 90 TABLET | Refills: 3 | Status: SHIPPED | OUTPATIENT
Start: 2020-01-06 | End: 2020-08-10

## 2020-01-06 RX ORDER — BUPROPION HYDROCHLORIDE 300 MG/1
300 TABLET ORAL EVERY MORNING
Qty: 90 TABLET | Refills: 3 | Status: SHIPPED | OUTPATIENT
Start: 2020-01-06 | End: 2020-01-06

## 2020-01-06 RX ORDER — ALLOPURINOL 100 MG/1
TABLET ORAL
Qty: 90 TABLET | Refills: 3 | Status: SHIPPED | OUTPATIENT
Start: 2020-01-06 | End: 2020-08-10

## 2020-01-06 RX ORDER — BUPROPION HYDROCHLORIDE 300 MG/1
300 TABLET ORAL EVERY MORNING
Qty: 90 TABLET | Refills: 3 | Status: SHIPPED | OUTPATIENT
Start: 2020-01-06 | End: 2020-08-10

## 2020-01-06 RX ORDER — AMLODIPINE BESYLATE 10 MG/1
10 TABLET ORAL AT BEDTIME
Qty: 90 TABLET | Refills: 3 | Status: SHIPPED | OUTPATIENT
Start: 2020-01-06 | End: 2020-01-06

## 2020-01-06 RX ORDER — PHENTERMINE HYDROCHLORIDE 37.5 MG/1
56 TABLET ORAL
Qty: 135 TABLET | Refills: 3 | Status: SHIPPED | OUTPATIENT
Start: 2020-01-06

## 2020-01-06 RX ORDER — HYDROCHLOROTHIAZIDE 12.5 MG/1
12.5 TABLET ORAL EVERY MORNING
Qty: 90 TABLET | Refills: 3 | Status: SHIPPED | OUTPATIENT
Start: 2020-01-06 | End: 2020-01-08

## 2020-01-06 RX ORDER — AMLODIPINE BESYLATE 10 MG/1
10 TABLET ORAL AT BEDTIME
Qty: 90 TABLET | Refills: 3 | Status: SHIPPED | OUTPATIENT
Start: 2020-01-06 | End: 2020-08-10

## 2020-01-06 RX ORDER — ALLOPURINOL 100 MG/1
TABLET ORAL
Qty: 90 TABLET | Refills: 3 | Status: SHIPPED | OUTPATIENT
Start: 2020-01-06 | End: 2020-01-06

## 2020-01-06 RX ORDER — CLOMIPHENE CITRATE 50 MG/1
50 TABLET ORAL EVERY OTHER DAY
Qty: 90 TABLET | Refills: 3 | Status: SHIPPED | OUTPATIENT
Start: 2020-01-06 | End: 2020-08-10

## 2020-01-06 RX ORDER — ROSUVASTATIN CALCIUM 40 MG/1
40 TABLET, COATED ORAL DAILY
Qty: 90 TABLET | Refills: 3 | Status: SHIPPED | OUTPATIENT
Start: 2020-01-06 | End: 2020-08-10

## 2020-01-06 ASSESSMENT — PAIN SCALES - GENERAL: PAINLEVEL: NO PAIN (0)

## 2020-01-07 NOTE — NURSING NOTE
Chief Complaint   Patient presents with     Hypertension     Pt is here to follow up on BP.      Yokasta Loomis LPN at 6:28 PM on 1/6/2020.

## 2020-01-07 NOTE — PROGRESS NOTES
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 He is doing well and has no complaints. No asthmatic symtpoms (has viral URI induced flares and requires no treatment in between).     Current Outpatient Medications   Medication     acetaminophen (TYLENOL) 325 MG tablet     allopurinol (ZYLOPRIM) 100 MG tablet     amLODIPine (NORVASC) 10 MG tablet     buPROPion (WELLBUTRIN XL) 150 MG 24 hr tablet     candesartan (ATACAND) 32 MG tablet     clomiPHENE (CLOMID) 50 MG tablet     empagliflozin (JARDIANCE) 25 MG TABS tablet     esomeprazole (NEXIUM) 20 MG DR capsule     hydrochlorothiazide (HYDRODIURIL) 12.5 MG tablet     metFORMIN (GLUCOPHAGE) 1000 MG tablet     multivitamin, therapeutic with minerals (MULTI-VITAMIN) TABS tablet     order for DME     phentermine (ADIPEX-P) 37.5 MG tablet     rosuvastatin (CRESTOR) 40 MG tablet     No current facility-administered medications for this visit.      Facility-Administered Medications Ordered in Other Visits   Medication     barium sulfate 40% (VARIBAR THIN HONEY) oral suspension       O) /83   Pulse 88   Wt 112.5 kg (248 lb)   SpO2 97%   BMI 31.83 kg/m    Well appearing  Cor RRR no MRG  Lungs clear bilaterally  Abd soft  No LE edema  Normal mentation and affect    1) HCM. . Colon screening UTD. Immunizations UTD. Wt loss ongoing in healthy manner.  2) HTN. BP controlled with amlodipine 10 mg/d, candesartan 32 mg/d, hydrochlorothiazide 12.5 mg. BMP normal on 12/3/19.  3) ASCVD Risk. Max RF reduction with statin, BP control. Coronary calcium score elevated but Nuc Med stress test with Lexiscan showed summed stress score of 0. Subsequent  Stress MRI done 1/18/2019 and no evidence of ischemia, fibrosis or other abnormalities. Continue current regimen/strategy. Recent neurosurgical procedure without any cardiac complications.  4) BPH. Symptoms well controlled s/p REZUM procedure. Had 1 PSA spike to above 4 but recheck down to 1.9 and most recently 2.08 a month ago. Follow.  5) LISETTE. Not tolerating CPAP but wt loss has been great- now down 48 lbs.  6) Obesity. Currently seeing a wt loss provider and using phentermine for pharmacologic treatment. 48 lb loss in 2 1/2 years.  7) Gout. No flares. Urate <6 on allopurinol 100 mg. No changes.  8) GERD. On esomeprazole without breakthrough symtpoms.  9) Secondary Hyperparathyroidism secondary to vitamin D deficiency. Ongoing vitamin D and calcium replacement. PTH 39 in 4/2019.  10) Metabolic syndrome. Continue metformin, jardiance wt loss, exercise. Last A1C at 5.2% last month.    Over 15 min of 25 min visit spent in care coordination and counseling related to the above issues.    Raul Dutta MD, FACP, FAAP

## 2020-01-07 NOTE — PATIENT INSTRUCTIONS
[facility] Medication Refill Request Information:  * Please contact your pharmacy regarding ANY request for medication refills.  ** [facility] Prescription Fax = [phone]  * Please allow 3 business days for routine medication refills.  * Please allow 5 business days for controlled substance medication refills.     [facility] Test Result notification information:  *You will be notified with in 7-10 days of your appointment day regarding the results of your test.  If you are on MyChart you will be notified as soon as the provider has reviewed the results and signed off on them.    [facility]: [phone]

## 2020-01-08 DIAGNOSIS — I10 ESSENTIAL HYPERTENSION, BENIGN: Primary | ICD-10-CM

## 2020-01-08 RX ORDER — HYDROCHLOROTHIAZIDE 12.5 MG/1
12.5 CAPSULE ORAL DAILY
Qty: 90 CAPSULE | Refills: 3 | Status: SHIPPED | OUTPATIENT
Start: 2020-01-08 | End: 2020-08-10

## 2020-01-15 ENCOUNTER — PRE VISIT (OUTPATIENT)
Dept: UROLOGY | Facility: CLINIC | Age: 65
End: 2020-01-15

## 2020-01-15 NOTE — TELEPHONE ENCOUNTER
Reason for visit: annual check up     Relevant information: history of Rezum    Records/imaging/labs/orders: PSA available    Pt called: generic message left    At Rooming: flow/pvr

## 2020-01-17 ENCOUNTER — MYC MEDICAL ADVICE (OUTPATIENT)
Dept: INTERNAL MEDICINE | Facility: CLINIC | Age: 65
End: 2020-01-17

## 2020-01-17 ENCOUNTER — TELEPHONE (OUTPATIENT)
Dept: INTERNAL MEDICINE | Facility: CLINIC | Age: 65
End: 2020-01-17

## 2020-01-17 NOTE — TELEPHONE ENCOUNTER
Nexium not covered. Omeprazole  and  Pantoprazole covered    Route to Tra Squires Paramedic on 1/17/2020 at 9:58 AM

## 2020-01-21 NOTE — PROGRESS NOTES
"Urology Clinic Note        Date: 1/22/20  Patient: Darnell Grant  MRN: [id number]     Reason for Visit: Follow up, BPH     HPI/Subjective: Darnell Grant is a 64 year old male with bladder outlet obstructive symptoms due to BPH s/p REZUM procedure on 4/13/18 with Dr. Lomeli. He presents today for routine follow-up. He was noted to have an elevated PSA of 4.51 at his last clinic visit on 11/28/18 which has since down-trended to 1.9 and 2.08 most recently.    IPSS = 21  Uroflow - slow flow with intermittent pattern.  High PVR first time and after repeat void was 57cc  Objective:  BP (!) 143/83   Pulse 90   Ht 1.88 m (6' 2\")   Wt 112.5 kg (248 lb 1.6 oz)   BMI 31.85 kg/m      Gen: In NAD, conversant.       Uroflowmetry:  Voided: 195 ml  Qmax: 6.4 ml/s   mL repeat was 57cc    Lab Results   Component Value Date    PSA 2.08 12/03/2019    PSA 1.90 04/30/2019    PSA 4.51 11/13/2018    PSA 0.96 03/06/2018    PSA 0.60 08/31/2015    PSA 0.35 07/14/2014    PSA 0.40 02/10/2009    PSA 0.56 07/21/2008    PSA 0.53 07/09/2007    PSA 0.51 06/19/2006       Assessment & Plan: Darnell Grant is a 64 year old male w/ BPH and PELLETIER sx s/p REZUM (4/13/18). Doing well but still complains of some symptoms of obstruction and frequency    -try flomax again as he has a high IPSS  -If symptoms do not improve may need reevaluation with a cysto and possible laser TURP  -f/u in 6 months or earlier    Patient seen and examined then discussed with staff, Dr. Lomeli, who developed the above treatment plan.    Carmine Melendez MD  Urology, PGY-2    Patient seen and examined with the resident.  Visit time 15 minutes and >50% spent in counseling.  I agree with the resident's note and plan of care.       Yaya Lomeli MD  Urology Staff          "

## 2020-01-22 ENCOUNTER — OFFICE VISIT (OUTPATIENT)
Dept: UROLOGY | Facility: CLINIC | Age: 65
End: 2020-01-22
Payer: COMMERCIAL

## 2020-01-22 VITALS
BODY MASS INDEX: 31.84 KG/M2 | HEART RATE: 90 BPM | DIASTOLIC BLOOD PRESSURE: 83 MMHG | SYSTOLIC BLOOD PRESSURE: 143 MMHG | WEIGHT: 248.1 LBS | HEIGHT: 74 IN

## 2020-01-22 DIAGNOSIS — N40.0 BENIGN PROSTATIC HYPERPLASIA, UNSPECIFIED WHETHER LOWER URINARY TRACT SYMPTOMS PRESENT: Primary | ICD-10-CM

## 2020-01-22 RX ORDER — TAMSULOSIN HYDROCHLORIDE 0.4 MG/1
0.4 CAPSULE ORAL DAILY
Qty: 90 CAPSULE | Refills: 3 | Status: SHIPPED | OUTPATIENT
Start: 2020-01-22 | End: 2020-08-10

## 2020-01-22 ASSESSMENT — MIFFLIN-ST. JEOR: SCORE: 1975.12

## 2020-01-22 ASSESSMENT — PAIN SCALES - GENERAL: PAINLEVEL: NO PAIN (0)

## 2020-01-22 NOTE — LETTER
"1/22/2020       RE: Darnell Grnat  [address]  [facility] [address]     Dear Colleague,    Thank you for referring your patient, Darnell Grant, to the [facility] UROLOGY AND INST FOR PROSTATE AND UROLOGIC CANCERS at [facility]. Please see a copy of my visit note below.    Urology Clinic Note        Date: 1 /22/20  Patient: Darnell Grant  MRN: [id number]     Reason for Visit: Follow up, BPH     HPI/Subjective: Darnell Grant is a 64 year old male with  bladder outlet obstructive symptoms due to BPH s/p REZUM procedure on 4/13/18 with Dr. Lomeli. He presents today for routine follow-up.  He was noted to have an elevated PSA of 4.51 at his last clinic visit on 11/28/18 which has since down-trended to 1.9 and 2.08 most recently.    IPSS = 21  Uroflow - slow flow with intermittent pattern.  High PVR first time and after repeat void was 57cc  Objective:  BP (!) 143/83   Pulse 90   Ht 1.88 m (6' 2\")   Wt 112.5 kg (248 lb 1.6 oz)   BMI 31.85 kg/m       Gen: In NAD, conversant.       Uroflowmetry:  Voided: 195 ml  Qmax: 6.4 ml/s   mL repeat was 57cc    Lab Results   Component Value Date    PSA 2.08 12/03/2019    PSA 1.90 04/30/2019    PSA 4.51 11/13/2018    PSA 0.96 03/06/2018    PSA 0.60 08/31/2015    PSA 0.35 07/14/2014    PSA 0.40 02/10/2009    PSA 0.56 07/21/2008    PSA 0.53 07/09/2007    PSA 0.51 06/19/2006       Assessment & Plan: Darnell Grant is a 64 year old male w/ BPH and PELLETIER sx s/p REZUM (4/13/18). Doing well but still complains of some symptoms of obstruction and frequency    -try flomax again as he has a high IPSS  -If symptoms do not improve may need reevaluation with a cysto and possible laser TURP  -f/u in 6 months or earlier    Patient seen and examined then discussed with staff, Dr. Lomeli, who developed the above treatment plan.    Carmine Melendez MD  Urology, PGY-2    Patient seen and examined with the resident.  Visit time 15 minutes and >50% " spent in counseling.  I agree with the resident's note and plan of care.       Yaya Lomeli MD  Urology Staff

## 2020-01-22 NOTE — NURSING NOTE
"Chief Complaint   Patient presents with     Follow Up     Annual check-in, BPH       Blood pressure (!) 143/83, pulse 90, height 1.88 m (6' 2\"), weight 112.5 kg (248 lb 1.6 oz). Body mass index is 31.85 kg/m .    Patient Active Problem List   Diagnosis     Adjustment disorder with mixed anxiety and depressed mood     Prostate nodule     Pure hypercholesterolemia     Essential hypertension, benign     Chronic cough     Diarrhea     Obstructive sleep apnea hypopnea, moderate [AHI 21 on CPAP 5-15]     Agatston coronary artery calcium score greater than 400     Mass of urinary bladder     Microscopic hematuria     Benign prostatic hyperplasia with urinary obstruction     Combined forms of age-related cataract of both eyes     Myopia of both eyes with astigmatism and presbyopia     Esophageal dysphagia     Osteophyte of cervical spine     Hyperlipidemia     Hypertension     Lumbar radiculopathy     Metabolic syndrome     Obesity, unspecified     Dysphagia       Allergies   Allergen Reactions     Ragweeds      Watery eyes, itchy nose       Current Outpatient Medications   Medication Sig Dispense Refill     acetaminophen (TYLENOL) 325 MG tablet Take 2 tablets (650 mg) by mouth every 4 hours as needed for mild pain 100 tablet 0     allopurinol (ZYLOPRIM) 100 MG tablet take by mouth 1 tab ( 100 mg)  at bedtime 90 tablet 3     amLODIPine (NORVASC) 10 MG tablet Take 1 tablet (10 mg) by mouth At Bedtime 90 tablet 3     buPROPion (WELLBUTRIN XL) 300 MG 24 hr tablet Take 1 tablet (300 mg) by mouth every morning 90 tablet 3     candesartan (ATACAND) 32 MG tablet Take 32 mg by mouth At Bedtime 90 tablet 3     clomiPHENE (CLOMID) 50 MG tablet Take 1 tablet (50 mg) by mouth every other day Pt can't recall if medication was taken today or yesterday 12/3/19 90 tablet 3     empagliflozin (JARDIANCE) 25 MG TABS tablet Take 1 tablet (25 mg) by mouth every morning 90 tablet 3     esomeprazole (NEXIUM) 20 MG DR capsule Take 1 capsule (20 " mg) by mouth At Bedtime Take 30-60 minutes before eating. 90 capsule 3     hydrochlorothiazide (MICROZIDE) 12.5 MG capsule Take 1 capsule (12.5 mg) by mouth daily 90 capsule 3     metFORMIN (GLUCOPHAGE) 1000 MG tablet Take 2 tablets (2,000 mg) by mouth daily (with dinner) 180 tablet 3     multivitamin, therapeutic with minerals (MULTI-VITAMIN) TABS tablet Take 1 tablet by mouth At Bedtime       order for DME Respironics Dreamstation AutoPap 7-15 cm with Respironics Leidy mask.       phentermine (ADIPEX-P) 37.5 MG tablet Take 1.5 tablets (56 mg) by mouth every morning (before breakfast) 135 tablet 3     rosuvastatin (CRESTOR) 40 MG tablet Take 1 tablet (40 mg) by mouth daily 90 tablet 3       Social History     Tobacco Use     Smoking status: Never Smoker     Smokeless tobacco: Never Used   Substance Use Topics     Alcohol use: No     Drug use: No       Sophia Donohue CMA, VASILIY  1/22/2020  3:47 PM

## 2020-01-22 NOTE — PATIENT INSTRUCTIONS
Take medication as directed.    Return in six with a full bladder for a urine flow test. Sooner if needed.    It was a pleasure meeting with you today.  Thank you for allowing me and my team the privilege of caring for you today.  YOU are the reason we are here, and I truly hope we provided you with the excellent service you deserve.  Please let us know if there is anything else we can do for you so that we can be sure you are leaving completely satisfied with your care experience.

## 2020-01-23 DIAGNOSIS — R05.3 CHRONIC COUGH: Primary | ICD-10-CM

## 2020-01-23 RX ORDER — ACETAZOLAMIDE 250 MG/1
125 TABLET ORAL 2 TIMES DAILY
Qty: 10 TABLET | Refills: 0 | Status: SHIPPED | OUTPATIENT
Start: 2020-01-23 | End: 2020-09-14

## 2020-03-25 DIAGNOSIS — J45.901 EXACERBATION OF ASTHMA, UNSPECIFIED ASTHMA SEVERITY, UNSPECIFIED WHETHER PERSISTENT: Primary | ICD-10-CM

## 2020-03-25 RX ORDER — ALBUTEROL SULFATE 90 UG/1
2 AEROSOL, METERED RESPIRATORY (INHALATION) EVERY 4 HOURS PRN
Qty: 1 INHALER | Refills: 11 | Status: SHIPPED | OUTPATIENT
Start: 2020-03-25 | End: 2021-02-02

## 2020-05-06 ENCOUNTER — TELEPHONE (OUTPATIENT)
Dept: PULMONOLOGY | Facility: CLINIC | Age: 65
End: 2020-05-06

## 2020-05-06 DIAGNOSIS — R05.9 COUGH: Primary | ICD-10-CM

## 2020-05-06 DIAGNOSIS — J98.01 BRONCHOSPASM: ICD-10-CM

## 2020-05-06 DIAGNOSIS — R05.8 POST-VIRAL COUGH SYNDROME: ICD-10-CM

## 2020-05-06 NOTE — TELEPHONE ENCOUNTER
Have had text message exchanges and has had cough for ~ 4 days persisting in spite of regular use of albuterol 2 puffs Q 4-6H.  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  No other signs or symptoms or influenza or COVID-19, specifically fever, loss of taste, muscle aches, etc.    A:  Post-viral bronchial hyperreactivity    P:  Breo Ellipta 200/25 1 q AM for 2-4 weeks  Assess symptomatic improvement and contact me if not improved.    Walter Simons MD

## 2020-07-13 ENCOUNTER — TELEPHONE (OUTPATIENT)
Dept: UROLOGY | Facility: CLINIC | Age: 65
End: 2020-07-13

## 2020-07-13 ENCOUNTER — TELEPHONE (OUTPATIENT)
Dept: INTERNAL MEDICINE | Facility: CLINIC | Age: 65
End: 2020-07-13

## 2020-07-13 DIAGNOSIS — N40.0 ENLARGED PROSTATE: Primary | ICD-10-CM

## 2020-07-13 DIAGNOSIS — R73.03 PREDIABETES: ICD-10-CM

## 2020-07-13 DIAGNOSIS — E78.5 HYPERLIPIDEMIA: ICD-10-CM

## 2020-07-13 DIAGNOSIS — Z00.00 ROUTINE HEALTH MAINTENANCE: Primary | ICD-10-CM

## 2020-07-13 DIAGNOSIS — I10 HYPERTENSION: ICD-10-CM

## 2020-07-13 NOTE — TELEPHONE ENCOUNTER
----- Message from Idalmis Rivera LPN sent at 7/10/2020  3:00 PM CDT -----    ----- Message -----  From: Diaz Pitt MD  Sent: 6/3/2020   7:51 AM CDT  To: Allison Garcia RN, Sherry Monzon RN    July/August, this is not time sensitive, thanks  ----- Message -----  From: Allison Garcia RN  Sent: 6/3/2020   6:34 AM CDT  To: Sherry Monzon RN, Diaz Pitt MD    We could put him in when you are in clinic in July otherwise, I could get him in sooner with Dr. Luciano sooner as she is in the clinic every Friday.  Please let me know your preference.   ----- Message -----  From: Diaz Pitt MD  Sent: 6/2/2020  10:40 PM CDT  To: Allison Garcia RN, Sherry Monzon RN    Hi - Just following up on a request from Dr. Lomeli to see this patient with an updated PSA in the next few months with a possible cystoscopy if having persistent urinary symptoms.  Thanks  Keon

## 2020-07-13 NOTE — TELEPHONE ENCOUNTER
Patient needs follow up with dr lowe in august with psa and possible cysto Idalmis Rivera LPN Staff Nurse

## 2020-07-13 NOTE — TELEPHONE ENCOUNTER
Patient contacted PCP regarding scheduling an upcoming physical and ordering annual labs. PCP recommends CBC, CMP. TSH, A1c, and lipids be completed. Orders placed. Will request patient be contacted for scheduling.    Bina Milligan RN (Brasch)

## 2020-07-13 NOTE — TELEPHONE ENCOUNTER
Patient called and schedule lab on August 11 at 7:00 AM and Dr. Dutta on Monday July 17 at 1:00 PM for in-person physical. Confirmed by patient.

## 2020-07-14 ENCOUNTER — TELEPHONE (OUTPATIENT)
Dept: UROLOGY | Facility: CLINIC | Age: 65
End: 2020-07-14

## 2020-07-14 NOTE — TELEPHONE ENCOUNTER
Left message for pt to call and schedule a Cysto with Dr. Pitt with a PSA prior, next available (September)

## 2020-07-14 NOTE — TELEPHONE ENCOUNTER
----- Message from Idalmis Rivera LPN sent at 7/13/2020  1:53 PM CDT -----  Please schedule with chrissy with psa and possible cysto in august  thanks Idalmis Rivera LPN Staff Nurse  ----- Message -----  From: Idalmis Rivera LPN  Sent: 7/10/2020   3:00 PM CDT  To: Idalmis Rivera LPN      ----- Message -----  From: Diaz Pitt MD  Sent: 6/3/2020   7:51 AM CDT  To: Allison Garcia, HAY, Sherry Monzon RN    July/August, this is not time sensitive, thanks  ----- Message -----  From: Allison Garcia RN  Sent: 6/3/2020   6:34 AM CDT  To: Sherry Monzon, HAY, Diaz Pitt MD    We could put him in when you are in clinic in July otherwise, I could get him in sooner with Dr. Luciano sooner as she is in the clinic every Friday.  Please let me know your preference.   ----- Message -----  From: Diaz Pitt MD  Sent: 6/2/2020  10:40 PM CDT  To: Allison Garcia RN, Sherry Monzon, RN    Hi - Just following up on a request from Dr. Lomeli to see this patient with an updated PSA in the next few months with a possible cystoscopy if having persistent urinary symptoms.  Thanks  Keon

## 2020-07-17 ENCOUNTER — DOCUMENTATION ONLY (OUTPATIENT)
Dept: CARE COORDINATION | Facility: CLINIC | Age: 65
End: 2020-07-17

## 2020-07-18 ENCOUNTER — MYC MEDICAL ADVICE (OUTPATIENT)
Dept: INTERNAL MEDICINE | Facility: CLINIC | Age: 65
End: 2020-07-18

## 2020-08-04 ENCOUNTER — OFFICE VISIT (OUTPATIENT)
Dept: OPHTHALMOLOGY | Facility: CLINIC | Age: 65
End: 2020-08-04
Payer: COMMERCIAL

## 2020-08-04 DIAGNOSIS — H52.203 MYOPIA OF BOTH EYES WITH ASTIGMATISM AND PRESBYOPIA: ICD-10-CM

## 2020-08-04 DIAGNOSIS — H25.813 COMBINED FORMS OF AGE-RELATED CATARACT OF BOTH EYES: Primary | ICD-10-CM

## 2020-08-04 DIAGNOSIS — H52.13 MYOPIA OF BOTH EYES WITH ASTIGMATISM AND PRESBYOPIA: ICD-10-CM

## 2020-08-04 DIAGNOSIS — H52.4 MYOPIA OF BOTH EYES WITH ASTIGMATISM AND PRESBYOPIA: ICD-10-CM

## 2020-08-04 ASSESSMENT — REFRACTION_WEARINGRX
OS_CYLINDER: +1.25
OD_AXIS: 120
OD_ADD: +2.50
OD_SPHERE: -1.00
OS_SPHERE: -1.00
OS_ADD: +2.50
OD_CYLINDER: +0.25
OS_AXIS: 075

## 2020-08-04 ASSESSMENT — REFRACTION_MANIFEST
OS_CYLINDER: +1.25
OD_AXIS: 093
OD_SPHERE: -1.00
OS_AXIS: 090
OD_CYLINDER: +0.25
OD_ADD: +2.50
OS_SPHERE: -1.25
OS_ADD: +2.50

## 2020-08-04 ASSESSMENT — EXTERNAL EXAM - LEFT EYE: OS_EXAM: NORMAL

## 2020-08-04 ASSESSMENT — SLIT LAMP EXAM - LIDS
COMMENTS: NORMAL
COMMENTS: NORMAL

## 2020-08-04 ASSESSMENT — VISUAL ACUITY
OS_CC: 20/25
OD_CC+: -
OD_CC: 20/20
METHOD: SNELLEN - LINEAR
CORRECTION_TYPE: GLASSES

## 2020-08-04 ASSESSMENT — CONF VISUAL FIELD
METHOD: COUNTING FINGERS
OS_NORMAL: 1
OD_NORMAL: 1

## 2020-08-04 ASSESSMENT — TONOMETRY
OS_IOP_MMHG: 16
OD_IOP_MMHG: 16
IOP_METHOD: ICARE

## 2020-08-04 ASSESSMENT — CUP TO DISC RATIO
OS_RATIO: 0.1
OD_RATIO: 0.1

## 2020-08-04 ASSESSMENT — EXTERNAL EXAM - RIGHT EYE: OD_EXAM: NORMAL

## 2020-08-04 NOTE — PROGRESS NOTES
History  HPI     Annual Eye Exam     In both eyes.  Onset was gradual.  This started years ago.  Charactertized as  blurred vision.  Severity is mild.  Occurring constantly.  It is worse throughout the day.  Context:  distance vision.  Since onset it is gradually worsening.  Treatments tried include no treatments.  Pain was noted as 0/10.              Comments     Distance vision a little blurry both eyes. Patient denies eye pain or irritation. Does not use any eye drops.    Ling Callaway COT 7:02 AM August 4, 2020             Last edited by Ling Callaway on 8/4/2020  7:02 AM. (History)          Assessment/Plan  (H25.813) Combined forms of age-related cataract of both eyes  (primary encounter diagnosis)  Comment: Mild visual significance but affecting nighttime vision and quality of life, motivated for surgery  Plan:  Educated patient on clinical findings. Discussed options including monitoring vs surgery. The patient is motivated for surgery as nighttime driving has become an issue, as well as seeing clearly during lectures. Referred to Dr. Tobin for cataract consultation.    (H52.13,  H52.203,  H52.4) Myopia of both eyes with astigmatism and presbyopia  Comment: Myopic astigmatism both eyes   Plan: REFRACTION         Prescription withheld given pending cataract consultation.    Complete documentation of historical and exam elements from today's encounter can  be found in the full encounter summary report (not reduplicated in this progress  note). I personally obtained the chief complaint(s) and history of present illness. I  confirmed and edited as necessary the review of systems, past medical/surgical  history, family history, social history, and examination findings as documented by  others; and I examined the patient myself. I personally reviewed the relevant tests,  images, and reports as documented above. I formulated and edited as necessary the  assessment and plan and discussed the findings and management  plan with the  patient and family.    Doc Zhu OD, FAAO

## 2020-08-04 NOTE — NURSING NOTE
Chief Complaints and History of Present Illnesses   Patient presents with     Annual Eye Exam     Chief Complaint(s) and History of Present Illness(es)     Annual Eye Exam     Laterality: both eyes    Onset: gradual    Onset: years ago    Quality: blurred vision    Severity: mild    Frequency: constantly    Timing: throughout the day    Context: distance vision    Course: gradually worsening    Treatments tried: no treatments    Pain scale: 0/10              Comments     Distance vision a little blurry both eyes. Patient denies eye pain or irritation. Does not use any eye drops.    Ling Callaway COT 7:02 AM August 4, 2020

## 2020-08-06 DIAGNOSIS — R73.03 PREDIABETES: ICD-10-CM

## 2020-08-06 DIAGNOSIS — E78.5 HYPERLIPIDEMIA: ICD-10-CM

## 2020-08-06 DIAGNOSIS — N40.0 ENLARGED PROSTATE: ICD-10-CM

## 2020-08-06 DIAGNOSIS — I10 HYPERTENSION: ICD-10-CM

## 2020-08-06 DIAGNOSIS — Z00.00 ROUTINE HEALTH MAINTENANCE: ICD-10-CM

## 2020-08-06 LAB
ALBUMIN SERPL-MCNC: 3.6 G/DL (ref 3.4–5)
ALP SERPL-CCNC: 78 U/L (ref 40–150)
ALT SERPL W P-5'-P-CCNC: 51 U/L (ref 0–70)
ANION GAP SERPL CALCULATED.3IONS-SCNC: 6 MMOL/L (ref 3–14)
AST SERPL W P-5'-P-CCNC: 30 U/L (ref 0–45)
BASOPHILS # BLD AUTO: 0.1 10E9/L (ref 0–0.2)
BASOPHILS NFR BLD AUTO: 0.8 %
BILIRUB SERPL-MCNC: 0.4 MG/DL (ref 0.2–1.3)
BUN SERPL-MCNC: 25 MG/DL (ref 7–30)
CALCIUM SERPL-MCNC: 9.2 MG/DL (ref 8.5–10.1)
CHLORIDE SERPL-SCNC: 109 MMOL/L (ref 94–109)
CHOLEST SERPL-MCNC: 77 MG/DL
CO2 SERPL-SCNC: 26 MMOL/L (ref 20–32)
CREAT SERPL-MCNC: 1 MG/DL (ref 0.66–1.25)
DIFFERENTIAL METHOD BLD: NORMAL
EOSINOPHIL # BLD AUTO: 0.2 10E9/L (ref 0–0.7)
EOSINOPHIL NFR BLD AUTO: 2.3 %
ERYTHROCYTE [DISTWIDTH] IN BLOOD BY AUTOMATED COUNT: 14 % (ref 10–15)
GFR SERPL CREATININE-BSD FRML MDRD: 78 ML/MIN/{1.73_M2}
GLUCOSE SERPL-MCNC: 88 MG/DL (ref 70–99)
HBA1C MFR BLD: 5.1 % (ref 0–5.6)
HCT VFR BLD AUTO: 48.9 % (ref 40–53)
HDLC SERPL-MCNC: 28 MG/DL
HGB BLD-MCNC: 16.6 G/DL (ref 13.3–17.7)
IMM GRANULOCYTES # BLD: 0 10E9/L (ref 0–0.4)
IMM GRANULOCYTES NFR BLD: 0.5 %
LDLC SERPL CALC-MCNC: 21 MG/DL
LYMPHOCYTES # BLD AUTO: 1.5 10E9/L (ref 0.8–5.3)
LYMPHOCYTES NFR BLD AUTO: 21.9 %
MCH RBC QN AUTO: 31.4 PG (ref 26.5–33)
MCHC RBC AUTO-ENTMCNC: 33.9 G/DL (ref 31.5–36.5)
MCV RBC AUTO: 93 FL (ref 78–100)
MONOCYTES # BLD AUTO: 0.5 10E9/L (ref 0–1.3)
MONOCYTES NFR BLD AUTO: 8 %
NEUTROPHILS # BLD AUTO: 4.4 10E9/L (ref 1.6–8.3)
NEUTROPHILS NFR BLD AUTO: 66.5 %
NONHDLC SERPL-MCNC: 50 MG/DL
NRBC # BLD AUTO: 0 10*3/UL
NRBC BLD AUTO-RTO: 0 /100
PLATELET # BLD AUTO: 170 10E9/L (ref 150–450)
POTASSIUM SERPL-SCNC: 4.1 MMOL/L (ref 3.4–5.3)
PROT SERPL-MCNC: 6.9 G/DL (ref 6.8–8.8)
PSA SERPL-MCNC: 2.07 UG/L (ref 0–4)
RBC # BLD AUTO: 5.28 10E12/L (ref 4.4–5.9)
SODIUM SERPL-SCNC: 141 MMOL/L (ref 133–144)
TRIGL SERPL-MCNC: 142 MG/DL
TSH SERPL DL<=0.005 MIU/L-ACNC: 3.06 MU/L (ref 0.4–4)
WBC # BLD AUTO: 6.6 10E9/L (ref 4–11)

## 2020-08-09 ASSESSMENT — ACTIVITIES OF DAILY LIVING (ADL)
IN_THE_PAST_7_DAYS,_DID_YOU_NEED_HELP_FROM_OTHERS_TO_TAKE_CARE_OF_THINGS_SUCH_AS_LAUNDRY_AND_HOUSEKEEPING,_BANKING,_SHOPPING,_USING_THE_TELEPHONE,_FOOD_PREPARATION,_TRANSPORTATION,_OR_TAKING_YOUR_OWN_MEDICATIONS?: N
IN_THE_PAST_7_DAYS,_DID_YOU_NEED_HELP_FROM_OTHERS_TO_PERFORM_EVERYDAY_ACTIVITIES_SUCH_AS_EATING,_GETTING_DRESSED,_GROOMING,_BATHING,_WALKING,_OR_USING_THE_TOILET: N

## 2020-08-10 ENCOUNTER — MYC MEDICAL ADVICE (OUTPATIENT)
Dept: INTERNAL MEDICINE | Facility: CLINIC | Age: 65
End: 2020-08-10

## 2020-08-10 ENCOUNTER — OFFICE VISIT (OUTPATIENT)
Dept: INTERNAL MEDICINE | Facility: CLINIC | Age: 65
End: 2020-08-10
Payer: COMMERCIAL

## 2020-08-10 VITALS — WEIGHT: 254 LBS | BODY MASS INDEX: 32.61 KG/M2

## 2020-08-10 DIAGNOSIS — Z11.59 NEED FOR HEPATITIS C SCREENING TEST: ICD-10-CM

## 2020-08-10 DIAGNOSIS — E88.810 METABOLIC SYNDROME X: ICD-10-CM

## 2020-08-10 DIAGNOSIS — N40.0 BENIGN PROSTATIC HYPERPLASIA, UNSPECIFIED WHETHER LOWER URINARY TRACT SYMPTOMS PRESENT: ICD-10-CM

## 2020-08-10 DIAGNOSIS — R53.83 OTHER FATIGUE: ICD-10-CM

## 2020-08-10 DIAGNOSIS — K21.9 GASTROESOPHAGEAL REFLUX DISEASE, ESOPHAGITIS PRESENCE NOT SPECIFIED: ICD-10-CM

## 2020-08-10 DIAGNOSIS — Z13.89 SCREENING FOR DIABETIC PERIPHERAL NEUROPATHY: ICD-10-CM

## 2020-08-10 DIAGNOSIS — N40.0 BENIGN NON-NODULAR PROSTATIC HYPERPLASIA WITHOUT LOWER URINARY TRACT SYMPTOMS: ICD-10-CM

## 2020-08-10 DIAGNOSIS — E78.5 HYPERLIPIDEMIA LDL GOAL <100: ICD-10-CM

## 2020-08-10 DIAGNOSIS — M10.9 GOUT, UNSPECIFIED CAUSE, UNSPECIFIED CHRONICITY, UNSPECIFIED SITE: ICD-10-CM

## 2020-08-10 DIAGNOSIS — Z00.00 ROUTINE GENERAL MEDICAL EXAMINATION AT A HEALTH CARE FACILITY: Primary | ICD-10-CM

## 2020-08-10 DIAGNOSIS — E66.811 CLASS 1 OBESITY WITH SERIOUS COMORBIDITY AND BODY MASS INDEX (BMI) OF 31.0 TO 31.9 IN ADULT, UNSPECIFIED OBESITY TYPE: ICD-10-CM

## 2020-08-10 DIAGNOSIS — Z00.00 ROUTINE GENERAL MEDICAL EXAMINATION AT A HEALTH CARE FACILITY: ICD-10-CM

## 2020-08-10 DIAGNOSIS — R35.1 NOCTURIA: ICD-10-CM

## 2020-08-10 DIAGNOSIS — I10 ESSENTIAL HYPERTENSION, BENIGN: ICD-10-CM

## 2020-08-10 DIAGNOSIS — I10 ESSENTIAL HYPERTENSION: ICD-10-CM

## 2020-08-10 DIAGNOSIS — K21.9 GASTROESOPHAGEAL REFLUX DISEASE WITHOUT ESOPHAGITIS: ICD-10-CM

## 2020-08-10 RX ORDER — ROSUVASTATIN CALCIUM 40 MG/1
40 TABLET, COATED ORAL DAILY
Qty: 90 TABLET | Refills: 3 | Status: SHIPPED | OUTPATIENT
Start: 2020-08-10 | End: 2021-03-08

## 2020-08-10 RX ORDER — AMLODIPINE BESYLATE 10 MG/1
10 TABLET ORAL AT BEDTIME
Qty: 90 TABLET | Refills: 3 | Status: SHIPPED | OUTPATIENT
Start: 2020-08-10 | End: 2021-03-08

## 2020-08-10 RX ORDER — BUPROPION HYDROCHLORIDE 300 MG/1
300 TABLET ORAL EVERY MORNING
Qty: 90 TABLET | Refills: 3 | Status: SHIPPED | OUTPATIENT
Start: 2020-08-10 | End: 2021-03-08

## 2020-08-10 RX ORDER — CANDESARTAN 32 MG/1
32 TABLET ORAL AT BEDTIME
Qty: 90 TABLET | Refills: 3 | Status: SHIPPED | OUTPATIENT
Start: 2020-08-10 | End: 2021-03-08

## 2020-08-10 RX ORDER — TAMSULOSIN HYDROCHLORIDE 0.4 MG/1
0.4 CAPSULE ORAL DAILY
Qty: 90 CAPSULE | Refills: 3 | Status: SHIPPED | OUTPATIENT
Start: 2020-08-10 | End: 2020-10-29

## 2020-08-10 RX ORDER — HYDROCHLOROTHIAZIDE 12.5 MG/1
12.5 CAPSULE ORAL DAILY
Qty: 90 CAPSULE | Refills: 3 | Status: SHIPPED | OUTPATIENT
Start: 2020-08-10 | End: 2021-03-08

## 2020-08-10 RX ORDER — CLOMIPHENE CITRATE 50 MG/1
50 TABLET ORAL EVERY OTHER DAY
Qty: 90 TABLET | Refills: 3 | Status: SHIPPED | OUTPATIENT
Start: 2020-08-10 | End: 2023-07-13

## 2020-08-10 RX ORDER — ALLOPURINOL 100 MG/1
TABLET ORAL
Qty: 90 TABLET | Refills: 3 | Status: SHIPPED | OUTPATIENT
Start: 2020-08-10 | End: 2021-03-08

## 2020-08-10 ASSESSMENT — PAIN SCALES - GENERAL: PAINLEVEL: NO PAIN (0)

## 2020-08-10 NOTE — PATIENT INSTRUCTIONS
[facility] Phone Number [phone]  [facility] Medication Refill Request Information:  * Please contact your pharmacy regarding ANY request for medication refills.  ** [facility] Prescription Fax = [phone]  * Please allow 3 business days for routine medication refills.  * Please allow 5 business days for controlled substance medication refills.     [facility] Test Result notification information:  *You will be notified with in 7-10 days of your appointment day regarding the results of your test.  If you are on MyChart you will be notified as soon as the provider has reviewed the results and signed off on them.

## 2020-08-10 NOTE — NURSING NOTE
Chief Complaint   Patient presents with     Physical     Pt is here for a regular physical.     Depression Response    Patient completed the PHQ-9 assessment for depression and scored >9? No  Question 9 on the PHQ-9 was positive for suicidality? No    I personally notified the following: visit provider       VASILIY Keith 3:36 PM  8/10/2020

## 2020-08-11 ENCOUNTER — TELEPHONE (OUTPATIENT)
Dept: INTERNAL MEDICINE | Facility: CLINIC | Age: 65
End: 2020-08-11

## 2020-08-11 DIAGNOSIS — K21.9 GASTROESOPHAGEAL REFLUX DISEASE, ESOPHAGITIS PRESENCE NOT SPECIFIED: ICD-10-CM

## 2020-08-11 NOTE — TELEPHONE ENCOUNTER
Discharge pharmacy calling requesting medication change for insurance coverage. Would like to change esomeprazole to omeprazole. Will route for review.    Robert Lake MA on 8/11/2020 at 9:17 AM

## 2020-08-11 NOTE — PROGRESS NOTES
SUBJECTIVE:  Darnell Grant is a 64 year old male presents for   Chief Complaint   Patient presents with     Physical     Pt is here for a regular physical.      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 ROS negative as below. Neck pain and swallowing symptoms much improved since surgery to shave down cervical spine osteophytes. Wt loss continues and is going well- over 50 lb loss to date. Walking     Medications and allergies were reviewed by me today.   Past Medical History:   Diagnosis Date     Bladder mass      BPH (benign prostatic hyperplasia)      GERD (gastroesophageal reflux disease)      High serum parathyroid hormone (PTH) 2017     Hypertension      Metabolic syndrome      Nephrolithiasis      Obesity      LISETTE (obstructive sleep apnea)      Prediabetes      Family History   Problem Relation Age of Onset     Asthma Brother      Diabetes Maternal Grandmother      Nephrolithiasis No family hx of      Parathyroid Disorders No family hx of      Thyroid Disease No family hx of      Glaucoma No family hx of      Macular Degeneration No family hx of      Meds reviewed:   Current Outpatient Medications   Medication     acetaminophen (TYLENOL) 325 MG tablet     albuterol (PROAIR HFA/PROVENTIL HFA/VENTOLIN HFA) 108 (90 Base) MCG/ACT inhaler     allopurinol (ZYLOPRIM) 100 MG tablet     amLODIPine (NORVASC) 10 MG tablet     buPROPion (WELLBUTRIN XL) 300 MG 24 hr tablet     candesartan (ATACAND) 32 MG tablet     clomiPHENE (CLOMID) 50 MG tablet     empagliflozin (JARDIANCE) 25 MG TABS tablet     esomeprazole (NEXIUM) 20 MG DR capsule     hydrochlorothiazide (MICROZIDE) 12.5 MG capsule     metFORMIN (GLUCOPHAGE) 1000 MG tablet     multivitamin, therapeutic with minerals (MULTI-VITAMIN) TABS tablet     phentermine (ADIPEX-P) 37.5 MG tablet     rosuvastatin (CRESTOR) 40 MG tablet     tamsulosin (FLOMAX) 0.4 MG capsule     acetaZOLAMIDE  (DIAMOX) 250 MG tablet     order for DME     No current facility-administered medications for this visit.      Facility-Administered Medications Ordered in Other Visits   Medication     barium sulfate 40% (VARIBAR THIN HONEY) oral suspension         Answers for HPI/ROS submitted by the patient on 8/9/2020   General Symptoms: No  Skin Symptoms: No  HENT Symptoms: No  EYE SYMPTOMS: No  HEART SYMPTOMS: No  LUNG SYMPTOMS: No  INTESTINAL SYMPTOMS: No  URINARY SYMPTOMS: No  REPRODUCTIVE SYMPTOMS: No  SKELETAL SYMPTOMS: No  BLOOD SYMPTOMS: No  NERVOUS SYSTEM SYMPTOMS: No  MENTAL HEALTH SYMPTOMS: No    PHYSICAL EXAM:  BP (P) 117/78   Pulse (P) 63   Wt 115.2 kg (254 lb)   SpO2 (P) 96%   BMI 32.61 kg/m        Constitutional: no distress, comfortable, pleasant   Eyes: anicteric, normal extra-ocular movements   Ears, Nose and Throat: tympanic membranes clear, nose clear and free of lesions, throat clear, neck supple with full range of motion, no thyromegaly.   Cardiovascular: regular rate and rhythm, normal S1 and S2, no murmurs, rubs or gallops, peripheral pulses full and symmetric   Respiratory: clear to auscultation, no wheezes or crackles, normal breath sounds   Gastrointestinal: positive bowel sounds, nontender, no hepatosplenomegaly, no masses   Musculoskeletal: full range of motion, no edema   Skin: no concerning lesions, no jaundice   Neurological: Nl mentation, normal gait, no tremor   Psychological: appropriate mood   Lymphatic: no cervical, axillary or inguinal lymphadenopathy      ASSESSMENT/PLAN:  1) HCM. Pneumovax today. Excellent weight reduction ongoing. Daily walking habit in place. Other screening UTD.  2) Post-infectious hyperreactive airways- no recent infection so asymptomatic on NO medications. Not c/w an asthma diagnosis.  3) HTN. BP in target range on current regimen, no changes. BMP nl.  4) BPH. S/P REZUM procedure and on tamsulosin with good results. Cystoscopy in f/u with Urology.  5) ASCVD risk.  Max RF reduction strategy. LDL and total cholesterol low on rosuvastatin. No SEs noted. No changes.   6) Obesity/DMII- On-going wt loss slow and steady with daily exercise. Continue current metformin and Jardiance. Phentermine managed by Wt. Loss clinic.  7) GERD- continue esomeprazole- failed de-escalation to H2 blocker only.  8) Gout. No flares on low dose allopurinol. No changes.     Over 15 min of 25 min visit spent in care coordination and counseling related to the above issues.    Raul Dutta MD, FACP, FAAP        Raul Dutta MD

## 2020-08-12 DIAGNOSIS — R53.83 OTHER FATIGUE: ICD-10-CM

## 2020-08-12 DIAGNOSIS — Z00.00 ROUTINE GENERAL MEDICAL EXAMINATION AT A HEALTH CARE FACILITY: ICD-10-CM

## 2020-08-12 LAB — CREAT UR-MCNC: 95 MG/DL

## 2020-08-17 ENCOUNTER — TELEPHONE (OUTPATIENT)
Dept: ENDOCRINOLOGY | Facility: CLINIC | Age: 65
End: 2020-08-17

## 2020-08-19 ENCOUNTER — OFFICE VISIT (OUTPATIENT)
Dept: OPHTHALMOLOGY | Facility: CLINIC | Age: 65
End: 2020-08-19
Attending: OPHTHALMOLOGY
Payer: COMMERCIAL

## 2020-08-19 DIAGNOSIS — H25.13 NUCLEAR SCLEROTIC CATARACT OF BOTH EYES: Primary | ICD-10-CM

## 2020-08-19 DIAGNOSIS — H52.203 MYOPIC ASTIGMATISM OF BOTH EYES: ICD-10-CM

## 2020-08-19 DIAGNOSIS — E88.810 METABOLIC SYNDROME: ICD-10-CM

## 2020-08-19 DIAGNOSIS — H52.13 MYOPIC ASTIGMATISM OF BOTH EYES: ICD-10-CM

## 2020-08-19 DIAGNOSIS — H52.4 PRESBYOPIA: ICD-10-CM

## 2020-08-19 LAB — METHYLMALONATE/CREAT UR-SRTO: 0.6 MMOL/MOLCR (ref 0–3.6)

## 2020-08-19 PROCEDURE — 76519 ECHO EXAM OF EYE: CPT | Mod: ZF | Performed by: OPHTHALMOLOGY

## 2020-08-19 PROCEDURE — G0463 HOSPITAL OUTPT CLINIC VISIT: HCPCS | Mod: ZF

## 2020-08-19 ASSESSMENT — REFRACTION_MANIFEST
OS_SPHERE: -2.00
OD_CYLINDER: +0.25
OS_CYLINDER: +1.50
OD_AXIS: 095
OD_SPHERE: -1.75
OS_AXIS: 085

## 2020-08-19 ASSESSMENT — SLIT LAMP EXAM - LIDS
COMMENTS: NORMAL
COMMENTS: NORMAL

## 2020-08-19 ASSESSMENT — VISUAL ACUITY
OD_CC+: -2
OS_BAT_HIGH: 20/40
OD_BAT_HIGH: 20/30+3
OD_BAT_MED: 20/25
OS_CC+: +2
OS_PH_CC: 20/25
CORRECTION_TYPE: GLASSES
OD_PH_CC: 20/30
OS_PH_CC+: -2
METHOD: SNELLEN - LINEAR
OS_CC: 20/40
OD_CC: 20/40
METHOD_MR: DIAGNOSTIC REFRACTION
OS_BAT_MED: 20/30

## 2020-08-19 ASSESSMENT — TONOMETRY
OS_IOP_MMHG: 17
IOP_METHOD: TONOPEN
OD_IOP_MMHG: 18

## 2020-08-19 ASSESSMENT — EXTERNAL EXAM - LEFT EYE: OS_EXAM: NORMAL

## 2020-08-19 ASSESSMENT — REFRACTION_WEARINGRX
OD_SPHERE: -1.00
OS_SPHERE: -1.00
OD_AXIS: 120
OS_ADD: +2.50
OS_AXIS: 075
OS_CYLINDER: +1.25
OD_CYLINDER: +0.25
OD_ADD: +2.50

## 2020-08-19 ASSESSMENT — CONF VISUAL FIELD
OS_NORMAL: 1
METHOD: COUNTING FINGERS
OD_NORMAL: 1

## 2020-08-19 ASSESSMENT — CUP TO DISC RATIO
OS_RATIO: 0.1
OD_RATIO: 0.1

## 2020-08-19 ASSESSMENT — EXTERNAL EXAM - RIGHT EYE: OD_EXAM: NORMAL

## 2020-08-19 NOTE — NURSING NOTE
Chief Complaints and History of Present Illnesses   Patient presents with     Cataract     Chief Complaint(s) and History of Present Illness(es)     Cataract     Laterality: both eyes    Associated symptoms: glare    Onset: gradual              Comments     New patient is here for cataract evaluation.    The patient notes he has gradually increased blurred vision and glare.  He notes he has light sensitivity.  He has no eye pain.  Razia Quan, COA, COA 7:59 AM 08/19/2020

## 2020-08-19 NOTE — PROGRESS NOTES
HPI:  Darnell Grant is a 64 year old male referred by Dr. Zhu for cataract evaluation. He has noted a slow worsening blurred vision in both eyes over the last few years, present at both near and distance. His current glasses don't help much. He is particularly bothered by the associated glare at night. This hinders his night driving. No pain, redness, discharge. No flashes/floaters.    POH: No history of eye surgery or trauma  PMH: DM2    Assessment & Plan     (H25.13) Nuclear sclerotic cataract of both eyes  (primary encounter diagnosis)  Comment: Visually significant with BCVA of 20/25 right eye and left eye with BAT to 20/30 and 20/40. Moderate NS on exam.    Dilates to: 7 mm  Alpha blockers/Flomax: YES (Flomax)  Trauma/Pseudoxfoliation: None  Fuchs dystrophy/guttae: None    Diabetes: YES, no retinopathy  Anticoagulation: YES, ASA 81    Cyl:  0.64 @ 102 right eye, 1.05 @ 086    We discussed the risks and benefits of cataract surgery, and informed consent was obtained.  Proceed with CE/IOL left eye followed by right eye.    Surgical plan:  Topical  Malyugin possible (flomax)  Aim emmetropia    (E88.81) Metabolic syndrome  Comment: On metformin. Last A1c 5.1 8/6/20. No retinopathy.  Plan: Observe    (H52.203,  H52.13) Myopic astigmatism of both eyes  (H52.4) Presbyopia  Comment: Vision limited by cataracts  Plan: Hold off on new glasses Rx until after CE/IOL      -----------------------------------------------------------------------------------    Patient disposition:   Return for scheduled procedure, or sooner as needed.    Teaching statement:  Complete documentation of historical and exam elements from today's encounter can be found in the full encounter summary report (not reduplicated in this progress note). I personally obtained the chief complaint(s) and history of present illness.  I confirmed and edited as necessary the review of systems, past medical/surgical history, family history, social history,  and examination findings as documented by others; and I examined the patient myself. I personally reviewed the relevant tests, images, and reports as documented above.     I formulated and edited as necessary the assessment and plan and discussed the findings and management plan with the patient and family.      Keyla Tobin MD  Comprehensive Ophthalmology & Ocular Pathology  Department of Ophthalmology and Visual Neurosciences  [email or web address]  Pager [phone]

## 2020-08-24 ENCOUNTER — TELEPHONE (OUTPATIENT)
Dept: OPHTHALMOLOGY | Facility: CLINIC | Age: 65
End: 2020-08-24

## 2020-08-24 DIAGNOSIS — Z11.59 ENCOUNTER FOR SCREENING FOR OTHER VIRAL DISEASES: Primary | ICD-10-CM

## 2020-08-24 PROBLEM — H25.13 NUCLEAR SCLEROTIC CATARACT OF BOTH EYES: Status: ACTIVE | Noted: 2020-08-24

## 2020-08-24 NOTE — TELEPHONE ENCOUNTER
Spoke with patient to schedule left eye surgery with Dr. Keyla Tobin.    Surgery was scheduled on 9/4/2020 at [facility]  Patient had H&P at [facility] with Dr. Raul Dutta on 8/17/2020.     Patient is aware a COVID-19 test is needed before their procedure. The test should be with-in 4 days of their procedure.   Test Details: Date 8/31/2020 Location  LAB.    Post-Op visit was scheduled on 9/4/2020 and 9/30/2020.    Patient was advised a / is needed day of surgery. As well as, for 24 hours after their surgery procedure.    Surgery packet was mailed 8/24, patient has my direct contact information for any further questions [phone].

## 2020-08-24 NOTE — TELEPHONE ENCOUNTER
Spoke with patient to schedule right eye surgery with Dr. Keyla Tobin.    Surgery was scheduled on 9/11/2020 at [facility]  Patient had H&P at [facility] with Dr Raul Dutta on 8/17/2020.     Patient is aware a COVID-19 test is needed before their procedure. The test should be with-in 4 days of their procedure.   Test Details: Date 9/8/2020 Location [facility].      Post-Op visit was scheduled on 9/11/2020 and 9/30/2020.    Patient was advised a / is needed day of surgery. As well as, for 24 hours after their surgery procedure.     Surgery packet was mailed 8/24, patient has my direct contact information for any further questions [phone].

## 2020-08-31 ENCOUNTER — PRE VISIT (OUTPATIENT)
Dept: UROLOGY | Facility: CLINIC | Age: 65
End: 2020-08-31

## 2020-08-31 DIAGNOSIS — Z11.59 ENCOUNTER FOR SCREENING FOR OTHER VIRAL DISEASES: ICD-10-CM

## 2020-09-01 LAB
SARS-COV-2 RNA SPEC QL NAA+PROBE: ABNORMAL
SPECIMEN SOURCE: ABNORMAL

## 2020-09-02 ENCOUNTER — MYC MEDICAL ADVICE (OUTPATIENT)
Dept: UROLOGY | Facility: CLINIC | Age: 65
End: 2020-09-02

## 2020-09-02 NOTE — TELEPHONE ENCOUNTER
The surgery scheduling department called me today to advise that the patient has tested positive for his covid Test.    Patient has been removed from the surgery schedule for both scheduled eye procedures with Dr. Tobin and placed in covid depot.    Patient has also had  all post-op appointments cancelled as well.    A message has been sent to the patients care to to report this change.

## 2020-09-03 ENCOUNTER — MYC MEDICAL ADVICE (OUTPATIENT)
Dept: INTERNAL MEDICINE | Facility: CLINIC | Age: 65
End: 2020-09-03

## 2020-09-03 DIAGNOSIS — U07.1 COVID-19: Primary | ICD-10-CM

## 2020-09-04 ENCOUNTER — HOSPITAL ENCOUNTER (OUTPATIENT)
Facility: AMBULATORY SURGERY CENTER | Age: 65
End: 2020-09-04
Attending: OPHTHALMOLOGY
Payer: COMMERCIAL

## 2020-09-04 ENCOUNTER — MYC MEDICAL ADVICE (OUTPATIENT)
Dept: INTERNAL MEDICINE | Facility: CLINIC | Age: 65
End: 2020-09-04

## 2020-09-04 DIAGNOSIS — H25.13 NUCLEAR SCLEROTIC CATARACT OF BOTH EYES: ICD-10-CM

## 2020-09-06 DIAGNOSIS — Z20.822 EXPOSURE TO COVID-19 VIRUS: Primary | ICD-10-CM

## 2020-09-08 ENCOUNTER — TELEPHONE (OUTPATIENT)
Dept: OPHTHALMOLOGY | Facility: CLINIC | Age: 65
End: 2020-09-08

## 2020-09-08 NOTE — TELEPHONE ENCOUNTER
Spoke with patient to reschedule left eye surgery with Dr. Keyla Tobin.    Surgery was rescheduled to 9/25/20 at [facility]  Patient will have H&P at [facility] on 9/14/20.     Due to the patient having a positive covid test in the last 30 days.   Patient does not need to be retested per Dr. Tobin.    Post-Op visit was rescheduled to 9/25/20 and 10/21/20    Patient was advised a / is needed day of surgery. As well as, for 24 hours after their surgery procedure.    Surgery packet was mailed 9/8, patient has my direct contact information for any further questions [phone].

## 2020-09-08 NOTE — TELEPHONE ENCOUNTER
Spoke with patient to reschedule right eye surgery with Dr. Keyla Tobin.    Surgery was rescheduled for 10/2/20 at [facility]  Patient will have H&P at [facility] on 9/14/20.     Due to the patient having a positive covid test in the last 30 days.   Patient does not need to be retested per Dr. Tobin.    Post-Op visit was rescheduled to 10/2/20 and 10/21/20    Patient was advised a / is needed day of surgery. As well as, for 24 hours after their surgery procedure.    Surgery packet was mailed 9/8, patient has my direct contact information for any further questions [phone].

## 2020-09-09 NOTE — TELEPHONE ENCOUNTER
FUTURE VISIT INFORMATION      SURGERY INFORMATION:    Date: 10/2/20    [facility]    Surgeon:  Keyla Tobin MD     Anesthesia Type:  Combined MAC with Topical     Procedure: RIGHT CATARACT REMOVAL WITH INTRAOCULAR LENS IMPLANT     Consult: ov     RECORDS REQUESTED FROM:       Primary Care Provider: Raul Dutta MD- [facility]    Pertinent Medical History: woody, hypertension    Most recent EKG+ Tracin19    Most recent ECHO: 13    Most recent Cardiac Stress Test: 17    Most recent PFT's: 18    Most recent Sleep Study:  10/23/15

## 2020-09-11 ENCOUNTER — MYC MEDICAL ADVICE (OUTPATIENT)
Dept: INTERNAL MEDICINE | Facility: CLINIC | Age: 65
End: 2020-09-11

## 2020-09-14 ENCOUNTER — PRE VISIT (OUTPATIENT)
Dept: SURGERY | Facility: CLINIC | Age: 65
End: 2020-09-14

## 2020-09-14 ENCOUNTER — ANESTHESIA EVENT (OUTPATIENT)
Dept: SURGERY | Facility: AMBULATORY SURGERY CENTER | Age: 65
End: 2020-09-14

## 2020-09-14 ENCOUNTER — VIRTUAL VISIT (OUTPATIENT)
Dept: SURGERY | Facility: CLINIC | Age: 65
End: 2020-09-14
Payer: COMMERCIAL

## 2020-09-14 VITALS — WEIGHT: 254 LBS | HEIGHT: 74 IN | BODY MASS INDEX: 32.6 KG/M2

## 2020-09-14 DIAGNOSIS — Z01.818 PREOP EXAMINATION: Primary | ICD-10-CM

## 2020-09-14 ASSESSMENT — LIFESTYLE VARIABLES: TOBACCO_USE: 0

## 2020-09-14 ASSESSMENT — PAIN SCALES - GENERAL: PAINLEVEL: NO PAIN (0)

## 2020-09-14 ASSESSMENT — ENCOUNTER SYMPTOMS
SEIZURES: 0
DYSRHYTHMIAS: 1

## 2020-09-14 ASSESSMENT — MIFFLIN-ST. JEOR: SCORE: 2001.89

## 2020-09-14 NOTE — H&P
Pre-Operative H & P     CC:  Preoperative exam to assess for increased cardiopulmonary risk while undergoing surgery and anesthesia.    Date of Encounter: 9/14/2020  Primary Care Physician:  Raul Dutta  Reason for Visit: Nuclear sclerotic cataract of both eyes    VIDEO-VISIT DETAILS    Type of service:  Video Visit    Patient verbally consented to video service today:  YES    Video Start Time: 1434  Video End Time (time video stopped): 1444     Originating Location (pt. Location): Home    Distant Location (provider location):  [facility] PREOPERATIVE [facility]ESS[facility]    Mode of Communication:  Video Conference via [facility]  Darnell Grant is a 65 y/o male who presents for pre-operative H&P in preparation for LEFT CATARACT REMOVAL WITH INTRAOCULAR LENS IMPLANT with Keyla Tobin MD on 9/25/20 at [facility] for treatment of Nuclear sclerotic cataract of both eyes.    Mr. Grant has noted a slow worsening blurred vision in both eyes over the last few years, present at both near and distance. His current glasses don't help much. He is particularly bothered by the associated glare at night. This hinders his night driving. No pain, redness, discharge. No flashes/floaters. He now presents for the above procedure.    PMH is also significant for hypertension, hyperlipidemia, LISETTE (improved after 50# wt loss), exercise/cold weather induced asthma, metabolic syndrome, PVCs, hx esophageal dysphagia secondary to anterior cervical osteophytes at C2-3 and C3-4 (s/p resection), lumbar radiculopathy, GERD, hx kidney stone, s/p TURBT in 2018, hypogonadism, BPH s/p thermotherapy destruction in 2018, anxiety, depression, gout.    History was obtained from patient & chart review.     Past Medical History  Past Medical History:   Diagnosis Date     Bladder mass      BPH (benign prostatic hyperplasia)      GERD (gastroesophageal reflux disease)      High serum parathyroid hormone (PTH) 2017      Hypertension      Metabolic syndrome      Nephrolithiasis      Obesity      LISETTE (obstructive sleep apnea)      Prediabetes        Past Surgical History  Past Surgical History:   Procedure Laterality Date     BACK SURGERY      repair disc     CYSTOSCOPY, TRANSURETHRAL RESECTION (TUR) TUMOR BLADDER, COMBINED N/A 1/31/2018    Procedure: COMBINED CYSTOSCOPY, TRANSURETHRAL RESECTION (TUR) TUMOR BLADDER;  Transurethral Biopsy and Resection of Bladder Tumor;  Surgeon: Yaya Lomeli MD;  Location: [facility] OR     DECOMPRESSION, FUSION CERVICAL ANTERIOR TWO LEVELS, COMBINED Right 12/10/2019    Procedure: right approach anterior cervical 2-3, 3-4 resection of anterior cervical osteophytes causing dysphagia, with microscope;  Surgeon: María Fitzgerald MD;  Location: [facility] OR     HC BREATH HYDROGEN TEST  5/22/2013    Procedure: HYDROGEN BREATH TEST;  Surgeon: Donny Paris MD;  Location: [facility] GI     TRANSURETHERAL DESTRUCTION OF PROSTATE BY THERMOTHERAPY N/A 4/13/2018    Procedure: TRANSURETHERAL DESTRUCTION OF PROSTATE BY THERMOTHERAPY;  [facility] Procedure;  Surgeon: Yaya Lomeli MD;  Location: [facility] OR       Hx of Blood transfusions/reactions: no     Hx of abnormal bleeding or anti-platelet use: no    Menstrual history: No LMP for male patient.:      Steroid use in the last year: no    Personal or FH with difficulty with Anesthesia:  no    Prior to Admission Medications  Current Outpatient Medications   Medication Sig Dispense Refill     albuterol (PROAIR HFA/PROVENTIL HFA/VENTOLIN HFA) 108 (90 Base) MCG/ACT inhaler Inhale 2 puffs into the lungs every 4 hours as needed for shortness of breath / dyspnea or wheezing 1 Inhaler 11     allopurinol (ZYLOPRIM) 100 MG tablet take by mouth 1 tab ( 100 mg)  at bedtime 90 tablet 3     amLODIPine (NORVASC) 10 MG tablet Take 1 tablet (10 mg) by mouth At Bedtime 90 tablet 3     buPROPion (WELLBUTRIN XL) 300 MG 24 hr tablet Take 1 tablet (300 mg) by mouth every morning 90  tablet 3     candesartan (ATACAND) 32 MG tablet Take 32 mg by mouth At Bedtime 90 tablet 3     clomiPHENE (CLOMID) 50 MG tablet Take 1 tablet (50 mg) by mouth every other day Pt can't recall if medication was taken today or yesterday 12/3/19 90 tablet 3     empagliflozin (JARDIANCE) 25 MG TABS tablet Take 1 tablet (25 mg) by mouth every morning 90 tablet 3     hydrochlorothiazide (MICROZIDE) 12.5 MG capsule Take 1 capsule (12.5 mg) by mouth daily 90 capsule 3     metFORMIN (GLUCOPHAGE) 1000 MG tablet Take 2 tablets (2,000 mg) by mouth daily (with dinner) 180 tablet 3     multivitamin, therapeutic with minerals (MULTI-VITAMIN) TABS tablet Take 1 tablet by mouth At Bedtime       omeprazole (PRILOSEC) 20 MG DR capsule Take 1 capsule (20 mg) by mouth daily 90 capsule 3     phentermine (ADIPEX-P) 37.5 MG tablet Take 1.5 tablets (56 mg) by mouth every morning (before breakfast) 135 tablet 3     rosuvastatin (CRESTOR) 40 MG tablet Take 1 tablet (40 mg) by mouth daily 90 tablet 3     tamsulosin (FLOMAX) 0.4 MG capsule Take 1 capsule (0.4 mg) by mouth daily 90 capsule 3       Allergies  Allergies   Allergen Reactions     Ragweeds      Watery eyes, itchy nose       Social History  Social History     Socioeconomic History     Marital status:      Spouse name: Not on file     Number of children: Not on file     Years of education: Not on file     Highest education level: Not on file   Occupational History     Not on file   Social Needs     Financial resource strain: Not on file     Food insecurity     Worry: Not on file     Inability: Not on file     Transportation needs     Medical: Not on file     Non-medical: Not on file   Tobacco Use     Smoking status: Never Smoker     Smokeless tobacco: Never Used   Substance and Sexual Activity     Alcohol use: No     Drug use: No     Sexual activity: Not on file   Lifestyle     Physical activity     Days per week: Not on file     Minutes per session: Not on file     Stress: Not  "on file   Relationships     Social connections     Talks on phone: Not on file     Gets together: Not on file     Attends Protestant service: Not on file     Active member of club or organization: Not on file     Attends meetings of clubs or organizations: Not on file     Relationship status: Not on file     Intimate partner violence     Fear of current or ex partner: Not on file     Emotionally abused: Not on file     Physically abused: Not on file     Forced sexual activity: Not on file   Other Topics Concern     Parent/sibling w/ CABG, MI or angioplasty before 65F 55M? Not Asked   Social History Narrative     Not on file       Family History  Family History   Problem Relation Age of Onset     Asthma Brother      Diabetes Maternal Grandmother      Cardiovascular Father      Nephrolithiasis No family hx of      Parathyroid Disorders No family hx of      Thyroid Disease No family hx of      Glaucoma No family hx of      Macular Degeneration No family hx of      Anesthesia Reaction No family hx of      Deep Vein Thrombosis (DVT) No family hx of            Preop Vitals    BP Readings from Last 3 Encounters:   08/10/20 (P) 117/78   01/22/20 (!) 143/83   01/06/20 126/83    Pulse Readings from Last 3 Encounters:   08/10/20 (P) 63   01/22/20 90   01/06/20 88      Resp Readings from Last 3 Encounters:   12/10/19 17   12/03/19 19   10/31/19 15    SpO2 Readings from Last 3 Encounters:   08/10/20 (P) 96%   01/06/20 97%   12/10/19 94%      Temp Readings from Last 1 Encounters:   12/10/19 97.5  F (36.4  C) (Oral)    Ht Readings from Last 1 Encounters:   09/14/20 1.88 m (6' 2\")      Wt Readings from Last 1 Encounters:   09/14/20 115.2 kg (254 lb)    Estimated body mass index is 32.61 kg/m  as calculated from the following:    Height as of this encounter: 1.88 m (6' 2\").    Weight as of this encounter: 115.2 kg (254 lb).         ROS/MED HX  The complete review of systems is negative other than noted in the HPI or here.  Patient " "denies recent illness, fever and respiratory infection during past month.  Pt denies steroid use during past year.    ENT/Pulmonary: Comment: LISETTE symptoms improved after 50# wt loss    Exercise & cold weather induced asthma    (+)sleep apnea, Intermittent asthma Treatment: Inhaler prn,  doesn't use CPAP , . .   (-) tobacco use   Neurologic:  - neg neurologic ROS    (-) seizures and CVA   Cardiovascular: Comment: PVCs on holter monitor    (+) Dyslipidemia, hypertension----. : . . . :. dysrhythmias Other, . Previous cardiac testing date:results:Stress Testdate:2017 results:ECG reviewed date:11/23/19 results: date: results:          METS/Exercise Tolerance: Comment: Denies SOB & CP 4 - Raking leaves, gardening   Hematologic:  - neg hematologic  ROS      (-) history of blood clots and History of Transfusion   Musculoskeletal: Comment: S/p C2-C4 fusion    S/p microdiscectomy, lumbar        GI/Hepatic: Comment: Hx esophageal dysphagia secondary to cervical spine osteophytes, s/p anterior resection 12/2019    (+) GERD Asymptomatic on medication,      (-) liver disease   Renal/Genitourinary: Comment: S/P TURBT 2018, S/P prostate thermotherapy destruction 2018        (+) BPH,       Endo: Comment: Metabolic syndrome, treated w/ metformin & jardiance    Hypogonadism, taking clomid        (+) Obesity, .      Psychiatric:     (+) psychiatric history anxiety and depression      Infectious Disease:  - neg infectious disease ROS       Malignancy:      - no malignancy   Other:    (+) no H/O Chronic Pain,               PHYSICAL EXAM:   Mental Status/Neuro: A/A/O; Age Appropriate   Airway:    Respiratory:    CV:    Comments:                                        254 lbs 0 oz  6' 2\"   Body mass index is 32.61 kg/m .      Physical Exam  Constitutional: Awake, alert, cooperative, no apparent distress, and appears stated age.  Neurologic: Awake, alert, oriented to name, place and time.   Neuropsychiatric: Calm, cooperative. Normal " affect. Answers questions appropriately.    ** Patient's visit was done virtually today due to the Covid 19 pandemic.  A full physical exam was not completed.  Please refer to the physical examination documented by the anesthesiologist in the anesthesia record on the day of surgery. **    PRIOR LABS/DIAGNOSTIC STUDIES:  All labs and imaging personally reviewed    EKG: Personally reviewed but formal cardiology read [email or web address]  Sinus rhythm with occasional Premature ventricular complexes and Fusion complexes  Left anterior fascicular block  Left ventricular hypertrophy with QRS widening and repolarization abnormality  Abnormal ECG  When compared with ECG of 2018 15:47,  Fusion complexes are now Present     Stress test: Lexiscan 2017  Impression:  1. Normal myocardial SPECT study with a summed stress score of 0. A  summed stress score of <4 is associated with an annual event rate of  0.8% and 0.9% for myocardial infarction and cardiac death,  respectively (Home. Circulation 1998;98:535-43).  2. Normal left ventricular systolic function as described above.  3. No significant perfusion abnormalities.  4. No prior study available for comparison.     Cardiac MRI: 2018:  1. The LV is normal in cavity size and wall thickness. The global systolic function is normal. The LVEF is  55%. There are no regional wall motion abnormalities.     2. The RV is normal in cavity size. The global systolic function is normal. The RVEF is 61%.      3. Both atria are normal in size.     4. There is no significant valvular disease.      5. Late gadolinium enhancement imaging shows no MI, fibrosis or infiltrative disease.      6. Regadenoson stress perfusion imaging shows no ischemia.     CONCLUSIONS: No ischemia or infarction. Normal LV and RV function, LVEF of 55% and RVEF of 61%. No obvious  substrate for arrhythmia.      LABS:  CBC:   Lab Results   Component Value Date    WBC 6.6 2020    WBC 9.1  12/03/2019    HGB 16.6 08/06/2020    HGB 17.2 12/03/2019    HCT 48.9 08/06/2020    HCT 51.5 12/03/2019     08/06/2020     12/03/2019     BMP:   Lab Results   Component Value Date     08/06/2020     12/03/2019    POTASSIUM 4.1 08/06/2020    POTASSIUM 3.9 12/10/2019    CHLORIDE 109 08/06/2020    CHLORIDE 105 12/03/2019    CO2 26 08/06/2020    CO2 28 12/03/2019    BUN 25 08/06/2020    BUN 22 12/03/2019    CR 1.00 08/06/2020    CR 0.83 12/03/2019    GLC 88 08/06/2020    GLC 88 12/10/2019     COAGS:   Lab Results   Component Value Date    PTT 39 (H) 12/03/2019    INR 0.91 12/03/2019     POC: No results found for: BGM, HCG, HCGS  OTHER:   Lab Results   Component Value Date    A1C 5.1 08/06/2020    ZHANE 9.2 08/06/2020    PHOS 2.8 04/30/2019    ALBUMIN 3.6 08/06/2020    PROTTOTAL 6.9 08/06/2020    ALT 51 08/06/2020    AST 30 08/06/2020    ALKPHOS 78 08/06/2020    BILITOTAL 0.4 08/06/2020    TSH 3.06 08/06/2020    CRP <5.0 05/20/2013    SED 5 05/20/2013          Labs today: None  COVID19 testing ordered by Dr. Tobin, to be completed within 4 days of DOS    Outside records reviewed from: Care Everywhere    ASSESSMENT and PLAN  Darnell Grant is a 64 year old male scheduled to undergo LEFT CATARACT REMOVAL WITH INTRAOCULAR LENS IMPLANT with Keyla Tobin MD on 9/25/20 at [facility] for treatment of Nuclear sclerotic cataract of both eyes.      Pt has had prior anesthetic.     No history of anesthetic complications    He has the following specific operative considerations:   # VTE risk: 0.5%  # Risk of PONV score = 1.  If > 2, anti-emetic intervention recommended.  # Anesthesia considerations:  Refer to PAC assessment in anesthesia records      CARDIAC: METS 4,  enies SOB & CP      # RCRI : No serious cardiac risks.  0.4% risk of major adverse cardiac event.     #  Seen by Dr. Dutta, cardiology 11/23/2019.  EKG then showed SR with PVC, LAFB, LVH with QRS widening and  repolarization abnormality.    # Holter monitor 1/2019 with frequent PVCs, 5.4% burden, no symptoms associated with PVC    # Lexiscan 2/7/2017: no wall motion abnormalities, no perfusion abnormalities.    # cardiac MRI 1/19/2018: EF of 55%, no wall motion abnormalities, no ischemia or infarct    # hypertension, on hydrochlorothiazide & candesartan    PULMONARY:     # Hx LISETTE, improved following 50# wt loss    # Mild intermittent Asthma (exercise & cold weather induced)    # Never smoked      GI:     # GERD, asymptomatic on prilosec    # Hx esophageal dysphagia secondary to cervical spine osteophytes, s/p anterior resection 12/2019    /RENAL:     # BPH, s/p thermotherapy destruction 2018    ENDO: BMI 32    # Metabolic syndrome, A1c on 8/6/20 was 5.2. On metformin & jardiance.    # Hypogonadism, on clomid    ORTHO:     # S/p C2-C4 fusion w/ subsequent development of cervical osteophytes, s/p anterior resection    # S/p microdiscectomy, lumbar      # Gout    Patient is optimized and is acceptable candidate for the proposed procedure. No further diagnostic evaluation is needed.    ** Patient's visit was done virtually today due to the Covid 19 pandemic.  A full physical exam was not completed.  Please refer to the physical examination documented by the anesthesiologist in the anesthesia record on the day of surgery. **      Arrival time, NPO, shower and medication instructions provided by nursing staff today.  Preparing For Your Surgery handout given.        Aimee Michael PA-C  Preoperative Assessment Center  [facility]  Phone: [phone]  Fax: [phone]

## 2020-09-14 NOTE — PATIENT INSTRUCTIONS
Preparing for Your Surgery      Name:  Darnell Grant   MRN:  [id number]   :  1955   Today's Date:  2020         Arriving for surgery:  Surgery date:  2020  Arrival time:  7:30 am    Restrictions due to COVID 19:  No visitors at the Surgery Center   parking is not available     Please come to:    [facility]  [address] [place] [id number]    Please check in on the 5th floor at the Ambulatory Surgery Center         What can I eat or drink?    -  You may eat and drink normally until 8 hours before surgery. (Until Midnight )  -  You may have clear liquids up to 4 hours before surgery. (Until 5 am)  Examples of clear liquids:  Water  Clear broth  Juices (apple, white grape, white cranberry  and cider) without pulp  Noncarbonated, powder based beverages  (lemonade and Gustavo-Aid)  Sodas (Sprite, 7-Up, ginger ale and seltzer)  Coffee or tea (without milk or cream)  Gatorade    --No alcohol for at least 24 hours before surgery    Which medicines can I take?    Hold aspirin for 7 days before surgery.     Hold Phentermine for 7 days before surgery    Hold multivitamins for 7 days before surgery.  Hold supplements for 7 days before surgery.  Hold Ibuprofen (Advil, Motrin) for 1 day before surgery--unless otherwise directed by surgeon.  Hold Naproxen (Aleve) for 4 days before surgery.    -Do NOT Take the am of surgery:  Jardiance         -  PLEASE TAKE the following medications the day of surgery   Inhalers as usual and bring to surgery center  Bupropion  Clomid  Omeprazole  Rosuvastatin  Tamsulosin  Hydrochlorothiazide     How do I prepare myself?  - Please take 2 showers before surgery using Scrubcare or Hibiclens soap.    Use this soap only from the neck to your toes.     Leave the soap on your skin for one minute--then rinse thoroughly.      You may use your own shampoo and conditioner; no other hair products.   - Please remove all jewelry and body piercings.  -  No lotions, deodorants or fragrance.  - No makeup or fingernail polish.   - Bring your ID and insurance card.        - All patients are required to have a Covid-19 test within 4 days of surgery/procedure.      -Patients will be contacted by the [facility] scheduling team within 1 week of surgery to make an appointment.      - Patients may call the Scheduling team at [phone] if they have not been scheduled within 4 days of  surgery.      ALL PATIENTS ARE REQUIRED TO HAVE A RESPONSIBLE ADULT TO DRIVE AND BE IN ATTENDANCE WITH THEM FOR 24 HOURS FOLLOWING SURGERY       Questions or Concerns:    -For questions regarding the day of surgery please contact the Ambulatory Surgery Center at [phone].    -If you have health changes between today and your surgery please contact your surgeon.     For questions after surgery please call your surgeons office.

## 2020-09-14 NOTE — ANESTHESIA PREPROCEDURE EVALUATION
"Anesthesia Pre-Procedure Evaluation    Patient: Darnell Grant   MRN:     [id number] Gender:   male   Age:    64 year old :      1955        Preoperative Diagnosis: Nuclear sclerotic cataract of both eyes [H25.13]   Procedure(s):  LEFT CATARACT REMOVAL WITH INTRAOCULAR LENS IMPLANT     LABS:  CBC:   Lab Results   Component Value Date    WBC 6.6 2020    WBC 9.1 2019    HGB 16.6 2020    HGB 17.2 2019    HCT 48.9 2020    HCT 51.5 2019     2020     2019     BMP:   Lab Results   Component Value Date     2020     2019    POTASSIUM 4.1 2020    POTASSIUM 3.9 12/10/2019    CHLORIDE 109 2020    CHLORIDE 105 2019    CO2 26 2020    CO2 28 2019    BUN 25 2020    BUN 22 2019    CR 1.00 2020    CR 0.83 2019    GLC 88 2020    GLC 88 12/10/2019     COAGS:   Lab Results   Component Value Date    PTT 39 (H) 2019    INR 0.91 2019     POC: No results found for: BGM, HCG, HCGS  OTHER:   Lab Results   Component Value Date    A1C 5.1 2020    ZHANE 9.2 2020    PHOS 2.8 2019    ALBUMIN 3.6 2020    PROTTOTAL 6.9 2020    ALT 51 2020    AST 30 2020    ALKPHOS 78 2020    BILITOTAL 0.4 2020    TSH 3.06 2020    CRP <5.0 2013    SED 5 2013        Preop Vitals    BP Readings from Last 3 Encounters:   08/10/20 (P) 117/78   20 (!) 143/83   20 126/83    Pulse Readings from Last 3 Encounters:   08/10/20 (P) 63   20 90   20 88      Resp Readings from Last 3 Encounters:   12/10/19 17   19 19   10/31/19 15    SpO2 Readings from Last 3 Encounters:   08/10/20 (P) 96%   20 97%   12/10/19 94%      Temp Readings from Last 1 Encounters:   12/10/19 97.5  F (36.4  C) (Oral)    Ht Readings from Last 1 Encounters:   20 1.88 m (6' 2\")      Wt Readings from Last 1 Encounters:   20 115.2 " "kg (254 lb)    Estimated body mass index is 32.61 kg/m  as calculated from the following:    Height as of this encounter: 1.88 m (6' 2\").    Weight as of this encounter: 115.2 kg (254 lb).     LDA:  Peripheral IV 12/10/19 Left Hand (Active)   Number of days: 279       Peripheral IV 12/10/19 Right Hand (Active)   Number of days: 279       Urethral Catheter Latex (Active)   Number of days: 957       Urethral Catheter (Active)   Number of days: 885        Past Medical History:   Diagnosis Date     Bladder mass      BPH (benign prostatic hyperplasia)      GERD (gastroesophageal reflux disease)      High serum parathyroid hormone (PTH) 2017     Hypertension      Metabolic syndrome      Nephrolithiasis      Obesity      LISETTE (obstructive sleep apnea)      Prediabetes       Past Surgical History:   Procedure Laterality Date     BACK SURGERY      repair disc     CYSTOSCOPY, TRANSURETHRAL RESECTION (TUR) TUMOR BLADDER, COMBINED N/A 1/31/2018    Procedure: COMBINED CYSTOSCOPY, TRANSURETHRAL RESECTION (TUR) TUMOR BLADDER;  Transurethral Biopsy and Resection of Bladder Tumor;  Surgeon: Yaya Lomeli MD;  Location: [facility]     DECOMPRESSION, FUSION CERVICAL ANTERIOR TWO LEVELS, COMBINED Right 12/10/2019    Procedure: right approach anterior cervical 2-3, 3-4 resection of anterior cervical osteophytes causing dysphagia, with microscope;  Surgeon: María Fitzgerald MD;  Location: [facility] OR     HC BREATH HYDROGEN TEST  5/22/2013    Procedure: HYDROGEN BREATH TEST;  Surgeon: Donny Paris MD;  Location: [facility]     TRANSURETHERAL DESTRUCTION OF PROSTATE BY THERMOTHERAPY N/A 4/13/2018    Procedure: TRANSURETHERAL DESTRUCTION OF PROSTATE BY THERMOTHERAPY;  [facility] Procedure;  Surgeon: Yaya Lomeli MD;  Location: [facility] OR      Allergies   Allergen Reactions     Ragweeds      Watery eyes, itchy nose        Anesthesia Evaluation     . Pt has had prior anesthetic.     No history of anesthetic " complications          ROS/MED HX    ENT/Pulmonary: Comment: LISETTE symptoms improved after 50# wt loss    Exercise & cold weather induced asthma    (+)sleep apnea, Intermittent asthma Treatment: Inhaler prn,  doesn't use CPAP , . .   (-) tobacco use   Neurologic:  - neg neurologic ROS    (-) seizures and CVA   Cardiovascular: Comment: PVCs on holter monitor    (+) Dyslipidemia, hypertension----. : . . . :. dysrhythmias Other, . Previous cardiac testing date:results:Stress Testdate:2017 results:ECG reviewed date:11/23/19 results: date: results:          METS/Exercise Tolerance: Comment: Denies SOB & CP 4 - Raking leaves, gardening   Hematologic:  - neg hematologic  ROS      (-) history of blood clots and History of Transfusion   Musculoskeletal: Comment: S/p C2-C4 fusion    S/p microdiscectomy, lumbar        GI/Hepatic: Comment: Hx esophageal dysphagia secondary to cervical spine osteophytes, s/p anterior resection 12/2019    (+) GERD Asymptomatic on medication,      (-) liver disease   Renal/Genitourinary: Comment: S/P TURBT 2018, S/P prostate thermotherapy destruction 2018        (+) BPH,       Endo: Comment: Metabolic syndrome, treated w/ metformin & jardiance    Hypogonadism, taking clomid        (+) Obesity, .      Psychiatric:     (+) psychiatric history anxiety and depression      Infectious Disease:  - neg infectious disease ROS       Malignancy:      - no malignancy   Other:    (+) no H/O Chronic Pain,                       PHYSICAL EXAM:   Mental Status/Neuro: A/A/O; Age Appropriate   Airway:   Mallampati: I  Mouth/Opening: Full  TM distance: > 6 cm   Respiratory: Auscultation: CTAB     Resp. Rate: Normal     Resp. Effort: Normal      CV: Rhythm: Regular  Rate: Age appropriate  Heart: Normal Sounds  Edema: None   Comments:      Dental: Normal Dentition                Assessment:   ASA SCORE: 2    H&P: History and physical reviewed and following examination; no interval change.   Smoking Status:   Non-Smoker/Unknown   NPO Status: NPO Appropriate     Plan:   Anes. Type:  General   Pre-Medication: None   Induction:  IV (Standard)   Airway: ETT; Oral   Access/Monitoring: PIV   Maintenance: Balanced     Postop Plan:   Postop Pain: Opioids  Postop Sedation/Airway: Not planned  Disposition: Outpatient     PONV Management:   Adult Risk Factors:, Non-Smoker, Postop Opioids   Prevention:, No Volatiles     CONSENT: Direct conversation   Plan and risks discussed with: Patient   Blood Products: Consent Deferred (Minimal Blood Loss)                PAC Discussion and Assessment    ASA Classification: 2 and 3  Case is suitable for: ASC  Anesthetic techniques and relevant risks discussed: Regional and MAC with GA as backup  Invasive monitoring and risk discussed: No  Types:   Possibility and Risk of blood transfusion discussed: No  NPO instructions given:   Additional anesthetic preparation and risks discussed:   Needs early admission to pre-op area:   Other:     PAC Resident/NP Anesthesia Assessment:  Darnell Grant is a 64 year old male scheduled to undergo LEFT CATARACT REMOVAL WITH INTRAOCULAR LENS IMPLANT with Keyla Tobin MD on 9/25/20 at [facility] for treatment of Nuclear sclerotic cataract of both eyes.      Pt has had prior anesthetic.     No history of anesthetic complications    He has the following specific operative considerations:   # VTE risk: 0.5%  # Risk of PONV score = 1.  If > 2, anti-emetic intervention recommended.  # Anesthesia considerations:  Refer to PAC assessment in anesthesia records      CARDIAC: METS 4,  enies SOB & CP      # RCRI : No serious cardiac risks.  0.4% risk of major adverse cardiac event.     #  Seen by Dr. Dutta, cardiology 11/23/2019.  EKG then showed SR with PVC, LAFB, LVH with QRS widening and repolarization abnormality.    # Holter monitor 1/2019 with frequent PVCs, 5.4% burden, no symptoms associated with PVC    # Lexiscan 2/7/2017: no wall motion  abnormalities, no perfusion abnormalities.    # cardiac MRI 1/19/2018: EF of 55%, no wall motion abnormalities, no ischemia or infarct    # hypertension, on hydrochlorothiazide & candesartan    PULMONARY:     # Hx LISETTE, improved following 50# wt loss    # Mild intermittent Asthma (exercise & cold weather induced)    # Never smoked      GI:     # GERD, asymptomatic on prilosec    # Hx esophageal dysphagia secondary to cervical spine osteophytes, s/p anterior resection 12/2019    /RENAL:     # BPH, s/p thermotherapy destruction 2018    ENDO: BMI 32    # Metabolic syndrome, A1c on 8/6/20 was 5.2. On metformin & jardiance.    # Hypogonadism, on clomid    ORTHO:     # S/p C2-C4 fusion w/ subsequent development of cervical osteophytes, s/p anterior resection    # S/p microdiscectomy, lumbar      # Gout    Patient is optimized and is acceptable candidate for the proposed procedure. No further diagnostic evaluation is needed.    ** Patient's visit was done virtually today due to the Covid 19 pandemic.  A full physical exam was not completed.  Please refer to the physical examination documented by the anesthesiologist in the anesthesia record on the day of surgery. **        Reviewed and Signed by PAC Mid-Level Provider/Resident  Mid-Level Provider/Resident: Aimee Michael PA-C  Date: 9/14/20  Time: 1516    Attending Anesthesiologist Anesthesia Assessment:        Anesthesiologist:   Date:   Time:   Pass/Fail:   Disposition:     PAC Pharmacist Assessment:        Pharmacist:   Date:   Time:    KATI Wilcox MD  Staff Anesthesiologist  [phone]

## 2020-09-14 NOTE — PROGRESS NOTES
"Darnell Grant is a 64 year old male who is being evaluated via a billable video visit.      The patient has been notified of following:     \"This video visit will be conducted via a call between you and your physician/provider. We have found that certain health care needs can be provided without the need for an in-person physical exam.  This service lets us provide the care you need with a video conversation.  If a prescription is necessary we can send it directly to your pharmacy.  If lab work is needed we can place an order for that and you can then stop by our lab to have the test done at a later time.    Video visits are billed at different rates depending on your insurance coverage.  Please reach out to your insurance provider with any questions.    If during the course of the call the physician/provider feels a video visit is not appropriate, you will not be charged for this service.\"    Patient has given verbal consent for Video visit? Yes  How would you like to obtain your AVS? Quintinhart  If you are dropped from the video visit, the video invite should be resent to: Other e-mail: Flores  Will anyone else be joining your video visit? No            Kenneth Patterson      "

## 2020-09-15 DIAGNOSIS — U07.1 COVID-19: ICD-10-CM

## 2020-09-15 DIAGNOSIS — Z20.822 EXPOSURE TO COVID-19 VIRUS: ICD-10-CM

## 2020-09-15 LAB
ANION GAP SERPL CALCULATED.3IONS-SCNC: 7 MMOL/L (ref 3–14)
BUN SERPL-MCNC: 18 MG/DL (ref 7–30)
CALCIUM SERPL-MCNC: 9.7 MG/DL (ref 8.5–10.1)
CHLORIDE SERPL-SCNC: 107 MMOL/L (ref 94–109)
CO2 SERPL-SCNC: 28 MMOL/L (ref 20–32)
CREAT SERPL-MCNC: 0.94 MG/DL (ref 0.66–1.25)
GFR SERPL CREATININE-BSD FRML MDRD: 84 ML/MIN/{1.73_M2}
GLUCOSE SERPL-MCNC: 131 MG/DL (ref 70–99)
POTASSIUM SERPL-SCNC: 3.9 MMOL/L (ref 3.4–5.3)
SODIUM SERPL-SCNC: 142 MMOL/L (ref 133–144)
TROPONIN I SERPL-MCNC: <0.015 UG/L (ref 0–0.04)

## 2020-09-16 LAB
COVID-19 SPIKE RBD ABY TITER: NORMAL
COVID-19 SPIKE RBD ABY: NEGATIVE

## 2020-09-18 ENCOUNTER — TELEPHONE (OUTPATIENT)
Dept: UROLOGY | Facility: CLINIC | Age: 65
End: 2020-09-18

## 2020-09-18 NOTE — TELEPHONE ENCOUNTER
Left message for pt to call and and confirm cysto on 10/13/20 at 12:20PM. Left clinic coordinator number ([phone])

## 2020-09-23 DIAGNOSIS — H25.12 AGE-RELATED NUCLEAR CATARACT, LEFT: Primary | ICD-10-CM

## 2020-09-23 RX ORDER — OFLOXACIN 3 MG/ML
SOLUTION/ DROPS OPHTHALMIC
Qty: 1 BOTTLE | Refills: 0 | Status: SHIPPED | OUTPATIENT
Start: 2020-09-23 | End: 2021-02-02

## 2020-09-23 RX ORDER — PREDNISOLONE ACETATE 10 MG/ML
SUSPENSION/ DROPS OPHTHALMIC
Qty: 1 BOTTLE | Refills: 1 | Status: SHIPPED | OUTPATIENT
Start: 2020-09-23 | End: 2021-02-02

## 2020-09-25 ENCOUNTER — HOSPITAL ENCOUNTER (OUTPATIENT)
Facility: AMBULATORY SURGERY CENTER | Age: 65
End: 2020-09-25
Attending: OPHTHALMOLOGY
Payer: COMMERCIAL

## 2020-09-25 ENCOUNTER — OFFICE VISIT (OUTPATIENT)
Dept: OPHTHALMOLOGY | Facility: CLINIC | Age: 65
End: 2020-09-25
Payer: COMMERCIAL

## 2020-09-25 ENCOUNTER — ANESTHESIA (OUTPATIENT)
Dept: SURGERY | Facility: AMBULATORY SURGERY CENTER | Age: 65
End: 2020-09-25

## 2020-09-25 VITALS
TEMPERATURE: 97.7 F | HEIGHT: 74 IN | HEART RATE: 80 BPM | DIASTOLIC BLOOD PRESSURE: 85 MMHG | WEIGHT: 245 LBS | SYSTOLIC BLOOD PRESSURE: 137 MMHG | BODY MASS INDEX: 31.44 KG/M2 | OXYGEN SATURATION: 94 % | RESPIRATION RATE: 18 BRPM

## 2020-09-25 DIAGNOSIS — H25.13 NUCLEAR SCLEROTIC CATARACT OF BOTH EYES: ICD-10-CM

## 2020-09-25 DIAGNOSIS — Z96.1 PSEUDOPHAKIA, LEFT EYE: Primary | ICD-10-CM

## 2020-09-25 DIAGNOSIS — H25.12 AGE-RELATED NUCLEAR CATARACT, LEFT: Primary | ICD-10-CM

## 2020-09-25 LAB — GLUCOSE BLDC GLUCOMTR-MCNC: 108 MG/DL (ref 70–99)

## 2020-09-25 DEVICE — EYE IMP IOL AMO PCL TECNIS ZCB00 16.0: Type: IMPLANTABLE DEVICE | Site: EYE | Status: FUNCTIONAL

## 2020-09-25 RX ORDER — OFLOXACIN 3 MG/ML
1 SOLUTION/ DROPS OPHTHALMIC
Status: COMPLETED | OUTPATIENT
Start: 2020-09-25 | End: 2020-09-25

## 2020-09-25 RX ORDER — MOXIFLOXACIN IN NACL,ISO-OS/PF 0.3MG/0.3
SYRINGE (ML) INTRAOCULAR PRN
Status: DISCONTINUED | OUTPATIENT
Start: 2020-09-25 | End: 2020-09-25 | Stop reason: HOSPADM

## 2020-09-25 RX ORDER — FENTANYL CITRATE 50 UG/ML
INJECTION, SOLUTION INTRAMUSCULAR; INTRAVENOUS PRN
Status: DISCONTINUED | OUTPATIENT
Start: 2020-09-25 | End: 2020-09-25

## 2020-09-25 RX ORDER — SODIUM CHLORIDE, SODIUM LACTATE, POTASSIUM CHLORIDE, CALCIUM CHLORIDE 600; 310; 30; 20 MG/100ML; MG/100ML; MG/100ML; MG/100ML
INJECTION, SOLUTION INTRAVENOUS CONTINUOUS
Status: DISCONTINUED | OUTPATIENT
Start: 2020-09-25 | End: 2020-09-26 | Stop reason: HOSPADM

## 2020-09-25 RX ORDER — ONDANSETRON 4 MG/1
4 TABLET, ORALLY DISINTEGRATING ORAL EVERY 30 MIN PRN
Status: DISCONTINUED | OUTPATIENT
Start: 2020-09-25 | End: 2020-09-26 | Stop reason: HOSPADM

## 2020-09-25 RX ORDER — LIDOCAINE HYDROCHLORIDE 10 MG/ML
INJECTION, SOLUTION EPIDURAL; INFILTRATION; INTRACAUDAL; PERINEURAL PRN
Status: DISCONTINUED | OUTPATIENT
Start: 2020-09-25 | End: 2020-09-25 | Stop reason: HOSPADM

## 2020-09-25 RX ORDER — MEPERIDINE HYDROCHLORIDE 25 MG/ML
12.5 INJECTION INTRAMUSCULAR; INTRAVENOUS; SUBCUTANEOUS
Status: DISCONTINUED | OUTPATIENT
Start: 2020-09-25 | End: 2020-09-26 | Stop reason: HOSPADM

## 2020-09-25 RX ORDER — ONDANSETRON 2 MG/ML
4 INJECTION INTRAMUSCULAR; INTRAVENOUS EVERY 30 MIN PRN
Status: DISCONTINUED | OUTPATIENT
Start: 2020-09-25 | End: 2020-09-26 | Stop reason: HOSPADM

## 2020-09-25 RX ORDER — DICLOFENAC SODIUM 1 MG/ML
1 SOLUTION/ DROPS OPHTHALMIC
Status: COMPLETED | OUTPATIENT
Start: 2020-09-25 | End: 2020-09-25

## 2020-09-25 RX ORDER — TETRACAINE HYDROCHLORIDE 5 MG/ML
SOLUTION OPHTHALMIC PRN
Status: DISCONTINUED | OUTPATIENT
Start: 2020-09-25 | End: 2020-09-25 | Stop reason: HOSPADM

## 2020-09-25 RX ORDER — PROPARACAINE HYDROCHLORIDE 5 MG/ML
1 SOLUTION/ DROPS OPHTHALMIC ONCE
Status: COMPLETED | OUTPATIENT
Start: 2020-09-25 | End: 2020-09-25

## 2020-09-25 RX ORDER — BALANCED SALT SOLUTION 6.4; .75; .48; .3; 3.9; 1.7 MG/ML; MG/ML; MG/ML; MG/ML; MG/ML; MG/ML
SOLUTION OPHTHALMIC PRN
Status: DISCONTINUED | OUTPATIENT
Start: 2020-09-25 | End: 2020-09-25 | Stop reason: HOSPADM

## 2020-09-25 RX ORDER — CYCLOPENTOLAT/TROPIC/PHENYLEPH 1%-1%-2.5%
1 DROPS (EA) OPHTHALMIC (EYE)
Status: COMPLETED | OUTPATIENT
Start: 2020-09-25 | End: 2020-09-25

## 2020-09-25 RX ORDER — NALOXONE HYDROCHLORIDE 0.4 MG/ML
.1-.4 INJECTION, SOLUTION INTRAMUSCULAR; INTRAVENOUS; SUBCUTANEOUS
Status: DISCONTINUED | OUTPATIENT
Start: 2020-09-25 | End: 2020-09-26 | Stop reason: HOSPADM

## 2020-09-25 RX ORDER — SODIUM CHLORIDE, SODIUM LACTATE, POTASSIUM CHLORIDE, CALCIUM CHLORIDE 600; 310; 30; 20 MG/100ML; MG/100ML; MG/100ML; MG/100ML
500 INJECTION, SOLUTION INTRAVENOUS CONTINUOUS
Status: DISCONTINUED | OUTPATIENT
Start: 2020-09-25 | End: 2020-09-25 | Stop reason: HOSPADM

## 2020-09-25 RX ADMIN — FENTANYL CITRATE 50 MCG: 50 INJECTION, SOLUTION INTRAMUSCULAR; INTRAVENOUS at 09:06

## 2020-09-25 RX ADMIN — OFLOXACIN 1 DROP: 3 SOLUTION/ DROPS OPHTHALMIC at 08:16

## 2020-09-25 RX ADMIN — OFLOXACIN 1 DROP: 3 SOLUTION/ DROPS OPHTHALMIC at 08:08

## 2020-09-25 RX ADMIN — DICLOFENAC SODIUM 1 DROP: 1 SOLUTION/ DROPS OPHTHALMIC at 08:12

## 2020-09-25 RX ADMIN — DICLOFENAC SODIUM 1 DROP: 1 SOLUTION/ DROPS OPHTHALMIC at 08:16

## 2020-09-25 RX ADMIN — DICLOFENAC SODIUM 1 DROP: 1 SOLUTION/ DROPS OPHTHALMIC at 08:09

## 2020-09-25 RX ADMIN — FENTANYL CITRATE 50 MCG: 50 INJECTION, SOLUTION INTRAMUSCULAR; INTRAVENOUS at 09:02

## 2020-09-25 RX ADMIN — SODIUM CHLORIDE, SODIUM LACTATE, POTASSIUM CHLORIDE, CALCIUM CHLORIDE 500 ML: 600; 310; 30; 20 INJECTION, SOLUTION INTRAVENOUS at 08:16

## 2020-09-25 RX ADMIN — Medication 1 DROP: at 08:09

## 2020-09-25 RX ADMIN — Medication 1 DROP: at 08:13

## 2020-09-25 RX ADMIN — Medication 1 DROP: at 08:17

## 2020-09-25 RX ADMIN — OFLOXACIN 1 DROP: 3 SOLUTION/ DROPS OPHTHALMIC at 08:12

## 2020-09-25 RX ADMIN — PROPARACAINE HYDROCHLORIDE 1 DROP: 5 SOLUTION/ DROPS OPHTHALMIC at 08:08

## 2020-09-25 ASSESSMENT — VISUAL ACUITY
METHOD: SNELLEN - LINEAR
OS_SC+: -2
OS_SC: 20/50

## 2020-09-25 ASSESSMENT — SLIT LAMP EXAM - LIDS: COMMENTS: MILDLY EDEMATOUS

## 2020-09-25 ASSESSMENT — TONOMETRY
IOP_METHOD: TONOPEN
OS_IOP_MMHG: 24

## 2020-09-25 ASSESSMENT — MIFFLIN-ST. JEOR: SCORE: 1961.06

## 2020-09-25 NOTE — ANESTHESIA CARE TRANSFER NOTE
Patient: Darnell Grant    Procedure(s):  LEFT CATARACT REMOVAL WITH INTRAOCULAR LENS IMPLANT    Diagnosis: Nuclear sclerotic cataract of both eyes [H25.13]  Diagnosis Additional Information: No value filed.    Anesthesia Type:   General     Note:  Airway :Room Air  Patient transferred to:Phase II  Handoff Report: Identifed the Patient, Identified the Reponsible Provider, Reviewed the pertinent medical history, Discussed the surgical course, Reviewed Intra-OP anesthesia mangement and issues during anesthesia, Set expectations for post-procedure period and Allowed opportunity for questions and acknowledgement of understanding      Vitals: (Last set prior to Anesthesia Care Transfer)    CRNA VITALS  9/25/2020 0852 - 9/25/2020 0934      9/25/2020             Pulse:  70    Ht Rate:  70    SpO2:  95 %    Resp Rate (set):  10                Electronically Signed By: CHRISTIN Ojeda CRNA  September 25, 2020  9:34 AM

## 2020-09-25 NOTE — PROCEDURES
Ophthalmology Post-op Procedure Note    Surgical Service:    Ophthalmology & Visual Sciences  Date Performed:      September 25, 2020  [facility]      Pre-operative Diagnosis: Visually significant cataract, left eye  Post-operative Diagnosis:  Pseudophakia, left eye  Operative Procedure:  Phacoemulsification with intraocular lens implantation, left eye    Surgeon(s):  Fellow/Staff Surgeon:       Keyla Tobin MD  Resident Surgeon:            Reno Chaney MD    Anesthesia:   Topical/MAC  Findings:  No unusual findings   Blood Loss:    Minimal  Implants:  ZCB00 16.0D intraocular lens  Specimens:  None     Complications:  The patient did not experience any complications.   Condition: Stable    Operative Report Completion:    Description of Operation/Procedure:  After appropriate informed consent was obtained, the patient was brought to the operating room. The appropriate cardiac and blood pressure monitors were placed. A final pause occurred just before the start of the procedure during which the entire procedure team actively confirmed the correct patient, procedure, site, special equipment and special requirements. The patient was prepped and draped in the usual sterile fashion using 5% povidone/iodine.     An eyelid speculum was placed to open the eyelids. A paracentesis port was placed approximately sixty degrees to the left of the planned temporal incision location using the sideport blade. Approximately 0.8 cc of 1% nonpreserved lidocaine was placed into the anterior chamber. The anterior chamber was filled with dispersive viscoelastic. A clear corneal temporal incision was made with a metal 2.6 mm keratome. A round continuous tear capsulorhexis was initiated with a cystotome and completed with the Utrata forceps. Balanced salt solution on a cannula was used to perform hydrodissection. The nucleus was removed using phacoemulsification with a chop technique. The remaining cortical material was  removed using the irrigation/aspiration handpiece. The capsular bag was filled with dispersive viscoelastic. A lens of the  model and power listed above was placed into the capsular bag using the cartridge injection system. The remaining viscoelastic was removed using the irrigation aspiration handpiece. The paracentesis and temporal wounds were hydrated with balanced salt solution. Intracameral moxifloxacin was administered. At the conclusion of the case, the wounds were felt to be watertight, the pupil was round, the lens was centered, and the anterior chamber was deep. A few drops of antibiotic and prednisolone were given to the operative eye. The eyelid speculum was removed. A shield was placed over the operative eye.        Attending Attestation:  I was present for and performed the entire procedure.  Keyla Tobin MD

## 2020-09-25 NOTE — DISCHARGE INSTRUCTIONS
[facility]  Home Care Following Anesthesia  For 24 hours after surgery:  1. Get plenty of rest.  A responsible adult must stay with you for at least 24 hours after you leave the surgery center.  2. Do not drive or use heavy equipment.  If you have weakness or tingling, don't drive or use heavy equipment until this feeling goes away.   3. Do not drink alcohol.   4. Avoid strenuous or risky activities.  Ask for help when climbing stairs.  5. You may feel lightheaded.  IF so, sit for a few minutes before standing.  Have someone help you get up.   6. If you have nausea (feel sick to your stomach): Drink only clear liquids such as apple juice, ginger ale, broth or 7-Up.  Rest may also help.  Be sure to drink enough fluids.  Move to a regular diet as you feel able.   7. You may have a slight fever.  Call the doctor if your fever is over 100 F (37.7 C) (taken under the tongue) or lasts longer than 24 hours.  8. You may have a dry mouth, a sore throat, muscle aches or trouble sleeping. These should go away after 24 hours.  9. Do not make important or legal decisions.               Tips for taking pain medications  To get the best pain relief possible, remember these points:    Take pain medications as directed, before pain becomes severe.    Pain medication can upset your stomach: taking it with food may help.    Constipation is a common side effect of pain medication. Drink plenty of  fluids.    Eat foods high in fiber. Take a stool softener if recommended by your doctor or pharmacist.    Do not drink alcohol, drive or operate machinery while taking pain medications.    Ask about other ways to control pain, such as with heat, ice or relaxation.    Tylenol/Acetaminophen Consumption  To help encourage the safe use of acetaminophen, the makers of TYLENOL  have lowered the maximum daily dose for single-ingredient Extra Strength TYLENOL  (acetaminophen) products sold in the [place] from 8  pills per day (4,000 mg) to 6 pills per day (3,000 mg). The dosing interval has also changed from 2 pills every 4-6 hours to 2 pills every 6 hours.    If you feel your pain relief is insufficient, you may take Tylenol/Acetaminophen in addition to your narcotic pain medication.     Be careful not to exceed 3,000 mg of Tylenol/Acetaminophen in a 24 hour period from all sources.    If you are taking extra strength Tylenol/acetaminophen (500 mg), the maximum dose is 6 tablets in 24 hours.    If you are taking regular strength acetaminophen (325 mg), the maximum dose is 9 tablets in 24 hours.    Call a doctor for any of the followin. Signs of infection (fever, growing tenderness at the surgery site, a large amount of drainage or bleeding, severe pain, foul-smelling drainage, redness, swelling).  2. It has been over 8 to 10 hours since surgery and you are still not able to urinate (pass water).  3. Headache for over 24 hours.  4. Numbness, tingling or weakness the day after surgery (if you had spinal anesthesia).  5. Signs of Covid-19 infection (temperature over 100 degrees, shortness of breath, cough, loss of taste/smell, generalized body aches, persistent headache, chills, sore throat, nausea/vomiting/diarrhea)  Your doctor is:       Dr. Keyla Tobin, Ophthalmology: [phone]               Or dial [phone] and ask for the resident on call for:  Ophthalmology  For emergency care, call the:  [place] [facility] [facility]:  [phone] (TTY for hearing impaired: [phone])

## 2020-09-25 NOTE — ANESTHESIA POSTPROCEDURE EVALUATION
Anesthesia POST Procedure Evaluation    Patient: Darnell Grant   MRN:     [id number] Gender:   male   Age:    64 year old :      1955        Preoperative Diagnosis: Nuclear sclerotic cataract of both eyes [H25.13]   Procedure(s):  LEFT CATARACT REMOVAL WITH INTRAOCULAR LENS IMPLANT   Postop Comments: No value filed.     Anesthesia Type: General       Disposition: Outpatient   Postop Pain Control: Uneventful            Sign Out: Well controlled pain   PONV: No   Neuro/Psych: Uneventful            Sign Out: Acceptable/Baseline neuro status   Airway/Respiratory: Uneventful            Sign Out: Acceptable/Baseline resp. status   CV/Hemodynamics: Uneventful            Sign Out: Acceptable CV status   Other NRE: NONE   DID A NON-ROUTINE EVENT OCCUR? No         Last Anesthesia Record Vitals:  CRNA VITALS  2020 0852 - 2020 0952      2020             Pulse:  70    Ht Rate:  70    SpO2:  95 %    Resp Rate (set):  10          Last PACU Vitals:  Vitals Value Taken Time   /86 2020  9:25 AM   Temp 36.5  C (97.7  F) 2020  9:25 AM   Pulse     Resp 16 2020  9:25 AM   SpO2 95 % 2020  9:25 AM   Temp src Skin 2020  9:15 AM   NIBP 132/83 2020  9:19 AM   Pulse 70 2020  9:22 AM   SpO2 95 % 2020  9:22 AM   Resp     Temp     Ht Rate 70 2020  9:22 AM   Temp 2           Electronically Signed By: Deshawn Rey MD, 2020, 10:21 AM

## 2020-09-25 NOTE — PROGRESS NOTES
POD#0, status post cataract surgery, Left eye    No complaints.  Denies eye pain.    Impression/Plan:  Pseudophakia, OS: POD0, good post-operative appearance. IOP reasonable.    Eye protection at all times and eye shield at night for 1 week.    Limited activities with no exercise or heavy lifting for 1 week.    Instructed patient to contact us for decreasing vision, eye pain, new floaters or flashes of light or other concerning symptoms.    Written instructions given  Return to clinic 10/21/2020.    Reno Chaney MD  Department of Ophthalmology  Pager: [phone]    Teaching statement:  Complete documentation of historical and exam elements from today's encounter can be found in the full encounter summary report (not reduplicated in this progress note). I personally obtained the chief complaint(s) and history of present illness.  I confirmed and edited as necessary the review of systems, past medical/surgical history, family history, social history, and examination findings as documented by others; and I examined the patient myself. I personally reviewed the relevant tests, images, and reports as documented above.     I formulated and edited as necessary the assessment and plan and discussed the findings and management plan with the patient and family.    Keyla Tobin MD  Comprehensive Ophthalmology & Ocular Pathology  Department of Ophthalmology and Visual Neurosciences  [email or web address]  Pager [phone]

## 2020-09-25 NOTE — NURSING NOTE
Chief Complaints and History of Present Illnesses   Patient presents with     Post Op (Ophthalmology) Left Eye     POD#0 s/p CE/IOL LE     Chief Complaint(s) and History of Present Illness(es)     Post Op (Ophthalmology) Left Eye     Laterality: left eye    Quality: blurred vision    Associated symptoms: photophobia.  Negative for eye pain, nausea, double vision, flashes and floaters    Pain scale: 0/10    Comments: POD#0 s/p CE/IOL LE              Comments     Patient feeling well p aline Stuart COT September 25, 2020 10:10 AM

## 2020-09-28 ENCOUNTER — OFFICE VISIT (OUTPATIENT)
Dept: OPHTHALMOLOGY | Facility: CLINIC | Age: 65
End: 2020-09-28
Attending: OPHTHALMOLOGY
Payer: COMMERCIAL

## 2020-09-28 DIAGNOSIS — Z96.1 PSEUDOPHAKIA, LEFT EYE: Primary | ICD-10-CM

## 2020-09-28 PROCEDURE — G0463 HOSPITAL OUTPT CLINIC VISIT: HCPCS | Mod: ZF

## 2020-09-28 ASSESSMENT — REFRACTION_WEARINGRX
OD_CYLINDER: +0.25
OS_AXIS: 075
OD_AXIS: 120
OD_SPHERE: -1.00
OS_ADD: +2.50
OS_CYLINDER: +1.25
OD_ADD: +2.50
OS_SPHERE: -1.00

## 2020-09-28 ASSESSMENT — VISUAL ACUITY
OS_PH_SC: 20/25
CORRECTION_TYPE: GLASSES
OD_CC+: +2
OS_SC: 20/40
OD_CC: 20/30
OS_SC+: -2
METHOD: SNELLEN - LINEAR

## 2020-09-28 ASSESSMENT — TONOMETRY
OD_IOP_MMHG: 17
IOP_METHOD: TONOPEN
OS_IOP_MMHG: 17

## 2020-09-28 ASSESSMENT — CONF VISUAL FIELD
OD_NORMAL: 1
METHOD: COUNTING FINGERS
OS_NORMAL: 1

## 2020-09-28 NOTE — NURSING NOTE
Chief Complaints and History of Present Illnesses   Patient presents with     Post Op (Ophthalmology) Left Eye     POD#3 s/p CE/IOL LE     Chief Complaint(s) and History of Present Illness(es)     Post Op (Ophthalmology) Left Eye     Laterality: left eye    Comments: POD#3 s/p CE/IOL LE              Comments     Pt states vision appears more blurry in LE vs vision 3 days ago.  No eye pain today. No flashes or floaters.  No redness or dryness.  Pt scheduled for CE/IOL RE on Friday.    TAMIR Shaver September 28, 2020 1:55 PM

## 2020-09-29 ASSESSMENT — SLIT LAMP EXAM - LIDS: COMMENTS: NORMAL

## 2020-09-29 ASSESSMENT — EXTERNAL EXAM - LEFT EYE: OS_EXAM: NORMAL

## 2020-09-29 NOTE — PROGRESS NOTES
POD#3, status post cataract surgery, Left eye    Darnell is seen today because he noted that the vision was more blurred this morning than it was yesterday, and he wants to ensure that the decrease in vision is not something concerning. He is using his post-operative drops as instructed.     Impression/Plan:  Pseudophakia, OS: POD3, good post-operative appearance. IOP normal. Mild AC inflammation as expected.     Offered reassurance that vision may fluctuate some post-op, and the the appearance thus far is just as it should be.    Continue:  Eye protection at all times and eye shield at night for 1 week.    Limited activities with no exercise or heavy lifting for 1 week.    Contact us for decreasing vision, eye pain, new floaters or flashes of light or other concerning symptoms.    Return for right eye surgery already scheduled this Friday, or sooner as needed.    Teaching statement:  Complete documentation of historical and exam elements from today's encounter can be found in the full encounter summary report (not reduplicated in this progress note). I personally obtained the chief complaint(s) and history of present illness.  I confirmed and edited as necessary the review of systems, past medical/surgical history, family history, social history, and examination findings as documented by others; and I examined the patient myself. I personally reviewed the relevant tests, images, and reports as documented above.     I formulated and edited as necessary the assessment and plan and discussed the findings and management plan with the patient and family.    Keyla Tobin MD  Comprehensive Ophthalmology & Ocular Pathology  Department of Ophthalmology and Visual Neurosciences  [email or web address]  Pager [phone]

## 2020-10-01 ENCOUNTER — ANESTHESIA EVENT (OUTPATIENT)
Dept: SURGERY | Facility: AMBULATORY SURGERY CENTER | Age: 65
End: 2020-10-01

## 2020-10-01 DIAGNOSIS — H25.11 AGE-RELATED NUCLEAR CATARACT, RIGHT: Primary | ICD-10-CM

## 2020-10-01 RX ORDER — PREDNISOLONE ACETATE 10 MG/ML
SUSPENSION/ DROPS OPHTHALMIC
Qty: 1 BOTTLE | Refills: 1 | Status: SHIPPED | OUTPATIENT
Start: 2020-10-01 | End: 2021-02-02

## 2020-10-01 RX ORDER — OFLOXACIN 3 MG/ML
SOLUTION/ DROPS OPHTHALMIC
Qty: 1 BOTTLE | Refills: 0 | Status: SHIPPED | OUTPATIENT
Start: 2020-10-01 | End: 2021-02-02

## 2020-10-02 ENCOUNTER — OFFICE VISIT (OUTPATIENT)
Dept: OPHTHALMOLOGY | Facility: CLINIC | Age: 65
End: 2020-10-02
Payer: COMMERCIAL

## 2020-10-02 ENCOUNTER — HOSPITAL ENCOUNTER (OUTPATIENT)
Facility: AMBULATORY SURGERY CENTER | Age: 65
Discharge: HOME | End: 2020-10-02
Attending: OPHTHALMOLOGY | Admitting: OPHTHALMOLOGY
Payer: COMMERCIAL

## 2020-10-02 ENCOUNTER — ANESTHESIA (OUTPATIENT)
Dept: SURGERY | Facility: AMBULATORY SURGERY CENTER | Age: 65
End: 2020-10-02

## 2020-10-02 VITALS
SYSTOLIC BLOOD PRESSURE: 132 MMHG | TEMPERATURE: 97 F | HEART RATE: 83 BPM | RESPIRATION RATE: 16 BRPM | BODY MASS INDEX: 31.32 KG/M2 | HEIGHT: 74 IN | WEIGHT: 244 LBS | DIASTOLIC BLOOD PRESSURE: 82 MMHG | OXYGEN SATURATION: 94 %

## 2020-10-02 DIAGNOSIS — H25.13 NUCLEAR SCLEROTIC CATARACT OF BOTH EYES: Primary | ICD-10-CM

## 2020-10-02 DIAGNOSIS — Z96.1 PSEUDOPHAKIA, BOTH EYES: Primary | ICD-10-CM

## 2020-10-02 PROCEDURE — 66984 XCAPSL CTRC RMVL W/O ECP: CPT | Mod: RT

## 2020-10-02 PROCEDURE — 99024 POSTOP FOLLOW-UP VISIT: CPT | Performed by: OPHTHALMOLOGY

## 2020-10-02 DEVICE — EYE IMP IOL AMO PCL TECNIS ZCB00 17.5: Type: IMPLANTABLE DEVICE | Site: EYE | Status: FUNCTIONAL

## 2020-10-02 RX ORDER — PROPARACAINE HYDROCHLORIDE 5 MG/ML
1 SOLUTION/ DROPS OPHTHALMIC ONCE
Status: COMPLETED | OUTPATIENT
Start: 2020-10-02 | End: 2020-10-02

## 2020-10-02 RX ORDER — OFLOXACIN 3 MG/ML
1 SOLUTION/ DROPS OPHTHALMIC
Status: COMPLETED | OUTPATIENT
Start: 2020-10-02 | End: 2020-10-02

## 2020-10-02 RX ORDER — NALOXONE HYDROCHLORIDE 0.4 MG/ML
.1-.4 INJECTION, SOLUTION INTRAMUSCULAR; INTRAVENOUS; SUBCUTANEOUS
Status: DISCONTINUED | OUTPATIENT
Start: 2020-10-02 | End: 2020-10-03 | Stop reason: HOSPADM

## 2020-10-02 RX ORDER — CYCLOPENTOLAT/TROPIC/PHENYLEPH 1%-1%-2.5%
1 DROPS (EA) OPHTHALMIC (EYE)
Status: COMPLETED | OUTPATIENT
Start: 2020-10-02 | End: 2020-10-02

## 2020-10-02 RX ORDER — LIDOCAINE 40 MG/G
CREAM TOPICAL
Status: DISCONTINUED | OUTPATIENT
Start: 2020-10-02 | End: 2020-10-02 | Stop reason: HOSPADM

## 2020-10-02 RX ORDER — TETRACAINE HYDROCHLORIDE 5 MG/ML
SOLUTION OPHTHALMIC PRN
Status: DISCONTINUED | OUTPATIENT
Start: 2020-10-02 | End: 2020-10-02 | Stop reason: HOSPADM

## 2020-10-02 RX ORDER — DICLOFENAC SODIUM 1 MG/ML
1 SOLUTION/ DROPS OPHTHALMIC
Status: COMPLETED | OUTPATIENT
Start: 2020-10-02 | End: 2020-10-02

## 2020-10-02 RX ORDER — BALANCED SALT SOLUTION 6.4; .75; .48; .3; 3.9; 1.7 MG/ML; MG/ML; MG/ML; MG/ML; MG/ML; MG/ML
SOLUTION OPHTHALMIC PRN
Status: DISCONTINUED | OUTPATIENT
Start: 2020-10-02 | End: 2020-10-02 | Stop reason: HOSPADM

## 2020-10-02 RX ORDER — SODIUM CHLORIDE, SODIUM LACTATE, POTASSIUM CHLORIDE, CALCIUM CHLORIDE 600; 310; 30; 20 MG/100ML; MG/100ML; MG/100ML; MG/100ML
INJECTION, SOLUTION INTRAVENOUS CONTINUOUS
Status: DISCONTINUED | OUTPATIENT
Start: 2020-10-02 | End: 2020-10-03 | Stop reason: HOSPADM

## 2020-10-02 RX ORDER — FENTANYL CITRATE 50 UG/ML
25-50 INJECTION, SOLUTION INTRAMUSCULAR; INTRAVENOUS EVERY 5 MIN PRN
Status: DISCONTINUED | OUTPATIENT
Start: 2020-10-02 | End: 2020-10-02 | Stop reason: HOSPADM

## 2020-10-02 RX ORDER — ONDANSETRON 2 MG/ML
4 INJECTION INTRAMUSCULAR; INTRAVENOUS EVERY 30 MIN PRN
Status: DISCONTINUED | OUTPATIENT
Start: 2020-10-02 | End: 2020-10-03 | Stop reason: HOSPADM

## 2020-10-02 RX ORDER — SODIUM CHLORIDE, SODIUM LACTATE, POTASSIUM CHLORIDE, CALCIUM CHLORIDE 600; 310; 30; 20 MG/100ML; MG/100ML; MG/100ML; MG/100ML
500 INJECTION, SOLUTION INTRAVENOUS CONTINUOUS
Status: DISCONTINUED | OUTPATIENT
Start: 2020-10-02 | End: 2020-10-02 | Stop reason: HOSPADM

## 2020-10-02 RX ORDER — LIDOCAINE HYDROCHLORIDE 10 MG/ML
INJECTION, SOLUTION EPIDURAL; INFILTRATION; INTRACAUDAL; PERINEURAL PRN
Status: DISCONTINUED | OUTPATIENT
Start: 2020-10-02 | End: 2020-10-02 | Stop reason: HOSPADM

## 2020-10-02 RX ORDER — FENTANYL CITRATE 50 UG/ML
INJECTION, SOLUTION INTRAMUSCULAR; INTRAVENOUS PRN
Status: DISCONTINUED | OUTPATIENT
Start: 2020-10-02 | End: 2020-10-02

## 2020-10-02 RX ORDER — MOXIFLOXACIN IN NACL,ISO-OS/PF 0.3MG/0.3
SYRINGE (ML) INTRAOCULAR PRN
Status: DISCONTINUED | OUTPATIENT
Start: 2020-10-02 | End: 2020-10-02 | Stop reason: HOSPADM

## 2020-10-02 RX ORDER — ONDANSETRON 4 MG/1
4 TABLET, ORALLY DISINTEGRATING ORAL EVERY 30 MIN PRN
Status: DISCONTINUED | OUTPATIENT
Start: 2020-10-02 | End: 2020-10-03 | Stop reason: HOSPADM

## 2020-10-02 RX ADMIN — FENTANYL CITRATE 50 MCG: 50 INJECTION, SOLUTION INTRAMUSCULAR; INTRAVENOUS at 07:14

## 2020-10-02 RX ADMIN — Medication 1 DROP: at 06:34

## 2020-10-02 RX ADMIN — SODIUM CHLORIDE, SODIUM LACTATE, POTASSIUM CHLORIDE, CALCIUM CHLORIDE: 600; 310; 30; 20 INJECTION, SOLUTION INTRAVENOUS at 07:08

## 2020-10-02 RX ADMIN — DICLOFENAC SODIUM 1 DROP: 1 SOLUTION/ DROPS OPHTHALMIC at 06:27

## 2020-10-02 RX ADMIN — SODIUM CHLORIDE, SODIUM LACTATE, POTASSIUM CHLORIDE, CALCIUM CHLORIDE 500 ML: 600; 310; 30; 20 INJECTION, SOLUTION INTRAVENOUS at 06:37

## 2020-10-02 RX ADMIN — OFLOXACIN 1 DROP: 3 SOLUTION/ DROPS OPHTHALMIC at 06:18

## 2020-10-02 RX ADMIN — OFLOXACIN 1 DROP: 3 SOLUTION/ DROPS OPHTHALMIC at 06:27

## 2020-10-02 RX ADMIN — Medication 1 DROP: at 06:19

## 2020-10-02 RX ADMIN — DICLOFENAC SODIUM 1 DROP: 1 SOLUTION/ DROPS OPHTHALMIC at 06:18

## 2020-10-02 RX ADMIN — Medication 1 DROP: at 06:28

## 2020-10-02 RX ADMIN — FENTANYL CITRATE 50 MCG: 50 INJECTION, SOLUTION INTRAMUSCULAR; INTRAVENOUS at 07:12

## 2020-10-02 RX ADMIN — DICLOFENAC SODIUM 1 DROP: 1 SOLUTION/ DROPS OPHTHALMIC at 06:33

## 2020-10-02 RX ADMIN — PROPARACAINE HYDROCHLORIDE 1 DROP: 5 SOLUTION/ DROPS OPHTHALMIC at 06:17

## 2020-10-02 RX ADMIN — OFLOXACIN 1 DROP: 3 SOLUTION/ DROPS OPHTHALMIC at 06:32

## 2020-10-02 ASSESSMENT — VISUAL ACUITY
OD_SC: 20/50
METHOD: SNELLEN - LINEAR
OS_SC: 20/25-

## 2020-10-02 ASSESSMENT — MIFFLIN-ST. JEOR: SCORE: 1956.53

## 2020-10-02 ASSESSMENT — TONOMETRY
OD_IOP_MMHG: 24
IOP_METHOD: APPLANATION
OS_IOP_MMHG: 15

## 2020-10-02 ASSESSMENT — LIFESTYLE VARIABLES: TOBACCO_USE: 0

## 2020-10-02 ASSESSMENT — SLIT LAMP EXAM - LIDS
COMMENTS: NORMAL
COMMENTS: NORMAL

## 2020-10-02 ASSESSMENT — EXTERNAL EXAM - LEFT EYE: OS_EXAM: NORMAL

## 2020-10-02 ASSESSMENT — ENCOUNTER SYMPTOMS
SEIZURES: 0
DYSRHYTHMIAS: 1

## 2020-10-02 ASSESSMENT — EXTERNAL EXAM - RIGHT EYE: OD_EXAM: NORMAL

## 2020-10-02 NOTE — ANESTHESIA PREPROCEDURE EVALUATION
"Anesthesia Pre-Procedure Evaluation    Patient: Darnell Grant   MRN:     [id number] Gender:   male   Age:    64 year old :      1955        Preoperative Diagnosis: Nuclear sclerotic cataract of both eyes [H25.13]   Procedure(s):  LEFT CATARACT REMOVAL WITH INTRAOCULAR LENS IMPLANT     LABS:  CBC:   Lab Results   Component Value Date    WBC 6.6 2020    WBC 9.1 2019    HGB 16.6 2020    HGB 17.2 2019    HCT 48.9 2020    HCT 51.5 2019     2020     2019     BMP:   Lab Results   Component Value Date     09/15/2020     2020    POTASSIUM 3.9 09/15/2020    POTASSIUM 4.1 2020    CHLORIDE 107 09/15/2020    CHLORIDE 109 2020    CO2 28 09/15/2020    CO2 26 2020    BUN 18 09/15/2020    BUN 25 2020    CR 0.94 09/15/2020    CR 1.00 2020     (H) 09/15/2020    GLC 88 2020     COAGS:   Lab Results   Component Value Date    PTT 39 (H) 2019    INR 0.91 2019     POC:   Lab Results   Component Value Date     (H) 2020     OTHER:   Lab Results   Component Value Date    A1C 5.1 2020    ZHANE 9.7 09/15/2020    PHOS 2.8 2019    ALBUMIN 3.6 2020    PROTTOTAL 6.9 2020    ALT 51 2020    AST 30 2020    ALKPHOS 78 2020    BILITOTAL 0.4 2020    TSH 3.06 2020    CRP <5.0 2013    SED 5 2013        Preop Vitals    BP Readings from Last 3 Encounters:   10/02/20 138/84   20 137/85   08/10/20 (P) 117/78    Pulse Readings from Last 3 Encounters:   10/02/20 83   20 80   08/10/20 (P) 63      Resp Readings from Last 3 Encounters:   10/02/20 18   20 18   12/10/19 17    SpO2 Readings from Last 3 Encounters:   10/02/20 97%   20 94%   08/10/20 (P) 96%      Temp Readings from Last 1 Encounters:   10/02/20 36.6  C (97.9  F) (Oral)    Ht Readings from Last 1 Encounters:   10/02/20 1.88 m (6' 2\")      Wt Readings from Last 1 " "Encounters:   10/02/20 110.7 kg (244 lb)    Estimated body mass index is 31.33 kg/m  as calculated from the following:    Height as of an earlier encounter on 10/2/20: 1.88 m (6' 2\").    Weight as of an earlier encounter on 10/2/20: 110.7 kg (244 lb).     LDA:  Peripheral IV 10/02/20 Left Lower forearm (Active)   Site Assessment WDL 10/02/20 0631   Line Status Infusing 10/02/20 0631   Phlebitis Scale 0-->no symptoms 10/02/20 0631   Infiltration Scale 0 10/02/20 0631   Infiltration Site Treatment Method  None 10/02/20 0631   Extravasation? No 10/02/20 0631   Number of days: 0       Peripheral IV 12/10/19 Left Hand (Active)   Number of days: 297       Peripheral IV 12/10/19 Right Hand (Active)   Number of days: 297       Urethral Catheter Latex (Active)   Number of days: 975       Urethral Catheter (Active)   Number of days: 903        Past Medical History:   Diagnosis Date     Bladder mass      BPH (benign prostatic hyperplasia)      GERD (gastroesophageal reflux disease)      High serum parathyroid hormone (PTH) 2017     Hypertension      Metabolic syndrome      Nephrolithiasis      Obesity      LISETTE (obstructive sleep apnea)      Prediabetes       Past Surgical History:   Procedure Laterality Date     BACK SURGERY      repair disc     CATARACT IOL, RT/LT       CYSTOSCOPY, TRANSURETHRAL RESECTION (TUR) TUMOR BLADDER, COMBINED N/A 1/31/2018    Procedure: COMBINED CYSTOSCOPY, TRANSURETHRAL RESECTION (TUR) TUMOR BLADDER;  Transurethral Biopsy and Resection of Bladder Tumor;  Surgeon: Yaya Lomeli MD;  Location: [facility]     DECOMPRESSION, FUSION CERVICAL ANTERIOR TWO LEVELS, COMBINED Right 12/10/2019    Procedure: right approach anterior cervical 2-3, 3-4 resection of anterior cervical osteophytes causing dysphagia, with microscope;  Surgeon: María Fitzgerald MD;  Location: [facility] OR     HC BREATH HYDROGEN TEST  5/22/2013    Procedure: HYDROGEN BREATH TEST;  Surgeon: Donny Paris MD;  Location: [facility] "     PHACOEMULSIFICATION CLEAR CORNEA WITH STANDARD INTRAOCULAR LENS IMPLANT Left 9/25/2020    Procedure: LEFT CATARACT REMOVAL WITH INTRAOCULAR LENS IMPLANT;  Surgeon: Keyla Tobin MD;  Location: [facility]     TRANSURETHERAL DESTRUCTION OF PROSTATE BY THERMOTHERAPY N/A 4/13/2018    Procedure: TRANSURETHERAL DESTRUCTION OF PROSTATE BY THERMOTHERAPY;  [facility] Procedure;  Surgeon: Yaya Lomeli MD;  Location: [facility] OR      Allergies   Allergen Reactions     Ragweeds      Watery eyes, itchy nose        Anesthesia Evaluation     . Pt has had prior anesthetic.     No history of anesthetic complications          ROS/MED HX    ENT/Pulmonary: Comment: LISETTE symptoms improved after 50# wt loss    Exercise & cold weather induced asthma    (+)sleep apnea, Intermittent asthma Treatment: Inhaler prn,  doesn't use CPAP , . .   (-) tobacco use   Neurologic:  - neg neurologic ROS    (-) seizures and CVA   Cardiovascular: Comment: PVCs on holter monitor    (+) Dyslipidemia, hypertension----. : . . . :. dysrhythmias Other, . Previous cardiac testing date:results:Stress Testdate:2017 results:ECG reviewed date:11/23/19 results: date: results:          METS/Exercise Tolerance: Comment: Denies SOB & CP 4 - Raking leaves, gardening   Hematologic:  - neg hematologic  ROS      (-) history of blood clots and History of Transfusion   Musculoskeletal: Comment: S/p C2-C4 fusion    S/p microdiscectomy, lumbar        GI/Hepatic: Comment: Hx esophageal dysphagia secondary to cervical spine osteophytes, s/p anterior resection 12/2019    (+) GERD Asymptomatic on medication,      (-) liver disease   Renal/Genitourinary: Comment: S/P TURBT 2018, S/P prostate thermotherapy destruction 2018        (+) BPH,       Endo: Comment: Metabolic syndrome, treated w/ metformin & jardiance    Hypogonadism, taking clomid        (+) Obesity, .      Psychiatric:     (+) psychiatric history anxiety and depression      Infectious Disease:  - neg infectious disease ROS        Malignancy:      - no malignancy   Other:    (+) no H/O Chronic Pain,                       PHYSICAL EXAM:   Mental Status/Neuro: A/A/O; Age Appropriate   Airway:   Mallampati: I  Mouth/Opening: Full  TM distance: > 6 cm   Respiratory: Auscultation: CTAB     Resp. Rate: Normal     Resp. Effort: Normal      CV: Rhythm: Regular  Rate: Age appropriate  Heart: Normal Sounds  Edema: None   Comments:      Dental: Normal Dentition                Assessment:   ASA SCORE: 2    H&P: History and physical reviewed and following examination; no interval change.   Smoking Status:  Non-Smoker/Unknown   NPO Status: NPO Appropriate     Plan:   Anes. Type:  MAC   Pre-Medication: None   Induction:  N/a   Airway: Native Airway   Access/Monitoring: PIV   Maintenance: N/a     Postop Plan:   Postop Pain: None  Postop Sedation/Airway: Not planned  Disposition: Outpatient     PONV Management:   Adult Risk Factors:, Non-Smoker   Prevention:, No Volatiles     CONSENT: Direct conversation   Plan and risks discussed with: Patient                        Srini Johnson MD

## 2020-10-02 NOTE — ANESTHESIA CARE TRANSFER NOTE
Patient: Darnell Grant    Procedure(s):  RIGHT CATARACT REMOVAL WITH INTRAOCULAR LENS IMPLANT    Diagnosis: Nuclear sclerotic cataract of both eyes [H25.13]  Diagnosis Additional Information: No value filed.    Anesthesia Type:   MAC     Note:  Airway :Room Air          Vitals: (Last set prior to Anesthesia Care Transfer)    CRNA VITALS  10/2/2020 0701 - 10/2/2020 0737      10/2/2020             Pulse:  81    Ht Rate:  80    SpO2:  97 %    Resp Rate (set):  10                Electronically Signed By: CHRISTIN Vásquez CRNA  October 2, 2020  7:37 AM

## 2020-10-02 NOTE — PROGRESS NOTES
POD#0, status post cataract surgery, right eye    No complaints.  Denies eye pain.    Impression/Plan:  Pseudophakia, OD: POD0, good post-operative appearance. IOP reasonable.  Pseudophakia, left eye: POW1, healing well with improving uncorrected acuity    - Fluoroquinolone antibiotic 4x daily in the surgical eye for 1 week  - Prednisolone 4x daily in the surgical eye for 1 week, then weekly taper      Eye protection at all times and eye shield at night for 1 week.    Limited activities with no exercise or heavy lifting for 1 week.    Instructed patient to contact us for decreasing vision, eye pain, new floaters or flashes of light or other concerning symptoms.    Written instructions given    Return to clinic 3-4 weeks with refraction and dilation.    Teaching statement:  Complete documentation of historical and exam elements from today's encounter can be found in the full encounter summary report (not reduplicated in this progress note). I personally obtained the chief complaint(s) and history of present illness.  I confirmed and edited as necessary the review of systems, past medical/surgical history, family history, social history, and examination findings as documented by others; and I examined the patient myself. I personally reviewed the relevant tests, images, and reports as documented above.     I formulated and edited as necessary the assessment and plan and discussed the findings and management plan with the patient and family.    Keyla Tobin MD  Comprehensive Ophthalmology & Ocular Pathology  Department of Ophthalmology and Visual Neurosciences  [email or web address]  Pager [phone]

## 2020-10-02 NOTE — PROCEDURES
Ophthalmology Post-op Procedure Note    Surgical Service:    Ophthalmology & Visual Sciences  Date Performed:      October 2, 2020  Location: [facility]      Pre-operative Diagnosis: Visually significant cataract, right eye  Post-operative Diagnosis:  Pseudophakia, right eye  Operative Procedure:  Phacoemulsification with intraocular lens implantation, right eye      Surgeon(s):  Fellow/Staff Surgeon:       Keyla Tobin MD  Resident Surgeon:            None    Anesthesia:   Topical/MAC  Findings:  No unusual findings   Blood Loss:    Minimal  Implants:  ZCB00 17.5 intraocular lens  Specimens:  None     Complications:  The patient did not experience any complications.   Condition: Stable    Operative Report Completion:    Description of Operation/Procedure:  After appropriate informed consent was obtained, the patient was brought to the operating room. The appropriate cardiac and blood pressure monitors were placed. A final pause occurred just before the start of the procedure during which the entire procedure team actively confirmed the correct patient, procedure, site, special equipment and special requirements. The patient was prepped and draped in the usual sterile fashion using 5% povidone/iodine.     An eyelid speculum was placed to open the eyelids. A paracentesis port was placed approximately sixty degrees to the left of the planned temporal incision location using the sideport blade. Approximately 0.8 cc of 1% nonpreserved lidocaine was placed into the anterior chamber. The anterior chamber was filled with dispersive viscoelastic. A clear corneal temporal incision was made with a metal 2.6 mm keratome. A round continuous tear capsulorhexis was initiated with a cystotome and completed with the Utrata forceps. Balanced salt solution on a cannula was used to perform hydrodissection. The nucleus was removed using phacoemulsification with a chop technique. The remaining cortical material was removed using  the irrigation/aspiration handpiece. The capsular bag was filled with dispersive viscoelastic. A lens of the  model and power listed above was placed into the capsular bag using the cartridge injection system. The remaining viscoelastic was removed using the irrigation aspiration handpiece. The paracentesis and temporal wounds were hydrated with balanced salt solution. Intracameral moxifloxacin was administered. At the conclusion of the case, the wounds were felt to be watertight, the pupil was round, the lens was centered, and the anterior chamber was deep. A few drops of antibiotic and prednisolone were given to the operative eye. The eyelid speculum was removed. A shield was placed over the operative eye.        Attending Attestation:  I was present for and performed the entire procedure.  Keyla Tobin MD

## 2020-10-02 NOTE — ANESTHESIA POSTPROCEDURE EVALUATION
Anesthesia POST Procedure Evaluation    Patient: Darnell Grant   MRN:     [id number] Gender:   male   Age:    64 year old :      1955        Preoperative Diagnosis: Nuclear sclerotic cataract of both eyes [H25.13]   Procedure(s):  RIGHT CATARACT REMOVAL WITH INTRAOCULAR LENS IMPLANT   Postop Comments: No value filed.     Anesthesia Type: MAC       Disposition: Outpatient   Postop Pain Control: Uneventful            Sign Out: Well controlled pain   PONV: No   Neuro/Psych: Uneventful            Sign Out: Acceptable/Baseline neuro status   Airway/Respiratory: Uneventful            Sign Out: Acceptable/Baseline resp. status   CV/Hemodynamics: Uneventful            Sign Out: Acceptable CV status   Other NRE: NONE   DID A NON-ROUTINE EVENT OCCUR? No         Last Anesthesia Record Vitals:  CRNA VITALS  10/2/2020 0701 - 10/2/2020 0801      10/2/2020             Pulse:  81    Ht Rate:  80    SpO2:  97 %    Resp Rate (set):  10          Last PACU Vitals:  No vitals data found for the desired time range.        Electronically Signed By: Srini Johnson MD, 2020, 8:54 AM

## 2020-10-02 NOTE — DISCHARGE INSTRUCTIONS
[facility]  Home Care Following Anesthesia  For 24 hours after surgery:  1. Get plenty of rest.  A responsible adult must stay with you for at least 24 hours after you leave the surgery center.  2. Do not drive or use heavy equipment.  If you have weakness or tingling, don't drive or use heavy equipment until this feeling goes away.   3. Do not drink alcohol.   4. Avoid strenuous or risky activities.  Ask for help when climbing stairs.  5. You may feel lightheaded.  IF so, sit for a few minutes before standing.  Have someone help you get up.   6. If you have nausea (feel sick to your stomach): Drink only clear liquids such as apple juice, ginger ale, broth or 7-Up.  Rest may also help.  Be sure to drink enough fluids.  Move to a regular diet as you feel able.   7. You may have a slight fever.  Call the doctor if your fever is over 100 F (37.7 C) (taken under the tongue) or lasts longer than 24 hours.  8. You may have a dry mouth, a sore throat, muscle aches or trouble sleeping. These should go away after 24 hours.  9. Do not make important or legal decisions.               Tips for taking pain medications  To get the best pain relief possible, remember these points:    Take pain medications as directed, before pain becomes severe.    Pain medication can upset your stomach: taking it with food may help.    Constipation is a common side effect of pain medication. Drink plenty of  fluids.    Eat foods high in fiber. Take a stool softener if recommended by your doctor or pharmacist.    Do not drink alcohol, drive or operate machinery while taking pain medications.    Ask about other ways to control pain, such as with heat, ice or relaxation.    Tylenol/Acetaminophen Consumption  To help encourage the safe use of acetaminophen, the makers of TYLENOL  have lowered the maximum daily dose for single-ingredient Extra Strength TYLENOL  (acetaminophen) products sold in the [place] from 8  pills per day (4,000 mg) to 6 pills per day (3,000 mg). The dosing interval has also changed from 2 pills every 4-6 hours to 2 pills every 6 hours.    If you feel your pain relief is insufficient, you may take Tylenol/Acetaminophen in addition to your narcotic pain medication.     Be careful not to exceed 3,000 mg of Tylenol/Acetaminophen in a 24 hour period from all sources.    If you are taking extra strength Tylenol/acetaminophen (500 mg), the maximum dose is 6 tablets in 24 hours.    If you are taking regular strength acetaminophen (325 mg), the maximum dose is 9 tablets in 24 hours.    Call a doctor for any of the followin. Signs of infection (fever, growing tenderness at the surgery site, a large amount of drainage or bleeding, severe pain, foul-smelling drainage, redness, swelling).  2. It has been over 8 to 10 hours since surgery and you are still not able to urinate (pass water).  3. Headache for over 24 hours.  4. Numbness, tingling or weakness the day after surgery (if you had spinal anesthesia).  5. Signs of Covid-19 infection (temperature over 100 degrees, shortness of breath, cough, loss of taste/smell, generalized body aches, persistent headache, chills, sore throat, nausea/vomiting/diarrhea)  Your doctor is:       Dr. Keyla Tobin, Ophthalmology: [phone]               Or dial [phone] and ask for the resident on call for:  Ophthalmology  For emergency care, call the:  [facility]:  [phone] (TTY for hearing impaired: [phone])

## 2020-10-05 ENCOUNTER — TELEPHONE (OUTPATIENT)
Dept: UROLOGY | Facility: CLINIC | Age: 65
End: 2020-10-05

## 2020-10-05 NOTE — TELEPHONE ENCOUNTER
----- Message from Allison Garcia RN sent at 10/5/2020 11:23 AM CDT -----  Can you please offer him a cysto with Dr. Pitt on Oct 27th at 12:30pm. Thanks!

## 2020-10-05 NOTE — TELEPHONE ENCOUNTER
Left message for pt to call and schedule cystoscopy on 10/27/20 at 12:30PM, per Allison. (Okay to schedule appts already there will be rescheduled)

## 2020-10-07 ENCOUNTER — ALLIED HEALTH/NURSE VISIT (OUTPATIENT)
Dept: INTERNAL MEDICINE | Facility: CLINIC | Age: 65
End: 2020-10-07
Payer: COMMERCIAL

## 2020-10-07 DIAGNOSIS — Z23 NEED FOR INFLUENZA VACCINATION: Primary | ICD-10-CM

## 2020-10-07 PROCEDURE — 90471 IMMUNIZATION ADMIN: CPT

## 2020-10-07 PROCEDURE — 90662 IIV NO PRSV INCREASED AG IM: CPT

## 2020-10-07 PROCEDURE — 99207 PR NO CHARGE NURSE ONLY: CPT

## 2020-10-07 NOTE — PROGRESS NOTES
Darnell Grant comes into clinic today at the request of Dr. Dutta Ordering Provider for the high dose influenza vaccination.    Patient tolerated the vaccination well. No redness or swelling at the injection site. Patient feels well after administration of the vaccine.    This service provided today was under the supervising provider of the day Dr. Morris, who was available if needed.    Robert Lake MA

## 2020-10-15 ENCOUNTER — MYC MEDICAL ADVICE (OUTPATIENT)
Dept: UROLOGY | Facility: CLINIC | Age: 65
End: 2020-10-15

## 2020-10-21 ENCOUNTER — OFFICE VISIT (OUTPATIENT)
Dept: OPHTHALMOLOGY | Facility: CLINIC | Age: 65
End: 2020-10-21
Attending: OPHTHALMOLOGY
Payer: COMMERCIAL

## 2020-10-21 DIAGNOSIS — Z96.1 PSEUDOPHAKIA, BOTH EYES: Primary | ICD-10-CM

## 2020-10-21 PROCEDURE — G0463 HOSPITAL OUTPT CLINIC VISIT: HCPCS

## 2020-10-21 PROCEDURE — 99024 POSTOP FOLLOW-UP VISIT: CPT | Performed by: OPHTHALMOLOGY

## 2020-10-21 PROCEDURE — 92015 DETERMINE REFRACTIVE STATE: CPT

## 2020-10-21 ASSESSMENT — SLIT LAMP EXAM - LIDS
COMMENTS: NORMAL
COMMENTS: NORMAL

## 2020-10-21 ASSESSMENT — REFRACTION_MANIFEST
OS_SPHERE: +1.00
OS_CYLINDER: SPHERE
OD_ADD: +2.00
OD_CYLINDER: SPHERE
OD_SPHERE: +0.50
OS_ADD: +2.00

## 2020-10-21 ASSESSMENT — VISUAL ACUITY
OS_SC+: +2
METHOD: SNELLEN - LINEAR
OS_SC: 20/60
OD_SC: 20/20
OS_PH_SC: 20/25

## 2020-10-21 ASSESSMENT — TONOMETRY
IOP_METHOD: TONOPEN
OD_IOP_MMHG: 15
OS_IOP_MMHG: 16

## 2020-10-21 ASSESSMENT — EXTERNAL EXAM - LEFT EYE: OS_EXAM: NORMAL

## 2020-10-21 ASSESSMENT — CUP TO DISC RATIO
OS_RATIO: 0.1
OD_RATIO: 0.1

## 2020-10-21 ASSESSMENT — EXTERNAL EXAM - RIGHT EYE: OD_EXAM: NORMAL

## 2020-10-21 NOTE — PROGRESS NOTES
HPI:  Darnell Grant is a 64 year old male here for follow-up after CE/IOL now both eyes. He notes that with both eyes open, he sees very well at distance. When he looks out of one eye at at time, the left eye does seem more blurred. No pain, redness, discharge. No flashes/floaters. He is using his prednisolone drops as recommended. He is interested in updated glasses prescription because using the OTC readers is frustrating for him.     POH: S/p CE/IOL left eye 9/25/20 and right eye 10/2/20  PMH: DM2    Assessment & Plan     (Z96.1) Pseudophakia, both eyes  (primary encounter diagnosis)  Comment: Good post-operative appearance. Nice refractive outcome right eye, mild hyperopic result left eye.   Plan: Complete drop taper. Given updated glasses Rx.     (E88.81) Metabolic syndrome  Comment: On metformin. Last A1c 5.1 8/6/20. No retinopathy.  Plan: Observe     -----------------------------------------------------------------------------------    Patient disposition:   No follow-ups on file. or sooner as needed.    Teaching statement:  Complete documentation of historical and exam elements from today's encounter can be found in the full encounter summary report (not reduplicated in this progress note). I personally obtained the chief complaint(s) and history of present illness.  I confirmed and edited as necessary the review of systems, past medical/surgical history, family history, social history, and examination findings as documented by others; and I examined the patient myself. I personally reviewed the relevant tests, images, and reports as documented above.     I formulated and edited as necessary the assessment and plan and discussed the findings and management plan with the patient and family.      Keyla Tobin MD  Comprehensive Ophthalmology & Ocular Pathology  Department of Ophthalmology and Visual Neurosciences  [email or web address]  Pager [phone]

## 2020-10-21 NOTE — NURSING NOTE
Chief Complaints and History of Present Illnesses   Patient presents with     Post Op (Ophthalmology) Both Eyes     Chief Complaint(s) and History of Present Illness(es)     Post Op (Ophthalmology) Both Eyes     Laterality: both eyes    Treatments tried: eye drops    Pain scale: 0/10              Comments     3 week post op LE CE/IOL 9/25/20  2 week post op RE CE/IOL 10/2/20  Prednisolone every day LE, bid RE  Doing good  Kavitha Ramirez COA 9:53 AM October 21, 2020

## 2020-10-27 ENCOUNTER — OFFICE VISIT (OUTPATIENT)
Dept: UROLOGY | Facility: CLINIC | Age: 65
End: 2020-10-27
Payer: COMMERCIAL

## 2020-10-27 VITALS
DIASTOLIC BLOOD PRESSURE: 88 MMHG | BODY MASS INDEX: 31.32 KG/M2 | WEIGHT: 244 LBS | SYSTOLIC BLOOD PRESSURE: 130 MMHG | HEIGHT: 74 IN | HEART RATE: 92 BPM

## 2020-10-27 DIAGNOSIS — N40.0 BENIGN PROSTATIC HYPERPLASIA, UNSPECIFIED WHETHER LOWER URINARY TRACT SYMPTOMS PRESENT: ICD-10-CM

## 2020-10-27 DIAGNOSIS — N40.0 ENLARGED PROSTATE: Primary | ICD-10-CM

## 2020-10-27 PROCEDURE — 51798 US URINE CAPACITY MEASURE: CPT | Performed by: UROLOGY

## 2020-10-27 PROCEDURE — 99213 OFFICE O/P EST LOW 20 MIN: CPT | Mod: 25 | Performed by: UROLOGY

## 2020-10-27 RX ORDER — LIDOCAINE HYDROCHLORIDE 20 MG/ML
JELLY TOPICAL ONCE
Status: COMPLETED | OUTPATIENT
Start: 2020-10-27 | End: 2020-10-27

## 2020-10-27 RX ADMIN — LIDOCAINE HYDROCHLORIDE: 20 JELLY TOPICAL at 15:01

## 2020-10-27 ASSESSMENT — MIFFLIN-ST. JEOR: SCORE: 1956.53

## 2020-10-27 ASSESSMENT — PAIN SCALES - GENERAL: PAINLEVEL: NO PAIN (0)

## 2020-10-27 NOTE — PATIENT INSTRUCTIONS
Follow up with Dr. Pitt in one year with PSA prior.      It was a pleasure meeting with you today.  Thank you for allowing me and my team the privilege of caring for you today.  YOU are the reason we are here, and I truly hope we provided you with the excellent service you deserve.  Please let us know if there is anything else we can do for you so that we can be sure you are leaving completely satisfied with your care experience.

## 2020-10-27 NOTE — LETTER
10/27/2020       RE: Darnell Grant  [address]  [facility] [address]     Dear Colleague,    Thank you for referring your patient, Darnell Grant, to the [facility] [place] at [facility]. Please see a copy of my visit note below.              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  He underwent Rezum treatement on 4/18.  He does believe he has had modest improvement in symptoms since then.  He denies hematuria, dysuria or retention.  He is still on flomax. He was initially planned for cystoscopy today but upon discussion with him and characterization of his symptoms he would not be interested in further treatment at this time for his LUTS so cystoscopy was deferred.     Recent PSA is stable and within normal range for age    Digital rectal exam reveals 40 gm anodular prostate    AUA symptom score is 7/35 with QOL score of 2/6    PVR is 30 mL      Labs and Pathology:    I personally reviewed all applicable laboratory data and went over findings with patient  Significant for:    CBC RESULTS:  Recent Labs   Lab Test 08/06/20  0714 12/03/19  0836 03/06/18  0714 07/14/14  0801   WBC 6.6 9.1 6.4 5.9   HGB 16.6 17.2 16.2 15.9    170 145* 167        BMP RESULTS:  Recent Labs   Lab Test 09/15/20  0732 08/06/20  0714 12/10/19  0630 12/03/19  0836 04/30/19  0608 12/19/18  0626    141  --  138  --  143   POTASSIUM 3.9 4.1 3.9 4.0  --  3.8   CHLORIDE 107 109  --  105  --  106   CO2 28 26  --  28  --  30   ANIONGAP 7 6  --  4  --  7   * 88 88 96  --  105*   BUN 18 25  --  22  --  16   CR 0.94 1.00  --  0.83 1.01 0.86   GFRESTIMATED 84 78  --  >90 77 >90   GFRESTBLACK >90 >90  --  >90 90 >90   ZHANE 9.7 9.2  --  10.1 9.7 9.1       UA RESULTS:    Recent Labs   Lab Test 12/03/19  0832   SG 1.024   URINEPH 6.0   NITRITE Negative   RBCU 3*   WBCU 90*       PSA RESULTS  PSA   Date Value Ref Range Status   08/06/2020 2.07 0 - 4 ug/L Final     Comment:     Assay Method:  Chemiluminescence using Siemens Vista analyzer   12/03/2019 2.08 0 - 4 ug/L Final     Comment:     Assay Method:  Chemiluminescence using Siemens Vista analyzer   04/30/2019 1.90 0 - 4 ug/L Final     Comment:     Assay Method:  Chemiluminescence using Siemens Vista analyzer   11/13/2018 4.51 (H) 0 - 4 ug/L Final     Comment:     Assay Method:  Chemiluminescence using Siemens Vista analyzer   03/06/2018 0.96 0 - 4 ug/L Final     Comment:     Assay Method:  Chemiluminescence using Siemens Vista analyzer   08/31/2015 0.60 0 - 4 ug/L Final   07/14/2014 0.35 0 - 4 ug/L Final     Comment:     PSA results are about 7% lower than our prior method due to a methodology   change   on August 30, 2011.     02/10/2009 0.40 0 - 4 ug/L Final   07/21/2008 0.56 0 - 4 ug/L Final   07/09/2007 0.53 0 - 4 ug/L Final              Assessment/Plan   64 year old male with BPH/LUTS doing well on medical therapy s/p Rezum  -Will defer cystoscopy for today as he is symptomatically doing well  -Can continue PSA checks on annual basis, will contact us if symptoms change or worsen prior         Past Medical History:     Past Medical History:   Diagnosis Date     Bladder mass      BPH (benign prostatic hyperplasia)      GERD (gastroesophageal reflux disease)      High serum parathyroid hormone (PTH) 2017     Hypertension      Metabolic syndrome      Nephrolithiasis      Obesity      LISETTE (obstructive sleep apnea)      Prediabetes             Past Surgical History:     Past Surgical History:   Procedure Laterality Date     BACK SURGERY      repair disc     CATARACT IOL, RT/LT       CYSTOSCOPY, TRANSURETHRAL RESECTION (TUR) TUMOR BLADDER, COMBINED N/A 1/31/2018    Procedure: COMBINED CYSTOSCOPY, TRANSURETHRAL RESECTION (TUR) TUMOR  BLADDER;  Transurethral Biopsy and Resection of Bladder Tumor;  Surgeon: Yaya Lomeli MD;  [facility]: [facility]     DECOMPRESSION, FUSION CERVICAL ANTERIOR TWO LEVELS, COMBINED Right 12/10/2019    Procedure: right approach anterior cervical 2-3, 3-4 resection of anterior cervical osteophytes causing dysphagia, with microscope;  Surgeon: María Fitzgerald MD;  [facility]     HC BREATH HYDROGEN TEST  5/22/2013    Procedure: HYDROGEN BREATH TEST;  Surgeon: Donny Paris MD;  [facility]: [facility]     PHACOEMULSIFICATION CLEAR CORNEA WITH STANDARD INTRAOCULAR LENS IMPLANT Left 9/25/2020    Procedure: LEFT CATARACT REMOVAL WITH INTRAOCULAR LENS IMPLANT;  Surgeon: Keyla Tobin MD;  [facility]     PHACOEMULSIFICATION CLEAR CORNEA WITH STANDARD INTRAOCULAR LENS IMPLANT Right 10/2/2020    Procedure: RIGHT CATARACT REMOVAL WITH INTRAOCULAR LENS IMPLANT;  Surgeon: Keyla Tobin MD;  [facility]     TRANSURETHERAL DESTRUCTION OF PROSTATE BY THERMOTHERAPY N/A 4/13/2018    Procedure: TRANSURETHERAL DESTRUCTION OF PROSTATE BY THERMOTHERAPY;  Rezum Procedure;  Surgeon: Yaya Lomeli MD;  [facility]            Medications     Current Outpatient Medications   Medication     albuterol (PROAIR HFA/PROVENTIL HFA/VENTOLIN HFA) 108 (90 Base) MCG/ACT inhaler     allopurinol (ZYLOPRIM) 100 MG tablet     amLODIPine (NORVASC) 10 MG tablet     buPROPion (WELLBUTRIN XL) 300 MG 24 hr tablet     candesartan (ATACAND) 32 MG tablet     clomiPHENE (CLOMID) 50 MG tablet     empagliflozin (JARDIANCE) 25 MG TABS tablet     hydrochlorothiazide (MICROZIDE) 12.5 MG capsule     metFORMIN (GLUCOPHAGE) 1000 MG tablet     multivitamin, therapeutic with minerals (MULTI-VITAMIN) TABS tablet     ofloxacin (OCUFLOX) 0.3 % ophthalmic solution     ofloxacin (OCUFLOX) 0.3 % ophthalmic solution     omeprazole (PRILOSEC) 20 MG DR capsule     phentermine (ADIPEX-P) 37.5 MG tablet     prednisoLONE acetate (PRED FORTE) 1  % ophthalmic suspension     prednisoLONE acetate (PRED FORTE) 1 % ophthalmic suspension     rosuvastatin (CRESTOR) 40 MG tablet     tamsulosin (FLOMAX) 0.4 MG capsule     No current facility-administered medications for this visit.      Facility-Administered Medications Ordered in Other Visits   Medication     barium sulfate 40% (VARIBAR THIN HONEY) oral suspension            Family History:     Family History   Problem Relation Age of Onset     Asthma Brother      Diabetes Maternal Grandmother      Cardiovascular Father      Nephrolithiasis No family hx of      Parathyroid Disorders No family hx of      Thyroid Disease No family hx of      Glaucoma No family hx of      Macular Degeneration No family hx of      Anesthesia Reaction No family hx of      Deep Vein Thrombosis (DVT) No family hx of             Social History:     Social History     Socioeconomic History     Marital status:      Spouse name: Not on file     Number of children: Not on file     Years of education: Not on file     Highest education level: Not on file   Occupational History     Not on file   Social Needs     Financial resource strain: Not on file     Food insecurity     Worry: Not on file     Inability: Not on file     Transportation needs     Medical: Not on file     Non-medical: Not on file   Tobacco Use     Smoking status: Never Smoker     Smokeless tobacco: Never Used   Substance and Sexual Activity     Alcohol use: No     Drug use: No     Sexual activity: Not on file   Lifestyle     Physical activity     Days per week: Not on file     Minutes per session: Not on file     Stress: Not on file   Relationships     Social connections     Talks on phone: Not on file     Gets together: Not on file     Attends [employer] service: Not on file     Active member of club or organization: Not on file     Attends meetings of clubs or organizations: Not on file     Relationship status: Not on file     Intimate partner violence     Fear of current  or ex partner: Not on file     Emotionally abused: Not on file     Physically abused: Not on file     Forced sexual activity: Not on file   Other Topics Concern     Parent/sibling w/ CABG, MI or angioplasty before 65F 55M? Not Asked   Social History Narrative     Not on file            Allergies:   Ragweeds         Review of Systems:  From intake questionnaire   Negative 14 system review except as noted on HPI, nurse's note.        CC:  Raul Dutta

## 2020-10-27 NOTE — PROGRESS NOTES
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  He underwent Rezum treatement on 4/18.  He does believe he has had modest improvement in symptoms since then.  He denies hematuria, dysuria or retention.  He is still on flomax. He was initially planned for cystoscopy today but upon discussion with him and characterization of his symptoms he would not be interested in further treatment at this time for his LUTS so cystoscopy was deferred.     Recent PSA is stable and within normal range for age    Digital rectal exam reveals 40 gm anodular prostate    AUA symptom score is 7/35 with QOL score of 2/6    PVR is 30 mL      Labs and Pathology:    I personally reviewed all applicable laboratory data and went over findings with patient  Significant for:    CBC RESULTS:  Recent Labs   Lab Test 08/06/20  0714 12/03/19  0836 03/06/18  0714 07/14/14  0801   WBC 6.6 9.1 6.4 5.9   HGB 16.6 17.2 16.2 15.9    170 145* 167        BMP RESULTS:  Recent Labs   Lab Test 09/15/20  0732 08/06/20  0714 12/10/19  0630 12/03/19  0836 04/30/19  0608 12/19/18  0626    141  --  138  --  143   POTASSIUM 3.9 4.1 3.9 4.0  --  3.8   CHLORIDE 107 109  --  105  --  106   CO2 28 26  --  28  --  30   ANIONGAP 7 6  --  4  --  7   * 88 88 96  --  105*   BUN 18 25  --  22  --  16   CR 0.94 1.00  --  0.83 1.01 0.86   GFRESTIMATED 84 78  --  >90 77 >90   GFRESTBLACK >90 >90  --  >90 90 >90   ZHANE 9.7 9.2  --  10.1 9.7 9.1       UA RESULTS:   Recent Labs   Lab Test 12/03/19  0832   SG 1.024   URINEPH 6.0   NITRITE Negative   RBCU 3*   WBCU 90*       PSA RESULTS  PSA   Date Value Ref Range Status   08/06/2020 2.07 0 - 4 ug/L Final     Comment:     Assay Method:  Chemiluminescence using Siemens Vista analyzer   12/03/2019 2.08 0 - 4 ug/L Final      Comment:     Assay Method:  Chemiluminescence using Siemens Vista analyzer   04/30/2019 1.90 0 - 4 ug/L Final     Comment:     Assay Method:  Chemiluminescence using Siemens Vista analyzer   11/13/2018 4.51 (H) 0 - 4 ug/L Final     Comment:     Assay Method:  Chemiluminescence using Siemens Vista analyzer   03/06/2018 0.96 0 - 4 ug/L Final     Comment:     Assay Method:  Chemiluminescence using Siemens Vista analyzer   08/31/2015 0.60 0 - 4 ug/L Final   07/14/2014 0.35 0 - 4 ug/L Final     Comment:     PSA results are about 7% lower than our prior method due to a methodology   change   on August 30, 2011.     02/10/2009 0.40 0 - 4 ug/L Final   07/21/2008 0.56 0 - 4 ug/L Final   07/09/2007 0.53 0 - 4 ug/L Final              Assessment/Plan   64 year old male with BPH/LUTS doing well on medical therapy s/p Rezum  -Will defer cystoscopy for today as he is symptomatically doing well  -Can continue PSA checks on annual basis, will contact us if symptoms change or worsen prior         Past Medical History:     Past Medical History:   Diagnosis Date     Bladder mass      BPH (benign prostatic hyperplasia)      GERD (gastroesophageal reflux disease)      High serum parathyroid hormone (PTH) 2017     Hypertension      Metabolic syndrome      Nephrolithiasis      Obesity      LISETTE (obstructive sleep apnea)      Prediabetes             Past Surgical History:     Past Surgical History:   Procedure Laterality Date     BACK SURGERY      repair disc     CATARACT IOL, RT/LT       CYSTOSCOPY, TRANSURETHRAL RESECTION (TUR) TUMOR BLADDER, COMBINED N/A 1/31/2018    Procedure: COMBINED CYSTOSCOPY, TRANSURETHRAL RESECTION (TUR) TUMOR BLADDER;  Transurethral Biopsy and Resection of Bladder Tumor;  Surgeon: Yaya Lomeli MD;  [facility]     DECOMPRESSION, FUSION CERVICAL ANTERIOR TWO LEVELS, COMBINED Right 12/10/2019    Procedure: right approach anterior cervical 2-3, 3-4 resection of anterior cervical osteophytes causing  dysphagia, with microscope;  Surgeon: María Fitzgerald MD;  [facility]     HC BREATH HYDROGEN TEST  5/22/2013    Procedure: HYDROGEN BREATH TEST;  Surgeon: Donny Paris MD;  [facility]: [facility]     PHACOEMULSIFICATION CLEAR CORNEA WITH STANDARD INTRAOCULAR LENS IMPLANT Left 9/25/2020    Procedure: LEFT CATARACT REMOVAL WITH INTRAOCULAR LENS IMPLANT;  Surgeon: Keyla Tobin MD;  [facility]     PHACOEMULSIFICATION CLEAR CORNEA WITH STANDARD INTRAOCULAR LENS IMPLANT Right 10/2/2020    Procedure: RIGHT CATARACT REMOVAL WITH INTRAOCULAR LENS IMPLANT;  Surgeon: Keyla Tobin MD;  [facility]     TRANSURETHERAL DESTRUCTION OF PROSTATE BY THERMOTHERAPY N/A 4/13/2018    Procedure: TRANSURETHERAL DESTRUCTION OF PROSTATE BY THERMOTHERAPY;  Rezum Procedure;  Surgeon: Yaya Lomeli MD;  [facility]            Medications     Current Outpatient Medications   Medication     albuterol (PROAIR HFA/PROVENTIL HFA/VENTOLIN HFA) 108 (90 Base) MCG/ACT inhaler     allopurinol (ZYLOPRIM) 100 MG tablet     amLODIPine (NORVASC) 10 MG tablet     buPROPion (WELLBUTRIN XL) 300 MG 24 hr tablet     candesartan (ATACAND) 32 MG tablet     clomiPHENE (CLOMID) 50 MG tablet     empagliflozin (JARDIANCE) 25 MG TABS tablet     hydrochlorothiazide (MICROZIDE) 12.5 MG capsule     metFORMIN (GLUCOPHAGE) 1000 MG tablet     multivitamin, therapeutic with minerals (MULTI-VITAMIN) TABS tablet     ofloxacin (OCUFLOX) 0.3 % ophthalmic solution     ofloxacin (OCUFLOX) 0.3 % ophthalmic solution     omeprazole (PRILOSEC) 20 MG DR capsule     phentermine (ADIPEX-P) 37.5 MG tablet     prednisoLONE acetate (PRED FORTE) 1 % ophthalmic suspension     prednisoLONE acetate (PRED FORTE) 1 % ophthalmic suspension     rosuvastatin (CRESTOR) 40 MG tablet     tamsulosin (FLOMAX) 0.4 MG capsule     No current facility-administered medications for this visit.      Facility-Administered Medications Ordered in Other Visits    Medication     barium sulfate 40% (VARIBAR THIN HONEY) oral suspension            Family History:     Family History   Problem Relation Age of Onset     Asthma Brother      Diabetes Maternal Grandmother      Cardiovascular Father      Nephrolithiasis No family hx of      Parathyroid Disorders No family hx of      Thyroid Disease No family hx of      Glaucoma No family hx of      Macular Degeneration No family hx of      Anesthesia Reaction No family hx of      Deep Vein Thrombosis (DVT) No family hx of             Social History:     Social History     Socioeconomic History     Marital status:      Spouse name: Not on file     Number of children: Not on file     Years of education: Not on file     Highest education level: Not on file   Occupational History     Not on file   Social Needs     Financial resource strain: Not on file     Food insecurity     Worry: Not on file     Inability: Not on file     Transportation needs     Medical: Not on file     Non-medical: Not on file   Tobacco Use     Smoking status: Never Smoker     Smokeless tobacco: Never Used   Substance and Sexual Activity     Alcohol use: No     Drug use: No     Sexual activity: Not on file   Lifestyle     Physical activity     Days per week: Not on file     Minutes per session: Not on file     Stress: Not on file   Relationships     Social connections     Talks on phone: Not on file     Gets together: Not on file     Attends [employer] service: Not on file     Active member of club or organization: Not on file     Attends meetings of clubs or organizations: Not on file     Relationship status: Not on file     Intimate partner violence     Fear of current or ex partner: Not on file     Emotionally abused: Not on file     Physically abused: Not on file     Forced sexual activity: Not on file   Other Topics Concern     Parent/sibling w/ CABG, MI or angioplasty before 65F 55M? Not Asked   Social History Narrative     Not on file             Allergies:   Ragweeds         Review of Systems:  From intake questionnaire   Negative 14 system review except as noted on HPI, nurse's note.        CC:  Raul Dutta

## 2020-10-27 NOTE — NURSING NOTE
"Chief Complaint   Patient presents with     Cystoscopy     BPH/LUTS       Blood pressure 130/88, pulse 92, height 1.88 m (6' 2\"), weight 110.7 kg (244 lb). Body mass index is 31.33 kg/m .    Patient Active Problem List   Diagnosis     Adjustment disorder with mixed anxiety and depressed mood     Prostate nodule     Pure hypercholesterolemia     Essential hypertension, benign     Chronic cough     Diarrhea     Obstructive sleep apnea hypopnea, moderate [AHI 21 on CPAP 5-15]     Agatston coronary artery calcium score greater than 400     Mass of urinary bladder     Microscopic hematuria     Benign prostatic hyperplasia with urinary obstruction     Combined forms of age-related cataract of both eyes     Myopia of both eyes with astigmatism and presbyopia     Esophageal dysphagia     Osteophyte of cervical spine     Hyperlipidemia     Hypertension     Lumbar radiculopathy     Metabolic syndrome     Obesity, unspecified     Dysphagia     Nuclear sclerotic cataract of both eyes       Allergies   Allergen Reactions     Ragweeds      Watery eyes, itchy nose       Current Outpatient Medications   Medication Sig Dispense Refill     albuterol (PROAIR HFA/PROVENTIL HFA/VENTOLIN HFA) 108 (90 Base) MCG/ACT inhaler Inhale 2 puffs into the lungs every 4 hours as needed for shortness of breath / dyspnea or wheezing 1 Inhaler 11     allopurinol (ZYLOPRIM) 100 MG tablet take by mouth 1 tab ( 100 mg)  at bedtime 90 tablet 3     amLODIPine (NORVASC) 10 MG tablet Take 1 tablet (10 mg) by mouth At Bedtime 90 tablet 3     buPROPion (WELLBUTRIN XL) 300 MG 24 hr tablet Take 1 tablet (300 mg) by mouth every morning 90 tablet 3     candesartan (ATACAND) 32 MG tablet Take 32 mg by mouth At Bedtime 90 tablet 3     clomiPHENE (CLOMID) 50 MG tablet Take 1 tablet (50 mg) by mouth every other day Pt can't recall if medication was taken today or yesterday 12/3/19 90 tablet 3     empagliflozin (JARDIANCE) 25 MG TABS tablet Take 1 tablet (25 mg) by " mouth every morning 90 tablet 3     hydrochlorothiazide (MICROZIDE) 12.5 MG capsule Take 1 capsule (12.5 mg) by mouth daily 90 capsule 3     metFORMIN (GLUCOPHAGE) 1000 MG tablet Take 2 tablets (2,000 mg) by mouth daily (with dinner) 180 tablet 3     multivitamin, therapeutic with minerals (MULTI-VITAMIN) TABS tablet Take 1 tablet by mouth At Bedtime       ofloxacin (OCUFLOX) 0.3 % ophthalmic solution 1 drop 4x daily in the surgical eye for 1 week after surgery, then stop 1 Bottle 0     ofloxacin (OCUFLOX) 0.3 % ophthalmic solution 1 drop 4x daily in the surgical eye for 1 week after surgery, then stop 1 Bottle 0     omeprazole (PRILOSEC) 20 MG DR capsule Take 1 capsule (20 mg) by mouth daily 90 capsule 3     phentermine (ADIPEX-P) 37.5 MG tablet Take 1.5 tablets (56 mg) by mouth every morning (before breakfast) 135 tablet 3     prednisoLONE acetate (PRED FORTE) 1 % ophthalmic suspension 1 drop in surgical eye as directed, 4x daily after surgery for 1 week, 3x daily for 1 week, 2x daily for 1 week, daily for 1 week, then stop 1 Bottle 1     prednisoLONE acetate (PRED FORTE) 1 % ophthalmic suspension 1 drop in surgical eye as directed, 4x daily after surgery for 1 week, 3x daily for 1 week, 2x daily for 1 week, daily for 1 week, then stop 1 Bottle 1     rosuvastatin (CRESTOR) 40 MG tablet Take 1 tablet (40 mg) by mouth daily 90 tablet 3     tamsulosin (FLOMAX) 0.4 MG capsule Take 1 capsule (0.4 mg) by mouth daily 90 capsule 3       Social History     Tobacco Use     Smoking status: Never Smoker     Smokeless tobacco: Never Used   Substance Use Topics     Alcohol use: No     Drug use: No       Invasive Procedure Safety Checklist:    Procedure: Cystoscopy    Action: Complete sections and checkboxes as appropriate.    Pre-procedure:  1. Patient ID Verified with 2 identifiers (Sofi and  or MRN) : YES    2. Procedure and site verified with patient/designee (when able) : YES    3. Accurate consent documentation in  medical record : YES    4. H&P (or appropriate assessment) documented in medical record : N/A  H&P must be up to 30 days prior to procedure an updated within 24 hours of                 Procedure as applicable.     5. Relevant diagnostic and radiology test results appropriately labeled and displayed as applicable : YES    6. Blood products, implants, devices, and/or special equipment available for the procedure as applicable : YES    7. Procedure site(s) marked with provider initials [Exclusions: none] : NO    8. Marking not required. Reason : Yes  Procedure does not require site marking    Time Out:     Time-Out performed immediately prior to starting procedure, including verbal and active participation of all team members addressing: YES    1. Correct patient identity.  2. Confirmed that the correct side and site are marked.  3. An accurate procedure to be done.  4. Agreement on the procedure to be done.  5. Correct patient position.  6. Relevant images and results are properly labeled and appropriately displayed.  7. The need to administer antibiotics or fluids for irrigation purposes during the procedure as applicable.  8. Safety precautions based on patient history or medication use.    During Procedure: Verification of correct person, site, and procedure occurs any time the responsibility for care of the patient is transferred to another member of the care team.    The following medication was given:     MEDICATION:  Lidocaine without epinephrine 2% jelly  ROUTE: urethral   SITE: urethral   DOSE: 10 mL  LOT #: [id number]  : [employer]  EXPIRATION DATE: 06/2022  NDC#: [address]-92   Was there drug waste? No    Prior to med admin, verified patient identity using patient's name and date of birth.  Due to med administration, patient instructed to remain in clinic for 15 minutes  afterwards, and to report any adverse reaction to me immediately.    Drug Amount Wasted:   None.  Vial/Syringe: Syringe      Jenn Whitley LPN  10/27/2020  2:56 PM

## 2020-10-29 RX ORDER — TAMSULOSIN HYDROCHLORIDE 0.4 MG/1
0.4 CAPSULE ORAL DAILY
Qty: 90 CAPSULE | Refills: 3 | Status: SHIPPED | OUTPATIENT
Start: 2020-10-29 | End: 2021-09-15

## 2020-11-09 ENCOUNTER — TELEPHONE (OUTPATIENT)
Dept: CARDIOLOGY | Facility: CLINIC | Age: 65
End: 2020-11-09

## 2020-11-09 ENCOUNTER — MYC MEDICAL ADVICE (OUTPATIENT)
Dept: CARDIOLOGY | Facility: CLINIC | Age: 65
End: 2020-11-09

## 2020-11-30 ENCOUNTER — TELEPHONE (OUTPATIENT)
Dept: INTERNAL MEDICINE | Facility: CLINIC | Age: 65
End: 2020-11-30

## 2020-11-30 ENCOUNTER — MYC MEDICAL ADVICE (OUTPATIENT)
Dept: INTERNAL MEDICINE | Facility: CLINIC | Age: 65
End: 2020-11-30

## 2020-11-30 NOTE — TELEPHONE ENCOUNTER
[facility] Call Center    Phone Message    May a detailed message be left on voicemail: yes     Reason for Call: Other: Darnell calling to find out if Dr. Dutta will want him to have labs done prior to his appointment on 3/8/2021.  Please call him back to discuss     Action Taken: Other:  PCC    Travel Screening: Not Applicable

## 2020-11-30 NOTE — TELEPHONE ENCOUNTER
Forward message to provider.      Nilton Alves CMA ([facility]) at 3:11 PM on 11/30/2020

## 2020-12-03 ENCOUNTER — MEDICAL CORRESPONDENCE (OUTPATIENT)
Dept: HEALTH INFORMATION MANAGEMENT | Facility: CLINIC | Age: 65
End: 2020-12-03

## 2020-12-09 DIAGNOSIS — E78.5 DYSLIPIDEMIA: ICD-10-CM

## 2020-12-09 DIAGNOSIS — E55.9 VITAMIN D DEFICIENCY: ICD-10-CM

## 2020-12-09 DIAGNOSIS — E23.0 HYPOGONADISM, HYPOGONADOTROPIC, WITH ANOSMIA (H): Primary | ICD-10-CM

## 2020-12-09 DIAGNOSIS — M10.9 GOUT: ICD-10-CM

## 2020-12-09 DIAGNOSIS — R53.83 FATIGUE: ICD-10-CM

## 2020-12-09 DIAGNOSIS — R73.01 IFG (IMPAIRED FASTING GLUCOSE): ICD-10-CM

## 2020-12-09 LAB
ALBUMIN SERPL-MCNC: 4 G/DL (ref 3.4–5)
ALBUMIN UR-MCNC: NEGATIVE MG/DL
ALP SERPL-CCNC: 71 U/L (ref 40–150)
ALT SERPL W P-5'-P-CCNC: 40 U/L (ref 0–70)
ANION GAP SERPL CALCULATED.3IONS-SCNC: 4 MMOL/L (ref 3–14)
APPEARANCE UR: ABNORMAL
AST SERPL W P-5'-P-CCNC: 19 U/L (ref 0–45)
BASOPHILS # BLD AUTO: 0.1 10E9/L (ref 0–0.2)
BASOPHILS NFR BLD AUTO: 0.9 %
BILIRUB SERPL-MCNC: 0.5 MG/DL (ref 0.2–1.3)
BILIRUB UR QL STRIP: NEGATIVE
BUN SERPL-MCNC: 16 MG/DL (ref 7–30)
C PEPTIDE SERPL-MCNC: 4.9 NG/ML (ref 0.9–6.9)
CALCIUM SERPL-MCNC: 10.1 MG/DL (ref 8.5–10.1)
CHLORIDE SERPL-SCNC: 107 MMOL/L (ref 94–109)
CHOLEST SERPL-MCNC: 77 MG/DL
CO2 SERPL-SCNC: 30 MMOL/L (ref 20–32)
COLOR UR AUTO: YELLOW
CREAT SERPL-MCNC: 0.98 MG/DL (ref 0.66–1.25)
CRP SERPL-MCNC: <2.9 MG/L (ref 0–8)
DEPRECATED CALCIDIOL+CALCIFEROL SERPL-MC: 73 UG/L (ref 20–75)
DIFFERENTIAL METHOD BLD: NORMAL
EOSINOPHIL # BLD AUTO: 0.2 10E9/L (ref 0–0.7)
EOSINOPHIL NFR BLD AUTO: 3.1 %
ERYTHROCYTE [DISTWIDTH] IN BLOOD BY AUTOMATED COUNT: 13.8 % (ref 10–15)
FSH SERPL-ACNC: 5 IU/L (ref 0.7–10.8)
GFR SERPL CREATININE-BSD FRML MDRD: 80 ML/MIN/{1.73_M2}
GLUCOSE SERPL-MCNC: 90 MG/DL (ref 70–99)
GLUCOSE UR STRIP-MCNC: >499 MG/DL
HBA1C MFR BLD: 5 % (ref 0–5.6)
HCT VFR BLD AUTO: 52.2 % (ref 40–53)
HDLC SERPL-MCNC: 38 MG/DL
HGB BLD-MCNC: 17.3 G/DL (ref 13.3–17.7)
HGB UR QL STRIP: NEGATIVE
IMM GRANULOCYTES # BLD: 0 10E9/L (ref 0–0.4)
IMM GRANULOCYTES NFR BLD: 0.6 %
KETONES UR STRIP-MCNC: NEGATIVE MG/DL
LDLC SERPL CALC-MCNC: 19 MG/DL
LEUKOCYTE ESTERASE UR QL STRIP: NEGATIVE
LH SERPL-ACNC: 10.2 IU/L (ref 1.5–9.3)
LYMPHOCYTES # BLD AUTO: 1.5 10E9/L (ref 0.8–5.3)
LYMPHOCYTES NFR BLD AUTO: 22.8 %
MCH RBC QN AUTO: 30.9 PG (ref 26.5–33)
MCHC RBC AUTO-ENTMCNC: 33.1 G/DL (ref 31.5–36.5)
MCV RBC AUTO: 93 FL (ref 78–100)
MONOCYTES # BLD AUTO: 0.4 10E9/L (ref 0–1.3)
MONOCYTES NFR BLD AUTO: 6.6 %
NEUTROPHILS # BLD AUTO: 4.4 10E9/L (ref 1.6–8.3)
NEUTROPHILS NFR BLD AUTO: 66 %
NITRATE UR QL: NEGATIVE
NONHDLC SERPL-MCNC: 39 MG/DL
NRBC # BLD AUTO: 0 10*3/UL
NRBC BLD AUTO-RTO: 0 /100
PH UR STRIP: 5 PH (ref 5–7)
PLATELET # BLD AUTO: 162 10E9/L (ref 150–450)
POTASSIUM SERPL-SCNC: 4.2 MMOL/L (ref 3.4–5.3)
PROT SERPL-MCNC: 7.1 G/DL (ref 6.8–8.8)
RBC # BLD AUTO: 5.6 10E12/L (ref 4.4–5.9)
SODIUM SERPL-SCNC: 141 MMOL/L (ref 133–144)
SOURCE: ABNORMAL
SP GR UR STRIP: 1.02 (ref 1–1.03)
T3FREE SERPL-MCNC: 2.6 PG/ML (ref 2.3–4.2)
T4 FREE SERPL-MCNC: 1.05 NG/DL (ref 0.76–1.46)
TRIGL SERPL-MCNC: 100 MG/DL
TSH SERPL DL<=0.005 MIU/L-ACNC: 2.81 MU/L (ref 0.4–4)
URATE SERPL-MCNC: 5.5 MG/DL (ref 3.5–7.2)
UROBILINOGEN UR STRIP-MCNC: 0 MG/DL (ref 0–2)
WBC # BLD AUTO: 6.7 10E9/L (ref 4–11)

## 2020-12-09 PROCEDURE — 36415 COLL VENOUS BLD VENIPUNCTURE: CPT | Performed by: PATHOLOGY

## 2020-12-09 PROCEDURE — 84681 ASSAY OF C-PEPTIDE: CPT | Mod: 90 | Performed by: PATHOLOGY

## 2020-12-09 PROCEDURE — 83036 HEMOGLOBIN GLYCOSYLATED A1C: CPT | Performed by: PATHOLOGY

## 2020-12-09 PROCEDURE — 84403 ASSAY OF TOTAL TESTOSTERONE: CPT | Mod: 90 | Performed by: PATHOLOGY

## 2020-12-09 PROCEDURE — 81003 URINALYSIS AUTO W/O SCOPE: CPT | Performed by: PATHOLOGY

## 2020-12-09 PROCEDURE — 83002 ASSAY OF GONADOTROPIN (LH): CPT | Mod: 90 | Performed by: PATHOLOGY

## 2020-12-09 PROCEDURE — 86140 C-REACTIVE PROTEIN: CPT | Performed by: PATHOLOGY

## 2020-12-09 PROCEDURE — 83001 ASSAY OF GONADOTROPIN (FSH): CPT | Performed by: PATHOLOGY

## 2020-12-09 PROCEDURE — 84481 FREE ASSAY (FT-3): CPT | Mod: 90 | Performed by: PATHOLOGY

## 2020-12-09 PROCEDURE — 80050 GENERAL HEALTH PANEL: CPT | Performed by: PATHOLOGY

## 2020-12-09 PROCEDURE — 99000 SPECIMEN HANDLING OFFICE-LAB: CPT | Performed by: PATHOLOGY

## 2020-12-09 PROCEDURE — 80061 LIPID PANEL: CPT | Performed by: PATHOLOGY

## 2020-12-09 PROCEDURE — 84550 ASSAY OF BLOOD/URIC ACID: CPT | Performed by: PATHOLOGY

## 2020-12-09 PROCEDURE — 82306 VITAMIN D 25 HYDROXY: CPT | Mod: 90 | Performed by: PATHOLOGY

## 2020-12-09 PROCEDURE — 84439 ASSAY OF FREE THYROXINE: CPT | Performed by: PATHOLOGY

## 2020-12-11 LAB — TESTOST SERPL-MCNC: 507 NG/DL (ref 240–950)

## 2021-01-04 ENCOUNTER — MEDICAL CORRESPONDENCE (OUTPATIENT)
Dept: HEALTH INFORMATION MANAGEMENT | Facility: CLINIC | Age: 66
End: 2021-01-04

## 2021-01-15 ENCOUNTER — HEALTH MAINTENANCE LETTER (OUTPATIENT)
Age: 66
End: 2021-01-15

## 2021-01-31 NOTE — PROGRESS NOTES
[facility] CARDIOLOGY CLINIC FOLLOW UP    Referring Provider:  Raul Dutta  Primary Care Provider:   Raul Dutta  Indication for Consultation:  PVCs    HPI: Darnell Grant is a 65 year old male being seen today for evaluation of ventricular ectopy.    The patient's risk factor profile is: (+) HTN, (+) metabolic syndrome, (+) hypercholesterolemia, (-) tobacco use, (+) fam Hx premature CAD [paternal CABG in 60s]  The patient has no history of cardiovascular disease (CAD, CHF, arrhythmia, valvular heart disease).  The patient has no Hx of PAD or cerebrovascular disease.    He has had intermittent cardiac studies over the past several years.  He had an ECHO in 2013 that showed normal LV function and no valvular heart disease.  He had a stress nuclear study in Feb 2017 that was completely normal.  He has not had a prior coronary angiogram.  He had a coronary calcium score of 772 (90% percentile) in 2017.    He had a routine clinic visit (2018) and had a pulse 100-110.  On auscultation, irregularity was noted.  An ECG showed NSR with LVH and ventricular ectopy.  The patient had no symptoms at that time.  A Ziopatch (Feb 2018) showed 1 episode of NSVT (4 beats) in comparison to prior Holter.   PVC burden was 11% in comparison to 14% on the prior Holter.  I would not treat these asymptomatic arrhythmias.  A beta blocker would not be unreasonable if current antihypertensive regime (Ca blocker, ARB) becomes less effective.    He had a Ziopatch (Jan 2019) showing NSR.  He had one run of SVT, 4 beats, rate 147.  There were <1% PACs, <1%.  There were frequent PVCs (5.4%), rare couplets (<1%).  Ventricular bigeminy and tirgeminy noted.  No symptoms reported.    The patient has metabolic syndrome and is on Metformin and Jardiance with well controlled glucose values.  He takes phentermine and Jardiance for weight loss.  He is on triple therapy (Ca blocker, ARB, diuretic) for HTN with well controlled BP values.    The  patient denies chest pain, SOB, PND, orthopnea, pedal edema, palpitations, lightheadedness, and syncope.    The patient is not routinely exercising.  He had been walking 5000-10,000 steps on average day.      The patient has had a 51 lb weight loss over the past 2 years.    PAST MEDICAL HISTORY:  Past Medical History:   Diagnosis Date     Bladder mass      BPH (benign prostatic hyperplasia)      GERD (gastroesophageal reflux disease)      High serum parathyroid hormone (PTH) 2017     Hypertension      Metabolic syndrome      Nephrolithiasis      Obesity      LISETTE (obstructive sleep apnea)      Prediabetes        CURRENT MEDICATIONS:  Current Outpatient Medications   Medication Sig     allopurinol (ZYLOPRIM) 100 MG tablet take by mouth 1 tab ( 100 mg)  at bedtime     amLODIPine (NORVASC) 10 MG tablet Take 1 tablet (10 mg) by mouth At Bedtime     buPROPion (WELLBUTRIN XL) 300 MG 24 hr tablet Take 1 tablet (300 mg) by mouth every morning     candesartan (ATACAND) 32 MG tablet Take 32 mg by mouth At Bedtime     clomiPHENE (CLOMID) 50 MG tablet Take 1 tablet (50 mg) by mouth every other day Pt can't recall if medication was taken today or yesterday 12/3/19     empagliflozin (JARDIANCE) 25 MG TABS tablet Take 1 tablet (25 mg) by mouth every morning     hydrochlorothiazide (MICROZIDE) 12.5 MG capsule Take 1 capsule (12.5 mg) by mouth daily     metFORMIN (GLUCOPHAGE) 1000 MG tablet Take 2 tablets (2,000 mg) by mouth daily (with dinner)     multivitamin, therapeutic with minerals (MULTI-VITAMIN) TABS tablet Take 1 tablet by mouth At Bedtime     omeprazole (PRILOSEC) 20 MG DR capsule Take 1 capsule (20 mg) by mouth daily     phentermine (ADIPEX-P) 37.5 MG tablet Take 1.5 tablets (56 mg) by mouth every morning (before breakfast)     rosuvastatin (CRESTOR) 40 MG tablet Take 1 tablet (40 mg) by mouth daily     tamsulosin (FLOMAX) 0.4 MG capsule Take 1 capsule (0.4 mg) by mouth daily     albuterol (PROAIR HFA/PROVENTIL  HFA/VENTOLIN HFA) 108 (90 Base) MCG/ACT inhaler Inhale 2 puffs into the lungs every 4 hours as needed for shortness of breath / dyspnea or wheezing (Patient not taking: Reported on 2/2/2021)     ofloxacin (OCUFLOX) 0.3 % ophthalmic solution 1 drop 4x daily in the surgical eye for 1 week after surgery, then stop     ofloxacin (OCUFLOX) 0.3 % ophthalmic solution 1 drop 4x daily in the surgical eye for 1 week after surgery, then stop (Patient not taking: Reported on 2/2/2021)     prednisoLONE acetate (PRED FORTE) 1 % ophthalmic suspension 1 drop in surgical eye as directed, 4x daily after surgery for 1 week, 3x daily for 1 week, 2x daily for 1 week, daily for 1 week, then stop     prednisoLONE acetate (PRED FORTE) 1 % ophthalmic suspension 1 drop in surgical eye as directed, 4x daily after surgery for 1 week, 3x daily for 1 week, 2x daily for 1 week, daily for 1 week, then stop     No current facility-administered medications for this visit.      Facility-Administered Medications Ordered in Other Visits   Medication     barium sulfate 40% (VARIBAR THIN HONEY) oral suspension           PAST SURGICAL HISTORY:  Past Surgical History:   Procedure Laterality Date     BACK SURGERY      repair disc     CATARACT IOL, RT/LT       CYSTOSCOPY, TRANSURETHRAL RESECTION (TUR) TUMOR BLADDER, COMBINED N/A 1/31/2018    Procedure: COMBINED CYSTOSCOPY, TRANSURETHRAL RESECTION (TUR) TUMOR BLADDER;  Transurethral Biopsy and Resection of Bladder Tumor;  Surgeon: Yaya Lomeli MD;  Location: [facility] OR     DECOMPRESSION, FUSION CERVICAL ANTERIOR TWO LEVELS, COMBINED Right 12/10/2019    Procedure: right approach anterior cervical 2-3, 3-4 resection of anterior cervical osteophytes causing dysphagia, with microscope;  Surgeon: María Fitzgerald MD;  Location: [facility] OR     HC BREATH HYDROGEN TEST  5/22/2013    Procedure: HYDROGEN BREATH TEST;  Surgeon: Donny Paris MD;  Location: [facility] GI     PHACOEMULSIFICATION CLEAR CORNEA WITH  "STANDARD INTRAOCULAR LENS IMPLANT Left 9/25/2020    Procedure: LEFT CATARACT REMOVAL WITH INTRAOCULAR LENS IMPLANT;  Surgeon: Keyla Tobin MD;  Location: [facility] OR     PHACOEMULSIFICATION CLEAR CORNEA WITH STANDARD INTRAOCULAR LENS IMPLANT Right 10/2/2020    Procedure: RIGHT CATARACT REMOVAL WITH INTRAOCULAR LENS IMPLANT;  Surgeon: Keyla Tobin MD;  Location: [facility]     TRANSURETHERAL DESTRUCTION OF PROSTATE BY THERMOTHERAPY N/A 4/13/2018    Procedure: TRANSURETHERAL DESTRUCTION OF PROSTATE BY THERMOTHERAPY;  [facility] Procedure;  Surgeon: Yaya Lomeli MD;  Location: [facility] OR       ALLERGIES  Ragweeds    FAMILY HX:  Family History   Problem Relation Age of Onset     Asthma Brother      Diabetes Maternal Grandmother      Cardiovascular Father      Nephrolithiasis No family hx of      Parathyroid Disorders No family hx of      Thyroid Disease No family hx of      Glaucoma No family hx of      Macular Degeneration No family hx of      Anesthesia Reaction No family hx of      Deep Vein Thrombosis (DVT) No family hx of        SOCIAL HX:  Social History     Social History     Marital status:      Spouse name: N/A     Number of children: N/A     Years of education: N/A     Social History Main Topics     Smoking status: Never Smoker     Smokeless tobacco: Never Used     Alcohol use No     Drug use: No     Sexual activity: Not Asked     Other Topics Concern     None     Social History Narrative       ROS:  Answers for HPI/ROS submitted by the patient on 1/30/2021   General Symptoms: No  Skin Symptoms: No  HENT Symptoms: No  EYE SYMPTOMS: No  HEART SYMPTOMS: No  LUNG SYMPTOMS: No  INTESTINAL SYMPTOMS: No  URINARY SYMPTOMS: No  REPRODUCTIVE SYMPTOMS: No  SKELETAL SYMPTOMS: No  BLOOD SYMPTOMS: No  NERVOUS SYSTEM SYMPTOMS: No  MENTAL HEALTH SYMPTOMS: No       VITAL SIGNS:  /74 (BP Location: Right arm, Patient Position: Chair, Cuff Size: Adult Large)   Pulse 97   Ht 1.88 m (6' 2\")   Wt 112 kg (247 lb)   " SpO2 96%   BMI 31.71 kg/m    Body mass index is 31.71 kg/m .  Wt Readings from Last 2 Encounters:   10/27/20 110.7 kg (244 lb)   10/02/20 110.7 kg (244 lb)       PHYSICAL EXAM  Darnell Grant is a 65 year old male.in no acute distress.  Overweight.  HEENT: Eyes Nonicteric.  Neck: JVP normal.  Carotids +3/3 bilaterally without bruits.  Lungs: CTA.  Cor: RRR. Normal S1 and S2.  No murmur, rub, or gallop.  PMI in Lf 5th ICS.  Abd: Soft, nontender, nondistended.  NABS.  No pulsatile mass.  Extremities: No C/C/E.  Pulses +3/3 symmetric in upper and lower extremities.  Neuro: Grossly intact.  Psych: A&O x 3.  Skin: No rash.    LABS  Recent Labs   Lab Test 20  0815 20  0714   WBC 6.7 6.6   HGB 17.3 16.6   HCT 52.2 48.9    170     Recent Labs   Lab Test 20  0815 09/15/20  0732    142   POTASSIUM 4.2 3.9   CHLORIDE 107 107   CO2 30 28   BUN 16 18   CR 0.98 0.94   GFRESTIMATED 80 84     Recent Labs   Lab Test 20  0815 20  0714 08/31/15  0741 08/31/15  0741 14  0801   CHOL 77 77   < > 117 120   HDL 38* 28*   < > 34* 32*   LDL 19 21   < > 45 59   TRIG 100 142   < > 190* 146   CHOLHDLRATIO  --   --   --  3.4 3.7    < > = values in this interval not displayed.       Lab Results   Component Value Date    A1C 5.0 2020    A1C 5.1 2020    A1C 5.2 2019    A1C 5.6 2019    A1C 5.7 2018       EK21   NSR 93.  0.15/0.11/0.46  Axis -52 degrees.  LVH with strain.  LAFB.  No significant change.      EK18  12:31 pm  NSR.  LVH.  Nonspecific QRS widening.  Two separate PVCs, different sources.    EK18  2:37 pm  NSR.  LVH.  Nonspecific QRS widening.  No ventricular ectopy.    ZIOPATCH: 2019    HOLTER (18): Frequent ventricular ectopy, 2 dominant morphologies for PVCs with 14% burden.  107 NSVT, max 5 beats.  I left message for Darnell that I would like to see him in the clinic.  Consider stress cardiac MRI.  ECHO: 13  Global and  regional left ventricular function is normal with an EF of 55-60%.   Global right ventricular function is normal. Pulmonary artery systolic pressure is normal. No pericardial effusion is present.    ZIOPATCH  (Jan 2019): NSR.  Single SVT, 4 beats, rate 156.  PVC burden 5.4%.  Two 3 beat VT episodes.    No change in therapy.    ZIOPATCH  (Feb 2018) showed 1 episode of NSVT (4 beats) in comparison to prior Holter.   PVC burden was 11% in comparison to 14% on the prior Holter.     Coronary Calcium Score:  Jan 2017  Score 772.  90% percentile    LEXISCAN STRESS TEST:  2/7/17  Findings:  1. Overall quality of the study: Slightly reduced due to patient motion artifact.   2. Left ventricular cavity is normal on the rest and stress studies.  3. SPECT images demonstrate overall homogeneous radiotracer uptake in all myocardial segments at both stress and rest The summed stress score is 0.   4. Gated SPECT images reveal normal left ventricular systolic function without wall motion abnormalities. Left ventricular ejection fraction is 62%. Left ventricular end-diastolic volume is 205 mL. End-systolic volume is 78 mL.       Impression:  1. Normal myocardial SPECT study with a summed stress score of 0. A summed stress score of <4 is associated with an annual event rate of 0.8% and 0.9% for myocardial infarction and cardiac death, respectively (Home. Circulation 1998;98:535-43).  2. Normal left ventricular systolic function as described above.  3. No significant perfusion abnormalities.  4. No prior study available for comparison.    Stress Cardiac MRI (1/19/18):  1. The LV is normal in cavity size and wall thickness. The global systolic function is normal. The LVEF is 55%. There are no regional wall motion abnormalities.  2. The RV is normal in cavity size. The global systolic function is normal. The RVEF is 61%.   3. Both atria are normal in size.  4. There is no significant valvular disease.   5. Late gadolinium  enhancement imaging shows no MI, fibrosis or infiltrative disease.   6. Regadenoson stress perfusion imaging shows no ischemia.    CONCLUSIONS: No ischemia or infarction. Normal LV and RV function, LVEF of 55% and RVEF of 61%. No obvious substrate for arrhythmia.    CARDIAC CATH: None    ASSESSMENT AND PLAN:   1. Ventricular ectopy, NSVT.  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  He has no other symptoms to suggest CHF or typical angina.  His physical exam is unremarkable.  His prior ECG shows PVCs with two dominant morphologies and his Holter in 2018 showed 14% PVC burden with two dominant morphologies.  He had 107 runs of NSVT with no more than 5 consecutive beats.  He has no symptoms with the PVCs or NSVT.  He has normal LV function on prior ECHO (2013) and this is confirmed with recent Lexiscan (2017) and on cardiac MRI (2018).  He has no evidence of prior MI or ischemia by Lexiscan or stress cardiac MRI.      From a coronary standpoint he does not appear to have any anginal equivalents therefore we have elected to proceed with medication optimization.  He is on aspirin 81 mg daily and Crestor 40 mg daily.  His blood pressure is well controlled.   Similarly, his glucose intolerance is well controlled.    From a PVC standpoint he is largely is a symptomatic.  In addition, follow up Nicholasopatch earlier this year showed a reduction in PVCs to 4%.  At this time he does not appear that he had any reduction in his LVEF based on his symptoms and examination.  He has had multiple assessments of his LV function in the past year that has been without findings therefore at this time we would proceed with regular follow-up and assessment of LVEF every 2-3 years or sooner based on any changes in his symptoms.      2. HTN.  BP well controlled/  Continue Norvasc 10 mg every day, Candesartan 32 mg every day, hydrochlorothiazide 12.5 every day.  This is well controlled  today    3. Hypercholesterolemia.  LDL 19 mg/dl.  Continue Crestor 40 mg qd.    4. Metabolic syndrome / Glucose Intolerance. Continue Metformin.  Last A1c on file was 5.0 (Dec 2020).         FOLLOW UP:  PCP    Leo Dutta MD     Cardiovascular Division    CC  Patient Care Team:  Logan Dutta MD as PCP - General (Internal Medicine)  Yaya Lomeli MD as MD (Urology)  Sherry Monzon, RN as Registered Nurse (Urology)  Logan Dutta MD as Referring Physician (Internal Medicine)  Abhishek Lujan OD (Optometry)  Logan Dutta MD as Assigned PCP  Diaz Pitt MD as MD (Urology)  María Fitzgerald MD as Assigned Neuroscience Provider  Keyla Tobin MD as Assigned Surgical Provider  LOGAN DUTTA

## 2021-02-02 ENCOUNTER — OFFICE VISIT (OUTPATIENT)
Dept: CARDIOLOGY | Facility: CLINIC | Age: 66
End: 2021-02-02
Attending: INTERNAL MEDICINE
Payer: COMMERCIAL

## 2021-02-02 VITALS
HEART RATE: 97 BPM | BODY MASS INDEX: 31.7 KG/M2 | DIASTOLIC BLOOD PRESSURE: 74 MMHG | SYSTOLIC BLOOD PRESSURE: 105 MMHG | WEIGHT: 247 LBS | HEIGHT: 74 IN | OXYGEN SATURATION: 96 %

## 2021-02-02 DIAGNOSIS — I49.3 PVC'S (PREMATURE VENTRICULAR CONTRACTIONS): Primary | ICD-10-CM

## 2021-02-02 PROCEDURE — 93005 ELECTROCARDIOGRAM TRACING: CPT

## 2021-02-02 PROCEDURE — 99214 OFFICE O/P EST MOD 30 MIN: CPT | Performed by: INTERNAL MEDICINE

## 2021-02-02 PROCEDURE — G0463 HOSPITAL OUTPT CLINIC VISIT: HCPCS | Mod: 25

## 2021-02-02 ASSESSMENT — PAIN SCALES - GENERAL: PAINLEVEL: NO PAIN (0)

## 2021-02-02 ASSESSMENT — MIFFLIN-ST. JEOR: SCORE: 1965.13

## 2021-02-02 NOTE — LETTER
2/2/2021      RE: Darnell Grant  [address]  [facility] [address]       Dear Colleague,    Thank you for the opportunity to participate in the care of your patient, Darnell Grant, at the [facility] HEART [facility] at [facility] acute hospital. Please see a copy of my visit note below.    [facility] CARDIOLOGY CLINIC FOLLOW UP    Referring Provider:  Raul Dutta  Primary Care Provider:   Raul Dutta  Indication for Consultation:  PVCs    HPI: Darnell Grant is a 65 year old male being seen today for evaluation of ventricular ectopy.    The patient's risk factor profile is: (+) HTN, (+) metabolic syndrome, (+) hypercholesterolemia, (-) tobacco use, (+) fam Hx premature CAD [paternal CABG in 60s]  The patient has no history of cardiovascular disease (CAD, CHF, arrhythmia, valvular heart disease).  The patient has no Hx of PAD or cerebrovascular disease.    He has had intermittent cardiac studies over the past several years.  He had an ECHO in 2013 that showed normal LV function and no valvular heart disease.  He had a stress nuclear study in Feb 2017 that was completely normal.  He has not had a prior coronary angiogram.  He had a coronary calcium score of 772 (90% percentile) in 2017.    He had a routine clinic visit (2018) and had a pulse 100-110.  On auscultation, irregularity was noted.  An ECG showed NSR with LVH and ventricular ectopy.  The patient had no symptoms at that time.  A Ziopatch (Feb 2018) showed 1 episode of NSVT (4 beats) in comparison to prior Holter.   PVC burden was 11% in comparison to 14% on the prior Holter.  I would not treat these asymptomatic arrhythmias.  A beta blocker would not be unreasonable if current antihypertensive regime (Ca blocker, ARB) becomes less effective.    He had a Ziopatch (Jan 2019) showing NSR.  He had one run of SVT, 4 beats, rate 147.  There were <1% PACs, <1%.  There were frequent PVCs (5.4%), rare couplets  (<1%).  Ventricular bigeminy and tirgeminy noted.  No symptoms reported.    The patient has metabolic syndrome and is on Metformin and Jardiance with well controlled glucose values.  He takes phentermine and Jardiance for weight loss.  He is on triple therapy (Ca blocker, ARB, diuretic) for HTN with well controlled BP values.    The patient denies chest pain, SOB, PND, orthopnea, pedal edema, palpitations, lightheadedness, and syncope.    The patient is not routinely exercising.  He had been walking 5000-10,000 steps on average day.      The patient has had a 51 lb weight loss over the past 2 years.    PAST MEDICAL HISTORY:  Past Medical History:   Diagnosis Date     Bladder mass      BPH (benign prostatic hyperplasia)      GERD (gastroesophageal reflux disease)      High serum parathyroid hormone (PTH) 2017     Hypertension      Metabolic syndrome      Nephrolithiasis      Obesity      LISETTE (obstructive sleep apnea)      Prediabetes        CURRENT MEDICATIONS:  Current Outpatient Medications   Medication Sig     allopurinol (ZYLOPRIM) 100 MG tablet take by mouth 1 tab ( 100 mg)  at bedtime     amLODIPine (NORVASC) 10 MG tablet Take 1 tablet (10 mg) by mouth At Bedtime     buPROPion (WELLBUTRIN XL) 300 MG 24 hr tablet Take 1 tablet (300 mg) by mouth every morning     candesartan (ATACAND) 32 MG tablet Take 32 mg by mouth At Bedtime     clomiPHENE (CLOMID) 50 MG tablet Take 1 tablet (50 mg) by mouth every other day Pt can't recall if medication was taken today or yesterday 12/3/19     empagliflozin (JARDIANCE) 25 MG TABS tablet Take 1 tablet (25 mg) by mouth every morning     hydrochlorothiazide (MICROZIDE) 12.5 MG capsule Take 1 capsule (12.5 mg) by mouth daily     metFORMIN (GLUCOPHAGE) 1000 MG tablet Take 2 tablets (2,000 mg) by mouth daily (with dinner)     multivitamin, therapeutic with minerals (MULTI-VITAMIN) TABS tablet Take 1 tablet by mouth At Bedtime     omeprazole (PRILOSEC) 20 MG DR capsule Take 1 capsule  (20 mg) by mouth daily     phentermine (ADIPEX-P) 37.5 MG tablet Take 1.5 tablets (56 mg) by mouth every morning (before breakfast)     rosuvastatin (CRESTOR) 40 MG tablet Take 1 tablet (40 mg) by mouth daily     tamsulosin (FLOMAX) 0.4 MG capsule Take 1 capsule (0.4 mg) by mouth daily     albuterol (PROAIR HFA/PROVENTIL HFA/VENTOLIN HFA) 108 (90 Base) MCG/ACT inhaler Inhale 2 puffs into the lungs every 4 hours as needed for shortness of breath / dyspnea or wheezing (Patient not taking: Reported on 2/2/2021)     ofloxacin (OCUFLOX) 0.3 % ophthalmic solution 1 drop 4x daily in the surgical eye for 1 week after surgery, then stop     ofloxacin (OCUFLOX) 0.3 % ophthalmic solution 1 drop 4x daily in the surgical eye for 1 week after surgery, then stop (Patient not taking: Reported on 2/2/2021)     prednisoLONE acetate (PRED FORTE) 1 % ophthalmic suspension 1 drop in surgical eye as directed, 4x daily after surgery for 1 week, 3x daily for 1 week, 2x daily for 1 week, daily for 1 week, then stop     prednisoLONE acetate (PRED FORTE) 1 % ophthalmic suspension 1 drop in surgical eye as directed, 4x daily after surgery for 1 week, 3x daily for 1 week, 2x daily for 1 week, daily for 1 week, then stop     No current facility-administered medications for this visit.      Facility-Administered Medications Ordered in Other Visits   Medication     barium sulfate 40% (VARIBAR THIN HONEY) oral suspension         PAST SURGICAL HISTORY:  Past Surgical History:   Procedure Laterality Date     BACK SURGERY      repair disc     CATARACT IOL, RT/LT       CYSTOSCOPY, TRANSURETHRAL RESECTION (TUR) TUMOR BLADDER, COMBINED N/A 1/31/2018    Procedure: COMBINED CYSTOSCOPY, TRANSURETHRAL RESECTION (TUR) TUMOR BLADDER;  Transurethral Biopsy and Resection of Bladder Tumor;  Surgeon: Yaya Lomeli MD;  [facility]: [facility]     DECOMPRESSION, FUSION CERVICAL ANTERIOR TWO LEVELS, COMBINED Right 12/10/2019    Procedure: right approach anterior  cervical 2-3, 3-4 resection of anterior cervical osteophytes causing dysphagia, with microscope;  Surgeon: María Fitzgerald MD;  Location: [facility] OR     HC BREATH HYDROGEN TEST  5/22/2013    Procedure: HYDROGEN BREATH TEST;  Surgeon: Donny Paris MD;  [facility]: [facility] GI     PHACOEMULSIFICATION CLEAR CORNEA WITH STANDARD INTRAOCULAR LENS IMPLANT Left 9/25/2020    Procedure: LEFT CATARACT REMOVAL WITH INTRAOCULAR LENS IMPLANT;  Surgeon: Keyla Tobin MD;  [facility]     PHACOEMULSIFICATION CLEAR CORNEA WITH STANDARD INTRAOCULAR LENS IMPLANT Right 10/2/2020    Procedure: RIGHT CATARACT REMOVAL WITH INTRAOCULAR LENS IMPLANT;  Surgeon: Keyla Tobin MD;  [facility]     TRANSURETHERAL DESTRUCTION OF PROSTATE BY THERMOTHERAPY N/A 4/13/2018    Procedure: TRANSURETHERAL DESTRUCTION OF PROSTATE BY THERMOTHERAPY;  [facility] Procedure;  Surgeon: Yaya Lomeli MD;  [facility]       ALLERGIES  Ragweeds    FAMILY HX:  Family History   Problem Relation Age of Onset     Asthma Brother      Diabetes Maternal Grandmother      Cardiovascular Father      Nephrolithiasis No family hx of      Parathyroid Disorders No family hx of      Thyroid Disease No family hx of      Glaucoma No family hx of      Macular Degeneration No family hx of      Anesthesia Reaction No family hx of      Deep Vein Thrombosis (DVT) No family hx of        SOCIAL HX:  Social History     Social History     Marital status:      Spouse name: N/A     Number of children: N/A     Years of education: N/A     Social History Main Topics     Smoking status: Never Smoker     Smokeless tobacco: Never Used     Alcohol use No     Drug use: No     Sexual activity: Not Asked     Other Topics Concern     None     Social History Narrative       ROS:  Answers for HPI/ROS submitted by the patient on 1/30/2021   General Symptoms: No  Skin Symptoms: No  HENT Symptoms: No  EYE SYMPTOMS: No  HEART SYMPTOMS: No  LUNG SYMPTOMS: No  INTESTINAL  "SYMPTOMS: No  URINARY SYMPTOMS: No  REPRODUCTIVE SYMPTOMS: No  SKELETAL SYMPTOMS: No  BLOOD SYMPTOMS: No  NERVOUS SYSTEM SYMPTOMS: No  MENTAL HEALTH SYMPTOMS: No       VITAL SIGNS:  /74 (BP Location: Right arm, Patient Position: Chair, Cuff Size: Adult Large)   Pulse 97   Ht 1.88 m (6' 2\")   Wt 112 kg (247 lb)   SpO2 96%   BMI 31.71 kg/m    Body mass index is 31.71 kg/m .  Wt Readings from Last 2 Encounters:   10/27/20 110.7 kg (244 lb)   10/02/20 110.7 kg (244 lb)       PHYSICAL EXAM  Darnell Grant is a 65 year old male.in no acute distress.  Overweight.  HEENT: Eyes Nonicteric.  Neck: JVP normal.  Carotids +3/3 bilaterally without bruits.  Lungs: CTA.  Cor: RRR. Normal S1 and S2.  No murmur, rub, or gallop.  PMI in Lf 5th ICS.  Abd: Soft, nontender, nondistended.  NABS.  No pulsatile mass.  Extremities: No C/C/E.  Pulses +3/3 symmetric in upper and lower extremities.  Neuro: Grossly intact.  Psych: A&O x 3.  Skin: No rash.    LABS  Recent Labs   Lab Test 20  0815 20  0714   WBC 6.7 6.6   HGB 17.3 16.6   HCT 52.2 48.9    170     Recent Labs   Lab Test 20  0815 09/15/20  0732    142   POTASSIUM 4.2 3.9   CHLORIDE 107 107   CO2 30 28   BUN 16 18   CR 0.98 0.94   GFRESTIMATED 80 84     Recent Labs   Lab Test 20  0815 20  0714 08/31/15  0741 08/31/15  0741 14  0801   CHOL 77 77   < > 117 120   HDL 38* 28*   < > 34* 32*   LDL 19 21   < > 45 59   TRIG 100 142   < > 190* 146   CHOLHDLRATIO  --   --   --  3.4 3.7    < > = values in this interval not displayed.       Lab Results   Component Value Date    A1C 5.0 2020    A1C 5.1 2020    A1C 5.2 2019    A1C 5.6 2019    A1C 5.7 2018       EK21   NSR 93.  0.15/0.11/0.46  Axis -52 degrees.  LVH with strain.  LAFB.  No significant change.      EK18  12:31 pm  NSR.  LVH.  Nonspecific QRS widening.  Two separate PVCs, different sources.    EK18  2:37 pm  NSR.  LVH.  " Nonspecific QRS widening.  No ventricular ectopy.    ZIOPATCH: 1/14/2019    HOLTER (1/17/18): Frequent ventricular ectopy, 2 dominant morphologies for PVCs with 14% burden.  107 NSVT, max 5 beats.  I left message for Darnell that I would like to see him in the clinic.  Consider stress cardiac MRI.  ECHO: 6/4/13  Global and regional left ventricular function is normal with an EF of 55-60%.   Global right ventricular function is normal. Pulmonary artery systolic pressure is normal. No pericardial effusion is present.    ZIOPATCH  (Jan 2019): NSR.  Single SVT, 4 beats, rate 156.  PVC burden 5.4%.  Two 3 beat VT episodes.    No change in therapy.    ZIOPATCH  (Feb 2018) showed 1 episode of NSVT (4 beats) in comparison to prior Holter.   PVC burden was 11% in comparison to 14% on the prior Holter.     Coronary Calcium Score:  Jan 2017  Score 772.  90% percentile    LEXISCAN STRESS TEST:  2/7/17  Findings:  1. Overall quality of the study: Slightly reduced due to patient motion artifact.   2. Left ventricular cavity is normal on the rest and stress studies.  3. SPECT images demonstrate overall homogeneous radiotracer uptake in all myocardial segments at both stress and rest The summed stress score is 0.   4. Gated SPECT images reveal normal left ventricular systolic function without wall motion abnormalities. Left ventricular ejection fraction is 62%. Left ventricular end-diastolic volume is 205 mL. End-systolic volume is 78 mL.       Impression:  1. Normal myocardial SPECT study with a summed stress score of 0. A summed stress score of <4 is associated with an annual event rate of 0.8% and 0.9% for myocardial infarction and cardiac death, respectively (Home. Circulation 1998;98:535-43).  2. Normal left ventricular systolic function as described above.  3. No significant perfusion abnormalities.  4. No prior study available for comparison.    Stress Cardiac MRI (1/19/18):  1. The LV is normal in cavity size and wall  thickness. The global systolic function is normal. The LVEF is 55%. There are no regional wall motion abnormalities.  2. The RV is normal in cavity size. The global systolic function is normal. The RVEF is 61%.   3. Both atria are normal in size.  4. There is no significant valvular disease.   5. Late gadolinium enhancement imaging shows no MI, fibrosis or infiltrative disease.   6. Regadenoson stress perfusion imaging shows no ischemia.    CONCLUSIONS: No ischemia or infarction. Normal LV and RV function, LVEF of 55% and RVEF of 61%. No obvious substrate for arrhythmia.    CARDIAC CATH: None    ASSESSMENT AND PLAN:   1. Ventricular ectopy, NSVT.  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  He has no other symptoms to suggest CHF or typical angina.  His physical exam is unremarkable.  His prior ECG shows PVCs with two dominant morphologies and his Holter in 2018 showed 14% PVC burden with two dominant morphologies.  He had 107 runs of NSVT with no more than 5 consecutive beats.  He has no symptoms with the PVCs or NSVT.  He has normal LV function on prior ECHO (2013) and this is confirmed with recent Lexiscan (2017) and on cardiac MRI (2018).  He has no evidence of prior MI or ischemia by Lexiscan or stress cardiac MRI.      From a coronary standpoint he does not appear to have any anginal equivalents therefore we have elected to proceed with medication optimization.  He is on aspirin 81 mg daily and Crestor 40 mg daily.  His blood pressure is well controlled.   Similarly, his glucose intolerance is well controlled.    From a PVC standpoint he is largely is a symptomatic.  In addition, follow up Nicholasopatch earlier this year showed a reduction in PVCs to 4%.  At this time he does not appear that he had any reduction in his LVEF based on his symptoms and examination.  He has had multiple assessments of his LV function in the past year that has been without findings  therefore at this time we would proceed with regular follow-up and assessment of LVEF every 2-3 years or sooner based on any changes in his symptoms.      2. HTN.  BP well controlled/  Continue Norvasc 10 mg every day, Candesartan 32 mg every day, hydrochlorothiazide 12.5 every day.  This is well controlled today    3. Hypercholesterolemia.  LDL 19 mg/dl.  Continue Crestor 40 mg qd.    4. Metabolic syndrome / Glucose Intolerance. Continue Metformin.  Last A1c on file was 5.0 (Dec 2020).         FOLLOW UP:  PCP    Leo Dutta MD     Cardiovascular Division    CC  Patient Care Team:  Logan Dutta MD as PCP - General (Internal Medicine)  Yaay Lomeli MD as MD (Urology)  Sherry Monzon, RN as Registered Nurse (Urology)  Logan Dutta MD as Referring Physician (Internal Medicine)  Abhishek Lujan OD (Optometry)  Logan Dutta MD as Assigned PCP  Diaz Pitt MD as MD (Urology)  María Fitzgerald MD as Assigned Neuroscience Provider  Keyla Tobin MD as Assigned Surgical Provider  LOGAN DUTTA      Please do not hesitate to contact me if you have any questions/concerns.     Sincerely,     Leo Dutta MD

## 2021-02-02 NOTE — NURSING NOTE
Chief Complaint   Patient presents with     Follow Up     PVC's     Vitals were taken and medications were reconciled. EKG was performed    Carol AMANDA  2:45 PM

## 2021-02-02 NOTE — PATIENT INSTRUCTIONS
It was a pleasure to see you in the cardiology clinic today.    If you have any questions, you can reach my nurse, Jolie Rogers, at [phone].  Press Option #1 for the [facility], and then press Option #f 4 or nursing.    Note the new medications: None    Stop the following medications: None    The results from today include: None    Tests ordered today: None    I would like you to follow up with Dr. Dutta in 1 year.    Sincerely,      Leo Dutta MD     [facility]

## 2021-02-04 LAB — INTERPRETATION ECG - MUSE: NORMAL

## 2021-02-15 ENCOUNTER — E-VISIT (OUTPATIENT)
Dept: URGENT CARE | Facility: URGENT CARE | Age: 66
End: 2021-02-15
Payer: COMMERCIAL

## 2021-02-15 ENCOUNTER — OFFICE VISIT (OUTPATIENT)
Dept: URGENT CARE | Facility: URGENT CARE | Age: 66
End: 2021-02-15
Attending: FAMILY MEDICINE
Payer: COMMERCIAL

## 2021-02-15 DIAGNOSIS — Z20.822 SUSPECTED COVID-19 VIRUS INFECTION: Primary | ICD-10-CM

## 2021-02-15 DIAGNOSIS — Z20.822 SUSPECTED COVID-19 VIRUS INFECTION: ICD-10-CM

## 2021-02-15 PROCEDURE — 99207 PR NO CHARGE LOS: CPT

## 2021-02-15 PROCEDURE — 99421 OL DIG E/M SVC 5-10 MIN: CPT | Performed by: FAMILY MEDICINE

## 2021-02-15 PROCEDURE — U0003 INFECTIOUS AGENT DETECTION BY NUCLEIC ACID (DNA OR RNA); SEVERE ACUTE RESPIRATORY SYNDROME CORONAVIRUS 2 (SARS-COV-2) (CORONAVIRUS DISEASE [COVID-19]), AMPLIFIED PROBE TECHNIQUE, MAKING USE OF HIGH THROUGHPUT TECHNOLOGIES AS DESCRIBED BY CMS-2020-01-R: HCPCS | Performed by: FAMILY MEDICINE

## 2021-02-15 PROCEDURE — U0005 INFEC AGEN DETEC AMPLI PROBE: HCPCS | Performed by: FAMILY MEDICINE

## 2021-02-15 NOTE — PATIENT INSTRUCTIONS
Dear Darnell Grant,    Your symptoms show that you may have coronavirus (COVID-19). This illness can cause fever, cough and trouble breathing. Many people get a mild case and get better on their own. Some people can get very sick.    Will I be tested for COVID-19?  We would like to test you for Covid-19 virus. I have placed orders for this test.     For all employees or close contacts (except [place] and [employer] - see below), go to your [employer] home page and scroll down to the section that says  You have an appointment that needs to be scheduled  and click the large green button that says  Schedule Now  and follow the steps to find the next available opening.     If you are unable to complete these steps or if you cannot find any available times, please call [phone] to schedule employee testing.     [place] employees or close contacts, please call [phone].   [place] ([employer]) employees or close contacts call [phone].    Return to work/school/ guidance:  Please let your workplace manager and staffing office know when your quarantine ends     We can t give you an exact date as it depends on the above. You can calculate this on your own or work with your manager/staffing office to calculate this. (For example if you were exposed on 10/4, you would have to quarantine for 14 full days. That would be through 10/18. You could return on 10/19.)      If you receive a positive COVID-19 test result, follow the guidance of the those who are giving you the results. Usually the return to work is 10 (or in some cases 20 days from symptom onset.) If you work at [employer], you must also be cleared by Employee Occupational Health and Safety to return to work.        If you receive a negative COVID-19 test result and did not have a high risk exposure to someone with a known positive COVID-19 test, you can return to work once you're free of fever for 24 hours without fever-reducing medication and  your symptoms are improving or resolved.      If you receive a negative COVID-19 test and If you had a high risk exposure to someone who has tested positive for COVID-19 then you can return to work 14 days after your last contact with the positive individual    Note: If you have ongoing exposure to the covid positive person, this quarantine period may be more than 14 days. (For example, if you are continued to be exposed to your child who tested positive and cannot isolate from them, then the quarantine of 7-14 days can't start until your child is no longer contagious. This is typically 10 days from onset of the child's symptoms. So the total duration may be 17-24 days in this case.)    Sign up for [employer].   We know it's scary to hear that you might have COVID-19. We want to track your symptoms to make sure you're okay over the next 2 weeks. Please look for an email from [employer]--this is a free, online program that we'll use to keep in touch. To sign up, follow the link in the email you will receive. Learn more at [email or web address]    How can I take care of myself?    Get lots of rest. Drink extra fluids (unless a doctor has told you not to)    Take Tylenol (acetaminophen) or ibuprofen for fever or pain. If you have liver or kidney problems, ask your family doctor if it's okay to take Tylenol o ibuprofen    If you have other health problems (like cancer, heart failure, an organ transplant or severe kidney disease): Call your specialty clinic if you don't feel better in the next 2 days.    Know when to call 911. Emergency warning signs include:  o Trouble breathing or shortness of breath  o Pain or pressure in the chest that doesn't go away  o Feeling confused like you haven't felt before, or not being able to wake up  o Bluish-colored lips or face    Where can I get more information?   [employer] [place] - About COVID-19:   [email or web address]    CDC - What to Do If You're Sick:    www.cdc.gov/coronavirus/2019-ncov/about/steps-when-sick.html

## 2021-02-16 LAB
SARS-COV-2 RNA RESP QL NAA+PROBE: NOT DETECTED
SPECIMEN SOURCE: NORMAL

## 2021-02-19 DIAGNOSIS — R05.8 POST-VIRAL COUGH SYNDROME: Primary | ICD-10-CM

## 2021-02-19 DIAGNOSIS — J98.01 BRONCHOSPASM: ICD-10-CM

## 2021-02-19 RX ORDER — PREDNISONE 10 MG/1
10 TABLET ORAL DAILY
Qty: 60 TABLET | Refills: 3 | Status: SHIPPED | OUTPATIENT
Start: 2021-02-19 | End: 2021-10-26

## 2021-03-08 ENCOUNTER — OFFICE VISIT (OUTPATIENT)
Dept: INTERNAL MEDICINE | Facility: CLINIC | Age: 66
End: 2021-03-08
Payer: COMMERCIAL

## 2021-03-08 VITALS
WEIGHT: 245 LBS | DIASTOLIC BLOOD PRESSURE: 79 MMHG | SYSTOLIC BLOOD PRESSURE: 115 MMHG | OXYGEN SATURATION: 96 % | BODY MASS INDEX: 31.46 KG/M2 | HEART RATE: 102 BPM

## 2021-03-08 DIAGNOSIS — R53.83 OTHER FATIGUE: ICD-10-CM

## 2021-03-08 DIAGNOSIS — Z13.89 SCREENING FOR DIABETIC PERIPHERAL NEUROPATHY: ICD-10-CM

## 2021-03-08 DIAGNOSIS — M10.9 GOUT, UNSPECIFIED CAUSE, UNSPECIFIED CHRONICITY, UNSPECIFIED SITE: ICD-10-CM

## 2021-03-08 DIAGNOSIS — N40.0 BENIGN NON-NODULAR PROSTATIC HYPERPLASIA WITHOUT LOWER URINARY TRACT SYMPTOMS: ICD-10-CM

## 2021-03-08 DIAGNOSIS — K21.9 GASTROESOPHAGEAL REFLUX DISEASE WITHOUT ESOPHAGITIS: ICD-10-CM

## 2021-03-08 DIAGNOSIS — R35.1 NOCTURIA: ICD-10-CM

## 2021-03-08 DIAGNOSIS — E78.5 HYPERLIPIDEMIA LDL GOAL <100: ICD-10-CM

## 2021-03-08 DIAGNOSIS — I10 ESSENTIAL HYPERTENSION, BENIGN: ICD-10-CM

## 2021-03-08 DIAGNOSIS — Z11.59 NEED FOR HEPATITIS C SCREENING TEST: ICD-10-CM

## 2021-03-08 DIAGNOSIS — Z00.00 ROUTINE GENERAL MEDICAL EXAMINATION AT A HEALTH CARE FACILITY: ICD-10-CM

## 2021-03-08 DIAGNOSIS — I10 ESSENTIAL HYPERTENSION: ICD-10-CM

## 2021-03-08 PROCEDURE — 99214 OFFICE O/P EST MOD 30 MIN: CPT | Performed by: INTERNAL MEDICINE

## 2021-03-08 RX ORDER — HYDROCHLOROTHIAZIDE 12.5 MG/1
12.5 CAPSULE ORAL DAILY
Qty: 90 CAPSULE | Refills: 3 | Status: SHIPPED | OUTPATIENT
Start: 2021-03-08 | End: 2021-12-06

## 2021-03-08 RX ORDER — BUPROPION HYDROCHLORIDE 300 MG/1
300 TABLET ORAL EVERY MORNING
Qty: 90 TABLET | Refills: 3 | Status: SHIPPED | OUTPATIENT
Start: 2021-03-08 | End: 2022-10-03

## 2021-03-08 RX ORDER — CANDESARTAN 32 MG/1
32 TABLET ORAL AT BEDTIME
Qty: 90 TABLET | Refills: 3 | Status: SHIPPED | OUTPATIENT
Start: 2021-03-08 | End: 2021-12-06

## 2021-03-08 RX ORDER — ALLOPURINOL 100 MG/1
TABLET ORAL
Qty: 90 TABLET | Refills: 3 | Status: SHIPPED | OUTPATIENT
Start: 2021-03-08 | End: 2021-12-06

## 2021-03-08 RX ORDER — AMLODIPINE BESYLATE 10 MG/1
10 TABLET ORAL AT BEDTIME
Qty: 90 TABLET | Refills: 3 | Status: SHIPPED | OUTPATIENT
Start: 2021-03-08 | End: 2021-12-06

## 2021-03-08 RX ORDER — ROSUVASTATIN CALCIUM 40 MG/1
40 TABLET, COATED ORAL DAILY
Qty: 90 TABLET | Refills: 3 | Status: SHIPPED | OUTPATIENT
Start: 2021-03-08 | End: 2021-12-06

## 2021-03-08 ASSESSMENT — PAIN SCALES - GENERAL: PAINLEVEL: NO PAIN (0)

## 2021-03-08 NOTE — PROGRESS NOTES
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 Negative for COVID. Followed by Walter Simons in Pulm for this steroid response long-tail post viral cough. Was worsening but today feels like he's turned a corner. Blood sugars fine. BPs excellent. No SEs on high-intensity statin. Followed in [facility] clinic and doing well on a comprehensive regimen with >50 lbs loss.     Current Outpatient Medications   Medication     allopurinol (ZYLOPRIM) 100 MG tablet     amLODIPine (NORVASC) 10 MG tablet     buPROPion (WELLBUTRIN XL) 300 MG 24 hr tablet     candesartan (ATACAND) 32 MG tablet     clomiPHENE (CLOMID) 50 MG tablet     empagliflozin (JARDIANCE) 25 MG TABS tablet     hydrochlorothiazide (MICROZIDE) 12.5 MG capsule     metFORMIN (GLUCOPHAGE) 1000 MG tablet     multivitamin, therapeutic with minerals (MULTI-VITAMIN) TABS tablet     omeprazole (PRILOSEC) 20 MG DR capsule     phentermine (ADIPEX-P) 37.5 MG tablet     predniSONE (DELTASONE) 10 MG tablet     rosuvastatin (CRESTOR) 40 MG tablet     tamsulosin (FLOMAX) 0.4 MG capsule     No current facility-administered medications for this visit.      Facility-Administered Medications Ordered in Other Visits   Medication     barium sulfate 40% (VARIBAR THIN HONEY) oral suspension       O) /79 (BP Location: Right arm, Patient Position: Sitting, Cuff Size: Adult Regular)   Pulse 102   Wt 111.1 kg (245 lb)   SpO2 96%   BMI 31.46 kg/m    Well appearing  HEENT nl  COR- RRR no MRG  Lungs clear today- some decreased airflow but now audible wheezing/rales  Abd soft  Nl mentation  Trace LE edema    A/P) 1) Cough. Dr. Grant has a history of severe post viral bronchospasm every 12-18 mos. COVID unlikely- tested negative and has been fully vaccinated. Influenza possible - a bit moot at this point. Has responded to steroid in past- now on 30 mg prednisone on day 14. Dr. Simons managing the taper. Will query regarding PJP ppx as if on 20  mg/d for over a month would do this.     2) DM. Well controlled as below. No lows. No change.  Lab Results   Component Value Date    A1C 5.0 12/09/2020    A1C 5.1 08/06/2020    A1C 5.2 12/03/2019    A1C 5.6 04/30/2019    A1C 5.7 11/13/2018     3) Obesity. Very successful wt loss program with BMI now only 31 (down 51 lbs to 245 today). Continue- exercise with goal of cardiopulm fitness is key.    4) Hyperlipidemia. High risk for CAD so high intensity statin appropriate.    5) HTN. BP in target range. No change.     30 minutes spent on the date of the encounter performing chart review, history and exam, documentation and further activities as noted above.    Christian Dutta MD  [facility] Center  [facility]

## 2021-03-08 NOTE — NURSING NOTE
Chief Complaint   Patient presents with     Recheck Medication     Pt would like to recheck medications      Holli Tesfaye, EMT at 4:41 PM sign on 3/8/2021

## 2021-03-15 DIAGNOSIS — R05.8 POST-VIRAL COUGH SYNDROME: ICD-10-CM

## 2021-03-15 DIAGNOSIS — R05.9 COUGH: ICD-10-CM

## 2021-03-15 DIAGNOSIS — J45.901 EXACERBATION OF ASTHMA, UNSPECIFIED ASTHMA SEVERITY, UNSPECIFIED WHETHER PERSISTENT: Primary | ICD-10-CM

## 2021-05-06 ENCOUNTER — MEDICAL CORRESPONDENCE (OUTPATIENT)
Dept: HEALTH INFORMATION MANAGEMENT | Facility: CLINIC | Age: 66
End: 2021-05-06

## 2021-06-07 DIAGNOSIS — I10 ESSENTIAL HYPERTENSION: Primary | ICD-10-CM

## 2021-06-11 DIAGNOSIS — N25.81 SECONDARY HYPERPARATHYROIDISM (H): ICD-10-CM

## 2021-06-11 DIAGNOSIS — E78.5 DYSLIPIDEMIA: Primary | ICD-10-CM

## 2021-06-11 DIAGNOSIS — E23.0 HYPOGONADISM, HYPOGONADOTROPIC, WITH ANOSMIA (H): ICD-10-CM

## 2021-06-11 DIAGNOSIS — R73.01 IMPAIRED FASTING GLUCOSE: ICD-10-CM

## 2021-06-11 DIAGNOSIS — M10.9 GOUT, UNSPECIFIED: ICD-10-CM

## 2021-06-11 DIAGNOSIS — E55.9 VITAMIN D DEFICIENCY: ICD-10-CM

## 2021-06-11 DIAGNOSIS — I10 ESSENTIAL HYPERTENSION: ICD-10-CM

## 2021-06-11 LAB
C PEPTIDE SERPL-MCNC: 3.8 NG/ML (ref 0.9–6.9)
CALCIUM SERPL-MCNC: 9.8 MG/DL (ref 8.5–10.1)
CHOLEST SERPL-MCNC: 79 MG/DL
CRP SERPL-MCNC: 4.4 MG/L (ref 0–8)
FSH SERPL-ACNC: 5.1 IU/L (ref 0.7–10.8)
GLUCOSE SERPL-MCNC: 93 MG/DL (ref 70–99)
HDLC SERPL-MCNC: 35 MG/DL
LDLC SERPL CALC-MCNC: 26 MG/DL
LH SERPL-ACNC: 12.3 IU/L (ref 1.5–9.3)
MAGNESIUM SERPL-MCNC: 2.3 MG/DL (ref 1.6–2.3)
NONHDLC SERPL-MCNC: 44 MG/DL
PHOSPHATE SERPL-MCNC: 2.7 MG/DL (ref 2.5–4.5)
PTH-INTACT SERPL-MCNC: 52 PG/ML (ref 18–80)
TRIGL SERPL-MCNC: 91 MG/DL
URATE SERPL-MCNC: 4.5 MG/DL (ref 3.5–7.2)

## 2021-06-11 PROCEDURE — 83001 ASSAY OF GONADOTROPIN (FSH): CPT | Performed by: PATHOLOGY

## 2021-06-11 PROCEDURE — 83735 ASSAY OF MAGNESIUM: CPT | Performed by: PATHOLOGY

## 2021-06-11 PROCEDURE — 80061 LIPID PANEL: CPT | Performed by: PATHOLOGY

## 2021-06-11 PROCEDURE — 82947 ASSAY GLUCOSE BLOOD QUANT: CPT | Performed by: PATHOLOGY

## 2021-06-11 PROCEDURE — 82306 VITAMIN D 25 HYDROXY: CPT | Mod: 90 | Performed by: PATHOLOGY

## 2021-06-11 PROCEDURE — 86769 SARS-COV-2 COVID-19 ANTIBODY: CPT | Mod: 59 | Performed by: PATHOLOGY

## 2021-06-11 PROCEDURE — 99000 SPECIMEN HANDLING OFFICE-LAB: CPT | Performed by: PATHOLOGY

## 2021-06-11 PROCEDURE — 82310 ASSAY OF CALCIUM: CPT | Performed by: PATHOLOGY

## 2021-06-11 PROCEDURE — 86769 SARS-COV-2 COVID-19 ANTIBODY: CPT | Performed by: PATHOLOGY

## 2021-06-11 PROCEDURE — 84403 ASSAY OF TOTAL TESTOSTERONE: CPT | Mod: 90 | Performed by: PATHOLOGY

## 2021-06-11 PROCEDURE — 86140 C-REACTIVE PROTEIN: CPT | Performed by: PATHOLOGY

## 2021-06-11 PROCEDURE — 83002 ASSAY OF GONADOTROPIN (LH): CPT | Mod: 90 | Performed by: PATHOLOGY

## 2021-06-11 PROCEDURE — 36415 COLL VENOUS BLD VENIPUNCTURE: CPT | Performed by: PATHOLOGY

## 2021-06-11 PROCEDURE — 84100 ASSAY OF PHOSPHORUS: CPT | Performed by: PATHOLOGY

## 2021-06-11 PROCEDURE — 84681 ASSAY OF C-PEPTIDE: CPT | Mod: 90 | Performed by: PATHOLOGY

## 2021-06-11 PROCEDURE — 83970 ASSAY OF PARATHORMONE: CPT | Mod: 90 | Performed by: PATHOLOGY

## 2021-06-11 PROCEDURE — 84550 ASSAY OF BLOOD/URIC ACID: CPT | Performed by: PATHOLOGY

## 2021-06-13 LAB
SARS-COV-2 AB PNL SERPL IA: POSITIVE
SARS-COV-2 IGG SERPL IA-ACNC: NORMAL
TESTOST SERPL-MCNC: 385 NG/DL (ref 240–950)

## 2021-06-14 LAB — DEPRECATED CALCIDIOL+CALCIFEROL SERPL-MC: 80 UG/L (ref 20–75)

## 2021-06-16 ENCOUNTER — MYC MEDICAL ADVICE (OUTPATIENT)
Dept: INTERNAL MEDICINE | Facility: CLINIC | Age: 66
End: 2021-06-16

## 2021-06-16 ENCOUNTER — TELEPHONE (OUTPATIENT)
Dept: INTERNAL MEDICINE | Facility: CLINIC | Age: 66
End: 2021-06-16

## 2021-06-16 DIAGNOSIS — Z23 IMMUNIZATION DUE: Primary | ICD-10-CM

## 2021-06-16 NOTE — TELEPHONE ENCOUNTER
Patient contacted Dr. Dutta by email stating that it was recommended that he receive a Pfizer vaccine based on his antibody levels. He was wanting to know if Dr. Dutta would order the vaccine and antibody levels 2-3 weeks after a booster.    Nely Montelongo RN

## 2021-06-21 ENCOUNTER — TELEPHONE (OUTPATIENT)
Dept: INTERNAL MEDICINE | Facility: CLINIC | Age: 66
End: 2021-06-21

## 2021-06-21 DIAGNOSIS — Z91.89 AT INCREASED RISK OF EXPOSURE TO COVID-19 VIRUS: Primary | ICD-10-CM

## 2021-06-21 NOTE — TELEPHONE ENCOUNTER
[facility] Call Center    Phone Message    May a detailed message be left on voicemail: yes     Reason for Call: Other: Pt is calling in to speak with Bina, pt did not say what it was about, he just said please have her call me and I will talk to her.     Action Taken: Message routed to:  [facility] ([facility]    Travel Screening: Not Applicable

## 2021-06-22 ENCOUNTER — MYC MEDICAL ADVICE (OUTPATIENT)
Dept: INTERNAL MEDICINE | Facility: CLINIC | Age: 66
End: 2021-06-22

## 2021-06-23 NOTE — TELEPHONE ENCOUNTER
Patient needed orders entered for covid pfizer vaccine booster, entered by PCP on Monday.    I called Darnell and left a message to ensure nothing further is needed.    Bina ([facility]) HAY Milligan

## 2021-06-24 NOTE — TELEPHONE ENCOUNTER
Patient received recommended pfizer dose on 6/22. Active order has been canceled as vaccine was received.    Bina Milligan RN ([facility])

## 2021-07-11 ENCOUNTER — HEALTH MAINTENANCE LETTER (OUTPATIENT)
Age: 66
End: 2021-07-11

## 2021-07-14 ENCOUNTER — HOSPITAL ENCOUNTER (OUTPATIENT)
Facility: CLINIC | Age: 66
Setting detail: OBSERVATION
Discharge: HOME HEALTH | End: 2021-07-15
Attending: EMERGENCY MEDICINE | Admitting: SURGERY
Payer: COMMERCIAL

## 2021-07-14 ENCOUNTER — APPOINTMENT (OUTPATIENT)
Dept: CT IMAGING | Facility: CLINIC | Age: 66
End: 2021-07-14
Attending: EMERGENCY MEDICINE
Payer: COMMERCIAL

## 2021-07-14 ENCOUNTER — ANESTHESIA EVENT (OUTPATIENT)
Dept: SURGERY | Facility: CLINIC | Age: 66
End: 2021-07-14
Payer: COMMERCIAL

## 2021-07-14 ENCOUNTER — LAB (OUTPATIENT)
Dept: LAB | Facility: CLINIC | Age: 66
End: 2021-07-14
Payer: COMMERCIAL

## 2021-07-14 ENCOUNTER — ANESTHESIA (OUTPATIENT)
Dept: SURGERY | Facility: CLINIC | Age: 66
End: 2021-07-14
Payer: COMMERCIAL

## 2021-07-14 DIAGNOSIS — Z91.89 AT INCREASED RISK OF EXPOSURE TO COVID-19 VIRUS: ICD-10-CM

## 2021-07-14 DIAGNOSIS — R73.03 PREDIABETES: ICD-10-CM

## 2021-07-14 DIAGNOSIS — R73.01 IMPAIRED FASTING GLUCOSE: ICD-10-CM

## 2021-07-14 DIAGNOSIS — K35.30 ACUTE APPENDICITIS WITH LOCALIZED PERITONITIS, WITHOUT PERFORATION, ABSCESS, OR GANGRENE: Primary | ICD-10-CM

## 2021-07-14 DIAGNOSIS — Z11.52 ENCOUNTER FOR SCREENING LABORATORY TESTING FOR COVID-19 VIRUS: ICD-10-CM

## 2021-07-14 LAB
ALBUMIN SERPL-MCNC: 4.2 G/DL (ref 3.4–5)
ALP SERPL-CCNC: 69 U/L (ref 40–150)
ALT SERPL W P-5'-P-CCNC: 36 U/L (ref 0–70)
ANION GAP SERPL CALCULATED.3IONS-SCNC: 4 MMOL/L (ref 3–14)
AST SERPL W P-5'-P-CCNC: 20 U/L (ref 0–45)
BASOPHILS # BLD AUTO: 0.1 10E3/UL (ref 0–0.2)
BASOPHILS NFR BLD AUTO: 0 %
BILIRUB DIRECT SERPL-MCNC: 0.2 MG/DL (ref 0–0.2)
BILIRUB SERPL-MCNC: 1 MG/DL (ref 0.2–1.3)
BUN SERPL-MCNC: 18 MG/DL (ref 7–30)
CALCIUM SERPL-MCNC: 10.3 MG/DL (ref 8.5–10.1)
CHLORIDE BLD-SCNC: 104 MMOL/L (ref 94–109)
CO2 SERPL-SCNC: 31 MMOL/L (ref 20–32)
CREAT SERPL-MCNC: 0.93 MG/DL (ref 0.66–1.25)
CREAT UR-MCNC: 100 MG/DL
EOSINOPHIL # BLD AUTO: 0.1 10E3/UL (ref 0–0.7)
EOSINOPHIL NFR BLD AUTO: 0 %
ERYTHROCYTE [DISTWIDTH] IN BLOOD BY AUTOMATED COUNT: 14.3 % (ref 10–15)
GFR SERPL CREATININE-BSD FRML MDRD: 85 ML/MIN/1.73M2
GLUCOSE BLD-MCNC: 107 MG/DL (ref 70–99)
GLUCOSE BLDC GLUCOMTR-MCNC: 82 MG/DL (ref 70–99)
HBA1C MFR BLD: 4.8 % (ref 0–5.6)
HCT VFR BLD AUTO: 55.1 % (ref 40–53)
HGB BLD-MCNC: 18.2 G/DL (ref 13.3–17.7)
HOLD SPECIMEN: NORMAL
HOLD SPECIMEN: NORMAL
IMM GRANULOCYTES # BLD: 0.1 10E3/UL
IMM GRANULOCYTES NFR BLD: 0 %
LACTATE SERPL-SCNC: 3.4 MMOL/L (ref 0.7–2)
LIPASE SERPL-CCNC: 42 U/L (ref 73–393)
LYMPHOCYTES # BLD AUTO: 0.9 10E3/UL (ref 0.8–5.3)
LYMPHOCYTES NFR BLD AUTO: 5 %
MCH RBC QN AUTO: 30.5 PG (ref 26.5–33)
MCHC RBC AUTO-ENTMCNC: 33 G/DL (ref 31.5–36.5)
MCV RBC AUTO: 92 FL (ref 78–100)
MICROALBUMIN UR-MCNC: 24 MG/DL
MICROALBUMIN/CREAT UR: 24 MG/G CR (ref 0–17)
MONOCYTES # BLD AUTO: 0.9 10E3/UL (ref 0–1.3)
MONOCYTES NFR BLD AUTO: 6 %
NEUTROPHILS # BLD AUTO: 14.3 10E3/UL (ref 1.6–8.3)
NEUTROPHILS NFR BLD AUTO: 89 %
NRBC # BLD AUTO: 0 10E3/UL
NRBC BLD AUTO-RTO: 0 /100
PLATELET # BLD AUTO: 170 10E3/UL (ref 150–450)
POTASSIUM BLD-SCNC: 4.2 MMOL/L (ref 3.4–5.3)
PROT SERPL-MCNC: 7.7 G/DL (ref 6.8–8.8)
RADIOLOGIST FLAGS: ABNORMAL
RBC # BLD AUTO: 5.96 10E6/UL (ref 4.4–5.9)
SARS-COV-2 RNA RESP QL NAA+PROBE: NEGATIVE
SARS-COV-2 RNA RESP QL NAA+PROBE: NEGATIVE
SODIUM SERPL-SCNC: 139 MMOL/L (ref 133–144)
WBC # BLD AUTO: 16.3 10E3/UL (ref 4–11)

## 2021-07-14 PROCEDURE — 360N000076 HC SURGERY LEVEL 3, PER MIN: Performed by: SURGERY

## 2021-07-14 PROCEDURE — 83036 HEMOGLOBIN GLYCOSYLATED A1C: CPT | Performed by: PATHOLOGY

## 2021-07-14 PROCEDURE — 96375 TX/PRO/DX INJ NEW DRUG ADDON: CPT | Performed by: EMERGENCY MEDICINE

## 2021-07-14 PROCEDURE — 258N000003 HC RX IP 258 OP 636: Performed by: NURSE ANESTHETIST, CERTIFIED REGISTERED

## 2021-07-14 PROCEDURE — 250N000009 HC RX 250: Performed by: SURGERY

## 2021-07-14 PROCEDURE — 250N000009 HC RX 250: Performed by: NURSE ANESTHETIST, CERTIFIED REGISTERED

## 2021-07-14 PROCEDURE — 250N000011 HC RX IP 250 OP 636: Performed by: SURGERY

## 2021-07-14 PROCEDURE — 96361 HYDRATE IV INFUSION ADD-ON: CPT | Mod: XU | Performed by: EMERGENCY MEDICINE

## 2021-07-14 PROCEDURE — 250N000011 HC RX IP 250 OP 636

## 2021-07-14 PROCEDURE — 74177 CT ABD & PELVIS W/CONTRAST: CPT

## 2021-07-14 PROCEDURE — 99285 EMERGENCY DEPT VISIT HI MDM: CPT | Mod: 25 | Performed by: EMERGENCY MEDICINE

## 2021-07-14 PROCEDURE — 88304 TISSUE EXAM BY PATHOLOGIST: CPT | Mod: TC | Performed by: SURGERY

## 2021-07-14 PROCEDURE — 272N000001 HC OR GENERAL SUPPLY STERILE: Performed by: SURGERY

## 2021-07-14 PROCEDURE — U0003 INFECTIOUS AGENT DETECTION BY NUCLEIC ACID (DNA OR RNA); SEVERE ACUTE RESPIRATORY SYNDROME CORONAVIRUS 2 (SARS-COV-2) (CORONAVIRUS DISEASE [COVID-19]), AMPLIFIED PROBE TECHNIQUE, MAKING USE OF HIGH THROUGHPUT TECHNOLOGIES AS DESCRIBED BY CMS-2020-01-R: HCPCS | Performed by: EMERGENCY MEDICINE

## 2021-07-14 PROCEDURE — 258N000003 HC RX IP 258 OP 636: Performed by: EMERGENCY MEDICINE

## 2021-07-14 PROCEDURE — C9803 HOPD COVID-19 SPEC COLLECT: HCPCS | Performed by: EMERGENCY MEDICINE

## 2021-07-14 PROCEDURE — 82043 UR ALBUMIN QUANTITATIVE: CPT | Performed by: PATHOLOGY

## 2021-07-14 PROCEDURE — 87635 SARS-COV-2 COVID-19 AMP PRB: CPT | Performed by: STUDENT IN AN ORGANIZED HEALTH CARE EDUCATION/TRAINING PROGRAM

## 2021-07-14 PROCEDURE — 82248 BILIRUBIN DIRECT: CPT | Performed by: EMERGENCY MEDICINE

## 2021-07-14 PROCEDURE — 74177 CT ABD & PELVIS W/CONTRAST: CPT | Mod: 26 | Performed by: RADIOLOGY

## 2021-07-14 PROCEDURE — 250N000011 HC RX IP 250 OP 636: Performed by: EMERGENCY MEDICINE

## 2021-07-14 PROCEDURE — 86769 SARS-COV-2 COVID-19 ANTIBODY: CPT | Mod: 90 | Performed by: PATHOLOGY

## 2021-07-14 PROCEDURE — 85025 COMPLETE CBC W/AUTO DIFF WBC: CPT | Performed by: EMERGENCY MEDICINE

## 2021-07-14 PROCEDURE — 999N000141 HC STATISTIC PRE-PROCEDURE NURSING ASSESSMENT: Performed by: SURGERY

## 2021-07-14 PROCEDURE — 250N000011 HC RX IP 250 OP 636: Performed by: STUDENT IN AN ORGANIZED HEALTH CARE EDUCATION/TRAINING PROGRAM

## 2021-07-14 PROCEDURE — 80048 BASIC METABOLIC PNL TOTAL CA: CPT | Performed by: EMERGENCY MEDICINE

## 2021-07-14 PROCEDURE — 44970 LAPAROSCOPY APPENDECTOMY: CPT | Performed by: SURGERY

## 2021-07-14 PROCEDURE — 83605 ASSAY OF LACTIC ACID: CPT | Performed by: EMERGENCY MEDICINE

## 2021-07-14 PROCEDURE — 83690 ASSAY OF LIPASE: CPT | Performed by: EMERGENCY MEDICINE

## 2021-07-14 PROCEDURE — 36592 COLLECT BLOOD FROM PICC: CPT | Performed by: EMERGENCY MEDICINE

## 2021-07-14 PROCEDURE — 370N000017 HC ANESTHESIA TECHNICAL FEE, PER MIN: Performed by: SURGERY

## 2021-07-14 PROCEDURE — 99285 EMERGENCY DEPT VISIT HI MDM: CPT | Performed by: EMERGENCY MEDICINE

## 2021-07-14 PROCEDURE — 96365 THER/PROPH/DIAG IV INF INIT: CPT | Mod: 59 | Performed by: EMERGENCY MEDICINE

## 2021-07-14 PROCEDURE — 710N000010 HC RECOVERY PHASE 1, LEVEL 2, PER MIN: Performed by: SURGERY

## 2021-07-14 PROCEDURE — 250N000025 HC SEVOFLURANE, PER MIN: Performed by: SURGERY

## 2021-07-14 PROCEDURE — 88304 TISSUE EXAM BY PATHOLOGIST: CPT | Mod: 26 | Performed by: PATHOLOGY

## 2021-07-14 PROCEDURE — 250N000013 HC RX MED GY IP 250 OP 250 PS 637: Performed by: STUDENT IN AN ORGANIZED HEALTH CARE EDUCATION/TRAINING PROGRAM

## 2021-07-14 PROCEDURE — 250N000011 HC RX IP 250 OP 636: Performed by: NURSE ANESTHETIST, CERTIFIED REGISTERED

## 2021-07-14 RX ORDER — BUPIVACAINE HYDROCHLORIDE 5 MG/ML
INJECTION, SOLUTION PERINEURAL PRN
Status: DISCONTINUED | OUTPATIENT
Start: 2021-07-14 | End: 2021-07-14 | Stop reason: HOSPADM

## 2021-07-14 RX ORDER — ERTAPENEM 1 G/1
1 INJECTION, POWDER, LYOPHILIZED, FOR SOLUTION INTRAMUSCULAR; INTRAVENOUS ONCE
Status: DISCONTINUED | OUTPATIENT
Start: 2021-07-15 | End: 2021-07-15

## 2021-07-14 RX ORDER — FENTANYL CITRATE 50 UG/ML
25-50 INJECTION, SOLUTION INTRAMUSCULAR; INTRAVENOUS EVERY 5 MIN PRN
Status: DISCONTINUED | OUTPATIENT
Start: 2021-07-14 | End: 2021-07-14 | Stop reason: HOSPADM

## 2021-07-14 RX ORDER — ONDANSETRON 2 MG/ML
INJECTION INTRAMUSCULAR; INTRAVENOUS PRN
Status: DISCONTINUED | OUTPATIENT
Start: 2021-07-14 | End: 2021-07-14

## 2021-07-14 RX ORDER — PROPOFOL 10 MG/ML
INJECTION, EMULSION INTRAVENOUS PRN
Status: DISCONTINUED | OUTPATIENT
Start: 2021-07-14 | End: 2021-07-14

## 2021-07-14 RX ORDER — ONDANSETRON 2 MG/ML
4 INJECTION INTRAMUSCULAR; INTRAVENOUS EVERY 6 HOURS PRN
Status: DISCONTINUED | OUTPATIENT
Start: 2021-07-14 | End: 2021-07-15 | Stop reason: HOSPADM

## 2021-07-14 RX ORDER — CEFAZOLIN SODIUM 2 G/100ML
2 INJECTION, SOLUTION INTRAVENOUS SEE ADMIN INSTRUCTIONS
Status: DISCONTINUED | OUTPATIENT
Start: 2021-07-14 | End: 2021-07-14 | Stop reason: HOSPADM

## 2021-07-14 RX ORDER — DEXTROSE MONOHYDRATE 25 G/50ML
25-50 INJECTION, SOLUTION INTRAVENOUS
Status: DISCONTINUED | OUTPATIENT
Start: 2021-07-14 | End: 2021-07-15 | Stop reason: HOSPADM

## 2021-07-14 RX ORDER — SODIUM CHLORIDE 9 MG/ML
1000 INJECTION, SOLUTION INTRAVENOUS CONTINUOUS
Status: DISCONTINUED | OUTPATIENT
Start: 2021-07-14 | End: 2021-07-15 | Stop reason: HOSPADM

## 2021-07-14 RX ORDER — PHENTERMINE HYDROCHLORIDE 37.5 MG/1
56 TABLET ORAL
Status: DISCONTINUED | OUTPATIENT
Start: 2021-07-15 | End: 2021-07-15 | Stop reason: CLARIF

## 2021-07-14 RX ORDER — KETOROLAC TROMETHAMINE 30 MG/ML
15 INJECTION, SOLUTION INTRAMUSCULAR; INTRAVENOUS EVERY 6 HOURS PRN
Status: DISCONTINUED | OUTPATIENT
Start: 2021-07-14 | End: 2021-07-14 | Stop reason: HOSPADM

## 2021-07-14 RX ORDER — NICOTINE POLACRILEX 4 MG
15-30 LOZENGE BUCCAL
Status: DISCONTINUED | OUTPATIENT
Start: 2021-07-14 | End: 2021-07-15 | Stop reason: HOSPADM

## 2021-07-14 RX ORDER — LIDOCAINE 40 MG/G
CREAM TOPICAL
Status: DISCONTINUED | OUTPATIENT
Start: 2021-07-14 | End: 2021-07-15 | Stop reason: HOSPADM

## 2021-07-14 RX ORDER — ERTAPENEM 1 G/1
1 INJECTION, POWDER, LYOPHILIZED, FOR SOLUTION INTRAMUSCULAR; INTRAVENOUS ONCE
Status: COMPLETED | OUTPATIENT
Start: 2021-07-14 | End: 2021-07-14

## 2021-07-14 RX ORDER — ALLOPURINOL 100 MG/1
100 TABLET ORAL AT BEDTIME
Status: DISCONTINUED | OUTPATIENT
Start: 2021-07-15 | End: 2021-07-15 | Stop reason: HOSPADM

## 2021-07-14 RX ORDER — PROCHLORPERAZINE MALEATE 5 MG
5 TABLET ORAL EVERY 6 HOURS PRN
Status: DISCONTINUED | OUTPATIENT
Start: 2021-07-14 | End: 2021-07-15 | Stop reason: HOSPADM

## 2021-07-14 RX ORDER — HYDRALAZINE HYDROCHLORIDE 20 MG/ML
2.5-5 INJECTION INTRAMUSCULAR; INTRAVENOUS EVERY 10 MIN PRN
Status: DISCONTINUED | OUTPATIENT
Start: 2021-07-14 | End: 2021-07-14 | Stop reason: HOSPADM

## 2021-07-14 RX ORDER — HYDROMORPHONE HYDROCHLORIDE 2 MG/1
4 TABLET ORAL EVERY 4 HOURS PRN
Status: DISCONTINUED | OUTPATIENT
Start: 2021-07-14 | End: 2021-07-15 | Stop reason: HOSPADM

## 2021-07-14 RX ORDER — HYDROMORPHONE HYDROCHLORIDE 1 MG/ML
0.2 INJECTION, SOLUTION INTRAMUSCULAR; INTRAVENOUS; SUBCUTANEOUS EVERY 5 MIN PRN
Status: DISCONTINUED | OUTPATIENT
Start: 2021-07-14 | End: 2021-07-14 | Stop reason: HOSPADM

## 2021-07-14 RX ORDER — ONDANSETRON 4 MG/1
4 TABLET, ORALLY DISINTEGRATING ORAL EVERY 6 HOURS PRN
Status: DISCONTINUED | OUTPATIENT
Start: 2021-07-14 | End: 2021-07-15 | Stop reason: HOSPADM

## 2021-07-14 RX ORDER — KETOROLAC TROMETHAMINE 15 MG/ML
15 INJECTION, SOLUTION INTRAMUSCULAR; INTRAVENOUS ONCE
Status: COMPLETED | OUTPATIENT
Start: 2021-07-14 | End: 2021-07-14

## 2021-07-14 RX ORDER — SODIUM CHLORIDE, SODIUM LACTATE, POTASSIUM CHLORIDE, CALCIUM CHLORIDE 600; 310; 30; 20 MG/100ML; MG/100ML; MG/100ML; MG/100ML
INJECTION, SOLUTION INTRAVENOUS CONTINUOUS PRN
Status: DISCONTINUED | OUTPATIENT
Start: 2021-07-14 | End: 2021-07-14

## 2021-07-14 RX ORDER — IOPAMIDOL 755 MG/ML
135 INJECTION, SOLUTION INTRAVASCULAR ONCE
Status: COMPLETED | OUTPATIENT
Start: 2021-07-14 | End: 2021-07-14

## 2021-07-14 RX ORDER — DEXAMETHASONE SODIUM PHOSPHATE 4 MG/ML
INJECTION, SOLUTION INTRA-ARTICULAR; INTRALESIONAL; INTRAMUSCULAR; INTRAVENOUS; SOFT TISSUE PRN
Status: DISCONTINUED | OUTPATIENT
Start: 2021-07-14 | End: 2021-07-14

## 2021-07-14 RX ORDER — HYDROMORPHONE HYDROCHLORIDE 2 MG/1
2 TABLET ORAL EVERY 4 HOURS PRN
Status: DISCONTINUED | OUTPATIENT
Start: 2021-07-14 | End: 2021-07-15 | Stop reason: HOSPADM

## 2021-07-14 RX ORDER — ONDANSETRON 4 MG/1
4 TABLET, ORALLY DISINTEGRATING ORAL EVERY 30 MIN PRN
Status: DISCONTINUED | OUTPATIENT
Start: 2021-07-14 | End: 2021-07-14 | Stop reason: HOSPADM

## 2021-07-14 RX ORDER — AMLODIPINE BESYLATE 10 MG/1
10 TABLET ORAL AT BEDTIME
Status: DISCONTINUED | OUTPATIENT
Start: 2021-07-15 | End: 2021-07-15 | Stop reason: HOSPADM

## 2021-07-14 RX ORDER — ACETAMINOPHEN 325 MG/1
650 TABLET ORAL EVERY 6 HOURS PRN
Status: DISCONTINUED | OUTPATIENT
Start: 2021-07-14 | End: 2021-07-15 | Stop reason: HOSPADM

## 2021-07-14 RX ORDER — LORAZEPAM 2 MG/ML
.5-1 INJECTION INTRAMUSCULAR
Status: DISCONTINUED | OUTPATIENT
Start: 2021-07-14 | End: 2021-07-14 | Stop reason: HOSPADM

## 2021-07-14 RX ORDER — FENTANYL CITRATE 50 UG/ML
INJECTION, SOLUTION INTRAMUSCULAR; INTRAVENOUS PRN
Status: DISCONTINUED | OUTPATIENT
Start: 2021-07-14 | End: 2021-07-14

## 2021-07-14 RX ORDER — TAMSULOSIN HYDROCHLORIDE 0.4 MG/1
0.4 CAPSULE ORAL DAILY
Status: DISCONTINUED | OUTPATIENT
Start: 2021-07-15 | End: 2021-07-15 | Stop reason: HOSPADM

## 2021-07-14 RX ORDER — ALBUTEROL SULFATE 0.83 MG/ML
2.5 SOLUTION RESPIRATORY (INHALATION) EVERY 4 HOURS PRN
Status: DISCONTINUED | OUTPATIENT
Start: 2021-07-14 | End: 2021-07-14 | Stop reason: HOSPADM

## 2021-07-14 RX ORDER — LIDOCAINE HYDROCHLORIDE 20 MG/ML
INJECTION, SOLUTION INFILTRATION; PERINEURAL PRN
Status: DISCONTINUED | OUTPATIENT
Start: 2021-07-14 | End: 2021-07-14

## 2021-07-14 RX ORDER — HYDROCHLOROTHIAZIDE 12.5 MG/1
12.5 CAPSULE ORAL DAILY
Status: DISCONTINUED | OUTPATIENT
Start: 2021-07-15 | End: 2021-07-15 | Stop reason: HOSPADM

## 2021-07-14 RX ORDER — ACETAMINOPHEN 325 MG/1
975 TABLET ORAL ONCE
Status: COMPLETED | OUTPATIENT
Start: 2021-07-14 | End: 2021-07-14

## 2021-07-14 RX ORDER — CEFAZOLIN SODIUM 2 G/100ML
2 INJECTION, SOLUTION INTRAVENOUS
Status: DISCONTINUED | OUTPATIENT
Start: 2021-07-14 | End: 2021-07-14 | Stop reason: HOSPADM

## 2021-07-14 RX ORDER — LIDOCAINE HYDROCHLORIDE AND EPINEPHRINE 10; 10 MG/ML; UG/ML
INJECTION, SOLUTION INFILTRATION; PERINEURAL PRN
Status: DISCONTINUED | OUTPATIENT
Start: 2021-07-14 | End: 2021-07-14 | Stop reason: HOSPADM

## 2021-07-14 RX ORDER — ROSUVASTATIN CALCIUM 20 MG/1
40 TABLET, COATED ORAL DAILY
Status: DISCONTINUED | OUTPATIENT
Start: 2021-07-15 | End: 2021-07-15 | Stop reason: HOSPADM

## 2021-07-14 RX ORDER — HYDROMORPHONE HCL IN WATER/PF 6 MG/30 ML
0.4 PATIENT CONTROLLED ANALGESIA SYRINGE INTRAVENOUS
Status: DISCONTINUED | OUTPATIENT
Start: 2021-07-14 | End: 2021-07-15 | Stop reason: HOSPADM

## 2021-07-14 RX ORDER — OXYCODONE HYDROCHLORIDE 5 MG/1
5-10 TABLET ORAL EVERY 4 HOURS PRN
Status: DISCONTINUED | OUTPATIENT
Start: 2021-07-14 | End: 2021-07-15 | Stop reason: HOSPADM

## 2021-07-14 RX ORDER — HYDROMORPHONE HCL IN WATER/PF 6 MG/30 ML
0.2 PATIENT CONTROLLED ANALGESIA SYRINGE INTRAVENOUS
Status: DISCONTINUED | OUTPATIENT
Start: 2021-07-14 | End: 2021-07-15 | Stop reason: HOSPADM

## 2021-07-14 RX ORDER — ONDANSETRON 2 MG/ML
4 INJECTION INTRAMUSCULAR; INTRAVENOUS EVERY 30 MIN PRN
Status: DISCONTINUED | OUTPATIENT
Start: 2021-07-14 | End: 2021-07-14 | Stop reason: HOSPADM

## 2021-07-14 RX ORDER — MEPERIDINE HYDROCHLORIDE 25 MG/ML
12.5 INJECTION INTRAMUSCULAR; INTRAVENOUS; SUBCUTANEOUS
Status: DISCONTINUED | OUTPATIENT
Start: 2021-07-14 | End: 2021-07-14 | Stop reason: HOSPADM

## 2021-07-14 RX ORDER — KETOROLAC TROMETHAMINE 15 MG/ML
15 INJECTION, SOLUTION INTRAMUSCULAR; INTRAVENOUS EVERY 6 HOURS PRN
Status: DISCONTINUED | OUTPATIENT
Start: 2021-07-14 | End: 2021-07-15 | Stop reason: HOSPADM

## 2021-07-14 RX ORDER — SODIUM CHLORIDE, SODIUM LACTATE, POTASSIUM CHLORIDE, CALCIUM CHLORIDE 600; 310; 30; 20 MG/100ML; MG/100ML; MG/100ML; MG/100ML
INJECTION, SOLUTION INTRAVENOUS CONTINUOUS
Status: DISCONTINUED | OUTPATIENT
Start: 2021-07-14 | End: 2021-07-14 | Stop reason: HOSPADM

## 2021-07-14 RX ORDER — BUPROPION HYDROCHLORIDE 150 MG/1
300 TABLET ORAL EVERY MORNING
Status: DISCONTINUED | OUTPATIENT
Start: 2021-07-15 | End: 2021-07-15 | Stop reason: HOSPADM

## 2021-07-14 RX ORDER — LABETALOL HYDROCHLORIDE 5 MG/ML
10 INJECTION, SOLUTION INTRAVENOUS
Status: DISCONTINUED | OUTPATIENT
Start: 2021-07-14 | End: 2021-07-14 | Stop reason: HOSPADM

## 2021-07-14 RX ADMIN — HYDROMORPHONE HYDROCHLORIDE 0.5 MG: 1 INJECTION, SOLUTION INTRAMUSCULAR; INTRAVENOUS; SUBCUTANEOUS at 20:34

## 2021-07-14 RX ADMIN — DEXAMETHASONE SODIUM PHOSPHATE 8 MG: 4 INJECTION, SOLUTION INTRAMUSCULAR; INTRAVENOUS at 20:01

## 2021-07-14 RX ADMIN — KETOROLAC TROMETHAMINE 15 MG: 15 INJECTION, SOLUTION INTRAMUSCULAR; INTRAVENOUS at 14:05

## 2021-07-14 RX ADMIN — SODIUM CHLORIDE 2000 ML: 900 INJECTION, SOLUTION INTRAVENOUS at 13:59

## 2021-07-14 RX ADMIN — ONDANSETRON 4 MG: 2 INJECTION INTRAMUSCULAR; INTRAVENOUS at 21:20

## 2021-07-14 RX ADMIN — FENTANYL CITRATE 50 MCG: 50 INJECTION, SOLUTION INTRAMUSCULAR; INTRAVENOUS at 20:01

## 2021-07-14 RX ADMIN — PROPOFOL 200 MG: 10 INJECTION, EMULSION INTRAVENOUS at 20:01

## 2021-07-14 RX ADMIN — SODIUM CHLORIDE, POTASSIUM CHLORIDE, SODIUM LACTATE AND CALCIUM CHLORIDE: 600; 310; 30; 20 INJECTION, SOLUTION INTRAVENOUS at 19:55

## 2021-07-14 RX ADMIN — IOPAMIDOL 135 ML: 755 INJECTION, SOLUTION INTRAVENOUS at 13:18

## 2021-07-14 RX ADMIN — ACETAMINOPHEN 975 MG: 325 TABLET, FILM COATED ORAL at 19:13

## 2021-07-14 RX ADMIN — SODIUM CHLORIDE 1000 ML: 9 INJECTION, SOLUTION INTRAVENOUS at 11:28

## 2021-07-14 RX ADMIN — SUGAMMADEX 200 MG: 100 INJECTION, SOLUTION INTRAVENOUS at 21:26

## 2021-07-14 RX ADMIN — LIDOCAINE HYDROCHLORIDE 100 MG: 20 INJECTION, SOLUTION INFILTRATION; PERINEURAL at 20:01

## 2021-07-14 RX ADMIN — FENTANYL CITRATE 50 MCG: 50 INJECTION, SOLUTION INTRAMUSCULAR; INTRAVENOUS at 20:30

## 2021-07-14 RX ADMIN — METRONIDAZOLE 500 MG: 500 INJECTION, SOLUTION INTRAVENOUS at 14:33

## 2021-07-14 RX ADMIN — ERTAPENEM SODIUM 1 G: 1 INJECTION, POWDER, LYOPHILIZED, FOR SOLUTION INTRAMUSCULAR; INTRAVENOUS at 13:59

## 2021-07-14 RX ADMIN — ROCURONIUM BROMIDE 50 MG: 10 INJECTION INTRAVENOUS at 20:01

## 2021-07-14 RX ADMIN — KETOROLAC TROMETHAMINE 15 MG: 15 INJECTION, SOLUTION INTRAMUSCULAR; INTRAVENOUS at 23:08

## 2021-07-14 ASSESSMENT — MIFFLIN-ST. JEOR
SCORE: 1973.07
SCORE: 1973.07

## 2021-07-14 ASSESSMENT — ENCOUNTER SYMPTOMS
CONSTIPATION: 0
ABDOMINAL PAIN: 1
DIARRHEA: 0
ARTHRALGIAS: 0
HEADACHES: 0
DIFFICULTY URINATING: 0
EYE REDNESS: 0
SHORTNESS OF BREATH: 0
APPETITE CHANGE: 1
COLOR CHANGE: 0
BLOOD IN STOOL: 0
VOMITING: 0
FEVER: 0
NAUSEA: 1
CONFUSION: 0
NECK STIFFNESS: 0

## 2021-07-14 NOTE — ED NOTES
Transport arrived. Report given. Patient transported to [facility] OR/ PACU. Patient aware of plan. PACU phoned and updated on patient leaving ER.

## 2021-07-14 NOTE — ED PROVIDER NOTES
[place] EMERGENCY DEPARTMENT ([facility])  July 14, 2021  11:28 AM   History     Chief Complaint   Patient presents with     Abdominal Pain     The history is provided by the patient and medical records.     Dr. 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 Patient developed lower abdominal pain that started last night at around dinner time. This abdominal pain became localized more to right lower quadrant today.  The pain is nonradiating, crampy, occasionally sharp but overall constant and unrelenting.  Worsens with moving, tightening his belt. Has nausea, decreased appetite. No vomiting. Last bowel movement yesterday. No previous abdominal surgeries, but has had prior back and finger surgery. No recent antibiotics or steroids. He has some recent urinary frequency.  Last oral intake was at 7 AM today.  He notes prior history of stress induced and post-viral illness asthma. No history of similar symptoms in the past. He has not taken any medication for this abdominal pain.  He is concerned for possible appendicitis.    Patient is a retired pediatrician.    PAST MEDICAL HISTORY:   Past Medical History:   Diagnosis Date     Bladder mass      BPH (benign prostatic hyperplasia)      GERD (gastroesophageal reflux disease)      High serum parathyroid hormone (PTH) 2017     Hypertension      Metabolic syndrome      Nephrolithiasis      Obesity      LISETTE (obstructive sleep apnea)      Prediabetes        PAST SURGICAL HISTORY:   Past Surgical History:   Procedure Laterality Date     BACK SURGERY      repair disc     CATARACT IOL, RT/LT       CYSTOSCOPY, TRANSURETHRAL RESECTION (TUR) TUMOR BLADDER, COMBINED N/A 1/31/2018    Procedure: COMBINED CYSTOSCOPY, TRANSURETHRAL RESECTION (TUR) TUMOR BLADDER;  Transurethral Biopsy and Resection of Bladder Tumor;  Surgeon: Yaya Lomeli MD;  [facility]     DECOMPRESSION, FUSION CERVICAL  ANTERIOR TWO LEVELS, COMBINED Right 12/10/2019    Procedure: right approach anterior cervical 2-3, 3-4 resection of anterior cervical osteophytes causing dysphagia, with microscope;  Surgeon: María Fitzgerald MD;  [facility]     HC BREATH HYDROGEN TEST  5/22/2013    Procedure: HYDROGEN BREATH TEST;  Surgeon: Donny Paris MD;  [facility]: [facility]     PHACOEMULSIFICATION CLEAR CORNEA WITH STANDARD INTRAOCULAR LENS IMPLANT Left 9/25/2020    Procedure: LEFT CATARACT REMOVAL WITH INTRAOCULAR LENS IMPLANT;  Surgeon: Keyla Tobin MD;  [facility]     PHACOEMULSIFICATION CLEAR CORNEA WITH STANDARD INTRAOCULAR LENS IMPLANT Right 10/2/2020    Procedure: RIGHT CATARACT REMOVAL WITH INTRAOCULAR LENS IMPLANT;  Surgeon: Keyla Tobin MD;  [facility]     TRANSURETHERAL DESTRUCTION OF PROSTATE BY THERMOTHERAPY N/A 4/13/2018    Procedure: TRANSURETHERAL DESTRUCTION OF PROSTATE BY THERMOTHERAPY;  [facility] Procedure;  Surgeon: Yaya Lomeli MD;  [facility]       Past medical history, past surgical history, medications, and allergies were reviewed with the patient. Additional pertinent items: None    FAMILY HISTORY:   Family History   Problem Relation Age of Onset     Asthma Brother      Diabetes Maternal Grandmother      Cardiovascular Father      Nephrolithiasis No family hx of      Parathyroid Disorders No family hx of      Thyroid Disease No family hx of      Glaucoma No family hx of      Macular Degeneration No family hx of      Anesthesia Reaction No family hx of      Deep Vein Thrombosis (DVT) No family hx of        SOCIAL HISTORY:   Social History     Tobacco Use     Smoking status: Never Smoker     Smokeless tobacco: Never Used   Substance Use Topics     Alcohol use: No     Social history was reviewed with the patient. Additional pertinent items: None      Patient's Medications   New Prescriptions    No medications on file   Previous Medications    ALLOPURINOL (ZYLOPRIM) 100 MG  TABLET    take by mouth 1 tab ( 100 mg)  at bedtime    AMLODIPINE (NORVASC) 10 MG TABLET    Take 1 tablet (10 mg) by mouth At Bedtime    BUPROPION (WELLBUTRIN XL) 300 MG 24 HR TABLET    Take 1 tablet (300 mg) by mouth every morning    CANDESARTAN (ATACAND) 32 MG TABLET    Take 32 mg by mouth At Bedtime    CLOMIPHENE (CLOMID) 50 MG TABLET    Take 1 tablet (50 mg) by mouth every other day Pt can't recall if medication was taken today or yesterday 12/3/19    EMPAGLIFLOZIN (JARDIANCE) 25 MG TABS TABLET    Take 1 tablet (25 mg) by mouth every morning    FLUTICASONE-UMECLIDIN-VILANT (TRELEGY ELLIPTA) 200-62.5-25 MCG/INH AEPB    Inhale 1 puff into the lungs daily Rinse mouth after use. Use either Breo OR Trelegy    HYDROCHLOROTHIAZIDE (MICROZIDE) 12.5 MG CAPSULE    Take 1 capsule (12.5 mg) by mouth daily    METFORMIN (GLUCOPHAGE) 1000 MG TABLET    Take 2 tablets (2,000 mg) by mouth daily (with dinner)    MULTIVITAMIN, THERAPEUTIC WITH MINERALS (MULTI-VITAMIN) TABS TABLET    Take 1 tablet by mouth At Bedtime    OMEPRAZOLE (PRILOSEC) 20 MG DR CAPSULE    Take 1 capsule (20 mg) by mouth daily    PHENTERMINE (ADIPEX-P) 37.5 MG TABLET    Take 1.5 tablets (56 mg) by mouth every morning (before breakfast)    PREDNISONE (DELTASONE) 10 MG TABLET    Take 1 tablet (10 mg) by mouth daily Use as directed starting with 30 mg per day    ROSUVASTATIN (CRESTOR) 40 MG TABLET    Take 1 tablet (40 mg) by mouth daily    TAMSULOSIN (FLOMAX) 0.4 MG CAPSULE    Take 1 capsule (0.4 mg) by mouth daily   Modified Medications    No medications on file   Discontinued Medications    No medications on file          Allergies   Allergen Reactions     Ragweeds      Watery eyes, itchy nose        Review of Systems   Constitutional: Positive for appetite change. Negative for fever.   HENT: Negative for congestion.    Eyes: Negative for redness.   Respiratory: Negative for shortness of breath.    Cardiovascular: Negative for chest pain.   Gastrointestinal:  "Positive for abdominal pain and nausea. Negative for blood in stool, constipation, diarrhea and vomiting.   Genitourinary: Negative for difficulty urinating.   Musculoskeletal: Negative for arthralgias and neck stiffness.   Skin: Negative for color change.   Neurological: Negative for headaches.   Psychiatric/Behavioral: Negative for confusion.   All other systems reviewed and are negative.    A complete review of systems was performed with pertinent positives and negatives noted in the HPI, and all other systems negative.    Physical Exam   BP: 119/84  Pulse: 114  Temp: 98.4  F (36.9  C)  Resp: 18  Height: 189.2 cm (6' 2.5\")  Weight: 112 kg (247 lb)  SpO2: 98 %      Physical Exam  Constitutional:       General: He is not in acute distress.     Appearance: He is not diaphoretic.   HENT:      Head: Atraumatic.   Eyes:      General: No scleral icterus.     Pupils: Pupils are equal, round, and reactive to light.   Cardiovascular:      Heart sounds: Normal heart sounds.   Pulmonary:      Effort: No respiratory distress.      Breath sounds: Normal breath sounds.   Abdominal:      General: Bowel sounds are normal.      Palpations: Abdomen is soft.      Tenderness: There is no abdominal tenderness.   Musculoskeletal:         General: No tenderness.   Skin:     General: Skin is warm.      Findings: No rash.         ED Course        Procedures          The medical record was reviewed and interpreted.  Current labs reviewed and interpreted.  Previous labs reviewed and interpreted.         The Lactic acid level is elevated due to Possible infection, at this time there is no sign of severe sepsis or septic shock.       Results for orders placed or performed during the hospital encounter of 07/14/21 (from the past 24 hour(s))   CBC with Platelets & Differential    Narrative    The following orders were created for panel order CBC with Platelets & Differential.  Procedure                               Abnormality         Status   "                   ---------                               -----------         ------                     CBC with platelets and d...[[id number]]  Abnormal            Final result                 Please view results for these tests on the individual orders.   Basic metabolic panel   Result Value Ref Range    Sodium 139 133 - 144 mmol/L    Potassium 4.2 3.4 - 5.3 mmol/L    Chloride 104 94 - 109 mmol/L    Carbon Dioxide (CO2) 31 20 - 32 mmol/L    Anion Gap 4 3 - 14 mmol/L    Urea Nitrogen 18 7 - 30 mg/dL    Creatinine 0.93 0.66 - 1.25 mg/dL    Calcium 10.3 (H) 8.5 - 10.1 mg/dL    Glucose 107 (H) 70 - 99 mg/dL    GFR Estimate 85 >60 mL/min/1.73m2   Hepatic function panel   Result Value Ref Range    Bilirubin Total 1.0 0.2 - 1.3 mg/dL    Bilirubin Direct 0.2 0.0 - 0.2 mg/dL    Protein Total 7.7 6.8 - 8.8 g/dL    Albumin 4.2 3.4 - 5.0 g/dL    Alkaline Phosphatase 69 40 - 150 U/L    AST 20 0 - 45 U/L    ALT 36 0 - 70 U/L   Lipase   Result Value Ref Range    Lipase 42 (L) 73 - 393 U/L   CBC with platelets and differential   Result Value Ref Range    WBC Count 16.3 (H) 4.0 - 11.0 10e3/uL    RBC Count 5.96 (H) 4.40 - 5.90 10e6/uL    Hemoglobin 18.2 (H) 13.3 - 17.7 g/dL    Hematocrit 55.1 (H) 40.0 - 53.0 %    MCV 92 78 - 100 fL    MCH 30.5 26.5 - 33.0 pg    MCHC 33.0 31.5 - 36.5 g/dL    RDW 14.3 10.0 - 15.0 %    Platelet Count 170 150 - 450 10e3/uL    % Neutrophils 89 %    % Lymphocytes 5 %    % Monocytes 6 %    % Eosinophils 0 %    % Basophils 0 %    % Immature Granulocytes 0 %    NRBCs per 100 WBC 0 <1 /100    Absolute Neutrophils 14.3 (H) 1.6 - 8.3 10e3/uL    Absolute Lymphocytes 0.9 0.8 - 5.3 10e3/uL    Absolute Monocytes 0.9 0.0 - 1.3 10e3/uL    Absolute Eosinophils 0.1 0.0 - 0.7 10e3/uL    Absolute Basophils 0.1 0.0 - 0.2 10e3/uL    Absolute Immature Granulocytes 0.1 (H) <=0.0 10e3/uL    Absolute NRBCs 0.0 10e3/uL   [place] Draw    Narrative    The following orders were created for panel order [place] Draw.  Procedure                                Abnormality         Status                     ---------                               -----------         ------                     Extra Blue Top Tube[[id number]]                              Final result               Extra Red Top Tube[[id number]]                               Final result                 Please view results for these tests on the individual orders.   Extra Blue Top Tube   Result Value Ref Range    Hold Specimen JIC    Extra Red Top Tube   Result Value Ref Range    Hold Specimen JIC    Lactic acid whole blood   Result Value Ref Range    Lactic Acid 3.4 (H) 0.7 - 2.0 mmol/L   CT Abdomen Pelvis w Contrast   Result Value Ref Range    Radiologist flags Acute appendicitis (Urgent)     Narrative    Examination:  CT ABDOMEN PELVIS W CONTRAST 7/14/2021 1:25 PM     History: Right lower quadrant pain, appendicitis suspected.    Comparison: CT AP 1/17/2018.    Technique: CT of the abdomen and pelvis were obtained with contrast.  Sagittal and coronal reconstructions created and reviewed.    Findings:     Abdomen and pelvis: Normal hepatic parenchyma without focal lesions.  Gallbladder is partially dilated and unremarkable. No intra- or  extrahepatic biliary dilation. Spleen, pancreas, and adrenal glands  are unremarkable. Punctate calcifications in the bilateral mid  kidneys, otherwise, kidneys demonstrate symmetric enhancement without  solid lesions, hydronephrosis, or nephrolithiasis. Ureters and urinary  bladder are unremarkable. Previously demonstrated enhancing focus at  the bladder apex in 2018 is less conspicuous on present exam.     The stomach is unremarkable. Small bowel is largely decompressed, with  the exception of a prominent, fluid-filled terminal ileum. There is  diffuse dilatation measuring up to 14 mm with wall thickening of the  appendix. Significant periappendiceal fat stranding. Ill-defined  hypoattenuating material surrounding the appendiceal tip (series  3  image 343) measuring approximately 2.1 x 2.2 cm. Periappendiceal fat  stranding extending along the cecum. Scattered rectosigmoid  diverticula.    No evidence of perforation. No free fluid. No suspicious abdominal  adenopathy.     Abdominal aorta is normal in caliber and patent. Mild calcified  atherosclerotic disease of the infrarenal aorta. Celiac and mesenteric  artery origins are unremarkable. Portal veins and IVC are patent    Lower thorax: Unremarkable. Calcified granuloma in the left lung base.    Bones and soft tissues: No acute or suspicious osseous abnormality.  Multilevel degenerative changes of the thoracolumbar spine. No acute  osseous abnormalities.      Impression    Impression:   1. Acute appendicitis with extensive surrounding periappendiceal fat  stranding. Ill-defined, hypoattenuating soft tissue surrounding the  appendiceal tip, likely representing phlegmon. No definite abscess or  perforation.  2. Bilateral punctate nonobstructing renal stones.    [Urgent Result: Acute appendicitis]    Finding was identified on 7/14/2021 1:27 PM.     Dr. Estrada  was contacted by Dr. Douglas at 7/14/2021 1:36 PM and  verbalized understanding of the urgent finding.     I have personally reviewed the examination and initial interpretation  and I agree with the findings.    LUIGI CHRISTIANSON MD         SYSTEM ID:  [id number]     Medications   sodium chloride 0.9% infusion (0 mLs Intravenous Hold 7/14/21 1401)   metroNIDAZOLE (FLAGYL) infusion 500 mg (500 mg Intravenous New Bag 7/14/21 1433)   0.9% sodium chloride BOLUS (0 mLs Intravenous Stopped 7/14/21 1330)   0.9% sodium chloride BOLUS (2,000 mLs Intravenous New Bag 7/14/21 1359)   iopamidol (ISOVUE-370) solution 135 mL (135 mLs Intravenous Given 7/14/21 1318)   sodium chloride (PF) 0.9% PF flush 84 mL (84 mLs Intravenous Given 7/14/21 1318)   ertapenem (INVanz) 1 g vial to attach to  mL bag (0 g Intravenous Stopped 7/14/21 1430)   ketorolac (TORADOL)  injection 15 mg (15 mg Intravenous Given 7/14/21 1405)             Assessments & Plan (with Medical Decision Making)   65-year-old male who presents for evaluation of lower abdominal pain.  Differential included colitis, diverticulitis, appendicitis, renal colic, internal hernia, pancreatitis.  Exam revealed normal temperature, elevated heart rate and right lower quadrant tenderness.  Laboratories revealed CBC with elevated white blood count as well as hemoglobin and hematocrit consistent with inflammation and volume contraction.  Glucose was elevated at 107 consistent with stress response.  Lactic acid was elevated at 3.4 consistent with inflammation/infection as well as hypokalemia.  Comprehensive metabolic panel revealed slight elevation in calcium but was otherwise normal.  CT of the abdomen revealed findings consistent with acute appendicitis.  In the emergency room, patient was given IV bolus fluids and ertapenem.  The case was discussed at length with general surgery.  Patient will go to the operating room for definitive management.    I have reviewed the nursing notes.    I have reviewed the findings, diagnosis, plan and need for follow up with the patient.    New Prescriptions    No medications on file       Final diagnoses:   Acute appendicitis with localized peritonitis, without perforation, abscess, or gangrene   I, Anushka Salazar, am serving as a trained medical scribe to document services personally performed by Jony Estrada MD based on the provider's statements to me on July 14, 2021.  This document has been checked and approved by the attending provider.    IJony MD, was physically present and have reviewed and verified the accuracy of this note documented by Anushka Salazar, medical scribe.       JONY ESTRADA MD     7/14/2021   [facility] EMERGENCY DEPARTMENT     Jony Estrada MD  07/14/21 1302

## 2021-07-14 NOTE — H&P (VIEW-ONLY)
General Surgery Consultation    Darnell Grant MRN# [id number]   Age: 65 year old YOB: 1955     Date of Admission:  7/14/2021    Date of Consult:   7/14/21    Reason for consult: Acute appendicitis       Requesting service: Emergency Department; requesting provider: Dr. 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    Plan:  - Plan for laparoscopic appendectomy  - Patient has provided informed consent, forms placed in chart  - PRN pain meds  - Flagyl  - NPO        Discussed with staff, Dr. Boggs, who agrees with the above plan.    Yosi Napier MD  Surgery resident  [id number]           Chief Complaint:   Abdominal pain         History of Present Illness:   The patient is a 65-year-old male with history of BPH s/p TURP, HTN, GERD, who presents with acute onset RLQ abdominal pain and nausea. Symptoms began last night. Pain started as generalized abdominal pain and localized to the RLQ overnight. No emesis, though unable to tolerate PO intake since 7 AM. No prior abdominal surgical history. Of note he has seen Dr. Dutta for exertional dyspnea earlier this year and diagnosed with ventricular ectopy. Had normal LV function on echo in 2013, confirmed with Lexiscan in 2017 and cardiac MRI in 2018. Last seen in cardiology clinic in February and determined not to require further work-up. He is on ASA 81.            Past Medical History:     Past Medical History:   Diagnosis Date     Bladder mass      BPH (benign prostatic hyperplasia)      GERD (gastroesophageal reflux disease)      High serum parathyroid hormone (PTH) 2017     Hypertension      Metabolic syndrome      Nephrolithiasis      Obesity      LISETTE (obstructive sleep apnea)      Prediabetes               Past Surgical History:     Past Surgical History:   Procedure Laterality Date     BACK SURGERY      repair disc     CATARACT IOL, RT/LT       CYSTOSCOPY, TRANSURETHRAL RESECTION (TUR) TUMOR BLADDER, COMBINED N/A 1/31/2018    Procedure: COMBINED CYSTOSCOPY, TRANSURETHRAL RESECTION (TUR) TUMOR BLADDER;  Transurethral Biopsy and Resection of Bladder Tumor;  Surgeon: Yaya Lomeli MD;  [facility]     DECOMPRESSION, FUSION CERVICAL ANTERIOR TWO LEVELS, COMBINED Right 12/10/2019    Procedure: right approach anterior cervical 2-3, 3-4 resection of anterior cervical osteophytes causing dysphagia, with microscope;  Surgeon: María Fitzgerald MD;  [facility]     HC BREATH HYDROGEN TEST  5/22/2013    Procedure: HYDROGEN BREATH TEST;  Surgeon: Donny Paris MD;  [facility]     PHACOEMULSIFICATION CLEAR CORNEA WITH STANDARD INTRAOCULAR LENS IMPLANT Left 9/25/2020    Procedure: LEFT CATARACT REMOVAL WITH INTRAOCULAR LENS IMPLANT;  Surgeon: Keyla Tobin MD;  [facility]     PHACOEMULSIFICATION CLEAR CORNEA WITH STANDARD INTRAOCULAR LENS IMPLANT Right 10/2/2020    Procedure: RIGHT CATARACT REMOVAL WITH INTRAOCULAR LENS IMPLANT;  Surgeon: Keyla Tobin MD;  [facility]     TRANSURETHERAL DESTRUCTION OF PROSTATE BY THERMOTHERAPY N/A 4/13/2018    Procedure: TRANSURETHERAL DESTRUCTION OF PROSTATE BY THERMOTHERAPY;  [facility] Procedure;  Surgeon: Yaya Lomeli MD;  [facility]             Social History:     Social History     Tobacco Use     Smoking status: Never Smoker     Smokeless tobacco: Never Used   Substance Use Topics     Alcohol use: No             Family History:     Family History   Problem Relation Age of Onset     Asthma Brother      Diabetes Maternal Grandmother      Cardiovascular Father      Nephrolithiasis No family hx of      Parathyroid Disorders No family hx of      Thyroid Disease No family hx of      Glaucoma No family hx of      Macular Degeneration  No family hx of      Anesthesia Reaction No family hx of      Deep Vein Thrombosis (DVT) No family hx of                 Allergies:     Allergies   Allergen Reactions     Ragweeds      Watery eyes, itchy nose             Medications:     Current Facility-Administered Medications   Medication     sodium chloride 0.9% infusion     Current Outpatient Medications   Medication Sig     allopurinol (ZYLOPRIM) 100 MG tablet take by mouth 1 tab ( 100 mg)  at bedtime     amLODIPine (NORVASC) 10 MG tablet Take 1 tablet (10 mg) by mouth At Bedtime     buPROPion (WELLBUTRIN XL) 300 MG 24 hr tablet Take 1 tablet (300 mg) by mouth every morning     candesartan (ATACAND) 32 MG tablet Take 32 mg by mouth At Bedtime     clomiPHENE (CLOMID) 50 MG tablet Take 1 tablet (50 mg) by mouth every other day Pt can't recall if medication was taken today or yesterday 12/3/19     empagliflozin (JARDIANCE) 25 MG TABS tablet Take 1 tablet (25 mg) by mouth every morning     Fluticasone-Umeclidin-Vilant (TRELEGY ELLIPTA) 200-62.5-25 MCG/INH AEPB Inhale 1 puff into the lungs daily Rinse mouth after use. Use either Breo OR Trelegy     hydrochlorothiazide (MICROZIDE) 12.5 MG capsule Take 1 capsule (12.5 mg) by mouth daily     metFORMIN (GLUCOPHAGE) 1000 MG tablet Take 2 tablets (2,000 mg) by mouth daily (with dinner)     multivitamin, therapeutic with minerals (MULTI-VITAMIN) TABS tablet Take 1 tablet by mouth At Bedtime     omeprazole (PRILOSEC) 20 MG DR capsule Take 1 capsule (20 mg) by mouth daily     phentermine (ADIPEX-P) 37.5 MG tablet Take 1.5 tablets (56 mg) by mouth every morning (before breakfast)     predniSONE (DELTASONE) 10 MG tablet Take 1 tablet (10 mg) by mouth daily Use as directed starting with 30 mg per day (Patient taking differently: Take 30 mg by mouth daily Use as directed starting with 30 mg per day)     rosuvastatin (CRESTOR) 40 MG tablet Take 1 tablet (40 mg) by mouth daily     tamsulosin (FLOMAX) 0.4 MG capsule Take 1 capsule  "(0.4 mg) by mouth daily     Facility-Administered Medications Ordered in Other Encounters   Medication     barium sulfate 40% (VARIBAR THIN HONEY) oral suspension               Review of Systems:     A 10 point review of systems was conducted and found to be negative unless otherwise noted in the above HPI.          Physical Exam:   /81   Pulse 116   Temp 98.4  F (36.9  C) (Oral)   Resp 18   Ht 1.892 m (6' 2.5\")   Wt 112 kg (247 lb)   SpO2 97%   BMI 31.29 kg/m        Intake/Output Summary (Last 24 hours) at 7/14/2021 1727  Last data filed at 7/14/2021 1430  Gross per 24 hour   Intake 100 ml   Output --   Net 100 ml       GEN: A&Ox3, no acute distress  NEURO: CN II-XII grossly intact. No gross neurologic deficits  RESP: Nonlabored breathing on room air, no cough  CV: RRR by radial pulse, noncyanotic  ABD: soft, tender over McBurney's point. No guarding or rebound tenderness.  MSK: Moves all extremities independently. Normal active range of motion.  PSYCH: cooperative           Data:   All laboratory data reviewed    Results:  BMP  Recent Labs   Lab 07/14/21  1122      POTASSIUM 4.2   CHLORIDE 104   CO2 31   BUN 18   CR 0.93   *     CBC  Recent Labs   Lab 07/14/21  1122   WBC 16.3*   HGB 18.2*        LFT  Recent Labs   Lab 07/14/21  1122   AST 20   ALT 36   ALKPHOS 69   BILITOTAL 1.0   ALBUMIN 4.2     Recent Labs   Lab 07/14/21  1122   *       Imaging:  CT:  1. Acute appendicitis with extensive surrounding periappendiceal fat  stranding. Ill-defined, hypoattenuating soft tissue surrounding the  appendiceal tip, likely representing phlegmon. No definite abscess or  perforation.  2. Bilateral punctate nonobstructing renal stones.         "

## 2021-07-14 NOTE — CONSULTS
General Surgery Consultation    Darnell Grant MRN# [id number]   Age: 65 year old YOB: 1955     Date of Admission:  7/14/2021    Date of Consult:   7/14/21    Reason for consult: Acute appendicitis       Requesting service: Emergency Department; requesting provider: Dr. Estrada                   Assessment and Plan:   Assessment: Dr. Kumar is a 64 yo male with h/o notable for BPH s/p TUPR, no prior abdominal surgical history, and ventricular ectomy with normal LVEF, now with acute appendicitis and associated phlegmon on CT, without definitive perforation or abscess.    Plan:  - Plan for laparoscopic appendectomy  - Patient has provided informed consent, forms placed in chart  - PRN pain meds  - Flagyl  - NPO        Discussed with staff, Dr. Boggs, who agrees with the above plan.    Yosi Napier MD  Surgery resident  [id number]           Chief Complaint:   Abdominal pain         History of Present Illness:   The patient is a 65-year-old male with history of BPH s/p TURP, HTN, GERD, who presents with acute onset RLQ abdominal pain and nausea. Symptoms began last night. Pain started as generalized abdominal pain and localized to the RLQ overnight. No emesis, though unable to tolerate PO intake since 7 AM. No prior abdominal surgical history. Of note he has seen Dr. Dutta for exertional dyspnea earlier this year and diagnosed with ventricular ectopy. Had normal LV function on echo in 2013, confirmed with Lexiscan in 2017 and cardiac MRI in 2018. Last seen in cardiology clinic in February and determined not to require further work-up. He is on ASA 81.            Past Medical History:     Past Medical History:   Diagnosis Date     Bladder mass      BPH (benign prostatic hyperplasia)      GERD (gastroesophageal reflux disease)      High serum parathyroid hormone (PTH) 2017     Hypertension      Metabolic syndrome      Nephrolithiasis      Obesity      LISETTE (obstructive sleep apnea)      Prediabetes               Past Surgical History:     Past Surgical History:   Procedure Laterality Date     BACK SURGERY      repair disc     CATARACT IOL, RT/LT       CYSTOSCOPY, TRANSURETHRAL RESECTION (TUR) TUMOR BLADDER, COMBINED N/A 1/31/2018    Procedure: COMBINED CYSTOSCOPY, TRANSURETHRAL RESECTION (TUR) TUMOR BLADDER;  Transurethral Biopsy and Resection of Bladder Tumor;  Surgeon: Yaya Lomeli MD;  [facility]     DECOMPRESSION, FUSION CERVICAL ANTERIOR TWO LEVELS, COMBINED Right 12/10/2019    Procedure: right approach anterior cervical 2-3, 3-4 resection of anterior cervical osteophytes causing dysphagia, with microscope;  Surgeon: María Fitzgerald MD;  [facility]     HC BREATH HYDROGEN TEST  5/22/2013    Procedure: HYDROGEN BREATH TEST;  Surgeon: Donny Paris MD;  [facility]     PHACOEMULSIFICATION CLEAR CORNEA WITH STANDARD INTRAOCULAR LENS IMPLANT Left 9/25/2020    Procedure: LEFT CATARACT REMOVAL WITH INTRAOCULAR LENS IMPLANT;  Surgeon: Keyla Tobin MD;  [facility]     PHACOEMULSIFICATION CLEAR CORNEA WITH STANDARD INTRAOCULAR LENS IMPLANT Right 10/2/2020    Procedure: RIGHT CATARACT REMOVAL WITH INTRAOCULAR LENS IMPLANT;  Surgeon: Keyla Tobin MD;  [facility]     TRANSURETHERAL DESTRUCTION OF PROSTATE BY THERMOTHERAPY N/A 4/13/2018    Procedure: TRANSURETHERAL DESTRUCTION OF PROSTATE BY THERMOTHERAPY;  [facility] Procedure;  Surgeon: Yaya Lomeli MD;  [facility]             Social History:     Social History     Tobacco Use     Smoking status: Never Smoker     Smokeless tobacco: Never Used   Substance Use Topics     Alcohol use: No             Family History:     Family History   Problem Relation Age of Onset     Asthma Brother      Diabetes Maternal Grandmother      Cardiovascular Father      Nephrolithiasis No family hx of      Parathyroid Disorders No family hx of      Thyroid Disease No family hx of      Glaucoma No family hx of      Macular Degeneration  No family hx of      Anesthesia Reaction No family hx of      Deep Vein Thrombosis (DVT) No family hx of                 Allergies:     Allergies   Allergen Reactions     Ragweeds      Watery eyes, itchy nose             Medications:     Current Facility-Administered Medications   Medication     sodium chloride 0.9% infusion     Current Outpatient Medications   Medication Sig     allopurinol (ZYLOPRIM) 100 MG tablet take by mouth 1 tab ( 100 mg)  at bedtime     amLODIPine (NORVASC) 10 MG tablet Take 1 tablet (10 mg) by mouth At Bedtime     buPROPion (WELLBUTRIN XL) 300 MG 24 hr tablet Take 1 tablet (300 mg) by mouth every morning     candesartan (ATACAND) 32 MG tablet Take 32 mg by mouth At Bedtime     clomiPHENE (CLOMID) 50 MG tablet Take 1 tablet (50 mg) by mouth every other day Pt can't recall if medication was taken today or yesterday 12/3/19     empagliflozin (JARDIANCE) 25 MG TABS tablet Take 1 tablet (25 mg) by mouth every morning     Fluticasone-Umeclidin-Vilant (TRELEGY ELLIPTA) 200-62.5-25 MCG/INH AEPB Inhale 1 puff into the lungs daily Rinse mouth after use. Use either Breo OR Trelegy     hydrochlorothiazide (MICROZIDE) 12.5 MG capsule Take 1 capsule (12.5 mg) by mouth daily     metFORMIN (GLUCOPHAGE) 1000 MG tablet Take 2 tablets (2,000 mg) by mouth daily (with dinner)     multivitamin, therapeutic with minerals (MULTI-VITAMIN) TABS tablet Take 1 tablet by mouth At Bedtime     omeprazole (PRILOSEC) 20 MG DR capsule Take 1 capsule (20 mg) by mouth daily     phentermine (ADIPEX-P) 37.5 MG tablet Take 1.5 tablets (56 mg) by mouth every morning (before breakfast)     predniSONE (DELTASONE) 10 MG tablet Take 1 tablet (10 mg) by mouth daily Use as directed starting with 30 mg per day (Patient taking differently: Take 30 mg by mouth daily Use as directed starting with 30 mg per day)     rosuvastatin (CRESTOR) 40 MG tablet Take 1 tablet (40 mg) by mouth daily     tamsulosin (FLOMAX) 0.4 MG capsule Take 1 capsule  "(0.4 mg) by mouth daily     Facility-Administered Medications Ordered in Other Encounters   Medication     barium sulfate 40% (VARIBAR THIN HONEY) oral suspension               Review of Systems:     A 10 point review of systems was conducted and found to be negative unless otherwise noted in the above HPI.          Physical Exam:   /81   Pulse 116   Temp 98.4  F (36.9  C) (Oral)   Resp 18   Ht 1.892 m (6' 2.5\")   Wt 112 kg (247 lb)   SpO2 97%   BMI 31.29 kg/m        Intake/Output Summary (Last 24 hours) at 7/14/2021 1727  Last data filed at 7/14/2021 1430  Gross per 24 hour   Intake 100 ml   Output --   Net 100 ml       GEN: A&Ox3, no acute distress  NEURO: CN II-XII grossly intact. No gross neurologic deficits  RESP: Nonlabored breathing on room air, no cough  CV: RRR by radial pulse, noncyanotic  ABD: soft, tender over McBurney's point. No guarding or rebound tenderness.  MSK: Moves all extremities independently. Normal active range of motion.  PSYCH: cooperative           Data:   All laboratory data reviewed    Results:  BMP  Recent Labs   Lab 07/14/21  1122      POTASSIUM 4.2   CHLORIDE 104   CO2 31   BUN 18   CR 0.93   *     CBC  Recent Labs   Lab 07/14/21  1122   WBC 16.3*   HGB 18.2*        LFT  Recent Labs   Lab 07/14/21  1122   AST 20   ALT 36   ALKPHOS 69   BILITOTAL 1.0   ALBUMIN 4.2     Recent Labs   Lab 07/14/21  1122   *       Imaging:  CT:  1. Acute appendicitis with extensive surrounding periappendiceal fat  stranding. Ill-defined, hypoattenuating soft tissue surrounding the  appendiceal tip, likely representing phlegmon. No definite abscess or  perforation.  2. Bilateral punctate nonobstructing renal stones.         "

## 2021-07-14 NOTE — ED TRIAGE NOTES
Arrives ambulatory to triage c/o concern for appendicitis. Localized pain in RLQ, fatigue, malaise. Sx started last night. No relieving or aggravating factors. Afebrile, VSS RA ex tachycardic in 110s.

## 2021-07-14 NOTE — ANESTHESIA PREPROCEDURE EVALUATION
Anesthesia Pre-Procedure Evaluation    Patient: Darnell Grant   MRN: [id number] : 1955        Preoperative Diagnosis: Acute appendicitis [K35.80]   Procedure : Procedure(s):  APPENDECTOMY, LAPAROSCOPIC  APPENDECTOMY, OPEN     Past Medical History:   Diagnosis Date     Bladder mass      BPH (benign prostatic hyperplasia)      GERD (gastroesophageal reflux disease)      High serum parathyroid hormone (PTH) 2017     Hypertension      Metabolic syndrome      Nephrolithiasis      Obesity      LISETTE (obstructive sleep apnea)      Prediabetes       Past Surgical History:   Procedure Laterality Date     BACK SURGERY      repair disc     CATARACT IOL, RT/LT       CYSTOSCOPY, TRANSURETHRAL RESECTION (TUR) TUMOR BLADDER, COMBINED N/A 2018    Procedure: COMBINED CYSTOSCOPY, TRANSURETHRAL RESECTION (TUR) TUMOR BLADDER;  Transurethral Biopsy and Resection of Bladder Tumor;  Surgeon: Yaya Lomeli MD;  [facility]     DECOMPRESSION, FUSION CERVICAL ANTERIOR TWO LEVELS, COMBINED Right 12/10/2019    Procedure: right approach anterior cervical 2-3, 3-4 resection of anterior cervical osteophytes causing dysphagia, with microscope;  Surgeon: María Fitzgerald MD;  [facility]     HC BREATH HYDROGEN TEST  2013    Procedure: HYDROGEN BREATH TEST;  Surgeon: Donny Paris MD;  [facility]     PHACOEMULSIFICATION CLEAR CORNEA WITH STANDARD INTRAOCULAR LENS IMPLANT Left 2020    Procedure: LEFT CATARACT REMOVAL WITH INTRAOCULAR LENS IMPLANT;  Surgeon: Keyla Tobin MD;  [facility]     PHACOEMULSIFICATION CLEAR CORNEA WITH STANDARD INTRAOCULAR LENS IMPLANT Right 10/2/2020    Procedure: RIGHT CATARACT REMOVAL WITH INTRAOCULAR LENS IMPLANT;  Surgeon: Keyla Tobin MD;  Location: [facility]     TRANSURETHERAL DESTRUCTION OF PROSTATE BY THERMOTHERAPY N/A 2018    Procedure: TRANSURETHERAL DESTRUCTION OF PROSTATE BY THERMOTHERAPY;  [facility] Procedure;  Surgeon: Yaya Lomeli  MD BOLA;  [facility]      Allergies   Allergen Reactions     Ragweeds      Watery eyes, itchy nose      Social History     Tobacco Use     Smoking status: Never Smoker     Smokeless tobacco: Never Used   Substance Use Topics     Alcohol use: No      Wt Readings from Last 1 Encounters:   07/14/21 112 kg (247 lb)        Anesthesia Evaluation   Pt has had prior anesthetic. Type: General.    No history of anesthetic complications       ROS/MED HX  ENT/Pulmonary:     (+) sleep apnea, Intermittent, asthma Treatment: Inhaler prn,      Neurologic: Comment: S/p C2-C4 fusion      Cardiovascular:     (+) Dyslipidemia (on rosuvastatin) hypertension (on amlodipine, candesartan, and HCTZ)-----Previous cardiac testing   Echo: Date: Results:    Stress Test: Date: Results:    ECG Reviewed: Date: 2/2021 Results:  Sinus rhythm  LVH  LAFB  QTc 455ms  Cath: Date: Results:      METS/Exercise Tolerance:     Hematologic:       Musculoskeletal:       GI/Hepatic:     (+) GERD (on omeprazole),     Renal/Genitourinary:     (+) BPH (s/p TURBT and thermodestruction of bladder tumor),     Endo: Comment: Impaired fasting glucose (on metformin, empagliflozin)    Hypogonadism (on clomiphene)    (+) Obesity (on phentermine),     Psychiatric/Substance Use:     (+) psychiatric history depression (on bupropion)     Infectious Disease:       Malignancy:       Other:            Physical Exam    Airway        Mallampati: III   TM distance: > 3 FB   Neck ROM: full   Mouth opening: > 3 cm    Respiratory Devices and Support         Dental     Comment: Right upper front tooth dental work        Cardiovascular   cardiovascular exam normal          Pulmonary   pulmonary exam normal                OUTSIDE LABS:  CBC:   Lab Results   Component Value Date    WBC 16.3 (H) 07/14/2021    WBC 6.7 12/09/2020    HGB 18.2 (H) 07/14/2021    HGB 17.3 12/09/2020    HCT 55.1 (H) 07/14/2021    HCT 52.2 12/09/2020     07/14/2021     12/09/2020     BMP:   Lab  Results   Component Value Date     07/14/2021     12/09/2020    POTASSIUM 4.2 07/14/2021    POTASSIUM 4.2 12/09/2020    CHLORIDE 104 07/14/2021    CHLORIDE 107 12/09/2020    CO2 31 07/14/2021    CO2 30 12/09/2020    BUN 18 07/14/2021    BUN 16 12/09/2020    CR 0.93 07/14/2021    CR 0.98 12/09/2020     (H) 07/14/2021    GLC 93 06/11/2021     COAGS:   Lab Results   Component Value Date    PTT 39 (H) 12/03/2019    INR 0.91 12/03/2019     POC:   Lab Results   Component Value Date     (H) 09/25/2020     HEPATIC:   Lab Results   Component Value Date    ALBUMIN 4.2 07/14/2021    PROTTOTAL 7.7 07/14/2021    ALT 36 07/14/2021    AST 20 07/14/2021    ALKPHOS 69 07/14/2021    BILITOTAL 1.0 07/14/2021     OTHER:   Lab Results   Component Value Date    LACT 3.4 (H) 07/14/2021    A1C 4.8 07/14/2021    ZHANE 10.3 (H) 07/14/2021    PHOS 2.7 06/11/2021    MAG 2.3 06/11/2021    LIPASE 42 (L) 07/14/2021    TSH 2.81 12/09/2020    T4 1.05 12/09/2020    CRP 4.4 06/11/2021    SED 5 05/20/2013       Anesthesia Plan    ASA Status:  3   NPO Status:  ELEVATED Aspiration Risk/Unknown    Anesthesia Type: General.     - Airway: ETT   Induction: Intravenous, RSI, Propofol.   Maintenance: Balanced.   Techniques and Equipment:     - Airway: Video-Laryngoscope         Consents    Anesthesia Plan(s) and associated risks, benefits, and realistic alternatives discussed. Questions answered and patient/representative(s) expressed understanding.     - Discussed with:  Patient      - Extended Intubation/Ventilatory Support Discussed: No.      - Patient is DNR/DNI Status: No    Use of blood products discussed: No .     Postoperative Care    Pain management: IV analgesics, Oral pain medications, Multi-modal analgesia.   PONV prophylaxis: Ondansetron (or other 5HT-3), Dexamethasone or Solumedrol     Comments:    [facility]  RSI            Vinayak Clayton MD

## 2021-07-14 NOTE — ED NOTES
Plan to send to [facility] for OR. Transport set up, ETA aprox 1 hour. Patient updated. Report called to [facility]- PACU. COVID swab collected.

## 2021-07-15 ENCOUNTER — NURSE TRIAGE (OUTPATIENT)
Dept: NURSING | Facility: CLINIC | Age: 66
End: 2021-07-15

## 2021-07-15 ENCOUNTER — TELEPHONE (OUTPATIENT)
Dept: SURGERY | Facility: CLINIC | Age: 66
End: 2021-07-15

## 2021-07-15 VITALS
BODY MASS INDEX: 30.71 KG/M2 | HEIGHT: 75 IN | SYSTOLIC BLOOD PRESSURE: 126 MMHG | TEMPERATURE: 97.2 F | WEIGHT: 247 LBS | RESPIRATION RATE: 16 BRPM | OXYGEN SATURATION: 96 % | DIASTOLIC BLOOD PRESSURE: 69 MMHG | HEART RATE: 88 BPM

## 2021-07-15 PROBLEM — K37 APPENDICITIS: Status: ACTIVE | Noted: 2021-07-15

## 2021-07-15 LAB
ALBUMIN UR-MCNC: NEGATIVE MG/DL
APPEARANCE UR: CLEAR
BACTERIA #/AREA URNS HPF: ABNORMAL /HPF
BILIRUB UR QL STRIP: NEGATIVE
COLOR UR AUTO: YELLOW
GLUCOSE BLDC GLUCOMTR-MCNC: 106 MG/DL (ref 70–99)
GLUCOSE BLDC GLUCOMTR-MCNC: 122 MG/DL (ref 70–99)
GLUCOSE UR STRIP-MCNC: 1000 MG/DL
HBA1C MFR BLD: 4.8 % (ref 0–5.6)
HGB UR QL STRIP: NEGATIVE
KETONES UR STRIP-MCNC: 60 MG/DL
LEUKOCYTE ESTERASE UR QL STRIP: NEGATIVE
NITRATE UR QL: POSITIVE
PH UR STRIP: 6 [PH] (ref 5–7)
RBC URINE: 2 /HPF
SP GR UR STRIP: 1.04 (ref 1–1.03)
UROBILINOGEN UR STRIP-MCNC: NORMAL MG/DL
WBC URINE: 4 /HPF

## 2021-07-15 PROCEDURE — 36415 COLL VENOUS BLD VENIPUNCTURE: CPT | Performed by: SURGERY

## 2021-07-15 PROCEDURE — 258N000003 HC RX IP 258 OP 636: Performed by: EMERGENCY MEDICINE

## 2021-07-15 PROCEDURE — 83036 HEMOGLOBIN GLYCOSYLATED A1C: CPT | Performed by: SURGERY

## 2021-07-15 PROCEDURE — 87086 URINE CULTURE/COLONY COUNT: CPT | Performed by: EMERGENCY MEDICINE

## 2021-07-15 PROCEDURE — 96376 TX/PRO/DX INJ SAME DRUG ADON: CPT

## 2021-07-15 PROCEDURE — G0378 HOSPITAL OBSERVATION PER HR: HCPCS

## 2021-07-15 PROCEDURE — 250N000013 HC RX MED GY IP 250 OP 250 PS 637: Performed by: SURGERY

## 2021-07-15 PROCEDURE — 250N000011 HC RX IP 250 OP 636: Performed by: SURGERY

## 2021-07-15 PROCEDURE — 96375 TX/PRO/DX INJ NEW DRUG ADDON: CPT

## 2021-07-15 PROCEDURE — 81001 URINALYSIS AUTO W/SCOPE: CPT | Performed by: EMERGENCY MEDICINE

## 2021-07-15 RX ORDER — CEFTRIAXONE 2 G/1
2 INJECTION, POWDER, FOR SOLUTION INTRAMUSCULAR; INTRAVENOUS EVERY 24 HOURS
Status: COMPLETED | OUTPATIENT
Start: 2021-07-15 | End: 2021-07-15

## 2021-07-15 RX ORDER — ERTAPENEM 1 G/1
1 INJECTION, POWDER, LYOPHILIZED, FOR SOLUTION INTRAMUSCULAR; INTRAVENOUS ONCE
Status: DISCONTINUED | OUTPATIENT
Start: 2021-07-15 | End: 2021-07-15

## 2021-07-15 RX ORDER — NALOXONE HYDROCHLORIDE 0.4 MG/ML
0.2 INJECTION, SOLUTION INTRAMUSCULAR; INTRAVENOUS; SUBCUTANEOUS
Status: DISCONTINUED | OUTPATIENT
Start: 2021-07-15 | End: 2021-07-15 | Stop reason: HOSPADM

## 2021-07-15 RX ORDER — AMOXICILLIN AND CLAVULANATE POTASSIUM 500; 125 MG/1; MG/1
1 TABLET, FILM COATED ORAL 2 TIMES DAILY
Qty: 7 TABLET | Refills: 0 | Status: SHIPPED | OUTPATIENT
Start: 2021-07-15 | End: 2021-12-06

## 2021-07-15 RX ORDER — OXYCODONE HYDROCHLORIDE 5 MG/1
5-10 TABLET ORAL EVERY 4 HOURS PRN
Qty: 12 TABLET | Refills: 0 | Status: SHIPPED | OUTPATIENT
Start: 2021-07-15 | End: 2021-12-06

## 2021-07-15 RX ORDER — CEFTRIAXONE 1 G/1
1 INJECTION, POWDER, FOR SOLUTION INTRAMUSCULAR; INTRAVENOUS EVERY 24 HOURS
Status: DISCONTINUED | OUTPATIENT
Start: 2021-07-15 | End: 2021-07-15

## 2021-07-15 RX ORDER — NALOXONE HYDROCHLORIDE 0.4 MG/ML
0.4 INJECTION, SOLUTION INTRAMUSCULAR; INTRAVENOUS; SUBCUTANEOUS
Status: DISCONTINUED | OUTPATIENT
Start: 2021-07-15 | End: 2021-07-15 | Stop reason: HOSPADM

## 2021-07-15 RX ADMIN — AMLODIPINE BESYLATE 10 MG: 10 TABLET ORAL at 01:17

## 2021-07-15 RX ADMIN — OMEPRAZOLE 20 MG: 20 CAPSULE, DELAYED RELEASE ORAL at 07:49

## 2021-07-15 RX ADMIN — HYDROCHLOROTHIAZIDE 12.5 MG: 12.5 CAPSULE ORAL at 09:29

## 2021-07-15 RX ADMIN — SODIUM CHLORIDE 1000 ML: 9 INJECTION, SOLUTION INTRAVENOUS at 07:56

## 2021-07-15 RX ADMIN — METRONIDAZOLE 500 MG: 500 INJECTION, SOLUTION INTRAVENOUS at 09:31

## 2021-07-15 RX ADMIN — BUPROPION HYDROCHLORIDE 300 MG: 150 TABLET, EXTENDED RELEASE ORAL at 07:49

## 2021-07-15 RX ADMIN — METRONIDAZOLE 500 MG: 500 INJECTION, SOLUTION INTRAVENOUS at 01:10

## 2021-07-15 RX ADMIN — ALLOPURINOL 100 MG: 100 TABLET ORAL at 01:17

## 2021-07-15 RX ADMIN — EMPAGLIFLOZIN 25 MG: 25 TABLET, FILM COATED ORAL at 09:29

## 2021-07-15 RX ADMIN — CEFTRIAXONE SODIUM 2 G: 2 INJECTION, POWDER, FOR SOLUTION INTRAMUSCULAR; INTRAVENOUS at 10:50

## 2021-07-15 NOTE — ANESTHESIA PROCEDURE NOTES
Airway       Patient location during procedure: OR       Procedure Start/Stop Times: 7/14/2021 8:03 PM  Staff -        CRNA: Jaja Hoffmann APRN CRNA       Performed By: CRNA  Consent for Airway        Urgency: elective  Indications and Patient Condition       Indications for airway management: abimael-procedural       Induction type:intravenous       Mask difficulty assessment: 1 - vent by mask    Final Airway Details       Final airway type: endotracheal airway       Successful airway: ETT - single  Endotracheal Airway Details        ETT size (mm): 8.0       Cuffed: yes       Successful intubation technique: direct laryngoscopy and video laryngoscopy       VL Blade Size: MAC 3       Grade View of Cords: 2       Adjucts: stylet       Position: Right       Measured from: lips       Secured at (cm): 26       Bite block used: None    Post intubation assessment        Placement verified by: capnometry, equal breath sounds and chest rise        Number of attempts at approach: 1       Secured with: pink tape       Ease of procedure: easy       Dentition: Intact and Unchanged

## 2021-07-15 NOTE — PLAN OF CARE
Arrived on floor ~0045, received report from flyer nurse.  VSS. 3L NC and capno A&Ox4. Snoring when sleeping. Pt slept for a few minutes before transfer to [address] [place] [address].  Pt has 3 lap sites on abdomen covered w/ steri-strips.  Night medication & flagyl administered before transfer.  Writer called lab regarding A1c lab,  said pt had an A1c draw on 7/14 and they can add it on or do another draw.  Report and lab info given to [address] [place] [address]

## 2021-07-15 NOTE — OP NOTE
[facility] Operative Note    Pre-operative diagnosis: Acute appendicitis [K35.80]   Post-operative diagnosis Same with periappendiceal abscess   Procedure: Procedure(s):  APPENDECTOMY, LAPAROSCOPIC     Surgeon(s): Surgeon(s) and Role:     * Walter Boggs MD - Primary   Estimated blood loss: 10ml               Specimens: ID Type Source Tests Collected by Time Destination   1 : Appendix Tissue Appendix SURGICAL PATHOLOGY EXAM Walter Boggs MD 7/14/2021  8:46 PM        Findings: Periappendiceal abscess, inflamed appendix.  Stapled at base            Description of procedure:    Darnell Grant was brought to the OR and placed supine on the OR table.  He was sedated and intubated and an OG tube was placed. He received ABX.  His abdomen was prepped and draped in sterile fashion and a time out was performed.    An infraumbilical incision was made and a prudencio port placed in open fashion. The abdomen was insufflated and the laparoscope introduced.  Two 5mm ports were placed, one in the left abdomen and the other suprapubic region.  The RLQ was explored.  Adhesions to the side wall were bluntly lyzed freeing up the terminal ileum.  This revealed a very inflamed appendix/mesoappendix.  There was a periappendiceal abscess.  The appendix was bluntly freed up from the cecum and grasped.  A ligasure energy device was used to divide the mesoappendix down to the base of the appendix and it was transected using a 45mm staple load.  The staple line was inspected and no spillage/leakage seen.  The appendix was placed in an endocatch bag and removed.    The RLQ was irrigated and all fluid aspirated out.  We then removed our working ports under direct vision.  The prudencio port was removed and the abdomen desufflated.  The umbilical port site fascia was closed with interrupted 0 vicryl suture. Skin was closed with 4.0 monocryl and steri strips applied.  He was then extubated and brought to the PACU in stable  condition.    I performed the operation

## 2021-07-15 NOTE — ANESTHESIA CARE TRANSFER NOTE
Patient: Darnell Grant    Procedure(s):  APPENDECTOMY, LAPAROSCOPIC  APPENDECTOMY, OPEN    Diagnosis: Acute appendicitis [K35.80]  Diagnosis Additional Information: No value filed.    Anesthesia Type:   General     Note:    Oropharynx: spontaneously breathing and nasal airway in place  Level of Consciousness: unresponsive  Oxygen Supplementation: face mask  Level of Supplemental Oxygen (L/min / FiO2): 8  Independent Airway: airway patency satisfactory and stable  Dentition: dentition unchanged  Vital Signs Stable: post-procedure vital signs reviewed and stable  Report to RN Given: handoff report given  Patient transferred to: PACU    Handoff Report: Identifed the Patient, Identified the Reponsible Provider, Reviewed the pertinent medical history, Discussed the surgical course, Reviewed Intra-OP anesthesia mangement and issues during anesthesia, Set expectations for post-procedure period and Allowed opportunity for questions and acknowledgement of understanding      Vitals: (Last set prior to Anesthesia Care Transfer)  CRNA VITALS  7/14/2021 2106 - 7/14/2021 2148      7/14/2021             NIBP:  128/75    Pulse:  94        Electronically Signed By: CHRISTIN Hester CRNA  July 14, 2021  9:48 PM

## 2021-07-15 NOTE — OR NURSING
Patient reported weight, in pain, does not want to stand. Dr. Nova notified, OK with using reported weight.     Dr. Boggs also notified, he is OK with reported weight.

## 2021-07-15 NOTE — PLAN OF CARE
Pt A/O X 4. Afebrile. VSS. Lungs-Clear bilaterally with both anterior and posterior. Bowels-Hyperactive in all four quadrants. Voids spontaneously without difficulty into the bedside urinal. Denies nausea and vomiting. CMS and Neuro's are intact. Denies numbness and tingling in all extremities. Denies pain. Is on a Clear liquid diet and patient did not have an appetite this shift. Blood glucose level was 106. 3 Lap incisions on the abdomin area are C/D/I and open to air. Pt up in room independently. PIV removed for discharge. PCD's on BLE's. Bilateral heels are elevated off the bed. Pt is able to make needs known, and call light is within reach. Pt. discharged at 12:20PM to home, and left with personal belongings. Pt. received complete discharge paperwork and PO medications as filled by discharge pharmacy. Pt received the home discharge medication Oxycodone and signed for it. Pt. was given times of last dose for all discharge medications in writing on discharge medication sheets. Discharge teaching included PO medication. Pt. had no further questions at the time of discharge and no unmet needs were identified.

## 2021-07-15 NOTE — BRIEF OP NOTE
[facility] Brief Operative Note    Pre-operative diagnosis: Acute appendicitis [K35.80]   Post-operative diagnosis Same with periappendiceal abscess   Procedure: Procedure(s):  APPENDECTOMY, LAPAROSCOPIC  APPENDECTOMY, OPEN   Surgeon(s): Surgeon(s) and Role:     * Walter Boggs MD - Primary   Estimated blood loss: 10ml    Specimens: ID Type Source Tests Collected by Time Destination   1 : Appendix Tissue Appendix SURGICAL PATHOLOGY EXAM Walter Boggs MD 7/14/2021  8:46 PM       Findings: Periappendiceal abscess, inflamed appendix.  Stapled at base

## 2021-07-15 NOTE — DISCHARGE SUMMARY
Physician Discharge Summary     Patient ID:  Darnell Grant  [id number]  65 year old  1955    Admit date: 7/14/2021    Discharge date and time: 7/15/2021     Admitting Physician: Walter Boggs MD     Discharge Physician: Walter Boggs    Admission Diagnoses: Acute appendicitis with localized peritonitis, without perforation, abscess, or gangrene [K35.30]    Discharge Diagnoses: Appendicitis with abimael-appendiceal abscess    Admission Condition: fair    Discharged Condition: good    Indication for Admission: Surgery and post operative recovery    Hospital Course: Darnell Grant presented to our ED with one day of RLQ pain.  CT scan with inflamed appendix and surrounding induration.  He was given ABX in the ED and brought that evening to the OR for a laparoscopic appendectomy.  No complications, but a small abimael-appendiceal abscess was seen.  The appendix was removed and the site irrigated.  He was observed overnight and noted to be improved in the morning.  He will complete 4 days of ABX (given ertapenem inpatient, augmentin as an outpatient)    Consults: general surgery    Significant Diagnostic Studies: CT scan with appendicitis    Treatments: antibiotics: ertapenem and flagyl and surgery: lap appy    Discharge Exam:  GENERAL APPEARANCE: healthy, alert and no distress  EYES: Eyes grossly normal to inspection, PERRL and conjunctivae and sclerae normal  RESP: no resp distress or wheezing, on nasal cannula in the AM  CV: RRR  ABDOMEN: soft, appropriately tender. Incisions covered with steri strips   (male): normal male genitalia  SKIN: no suspicious lesions or rashes  NEURO: Normal strength and tone, sensory exam grossly normal, mentation intact and speech normal  PSYCH: mentation appears normal and affect normal/bright    Disposition: home    Patient Instructions:   Current Discharge Medication List      START taking these medications    Details   amoxicillin-clavulanate (AUGMENTIN) 500-125 MG tablet  Take 1 tablet by mouth 2 times daily  Qty: 7 tablet, Refills: 0    Associated Diagnoses: Acute appendicitis with localized peritonitis, without perforation, abscess, or gangrene      oxyCODONE (ROXICODONE) 5 MG tablet Take 1-2 tablets (5-10 mg) by mouth every 4 hours as needed for moderate to severe pain  Qty: 12 tablet, Refills: 0    Associated Diagnoses: Acute appendicitis with localized peritonitis, without perforation, abscess, or gangrene         CONTINUE these medications which have NOT CHANGED    Details   allopurinol (ZYLOPRIM) 100 MG tablet take by mouth 1 tab ( 100 mg)  at bedtime  Qty: 90 tablet, Refills: 3    Associated Diagnoses: Essential hypertension; Gastroesophageal reflux disease without esophagitis; Benign non-nodular prostatic hyperplasia without lower urinary tract symptoms; Routine general medical examination at a health care facility; Essential hypertension, benign; Nocturia; Need for hepatitis C screening test; Screening for diabetic peripheral neuropathy; Other fatigue; Hyperlipidemia LDL goal <100      amLODIPine (NORVASC) 10 MG tablet Take 1 tablet (10 mg) by mouth At Bedtime  Qty: 90 tablet, Refills: 3    Associated Diagnoses: Essential hypertension; Gastroesophageal reflux disease without esophagitis; Benign non-nodular prostatic hyperplasia without lower urinary tract symptoms; Routine general medical examination at a health care facility; Essential hypertension, benign; Nocturia; Need for hepatitis C screening test; Screening for diabetic peripheral neuropathy; Other fatigue; Hyperlipidemia LDL goal <100      buPROPion (WELLBUTRIN XL) 300 MG 24 hr tablet Take 1 tablet (300 mg) by mouth every morning  Qty: 90 tablet, Refills: 3    Associated Diagnoses: Other fatigue      candesartan (ATACAND) 32 MG tablet Take 32 mg by mouth At Bedtime  Qty: 90 tablet, Refills: 3    Associated Diagnoses: Essential hypertension, benign      clomiPHENE (CLOMID) 50 MG tablet Take 1 tablet (50 mg) by mouth  every other day Pt can't recall if medication was taken today or yesterday 12/3/19  Qty: 90 tablet, Refills: 3    Associated Diagnoses: Class 1 obesity with serious comorbidity and body mass index (BMI) of 31.0 to 31.9 in adult, unspecified obesity type; Metabolic syndrome X      empagliflozin (JARDIANCE) 25 MG TABS tablet Take 1 tablet (25 mg) by mouth every morning  Qty: 90 tablet, Refills: 3    Associated Diagnoses: Class 1 obesity with serious comorbidity and body mass index (BMI) of 31.0 to 31.9 in adult, unspecified obesity type; Metabolic syndrome X      hydrochlorothiazide (MICROZIDE) 12.5 MG capsule Take 1 capsule (12.5 mg) by mouth daily  Qty: 90 capsule, Refills: 3    Associated Diagnoses: Essential hypertension, benign      metFORMIN (GLUCOPHAGE) 1000 MG tablet Take 2 tablets (2,000 mg) by mouth daily (with dinner)  Qty: 180 tablet, Refills: 3    Associated Diagnoses: Essential hypertension; Gout, unspecified cause, unspecified chronicity, unspecified site; Gastroesophageal reflux disease without esophagitis; Benign non-nodular prostatic hyperplasia without lower urinary tract symptoms; Routine general medical examination at a health care facility; Essential hypertension, benign; Nocturia; Need for hepatitis C screening test; Screening for diabetic peripheral neuropathy; Other fatigue; Hyperlipidemia LDL goal <100      multivitamin, therapeutic with minerals (MULTI-VITAMIN) TABS tablet Take 1 tablet by mouth At Bedtime      omeprazole (PRILOSEC) 20 MG DR capsule Take 1 capsule (20 mg) by mouth daily  Qty: 90 capsule, Refills: 3    Associated Diagnoses: Gastroesophageal reflux disease without esophagitis      phentermine (ADIPEX-P) 37.5 MG tablet Take 1.5 tablets (56 mg) by mouth every morning (before breakfast)  Qty: 135 tablet, Refills: 3    Associated Diagnoses: Class 1 obesity with serious comorbidity and body mass index (BMI) of 31.0 to 31.9 in adult, unspecified obesity type; Metabolic syndrome X       rosuvastatin (CRESTOR) 40 MG tablet Take 1 tablet (40 mg) by mouth daily  Qty: 90 tablet, Refills: 3    Associated Diagnoses: Essential hypertension; Gastroesophageal reflux disease without esophagitis; Benign non-nodular prostatic hyperplasia without lower urinary tract symptoms; Routine general medical examination at a health care facility; Essential hypertension, benign; Nocturia; Need for hepatitis C screening test; Screening for diabetic peripheral neuropathy; Other fatigue; Hyperlipidemia LDL goal <100      tamsulosin (FLOMAX) 0.4 MG capsule Take 1 capsule (0.4 mg) by mouth daily  Qty: 90 capsule, Refills: 3    Associated Diagnoses: Benign prostatic hyperplasia, unspecified whether lower urinary tract symptoms present      Fluticasone-Umeclidin-Vilant (TRELEGY ELLIPTA) 200-62.5-25 MCG/INH AEPB Inhale 1 puff into the lungs daily Rinse mouth after use. Use either Breo OR Trelegy  Qty: 1 each, Refills: 11    Associated Diagnoses: Exacerbation of asthma, unspecified asthma severity, unspecified whether persistent; Post-viral cough syndrome; Cough      predniSONE (DELTASONE) 10 MG tablet Take 1 tablet (10 mg) by mouth daily Use as directed starting with 30 mg per day  Qty: 60 tablet, Refills: 3    Associated Diagnoses: Post-viral cough syndrome; Bronchospasm           Activity: no heavy lifting for 4 weeks  Diet: regular diet  Wound Care: keep wound clean and dry, ice to area for comfort and ok to shower    Follow-up with Surgery  in 1 week.    Signed:  Walter Boggs MD  7/15/2021  9:22 AM

## 2021-07-15 NOTE — PROGRESS NOTES
"VS: BP (!) 144/78 (BP Location: Right arm)   Pulse 97   Temp 98.9  F (37.2  C) (Oral)   Resp 16   Ht 1.892 m (6' 2.5\")   Wt 112 kg (247 lb)   SpO2 95%   BMI 31.29 kg/m   pt denies CP or SOB.    O2: Room air sat. > 90%.    Output: Voiding adequate amount in urinal   Last BM: 7/14/21   Activity: Pt not out of bed this shift.   Skin: Intact.Ex 3 x lap incision.   Pain: Denies.   CMS: CMS and neuro intact.a/o x 4. Able to make needs known. Denies numbness and tingling.   Dressing: CDI   Diet: Advances as tolerated.Carb count.   LDA: PIV infusing.   Equipment: IV pole, capno, IS, Urinal.and personal belongings.   Plan: Continue with plan of care. Call light within reach.   Additional Info:  UA sent to lab.  at 0200 and 0600 was refused by patient, hence, not able to cover.     "

## 2021-07-15 NOTE — TELEPHONE ENCOUNTER
[facility]: Nurse Triage Note  SITUATION/BACKGROUND                                                      Darnell Grant is a 65 year old male who's wife calls because Darnell's temperature has increased since he arrived home  .    Allergies:   Allergies   Allergen Reactions     Ragweeds      Watery eyes, itchy nose       ASSESSMENT      65 year old male s/p laparoscopic appendectomy on 7/14/21  He was discharged today.  He arrived home at 12:30pm and has been resting most of the afternoon   His wife calls because she is very concerned as his temperature has increased  At discharge it was 97.2 and at 3:30 it was 99.8   She states she gave him some Tylenol at that time  Jyoti states he is in a lot of pain but refuses to take narcotics.  He is also very sleepy He has had 2 cups of broth and a glass of water since he got home.  Jyoti is very upset   She states her  is a physicain at the [employer] and she cant believe she has had to call multiple numbers to reach Dr. Noriega team.      Told her I would send a high priority note to Dr. Noriega nurses per surgery clinics recommendations   Also gave her the oncall # to call after 5pm    I had her check Darnell's temperature again and it was 99.5    Recommended pushing fluids and keeping him comfortable   Keep monitoring temperature..    RECOMMENDATION/PLAN                                                      RECOMMENDED DISPOSITION:  See above  Will comply with recommendation: Yes    If further questions/concerns or if symptoms do not improve, worsen or new symptoms develop, call your PCP or [phone] to talk with the Resident on call, as soon as possible.    Guideline used:post op problem  Telephone Triage Protocols for Nurses, Fifth Edition, Edwina Griffith, RN, RN

## 2021-07-15 NOTE — ANESTHESIA POSTPROCEDURE EVALUATION
Patient: Darnell Grant    Procedure(s):  APPENDECTOMY, LAPAROSCOPIC  APPENDECTOMY, OPEN    Diagnosis:Acute appendicitis [K35.80]  Diagnosis Additional Information: No value filed.    Anesthesia Type:  General    Note:  Disposition: Admission   Postop Pain Control: Uneventful            Sign Out: Well controlled pain   PONV:    Neuro/Psych: Uneventful            Sign Out: Acceptable/Baseline neuro status   Airway/Respiratory: Uneventful            Sign Out: Acceptable/Baseline resp. status   CV/Hemodynamics: Uneventful            Sign Out: Acceptable CV status; No obvious hypovolemia; No obvious fluid overload   Other NRE:    DID A NON-ROUTINE EVENT OCCUR?            Last vitals:  Vitals:    07/14/21 2140 07/14/21 2145 07/14/21 2200   BP: (!) 141/77 133/76 138/71   Pulse: 96 94 97   Resp: 14 14 11   Temp: 36.8  C (98.2  F)     SpO2: 96% 96% 96%       Last vitals prior to Anesthesia Care Transfer:  CRNA VITALS  7/14/2021 2106 - 7/14/2021 2206 7/14/2021             NIBP:  128/75    Pulse:  94          Electronically Signed By: Joe Nova MD  July 14, 2021  10:22 PM

## 2021-07-15 NOTE — OR NURSING
PACU to Inpatient Nursing Handoff    Patient Darnell Grant is a 65 year old male who speaks English.   Procedure Procedure(s):  APPENDECTOMY, LAPAROSCOPIC  APPENDECTOMY, OPEN   Surgeon(s) Primary: Walter Boggs MD     Allergies   Allergen Reactions     Ragweeds      Watery eyes, itchy nose       Isolation  No active isolations     Past Medical History   has a past medical history of Bladder mass, BPH (benign prostatic hyperplasia), GERD (gastroesophageal reflux disease), High serum parathyroid hormone (PTH) (2017), Hypertension, Metabolic syndrome, Nephrolithiasis, Obesity, LISETTE (obstructive sleep apnea), and Prediabetes.    Anesthesia General   Dermatome Level     Preop Meds flagyl @ 1433 - time given: ertapenem at 1359   Nerve block Not applicable   Intraop Meds dexamethasone (Decadron)  fentanyl (Sublimaze): 100 mcg total  hydromorphone (Dilaudid): 0.5 mg total  ondansetron (Zofran): last given at 2120   Local Meds Yes   Antibiotics flagyl @ 1433 ertapenem @ 1359     Pain Patient Currently in Pain: denies   PACU meds  ketorolac (Toradol): 15 mg last given at 2310   PCA / epidural No   Capnography Respiratory Monitoring (EtCO2): 34 mmHg  Integrated Pulmonary Index (IPI): 8-9   Telemetry ECG Rhythm: Sinus tachycardia   Inpatient Telemetry Monitor Ordered? No        Labs Glucose Lab Results   Component Value Date    GLC 82 07/14/2021     07/14/2021    GLC 93 06/11/2021       Hgb Lab Results   Component Value Date    HGB 18.2 07/14/2021    HGB 17.3 12/09/2020       INR Lab Results   Component Value Date    INR 0.91 12/03/2019      PACU Imaging Not applicable     Wound/Incision Incision/Surgical Site 07/14/21 Left;Quadrant;Lower Abdomen (Active)   Incision Assessment WDL 07/14/21 2230   Closure Adhesive strip(s);Liquid bandage 07/14/21 2230   Incision Drainage Amount None 07/14/21 2230   Dressing Intervention Clean, dry, intact 07/14/21 2230   Number of days: 0       Incision/Surgical Site 07/14/21  Mid;Lower Abdomen (Active)   Incision Assessment [facility] 07/14/21 2230   Closure Liquid bandage;Adhesive strip(s) 07/14/21 2230   Incision Drainage Amount None 07/14/21 2230   Dressing Intervention Clean, dry, intact 07/14/21 2230   Number of days: 0       Incision/Surgical Site 07/14/21 Umbilicus (Active)   Incision Assessment [facility] 07/14/21 2230   Closure Adhesive strip(s);Liquid bandage 07/14/21 2230   Incision Drainage Amount None 07/14/21 2230   Dressing Intervention Clean, dry, intact 07/14/21 2230   Number of days: 0      CMS        Equipment Not applicable   Other LDA       IV Access Peripheral IV 07/14/21 Left Upper forearm (Active)   Site Assessment [facility] 07/14/21 2140   Line Status Infusing 07/14/21 2140   Dressing Intervention New dressing  07/14/21 1124   Phlebitis Scale 0-->no symptoms 07/14/21 2140   Infiltration Scale 0 07/14/21 2140   Number of days: 0      Blood Products Not applicable EBL 10 mL   Intake/Output Date 07/14/21 0700 - 07/15/21 0659   Shift 8967-5543 0400-1338 3725-4912 24 Hour Total   INTAKE   I.V.  700  700   IV Piggyback 100   100   Shift Total(mL/kg) 100(0.89) 700(6.25)  800(7.14)   OUTPUT   Urine  350  350   Blood  10  10   Shift Total(mL/kg)  360(3.21)  360(3.21)   Weight (kg) 112.04 112.04 112.04 112.04      Drains / Krause     Time of void PreOp Void Prior to Procedure: 1800 (07/14/21 1852)    PostOp Voided (mL): 350 mL (07/14/21 1900)    Diapered? No   Bladder Scan     PO    ice chips     Vitals    B/P: 131/73  T: 98.2  F (36.8  C)    Temp src: Oral  P:  Pulse: 94 (07/14/21 2245)          R: 14  O2:  SpO2: 94 %    O2 Device: Nasal cannula (07/14/21 2245)    Oxygen Delivery: 2 LPM (07/14/21 2245)         Family/support present significant other   Patient belongings  call phone clothing shoes glasses, everything else home woitth wife   Patient transported on cart and air mat   DC meds/scripts (obs/outpt) Not applicable   Inpatient Pain Meds Released? Yes       Special  needs/considerations None    Tasks needing completion None       Wilda Garcia, RN  [facility] [id number]

## 2021-07-15 NOTE — TELEPHONE ENCOUNTER
[facility] Call Center    Phone Message    May a detailed message be left on voicemail: yes     Reason for Call: Other:      Jyoti is calling asking to speak with Dr Boggs.  She is worried about a fever.   Darnell's temp is up to 99.8. (it was 97.2 when he was discharged)      Please call Jyoti back asa to discuss.      Action Taken: Message routed to:  [facility] General Surgery    Travel Screening: Not Applicable

## 2021-07-16 ENCOUNTER — PATIENT OUTREACH (OUTPATIENT)
Dept: SURGERY | Facility: CLINIC | Age: 66
End: 2021-07-16

## 2021-07-16 LAB — BACTERIA UR CULT: NO GROWTH

## 2021-07-16 NOTE — TELEPHONE ENCOUNTER
Called and spoke with Jyoti's wife about a range of issues she has experienced with her  since his discharge. Provided her the GS number and will have Dr. Boggs call patient and his wife to discuss her concerns.

## 2021-07-16 NOTE — PROGRESS NOTES
Darnell Grant is a patient of Dr. Walter Boggs that underwent laparoscopic appendectomy approximately 2 days ago 7/14.  Attempted to contact patient via telephone for a status update and [id number] post op teaching.  LM on  to call office.  Await return call.      Spoke with patients wife and she states she will call when she is home with her . Her biggest concern was the fever that he experienced yesterday evening and into the night and she was not provided with the correct GS number upon discharge. RN cell number provided for follow-up call.    Of note:  Pathology:  Pending  Wound:  Steri-strips  Follow-up: routine  Restrictions:  - No strenuous exercise for 3-4 weeks  - No lifting, pushing, pulling more than 15-20 pounds for 3-4 weeks  - Do not strain with bowel movements  - Do not drive until you can press the brake pedal quickly and fully without pain  - Do not operate a motor vehicle while taking narcotic pain medications  New medications:  Oxycodone, Augmentin BID x 4 days  Equipment/Supplies:  None        RN Post-Op/Post-Discharge Care Coordination Note    Spoke with Patients spouse. She had spoke with Dr. Boggs earlier this morning.     Support  Family member assisting with care     Health Status  Nausea/Vomiting: Patient reports a lack of appetite.  Eating/drinking: Patient is able to eat and drink without any complaints. He hasn't felt hungry but Jyoti will encourage fluids and small meals with medication.  Bowel habits: Patient reports no bowel movement since surgery. He is passing gas and is not complaining of gas pain.   Drains (CONSTANCE): N/A  Fevers/chills: Patient reports fever measuring 101.2 during the night which had slowly been increasing during the evening. She has been giving him Tylenol and he said his temperature is normal now. Encouraged deep breathing exercises and she ordered an IS will she will receive tomorrow.  Incisions: Patient denies any signs and symptoms of infection..  Wound  closure:  Steri strips  Pain: discussed alternating between Tylenol and Ibuprofen Q6H. He can use a heating pad to his incisions.   New Medications: Oxycodone, Augmentin    Activity/Restrictions  No lifting in excess of 15-20 pounds for 3-4 weeks    Equipment  None    Pathology reviewed with patient:  No, not yet available    Forms/Letters  No    All of her questions were answered including reviewing restrictions, and wound care.  She will call this office if she has any further questions and/or concerns.      In lieu of a post-op clinic patient that patient would like to be contacted in approximately 3 days via telephone.    A copy of this note was routed to the primary surgeon.      Whom and When to Call  Patient acknowledges understanding of how to manage any medication changes and   when to seek medical care.     Patient advised that if after hour medical concerns arise to please call [phone] and choose option 4 to speak to the physician on call.

## 2021-07-19 ENCOUNTER — PATIENT OUTREACH (OUTPATIENT)
Dept: SURGERY | Facility: CLINIC | Age: 66
End: 2021-07-19

## 2021-07-19 NOTE — PROGRESS NOTES
RN Post-Op/Post-Discharge Care Coordination Note    Spoke with Patient.    Support  Patient able to care for self independently     Health Status  Nausea/Vomiting: Patient denies nausea/vomiting.  Eating/drinking: Patient is able to eat and drink without any complaints.  Bowel habits: Patient reports having a normal bowel movement.  Drains (CONSTANCE): N/A  Fevers/chills: Patient denies any fever or chills.  Incisions: Patient denies any signs and symptoms of infection..  Wound closure:  Steri-strips. Small amount ecchymosis.  Pain: Not taking any analgesics    Activity/Restrictions  No lifting in excess of 15-20 pounds for 3-4 weeks    Equipment  None    Pathology reviewed with patient:  No, not yet available    All of his questions were answered.  He will call this office if he has any further questions and/or concerns.      In lieu of a post-op clinic patient that patient would like to be contacted in approximately 7-10 days via telephone.  Patient asks that we call his wife, Jyoti, as he will be unable to answer his phone as he plans to return to work.      A copy of this note was routed to the primary surgeon.      Whom and When to Call  Patient acknowledges understanding of how to manage any medication changes and   when to seek medical care.     Patient advised that if after hour medical concerns arise to please call [phone] and choose option 4 to speak to the physician on call.

## 2021-07-19 NOTE — PROGRESS NOTES
Darnell Grant is a patient of Dr. Walter Boggs that recently underwent a surgical procedure (lap appy).  The patient was contacted via telephone approximately 3 days ago for a status update and post-op teaching.     The patients wife, Jyoti,  requested to be contacted at a later date by an RN for an assessment and to see how things went over the weekend (any fever or signs of infection). Will see if patient would like to schedule a follow-up appointment with Dr. Boggs at this time.    Attempted to contact patient via telephone for a status update. LM on VM to call office.

## 2021-07-21 LAB
PATH REPORT.COMMENTS IMP SPEC: NORMAL
PATH REPORT.COMMENTS IMP SPEC: NORMAL
PATH REPORT.FINAL DX SPEC: NORMAL
PATH REPORT.GROSS SPEC: NORMAL
PATH REPORT.MICROSCOPIC SPEC OTHER STN: NORMAL
PATH REPORT.RELEVANT HX SPEC: NORMAL
PHOTO IMAGE: NORMAL

## 2021-07-26 ENCOUNTER — PATIENT OUTREACH (OUTPATIENT)
Dept: SURGERY | Facility: CLINIC | Age: 66
End: 2021-07-26

## 2021-07-26 NOTE — PROGRESS NOTES
Darnell Grant was contacted (spouse, Jyoti per last conversation with patient) several days post procedure via telephone for a status update and elected to have a telephone follow -up call approximately 7-10 days after original contact in lieu of a clinic visit with Dr. Walter Boggs.  He continues to do well and the wounds are C/D/I.  He is afebrile, pain free, and mary po; the patient is slowly resuming his normal activity.   Discussed restrictions including no lifting in excess of 15-20 pounds for 3 weeks.    Pathology was reviewed with the patient: Yes    All of Darnell Grant question's were answered and  he would like to return to the clinic on a PRN basis.  The patient is aware that  he may schedule a post op appointment at any time.    A copy of this note was routed to the patients surgeon.      Pathology:  Surgical Pathology Exam: [id number]  Order: [id number]  Collected:  7/14/2021  8:46 PM Status:  Final result   Visible to patient:  Yes (MyChart)   0 Result Notes  Component  Resulting Agency   Final Diagnosis   A. APPENDIX, APPENDECTOMY:  -Acute appendicitis with acute serositis

## 2021-08-04 LAB
SARS-COV-2 AB SERPL IA-ACNC: NORMAL [IU]/ML
SARS-COV-2 AB SERPL-IMP: NORMAL

## 2021-09-05 ENCOUNTER — HEALTH MAINTENANCE LETTER (OUTPATIENT)
Age: 66
End: 2021-09-05

## 2021-09-15 ENCOUNTER — TELEPHONE (OUTPATIENT)
Dept: UROLOGY | Facility: CLINIC | Age: 66
End: 2021-09-15

## 2021-09-15 DIAGNOSIS — N40.0 BENIGN PROSTATIC HYPERPLASIA, UNSPECIFIED WHETHER LOWER URINARY TRACT SYMPTOMS PRESENT: ICD-10-CM

## 2021-09-15 RX ORDER — TAMSULOSIN HYDROCHLORIDE 0.4 MG/1
0.4 CAPSULE ORAL DAILY
Qty: 90 CAPSULE | Refills: 3 | Status: SHIPPED | OUTPATIENT
Start: 2021-09-15 | End: 2022-10-03

## 2021-09-15 NOTE — TELEPHONE ENCOUNTER
[facility] Call Center    Phone Message    May a detailed message be left on voicemail: yes     Reason for Call: Other: PT Darnell called scheduled first avail video w/Dr. Pitt 1/4/22. Per PT was previously supposed to see Dr. Pitt at 1 yr and his previous appt was in-person, so was not sure if he needs to be seen sooner, or in person. Pls call PT to discuss.     Action Taken: Message routed to:  [facility]    Travel Screening: Not Applicable

## 2021-09-15 NOTE — TELEPHONE ENCOUNTER
Patient called can not get in till January for his yearly follow up  Oked to refill his flomax Idalmis Rivera LPN Staff Nurse

## 2021-10-18 DIAGNOSIS — M51.26 LUMBAR HERNIATED DISC: Primary | ICD-10-CM

## 2021-10-22 ENCOUNTER — MYC MEDICAL ADVICE (OUTPATIENT)
Dept: FAMILY MEDICINE | Facility: CLINIC | Age: 66
End: 2021-10-22

## 2021-10-25 DIAGNOSIS — M54.16 LUMBAR RADICULOPATHY: Primary | ICD-10-CM

## 2021-10-25 NOTE — PROGRESS NOTES
Signed verbal readback referral for spine surgery at [facility] and faxed to [facility] [phone]. Patient notified.  Melissa Montelongo RN

## 2021-10-26 ENCOUNTER — OFFICE VISIT (OUTPATIENT)
Dept: FAMILY MEDICINE | Facility: CLINIC | Age: 66
End: 2021-10-26
Payer: COMMERCIAL

## 2021-10-26 VITALS
TEMPERATURE: 97.8 F | SYSTOLIC BLOOD PRESSURE: 122 MMHG | HEART RATE: 96 BPM | DIASTOLIC BLOOD PRESSURE: 82 MMHG | OXYGEN SATURATION: 96 %

## 2021-10-26 DIAGNOSIS — Z01.818 PRE-OP EVALUATION: ICD-10-CM

## 2021-10-26 DIAGNOSIS — I10 HYPERTENSION GOAL BP (BLOOD PRESSURE) < 140/90: Primary | ICD-10-CM

## 2021-10-26 DIAGNOSIS — D58.2 ELEVATED HEMOGLOBIN (H): ICD-10-CM

## 2021-10-26 LAB
ANION GAP SERPL CALCULATED.3IONS-SCNC: 4 MMOL/L (ref 3–14)
BUN SERPL-MCNC: 18 MG/DL (ref 7–30)
CALCIUM SERPL-MCNC: 9.9 MG/DL (ref 8.5–10.1)
CHLORIDE BLD-SCNC: 105 MMOL/L (ref 94–109)
CO2 SERPL-SCNC: 29 MMOL/L (ref 20–32)
CREAT SERPL-MCNC: 0.84 MG/DL (ref 0.66–1.25)
ERYTHROCYTE [DISTWIDTH] IN BLOOD BY AUTOMATED COUNT: 14.9 % (ref 10–15)
GFR SERPL CREATININE-BSD FRML MDRD: >90 ML/MIN/1.73M2
GLUCOSE BLD-MCNC: 106 MG/DL (ref 70–99)
HCT VFR BLD AUTO: 52.9 % (ref 40–53)
HGB BLD-MCNC: 18.2 G/DL (ref 13.3–17.7)
MCH RBC QN AUTO: 31.1 PG (ref 26.5–33)
MCHC RBC AUTO-ENTMCNC: 34.4 G/DL (ref 31.5–36.5)
MCV RBC AUTO: 90 FL (ref 78–100)
PLATELET # BLD AUTO: 175 10E3/UL (ref 150–450)
POTASSIUM BLD-SCNC: 4.3 MMOL/L (ref 3.4–5.3)
RBC # BLD AUTO: 5.86 10E6/UL (ref 4.4–5.9)
SODIUM SERPL-SCNC: 138 MMOL/L (ref 133–144)
WBC # BLD AUTO: 7.3 10E3/UL (ref 4–11)

## 2021-10-26 PROCEDURE — 85027 COMPLETE CBC AUTOMATED: CPT | Performed by: FAMILY MEDICINE

## 2021-10-26 PROCEDURE — 99214 OFFICE O/P EST MOD 30 MIN: CPT | Performed by: FAMILY MEDICINE

## 2021-10-26 PROCEDURE — 36415 COLL VENOUS BLD VENIPUNCTURE: CPT | Performed by: FAMILY MEDICINE

## 2021-10-26 PROCEDURE — 80048 BASIC METABOLIC PNL TOTAL CA: CPT | Performed by: FAMILY MEDICINE

## 2021-10-26 ASSESSMENT — PAIN SCALES - GENERAL: PAINLEVEL: EXTREME PAIN (8)

## 2021-10-26 NOTE — PROGRESS NOTES
[facility]  [address] [facility] [address]  Phone: [phone]  Primary Provider: Raul Dutta  Pre-op Performing Provider: NICOLE WHITLEY      PREOPERATIVE EVALUATION:  Today's date: 10/26/2021    Darnell Grant is a 65 year old male who presents for a preoperative evaluation.    Surgical Information:  Surgery/Procedure: L4-L5  Surgery Location: [facility]  Surgeon: Reinier   Surgery Date: 10/27/21  Time of Surgery: 9:00  Where patient plans to recover: At home with family  Fax number for surgical facility: [phone]    Type of Anesthesia Anticipated: to be determined    Assessment & Plan     The proposed surgical procedure is considered INTERMEDIATE risk.    Hypertension goal BP (blood pressure) < 140/90  - Basic metabolic panel  (Ca, Cl, CO2, Creat, Gluc, K, Na, BUN)    Elevated hemoglobin (H)  - likely secondary to poor oral fluid intake    Pre-op evaluation         Risks and Recommendations:  The patient has the following additional risks and recommendations for perioperative complications:   - No identified additional risk factors other than previously addressed    Medication Instructions:   - metformin: HOLD day of surgery.   - SGLT2 Inhibitor (ertugliflozin): HOLD 4 days before surgery.     RECOMMENDATION:  APPROVAL GIVEN to proceed with proposed procedure pending review of diagnostic evaluation.                      Subjective     HPI related to upcoming procedure: He has a herniated disc at L4-5   He had previous laminectomy and microdiscetomy on the right  He is planned for a left sided procedure at this point.     Preop Questions 10/22/2021   1. Have you ever had a heart attack or stroke? No   2. Have you ever had surgery on your heart or blood vessels, such as a stent placement, a coronary artery bypass, or surgery on an artery in your head, neck, heart, or legs? No   3. Do you have chest pain with activity? No   4. Do you have a history of  heart  failure? No   5. Do you currently have a cold, bronchitis or symptoms of other infection? No   6. Do you have a cough, shortness of breath, or wheezing? No   7. Do you or anyone in your family have previous history of blood clots? No   8. Do you or does anyone in your family have a serious bleeding problem such as prolonged bleeding following surgeries or cuts? No   9. Have you ever had problems with anemia or been told to take iron pills? No   10. Have you had any abnormal blood loss such as black, tarry or bloody stools? No   11. Have you ever had a blood transfusion? No   12. Are you willing to have a blood transfusion if it is medically needed before, during, or after your surgery? Yes   13. Have you or any of your relatives ever had problems with anesthesia? No   14. Do you have sleep apnea, excessive snoring or daytime drowsiness? UNKNOWN - he will be tested in January for obstructive sleep apnea but he does snore   15. Do you have any artifical heart valves or other implanted medical devices like a pacemaker, defibrillator, or continuous glucose monitor? No   16. Do you have artificial joints? No   17. Are you allergic to latex? No       Health Care Directive:  Patient does not have a Health Care Directive or Living Will: Patient states has Advance Directive and will bring in a copy to clinic.    Preoperative Review of :   reviewed - controlled substances prescribed by other outside provider(s).      Review of Systems  CONSTITUTIONAL: NEGATIVE for fever, chills, change in weight  INTEGUMENTARY/SKIN: NEGATIVE for worrisome rashes, moles or lesions  EYES: NEGATIVE for vision changes or irritation  ENT/MOUTH: NEGATIVE for ear, mouth and throat problems  RESP: NEGATIVE for significant cough or SOB  CV: NEGATIVE for chest pain, palpitations or peripheral edema  GI: NEGATIVE for nausea, abdominal pain, heartburn, or change in bowel habits  : NEGATIVE for frequency, dysuria, or hematuria  MUSCULOSKELETAL:  NEGATIVE for significant arthralgias or myalgia  NEURO: NEGATIVE for weakness, dizziness or paresthesias  ENDOCRINE: NEGATIVE for temperature intolerance, skin/hair changes  HEME: NEGATIVE for bleeding problems  PSYCHIATRIC: NEGATIVE for changes in mood or affect     He reports that he has been avoiding drinking fluids to avoid having to get up to go to the bathroom  He reports having a negative COVID test 3 day(s) ago.     Patient Active Problem List    Diagnosis Date Noted     Acute appendicitis with localized peritonitis, without perforation, abscess, or gangrene 07/14/2021     Priority: Medium     Nuclear sclerotic cataract of both eyes 08/24/2020     Priority: Medium     Added automatically from request for surgery [id number]       Dysphagia 12/10/2019     Priority: Medium     Esophageal dysphagia 10/29/2019     Priority: Medium     Osteophyte of cervical spine 10/29/2019     Priority: Medium     Combined forms of age-related cataract of both eyes 08/17/2019     Priority: Medium     Myopia of both eyes with astigmatism and presbyopia 08/17/2019     Priority: Medium     Benign prostatic hyperplasia with urinary obstruction 04/25/2018     Priority: Medium     Mass of urinary bladder 01/26/2018     Priority: Medium     Microscopic hematuria 01/26/2018     Priority: Medium     Agatston coronary artery calcium score greater than 400 01/10/2017     Priority: Medium     Score >700       Obstructive sleep apnea hypopnea, moderate [AHI 21 on CPAP 5-15] 10/28/2015     Priority: Medium     Prostate nodule 06/05/2013     Priority: Medium     Pure hypercholesterolemia 06/05/2013     Priority: Medium     Essential hypertension, benign 06/05/2013     Priority: Medium     Chronic cough 06/05/2013     Priority: Medium     Diarrhea 06/05/2013     Priority: Medium     Adjustment disorder with mixed anxiety and depressed mood 12/14/2012     Priority: Medium     Hyperlipidemia 12/27/2010     Priority: Medium     Hypertension  12/27/2010     Priority: Medium     Lumbar radiculopathy 12/27/2010     Priority: Medium     Metabolic syndrome 12/27/2010     Priority: Medium     Obesity, unspecified 12/27/2010     Priority: Medium      Past Medical History:   Diagnosis Date     Bladder mass      BPH (benign prostatic hyperplasia)      GERD (gastroesophageal reflux disease)      Heart disease >5 years ago    PVCs     High serum parathyroid hormone (PTH) 2017     Hypertension      Metabolic syndrome      Nephrolithiasis      Obesity      LISETTE (obstructive sleep apnea)      Prediabetes      Uncomplicated asthma >5 years ago    Stress induced asthma     Past Surgical History:   Procedure Laterality Date     ABDOMEN SURGERY  July, 2021     APPENDECTOMY OPEN N/A 7/14/2021    Procedure: APPENDECTOMY, OPEN;  Surgeon: Walter Boggs MD;  Location: [facility]     BACK SURGERY      repair disc     BIOPSY  >5 years ago    Prostate     CATARACT IOL, RT/LT       CYSTOSCOPY, TRANSURETHRAL RESECTION (TUR) TUMOR BLADDER, COMBINED N/A 1/31/2018    Procedure: COMBINED CYSTOSCOPY, TRANSURETHRAL RESECTION (TUR) TUMOR BLADDER;  Transurethral Biopsy and Resection of Bladder Tumor;  Surgeon: Yaya Lomeli MD;  [facility]     DECOMPRESSION, FUSION CERVICAL ANTERIOR TWO LEVELS, COMBINED Right 12/10/2019    Procedure: right approach anterior cervical 2-3, 3-4 resection of anterior cervical osteophytes causing dysphagia, with microscope;  Surgeon: María Fitzgerald MD;  [facility]     HC BREATH HYDROGEN TEST  5/22/2013    Procedure: HYDROGEN BREATH TEST;  Surgeon: Donny Paris MD;  [facility]: [facility]     LAPAROSCOPIC APPENDECTOMY N/A 7/14/2021    Procedure: APPENDECTOMY, LAPAROSCOPIC;  Surgeon: Walter Boggs MD;  [facility]     PHACOEMULSIFICATION CLEAR CORNEA WITH STANDARD INTRAOCULAR LENS IMPLANT Left 9/25/2020    Procedure: LEFT CATARACT REMOVAL WITH INTRAOCULAR LENS IMPLANT;  Surgeon: Keyla Tobin MD;  [facility]      PHACOEMULSIFICATION CLEAR CORNEA WITH STANDARD INTRAOCULAR LENS IMPLANT Right 10/2/2020    Procedure: RIGHT CATARACT REMOVAL WITH INTRAOCULAR LENS IMPLANT;  Surgeon: Keyla Tobin MD;  [facility]     TRANSURETHERAL DESTRUCTION OF PROSTATE BY THERMOTHERAPY N/A 4/13/2018    Procedure: TRANSURETHERAL DESTRUCTION OF PROSTATE BY THERMOTHERAPY;  [facility] Procedure;  Surgeon: Yaya Lomeli MD;  [facility]     Current Outpatient Medications   Medication Sig Dispense Refill     allopurinol (ZYLOPRIM) 100 MG tablet take by mouth 1 tab ( 100 mg)  at bedtime 90 tablet 3     amLODIPine (NORVASC) 10 MG tablet Take 1 tablet (10 mg) by mouth At Bedtime 90 tablet 3     amoxicillin-clavulanate (AUGMENTIN) 500-125 MG tablet Take 1 tablet by mouth 2 times daily 7 tablet 0     buPROPion (WELLBUTRIN XL) 300 MG 24 hr tablet Take 1 tablet (300 mg) by mouth every morning 90 tablet 3     candesartan (ATACAND) 32 MG tablet Take 32 mg by mouth At Bedtime 90 tablet 3     clomiPHENE (CLOMID) 50 MG tablet Take 1 tablet (50 mg) by mouth every other day Pt can't recall if medication was taken today or yesterday 12/3/19 90 tablet 3     empagliflozin (JARDIANCE) 25 MG TABS tablet Take 1 tablet (25 mg) by mouth every morning 90 tablet 3     hydrochlorothiazide (MICROZIDE) 12.5 MG capsule Take 1 capsule (12.5 mg) by mouth daily 90 capsule 3     metFORMIN (GLUCOPHAGE) 1000 MG tablet Take 2 tablets (2,000 mg) by mouth daily (with dinner) 180 tablet 3     multivitamin, therapeutic with minerals (MULTI-VITAMIN) TABS tablet Take 1 tablet by mouth At Bedtime       omeprazole (PRILOSEC) 20 MG DR capsule Take 1 capsule (20 mg) by mouth daily 90 capsule 3     oxyCODONE (ROXICODONE) 5 MG tablet Take 1-2 tablets (5-10 mg) by mouth every 4 hours as needed for moderate to severe pain 12 tablet 0     phentermine (ADIPEX-P) 37.5 MG tablet Take 1.5 tablets (56 mg) by mouth every morning (before breakfast) 135 tablet 3     rosuvastatin (CRESTOR)  40 MG tablet Take 1 tablet (40 mg) by mouth daily 90 tablet 3     tamsulosin (FLOMAX) 0.4 MG capsule Take 1 capsule (0.4 mg) by mouth daily 90 capsule 3       Allergies   Allergen Reactions     Ragweeds      Watery eyes, itchy nose        Social History     Tobacco Use     Smoking status: Never Smoker     Smokeless tobacco: Never Used   Substance Use Topics     Alcohol use: No       History   Drug Use No         Objective     /82   Pulse 96   Temp 97.8  F (36.6  C) (Tympanic)   SpO2 96%     Physical Exam    GENERAL APPEARANCE: healthy, alert and no distress     EYES: EOMI,  PERRL     HENT: ear canals and TM's normal and nose and mouth without ulcers or lesions     HENT: dry oral mucosa     NECK: no adenopathy, no asymmetry, masses, or scars and thyroid normal to palpation     RESP: lungs clear to auscultation - no rales, rhonchi or wheezes     CV: regular rates and rhythm, normal S1 S2, no S3 or S4 and no murmur, click or rub     ABDOMEN:  soft, nontender, no HSM or masses and bowel sounds normal     MS: extremities normal- no gross deformities noted, no evidence of inflammation in joints, FROM in all extremities.     SKIN: no suspicious lesions or rashes     NEURO: Normal strength and tone, sensory exam grossly normal, mentation intact and speech normal     PSYCH: mentation appears normal. and affect normal/bright     LYMPHATICS: No cervical adenopathy    Recent Labs   Lab Test 07/15/21  0517 07/14/21  1122 07/14/21  0816 12/09/20  0815 12/10/19  0630 12/03/19  0836   HGB  --  18.2*  --  17.3   < > 17.2   PLT  --  170  --  162   < > 170   INR  --   --   --   --   --  0.91   NA  --  139  --  141   < > 138   POTASSIUM  --  4.2  --  4.2   < > 4.0   CR  --  0.93  --  0.98   < > 0.83   A1C 4.8  --  4.8 5.0   < > 5.2    < > = values in this interval not displayed.        Diagnostics:  Labs pending at this time.  Results will be reviewed when available.  Recent Results (from the past 24 hour(s))   CBC with  platelets    Collection Time: 10/26/21 12:29 PM   Result Value Ref Range    WBC Count 7.3 4.0 - 11.0 10e3/uL    RBC Count 5.86 4.40 - 5.90 10e6/uL    Hemoglobin 18.2 (H) 13.3 - 17.7 g/dL    Hematocrit 52.9 40.0 - 53.0 %    MCV 90 78 - 100 fL    MCH 31.1 26.5 - 33.0 pg    MCHC 34.4 31.5 - 36.5 g/dL    RDW 14.9 10.0 - 15.0 %    Platelet Count 175 150 - 450 10e3/uL        EKG:  Sinus rhythm Left anterior fascicular block Left ventricular hypertrophy with repolarization abnormality Abnormal ECG When compared with ECG of 23-NOV-2019 09:45, No significant change was found      Revised Cardiac Risk Index (RCRI):  The patient has the following serious cardiovascular risks for perioperative complications:   - No serious cardiac risks = 0 points     RCRI Interpretation: 0 points: Class I (very low risk - 0.4% complication rate)           Signed Electronically by: Kevin Gillis MD  Copy of this evaluation report is provided to requesting physician.

## 2021-10-26 NOTE — NURSING NOTE
"Chief Complaint   Patient presents with     Pre-Op Exam       Initial /82   Pulse 96   Temp 97.8  F (36.6  C) (Tympanic)   SpO2 96%  Estimated body mass index is 31.29 kg/m  as calculated from the following:    Height as of 7/14/21: 1.892 m (6' 2.5\").    Weight as of 7/14/21: 112 kg (247 lb).  Medication Reconciliation: complete  Kayleigh Haynes CMA    "

## 2021-10-26 NOTE — Clinical Note
Please also fax the EKG with the preoperative physical exam. I can show you how to print it if needed.

## 2021-10-27 ENCOUNTER — TRANSFERRED RECORDS (OUTPATIENT)
Dept: HEALTH INFORMATION MANAGEMENT | Facility: CLINIC | Age: 66
End: 2021-10-27

## 2021-10-27 ASSESSMENT — ASTHMA QUESTIONNAIRES: ACT_TOTALSCORE: 25

## 2021-11-05 DIAGNOSIS — M51.26 LUMBAR HERNIATED DISC: Primary | ICD-10-CM

## 2021-11-05 NOTE — PROGRESS NOTES
Placed verbal order for lumbar MRI. Printed and faxed to both [facility]. Patient notified.  Melissa Montelongo RN

## 2021-11-06 ENCOUNTER — TRANSFERRED RECORDS (OUTPATIENT)
Dept: HEALTH INFORMATION MANAGEMENT | Facility: CLINIC | Age: 66
End: 2021-11-06

## 2021-11-19 ENCOUNTER — TELEPHONE (OUTPATIENT)
Dept: INTERNAL MEDICINE | Facility: CLINIC | Age: 66
End: 2021-11-19

## 2021-11-19 NOTE — TELEPHONE ENCOUNTER
Called [facility] and gave verbal orders per Dr. Dutta for early discharge for PT as no further therapy is needed, including OT.      Nilton Alves CMA ([facility]) at 1:32 PM on 11/19/2021

## 2021-11-19 NOTE — TELEPHONE ENCOUNTER
[facility] Call Center    Phone Message    May a detailed message be left on voicemail: yes     Reason for Call: Order(s): Home Care Orders: Other: [facility] PT from [facility] calling and reporting patient was seen for a one time PT assessment today 11/19/2021. No further therapy needed, including no OT. Patient declined nursing. [facility] needs a verbal order as well.     [facility] #: [phone]    Action Taken: Message routed to:  [facility]    Travel Screening: Not Applicable

## 2021-12-06 ENCOUNTER — OFFICE VISIT (OUTPATIENT)
Dept: INTERNAL MEDICINE | Facility: CLINIC | Age: 66
End: 2021-12-06
Payer: COMMERCIAL

## 2021-12-06 VITALS
OXYGEN SATURATION: 98 % | SYSTOLIC BLOOD PRESSURE: 113 MMHG | HEART RATE: 96 BPM | RESPIRATION RATE: 16 BRPM | WEIGHT: 228.3 LBS | HEIGHT: 74 IN | DIASTOLIC BLOOD PRESSURE: 72 MMHG | BODY MASS INDEX: 29.3 KG/M2

## 2021-12-06 DIAGNOSIS — I10 ESSENTIAL HYPERTENSION: ICD-10-CM

## 2021-12-06 DIAGNOSIS — M10.9 GOUT, UNSPECIFIED CAUSE, UNSPECIFIED CHRONICITY, UNSPECIFIED SITE: ICD-10-CM

## 2021-12-06 DIAGNOSIS — R53.83 OTHER FATIGUE: ICD-10-CM

## 2021-12-06 DIAGNOSIS — I10 ESSENTIAL HYPERTENSION, BENIGN: ICD-10-CM

## 2021-12-06 DIAGNOSIS — E78.5 HYPERLIPIDEMIA LDL GOAL <100: ICD-10-CM

## 2021-12-06 DIAGNOSIS — R35.1 NOCTURIA: ICD-10-CM

## 2021-12-06 DIAGNOSIS — Z00.00 ROUTINE GENERAL MEDICAL EXAMINATION AT A HEALTH CARE FACILITY: ICD-10-CM

## 2021-12-06 DIAGNOSIS — K21.9 GASTROESOPHAGEAL REFLUX DISEASE WITHOUT ESOPHAGITIS: ICD-10-CM

## 2021-12-06 DIAGNOSIS — N40.0 BENIGN NON-NODULAR PROSTATIC HYPERPLASIA WITHOUT LOWER URINARY TRACT SYMPTOMS: ICD-10-CM

## 2021-12-06 DIAGNOSIS — Z13.89 SCREENING FOR DIABETIC PERIPHERAL NEUROPATHY: ICD-10-CM

## 2021-12-06 DIAGNOSIS — Z11.59 NEED FOR HEPATITIS C SCREENING TEST: ICD-10-CM

## 2021-12-06 PROCEDURE — 99214 OFFICE O/P EST MOD 30 MIN: CPT | Performed by: INTERNAL MEDICINE

## 2021-12-06 RX ORDER — ALLOPURINOL 100 MG/1
TABLET ORAL
Qty: 90 TABLET | Refills: 3 | Status: SHIPPED | OUTPATIENT
Start: 2021-12-06 | End: 2022-10-03

## 2021-12-06 RX ORDER — ACETAMINOPHEN 500 MG
1000 TABLET ORAL
COMMUNITY
Start: 2021-10-16 | End: 2023-07-13

## 2021-12-06 RX ORDER — CANDESARTAN 32 MG/1
16 TABLET ORAL AT BEDTIME
Qty: 45 TABLET | Refills: 3 | COMMUNITY
Start: 2021-12-06 | End: 2022-04-25

## 2021-12-06 RX ORDER — GABAPENTIN 600 MG/1
600 TABLET ORAL 3 TIMES DAILY
COMMUNITY
End: 2022-09-13

## 2021-12-06 RX ORDER — AMLODIPINE BESYLATE 10 MG/1
10 TABLET ORAL AT BEDTIME
Qty: 90 TABLET | Refills: 3 | Status: SHIPPED | OUTPATIENT
Start: 2021-12-06 | End: 2022-10-03

## 2021-12-06 RX ORDER — ROSUVASTATIN CALCIUM 40 MG/1
40 TABLET, COATED ORAL DAILY
Qty: 90 TABLET | Refills: 3 | Status: SHIPPED | OUTPATIENT
Start: 2021-12-06 | End: 2022-10-03

## 2021-12-06 ASSESSMENT — PAIN SCALES - GENERAL: PAINLEVEL: NO PAIN (0)

## 2021-12-06 ASSESSMENT — MIFFLIN-ST. JEOR: SCORE: 1880.31

## 2021-12-07 ENCOUNTER — MYC MEDICAL ADVICE (OUTPATIENT)
Dept: INTERNAL MEDICINE | Facility: CLINIC | Age: 66
End: 2021-12-07

## 2021-12-07 NOTE — NURSING NOTE
Darnell Grant is a 65 year old male patient that presents today in clinic for the following:    Chief Complaint   Patient presents with     RECHECK     Six month follow-up     Hypertension     The patient's allergies and medications were reviewed as noted. A set of vitals were recorded as noted without incident. The patient does not have any other questions for the provider.    Alexandro Caban, EMT at 6:24 PM on 12/6/2021

## 2021-12-07 NOTE — PROGRESS NOTES
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 He is recovering well now. Didn't take any of the prescribed opiates and pain is under control with tylenol and gabapentin. Some residual LE weakness and gait disturbance which increases fall risk signficantly. Uses cane sometimes. Wt reduction has been steady and intentional under care of another endocrine provider. Discussed leveling off the slope of wt loss and ensuring adequate nutrition including protein. BPs have been lower so titrated back his meds- home readings in good range on candesartan 16 mg/d, amlodipine 10 mg/d and off of hydrochlorothiazide.     Meds updated based on current history.  Current Outpatient Medications   Medication     acetaminophen (TYLENOL) 500 MG tablet     allopurinol (ZYLOPRIM) 100 MG tablet     amLODIPine (NORVASC) 10 MG tablet     buPROPion (WELLBUTRIN XL) 300 MG 24 hr tablet     candesartan (ATACAND) 32 MG tablet     clomiPHENE (CLOMID) 50 MG tablet     empagliflozin (JARDIANCE) 25 MG TABS tablet     gabapentin (NEURONTIN) 600 MG tablet     metFORMIN (GLUCOPHAGE) 1000 MG tablet     multivitamin, therapeutic with minerals (MULTI-VITAMIN) TABS tablet     omeprazole (PRILOSEC) 20 MG DR capsule     phentermine (ADIPEX-P) 37.5 MG tablet     rosuvastatin (CRESTOR) 40 MG tablet     Semaglutide, 1 MG/DOSE, 4 MG/3ML SOPN     tamsulosin (FLOMAX) 0.4 MG capsule     No current facility-administered medications for this visit.     Facility-Administered Medications Ordered in Other Visits   Medication     barium sulfate 40% (VARIBAR THIN HONEY) oral suspension       O) /72 (BP Location: Right arm, Patient Position: Sitting, Cuff Size: Adult Regular)   Pulse 96   Resp 16   Ht 1.88 m (6' 2\")   Wt 103.6 kg (228 lb 4.8 oz)   SpO2 98%   BMI 29.31 kg/m    Well appearing- much " thinner than a year ago!  HEENT nl  Cor RRR no MRG  Lungs clear bilaterally  Abd benign  Gait is antalgic and unsteady  Mentation clear    A/P) 1) Low back pain. Post-op care. PT starts tomorrow. Some residual neuro deficits and weakness - fall prevention is critical. Ergonomics with return to work will be very important.  2) HTN. BPs from home reviewed- all in target range. Continue current regimen as outlined above. BMP ok.   3) COVID risk high related to co-morbidities. Had a poor response to initial 2 vaccines so required a third with better response. Will schedule booster for 1/3/2022 and check ab titers a month after. He will use this data for risk stratification related to work travel and other activities.   4) BPH. Controlled with tamsulosin at night.  5) DM2. I suspect he no longer qualifies as diabetic- many of his DM meds are being used for weight loss- will defer to his endocrinologist on this. Would like to back off on several of these as his wt loss has likely changed in insulin resistance quite a bit. No hypoglycemia reported.  6) ASCVD risk. High coronary calcium score- on rosuvastatin 40 mg/d no change. Max RF reduction strategy.     30 minutes spent on the date of the encounter performing chart review, history and exam, documentation and further activities as noted above.    Christian Dutta MD  [facility]  [facility]

## 2021-12-09 ENCOUNTER — TELEPHONE (OUTPATIENT)
Dept: INTERNAL MEDICINE | Facility: CLINIC | Age: 66
End: 2021-12-09

## 2021-12-09 NOTE — TELEPHONE ENCOUNTER
[facility] Call Center    Phone Message    May a detailed message be left on voicemail: yes     Reason for Call: Ce calling to get the faxed over that she will be faxing over again. She faxed this over 3 times in November 11/20,11/27 and 12/6 Please fax over ASAP    Action Taken: Message routed to:  [facility]    Travel Screening: Not Applicable

## 2021-12-14 DIAGNOSIS — M54.16 LUMBAR RADICULOPATHY: Primary | ICD-10-CM

## 2021-12-14 DIAGNOSIS — M51.26 LUMBAR HERNIATED DISC: ICD-10-CM

## 2021-12-14 NOTE — PROGRESS NOTES
Orders placed and signed for PT. Referral sent to [facility] [phone], and [facility] [phone] per [facility] request.  Melissa Montelongo RN

## 2021-12-16 NOTE — TELEPHONE ENCOUNTER
[facility] Call Center    Phone Message    May a detailed message be left on voicemail: yes     Reason for Call: Form or Letter   Type or form/letter needing completion: [facility] Order Forms     Provider: Raul Dutta MD Sarah was calling to check on the Faxed order forms completion since last week that s been placed into the providers mailbox, please review and compete orders and fax back asap or call to address any questions or concerns thank you.     Date form needed: Today or ASAP  Once completed: Fax form to:  [phone]      Action Taken: Message routed to:  [facility]    Travel Screening: Not Applicable

## 2021-12-17 ENCOUNTER — MEDICAL CORRESPONDENCE (OUTPATIENT)
Dept: HEALTH INFORMATION MANAGEMENT | Facility: CLINIC | Age: 66
End: 2021-12-17

## 2021-12-17 NOTE — CONFIDENTIAL NOTE
Faxed signed orders to [facility] after Dr. Morris signed them for Dr. Dutta.    Sujata Fields on 12/17/2021 at 7:29 AM

## 2021-12-21 ENCOUNTER — PRE VISIT (OUTPATIENT)
Dept: UROLOGY | Facility: CLINIC | Age: 66
End: 2021-12-21

## 2021-12-21 DIAGNOSIS — N40.0 BENIGN PROSTATIC HYPERPLASIA, UNSPECIFIED WHETHER LOWER URINARY TRACT SYMPTOMS PRESENT: Primary | ICD-10-CM

## 2021-12-21 DIAGNOSIS — N40.0 ENLARGED PROSTATE: ICD-10-CM

## 2021-12-23 ENCOUNTER — TRANSFERRED RECORDS (OUTPATIENT)
Dept: HEALTH INFORMATION MANAGEMENT | Facility: CLINIC | Age: 66
End: 2021-12-23

## 2022-01-03 ENCOUNTER — ALLIED HEALTH/NURSE VISIT (OUTPATIENT)
Dept: INTERNAL MEDICINE | Facility: CLINIC | Age: 67
End: 2022-01-03
Payer: COMMERCIAL

## 2022-01-03 ENCOUNTER — THERAPY VISIT (OUTPATIENT)
Dept: PHYSICAL THERAPY | Facility: CLINIC | Age: 67
End: 2022-01-03
Payer: COMMERCIAL

## 2022-01-03 ENCOUNTER — LAB (OUTPATIENT)
Dept: LAB | Facility: CLINIC | Age: 67
End: 2022-01-03
Payer: COMMERCIAL

## 2022-01-03 DIAGNOSIS — M54.16 LUMBAR RADICULOPATHY: Primary | ICD-10-CM

## 2022-01-03 DIAGNOSIS — N40.0 ENLARGED PROSTATE: ICD-10-CM

## 2022-01-03 DIAGNOSIS — Z23 ENCOUNTER FOR IMMUNIZATION: Primary | ICD-10-CM

## 2022-01-03 DIAGNOSIS — N40.0 BENIGN PROSTATIC HYPERPLASIA, UNSPECIFIED WHETHER LOWER URINARY TRACT SYMPTOMS PRESENT: ICD-10-CM

## 2022-01-03 LAB — PSA SERPL-MCNC: 2.03 UG/L (ref 0–4)

## 2022-01-03 PROCEDURE — 84153 ASSAY OF PSA TOTAL: CPT | Performed by: PATHOLOGY

## 2022-01-03 PROCEDURE — 36415 COLL VENOUS BLD VENIPUNCTURE: CPT | Performed by: PATHOLOGY

## 2022-01-03 PROCEDURE — 91306 COVID-19,PF,MODERNA (18+ YRS BOOSTER .25ML): CPT

## 2022-01-03 PROCEDURE — 97110 THERAPEUTIC EXERCISES: CPT | Mod: GP | Performed by: PHYSICAL THERAPIST

## 2022-01-03 PROCEDURE — 0064A COVID-19,PF,MODERNA (18+ YRS BOOSTER .25ML): CPT

## 2022-01-03 PROCEDURE — 97161 PT EVAL LOW COMPLEX 20 MIN: CPT | Mod: GP | Performed by: PHYSICAL THERAPIST

## 2022-01-03 NOTE — PROGRESS NOTES
Physical Therapy Initial Evaluation  Subjective:    Patient Health History  Darnell Grant being seen for lumbar fusion physical therapy.     Problem began: 11/11/2021.   Problem occurred: Failed herniation repair L4-5   Pain is reported as 3/10 on pain scale.  General health as reported by patient is good.  Pertinent medical history includes: high blood pressure and numbness/tingling.     Medical allergies: none.   Surgeries include:  Orthopedic surgery.    Current medications:  Anti-inflammatory and high blood pressure medication.       Primary job tasks include:  Computer work.                                  [facility]  Physical Therapy Initial Examination/Evaluation  January 3, 2022    Darnell Grant is a 66 year old  male referred to physical therapy by Dr. Hills for treatment of s/p L4-L5 fusion with Precautions/Restrictions/MD instructions 5# lifting restriction    KEY PT FINDINGS:  1.  Significant gait deviation   2.  Ankle DF MMT 4/5 on L  3.  Poor gluteal strength, proprioceptive challenge    Subjective:  Referring MD visit date: 12/14/21  DOI/onset: October 2021  Mechanism of injury: s/p L4-L5 fusion  DOS Initial discectomy 10 years ago, underwent most recent surgery October 2021, then underwent fusion 11/11/21  Previous treatment: surgery, PT  Imaging: see Epic  Chief Complaint:   Ambulation, sitting   Pain: best 4 /10, worst 4/10 L buttock Described as: aching Alleviated by: rest Frequency: intermittent Progression of symptoms since initial onset: improving Time of day when pain is worse: acitivty  Sleeping: not affected  Social history:      Occupation: pediatric oncologist  Job duties:   Sitting, standing    Current HEP/exercise regimen: biking, walking  Patient's goals are reduce sxs, pain    Pertinent PMH: see [facility]   General Health Reported by Patient: good  Return to MD:  Interval post op visit       Objective:    Gait:    Gait Type:  Antalgic   Assistive  Devices:  Cane        Integumentary:  Surgical incision well healed lumbar spine         Lumbar/SI Evaluation    Lumbar Myotomes:    T12-L3 (Hip Flex):  Left: 5-    Right: 5  L2-4 (Quads):  Left:  4+    Right:  5  L4 (Ankle DF):  Left:  4    Right:  5  L5 (Great Toe Ext): Left: 5    Right: 5   S1 (Toe Raise):  Left: 5    Right: 5                                                               General     ROS    Assessment/Plan      Patient is a 66 year old male with lumbar complaints.    Patient has the following significant findings with corresponding treatment plan.                Diagnosis 1:  S/p lumbar fusion    Pain -  hot/cold therapy, US, electric stimulation, manual therapy, splint/taping/bracing/orthotics, self management, education, and home program  Decreased ROM/flexibility - manual therapy and therapeutic exercise  Decreased joint mobility - manual therapy and therapeutic exercise  Decreased strength - therapeutic exercise and therapeutic activities  Impaired balance - neuro re-education and therapeutic activities  Decreased proprioception - neuro re-education and therapeutic activities  Inflammation - cold therapy  Edema - vasopneumatics  Impaired gait - gait training  Impaired muscle performance - neuro re-education  Decreased function - therapeutic activities    Therapy Evaluation Codes:   1) History comprised of:   Personal factors that impact the plan of care:      None.    Comorbidity factors that impact the plan of care are:      None.     Medications impacting care: None.  2) Examination of Body Systems comprised of:   Body structures and functions that impact the plan of care:      Lumbar spine.   Activity limitations that impact the plan of care are:      Bending, Driving, Dressing, Sitting, Squatting/kneeling, Stairs, Standing and Walking.  3) Clinical presentation characteristics are:   Stable/Uncomplicated.  4) Decision-Making    Low complexity using standardized patient assessment  instrument and/or measureable assessment of functional outcome.  Cumulative Therapy Evaluation is: Low complexity.    Previous and current functional limitations:  (See Goal Flow Sheet for this information)    Short term and Long term goals: (See Goal Flow Sheet for this information)     Communication ability:  Patient appears to be able to clearly communicate and understand verbal and written communication and follow directions correctly.  Treatment Explanation - The following has been discussed with the patient:   RX ordered/plan of care  Anticipated outcomes  Possible risks and side effects  This patient would benefit from PT intervention to resume normal activities.   Rehab potential is good.    Frequency:  2 X week, once daily  Duration:  for 2 weeks tapering to 1 X a week over 8 weeks  Discharge Plan:  Achieve all LTG.  Independent in home treatment program.  Reach maximal therapeutic benefit.    Please refer to the daily flowsheet for treatment today, total treatment time and time spent performing 1:1 timed codes.

## 2022-01-03 NOTE — LETTER
[facility] [place]  [address]  5TH FLOOR  [facility] [address] [phone]    2022    Re: Darnell Grant   :   1955  MRN:  [id number]   REFERRING PHYSICIAN:   Srini GRACE [facility] [place]  Date of Initial Evaluation:  2022  Visits:  Rxs Used: 1  Reason for Referral:  Lumbar radiculopathy    Physical Therapy Initial Evaluation  Subjective:  Patient Health History  Darnell Grant being seen for lumbar fusion physical therapy.   Problem began: 2021.   Problem occurred: Failed herniation repair L4-5   Pain is reported as 3/10 on pain scale.  General health as reported by patient is good.  Pertinent medical history includes: high blood pressure and numbness/tingling.   Medical allergies: none.   Surgeries include:  Orthopedic surgery.    Current medications:  Anti-inflammatory and high blood pressure medication.    Primary job tasks include:  Computer work.                             [facility]  Physical Therapy Initial Examination/Evaluation  January 3, 2022    Darnell Grant is a 66 year old  male referred to physical therapy by Dr. Hills for treatment of s/p L4-L5 fusion with Precautions/Restrictions/MD instructions 5# lifting restriction    KEY PT FINDINGS:  1.  Significant gait deviation   2.  Ankle DF MMT 4/5 on L  3.  Poor gluteal strength, proprioceptive challenge    Subjective:  Referring MD visit date: 21  DOI/onset: 2021  Mechanism of injury: s/p L4-L5 fusion  DOS Initial discectomy 10 years ago, underwent most recent surgery 2021, then underwent fusion 21  Previous treatment: surgery, PT  Imaging: see Epic  Chief Complaint:   Ambulation, sitting   Pain: best 4 /10, worst 4/10 L buttock Described as: aching Alleviated by: rest  Re: Darnell Grant   :   1955    Frequency: intermittent Progression of symptoms since initial  onset: improving Time of day when pain is worse: acitivty  Sleeping: not affected  Social history:      Occupation: pediatric oncologist  Job duties:   Sitting, standing    Current HEP/exercise regimen: biking, walking  Patient's goals are reduce sxs, pain  Pertinent PMH: see Epic   General Health Reported by Patient: good  Return to MD:  Interval post op visit     Objective:  Gait:    Gait Type:  Antalgic   Assistive Devices:  Cane  Integumentary:  Surgical incision well healed lumbar spine   Lumbar/SI Evaluation  Lumbar Myotomes:    T12-L3 (Hip Flex):  Left: 5-    Right: 5  L2-4 (Quads):  Left:  4+    Right:  5  L4 (Ankle DF):  Left:  4    Right:  5  L5 (Great Toe Ext): Left: 5    Right: 5   S1 (Toe Raise):  Left: 5    Right: 5           Assessment/Plan      Patient is a 66 year old male with lumbar complaints.    Patient has the following significant findings with corresponding treatment plan.                Diagnosis 1:  S/p lumbar fusion    Pain -  hot/cold therapy, US, electric stimulation, manual therapy, splint/taping/bracing/orthotics, self management, education, and home program  Decreased ROM/flexibility - manual therapy and therapeutic exercise  Decreased joint mobility - manual therapy and therapeutic exercise  Decreased strength - therapeutic exercise and therapeutic activities  Impaired balance - neuro re-education and therapeutic activities  Decreased proprioception - neuro re-education and therapeutic activities  Inflammation - cold therapy  Edema - vasopneumatics  Impaired gait - gait training  Impaired muscle performance - neuro re-education  Decreased function - therapeutic activities    Therapy Evaluation Codes:   1) History comprised of:   Personal factors that impact the plan of care:      None.    Comorbidity factors that impact the plan of care are:      None.     Medications impacting care: None.  Re: Darnell Grant   :   1955    2) Examination of Body Systems comprised  of:   Body structures and functions that impact the plan of care:      Lumbar spine.   Activity limitations that impact the plan of care are:      Bending, Driving, Dressing, Sitting, Squatting/kneeling, Stairs, Standing and Walking.  3) Clinical presentation characteristics are:   Stable/Uncomplicated.  4) Decision-Making    Low complexity using standardized patient assessment instrument and/or measureable assessment of functional outcome.  Cumulative Therapy Evaluation is: Low complexity.    Previous and current functional limitations:  (See Goal Flow Sheet for this information)    Short term and Long term goals: (See Goal Flow Sheet for this information)     Communication ability:  Patient appears to be able to clearly communicate and understand verbal and written communication and follow directions correctly.  Treatment Explanation - The following has been discussed with the patient:   RX ordered/plan of care  Anticipated outcomes  Possible risks and side effects  This patient would benefit from PT intervention to resume normal activities.   Rehab potential is good.    Frequency:  2 X week, once daily  Duration:  for 2 weeks tapering to 1 X a week over 8 weeks  Discharge Plan:  Achieve all LTG.  Independent in home treatment program.  Reach maximal therapeutic benefit.    Thank you for your referral.    INQUIRIES  Therapist: Luann Flores, PT  [facility] SP[facility] & PHYSICAL THERAP[facility] [address]  5TH FLOOR  [facility] [address]  Phone: [phone]

## 2022-01-03 NOTE — PROGRESS NOTES
Darnell PLAZA Juanita received the Moderna covid-19 booster today in clinic at the request of Dr. Dutta. The immunization was given under the supervision of Dr. Morris if assistance was needed. The immunization was administered by an available RN. The immunization was given without incident--see immunization list for administration details. The patient was advised to remain in [facility] [place] for 15 minutes after the injection in case of an averse reaction.     Zak Brody, EMT at 1:13 PM on 1/3/2022.

## 2022-01-04 ENCOUNTER — VIRTUAL VISIT (OUTPATIENT)
Dept: UROLOGY | Facility: CLINIC | Age: 67
End: 2022-01-04
Attending: UROLOGY
Payer: COMMERCIAL

## 2022-01-04 ENCOUNTER — MEDICAL CORRESPONDENCE (OUTPATIENT)
Dept: HEALTH INFORMATION MANAGEMENT | Facility: CLINIC | Age: 67
End: 2022-01-04

## 2022-01-04 VITALS — WEIGHT: 228 LBS | BODY MASS INDEX: 29.27 KG/M2

## 2022-01-04 DIAGNOSIS — N40.0 ENLARGED PROSTATE: Primary | ICD-10-CM

## 2022-01-04 PROCEDURE — 99213 OFFICE O/P EST LOW 20 MIN: CPT | Mod: 95 | Performed by: UROLOGY

## 2022-01-04 ASSESSMENT — PAIN SCALES - GENERAL: PAINLEVEL: NO PAIN (0)

## 2022-01-04 NOTE — LETTER
1/4/2022       RE: Darnell Grant  [address]  [facility] [address]     Dear Colleague,    Thank you for referring your patient, Darnell Grant, to the [facility] [place] [facility]. Please see a copy of my visit note below.    Darnell is a 66 year old who is being evaluated via a billable video visit.            UROLOGY OUTPATIENT VISIT      Chief Complaint:   BPH/LUTS      Synopsis    Darnell Grant is a very pleasant AGE: 66 year old year old person previously followed by Dr. Lomeli for enlarged prostate and lower urinary tract symptoms.  He underwent Rezum treatement on 4/18.      Overall has done well since procedure.  He does take Flomax for some remnant LUTS but these are not particularly bothersome to him and he is not interested in further attention to this at this time.      Also has punctate nonobstructive and aysmpmtoatic renal stones, last seen o n CT during episode of appendicitis last summer.     PSA   Date Value Ref Range Status   08/06/2020 2.07 0 - 4 ug/L Final     Comment:     Assay Method:  Chemiluminescence using Siemens Vista analyzer   12/03/2019 2.08 0 - 4 ug/L Final     Comment:     Assay Method:  Chemiluminescence using Siemens Vista analyzer   04/30/2019 1.90 0 - 4 ug/L Final     Comment:     Assay Method:  Chemiluminescence using Siemens Vista analyzer   11/13/2018 4.51 (H) 0 - 4 ug/L Final     Comment:     Assay Method:  Chemiluminescence using Siemens Vista analyzer   03/06/2018 0.96 0 - 4 ug/L Final     Comment:     Assay Method:  Chemiluminescence using Siemens Vista analyzer     PSA Tumor Marker   Date Value Ref Range Status   01/03/2022 2.03 0.00 - 4.00 ug/L Final       Medical Comorbidities      Past Medical History:   Diagnosis Date     Bladder mass      BPH (benign prostatic hyperplasia)      GERD (gastroesophageal reflux disease)      Heart disease >5 years ago    PVCs     High serum parathyroid  hormone (PTH) 2017     Hypertension      Metabolic syndrome      Nephrolithiasis      Obesity      LISETTE (obstructive sleep apnea)      Prediabetes      Uncomplicated asthma >5 years ago    Stress induced asthma               Medications     Current Outpatient Medications   Medication     acetaminophen (TYLENOL) 500 MG tablet     allopurinol (ZYLOPRIM) 100 MG tablet     amLODIPine (NORVASC) 10 MG tablet     buPROPion (WELLBUTRIN XL) 300 MG 24 hr tablet     candesartan (ATACAND) 32 MG tablet     clomiPHENE (CLOMID) 50 MG tablet     empagliflozin (JARDIANCE) 25 MG TABS tablet     gabapentin (NEURONTIN) 600 MG tablet     metFORMIN (GLUCOPHAGE) 1000 MG tablet     multivitamin, therapeutic with minerals (MULTI-VITAMIN) TABS tablet     omeprazole (PRILOSEC) 20 MG DR capsule     phentermine (ADIPEX-P) 37.5 MG tablet     rosuvastatin (CRESTOR) 40 MG tablet     Semaglutide, 1 MG/DOSE, 4 MG/3ML SOPN     tamsulosin (FLOMAX) 0.4 MG capsule     No current facility-administered medications for this visit.     Facility-Administered Medications Ordered in Other Visits   Medication     barium sulfate 40% (VARIBAR THIN HONEY) oral suspension         The following  distinct labs were reviewed    I personally reviewed all applicable laboratory data and went over findings with patient  Significant for:    CBC RESULTS:  Recent Labs   Lab Test 10/26/21  1229 07/14/21  1122 12/09/20  0815 08/06/20  0714   WBC 7.3 16.3* 6.7 6.6   HGB 18.2* 18.2* 17.3 16.6    170 162 170        BMP RESULTS:  Recent Labs   Lab Test 10/26/21  1229 07/15/21  0739 07/15/21  0221 07/14/21  1859 07/14/21  1122 06/11/21  0800 12/09/20  0815 09/15/20  0732 08/06/20  0714 12/10/19  0630 12/03/19  0836     --   --   --  139  --  141 142 141  --  138   POTASSIUM 4.3  --   --   --  4.2  --  4.2 3.9 4.1   < > 4.0   CHLORIDE 105  --   --   --  104  --  107 107 109  --  105   CO2 29  --   --   --  31  --  30 28 26  --  28   ANIONGAP 4  --   --   --  4  --  4 7 6   --  4   * 106* 122* 82 107*   < > 90 131* 88   < > 96   BUN 18  --   --   --  18  --  16 18 25  --  22   CR 0.84  --   --   --  0.93  --  0.98 0.94 1.00  --  0.83   GFRESTIMATED >90  --   --   --  85  --  80 84 78  --  >90   GFRESTBLACK  --   --   --   --   --   --  >90 >90 >90  --  >90    < > = values in this interval not displayed.       CALCIUM RESULTS:  Recent Labs   Lab Test 10/26/21  1229 07/14/21  1122 06/11/21  0800 12/09/20  0815   ZHANE 9.9 10.3* 9.8 10.1       UA RESULTS:   Recent Labs   Lab Test 07/15/21  0245 12/09/20  0821 12/03/19  0832   SG 1.039* 1.017 1.024   URINEPH 6.0 5.0 6.0   NITRITE Positive* Negative Negative   RBCU 2  --  3*   WBCU 4  --  90*       PSA RESULTS  PSA   Date Value Ref Range Status   08/06/2020 2.07 0 - 4 ug/L Final     Comment:     Assay Method:  Chemiluminescence using Siemens Vista analyzer   12/03/2019 2.08 0 - 4 ug/L Final     Comment:     Assay Method:  Chemiluminescence using Siemens Vista analyzer   04/30/2019 1.90 0 - 4 ug/L Final     Comment:     Assay Method:  Chemiluminescence using Siemens Vista analyzer   11/13/2018 4.51 (H) 0 - 4 ug/L Final     Comment:     Assay Method:  Chemiluminescence using Siemens Vista analyzer   03/06/2018 0.96 0 - 4 ug/L Final     Comment:     Assay Method:  Chemiluminescence using Siemens Vista analyzer   08/31/2015 0.60 0 - 4 ug/L Final   07/14/2014 0.35 0 - 4 ug/L Final     Comment:     PSA results are about 7% lower than our prior method due to a methodology   change   on August 30, 2011.     02/10/2009 0.40 0 - 4 ug/L Final   07/21/2008 0.56 0 - 4 ug/L Final   07/09/2007 0.53 0 - 4 ug/L Final     PSA Tumor Marker   Date Value Ref Range Status   01/03/2022 2.03 0.00 - 4.00 ug/L Final         Recent Imaging Report    I personally reviewed all applicable imaging and went over the below findings with patient.    Results for orders placed or performed during the hospital encounter of 07/14/21   CT Abdomen Pelvis w Contrast      Value    Radiologist flags Acute appendicitis (Urgent)    Narrative    Examination:  CT ABDOMEN PELVIS W CONTRAST 7/14/2021 1:25 PM     History: Right lower quadrant pain, appendicitis suspected.    Comparison: CT AP 1/17/2018.    Technique: CT of the abdomen and pelvis were obtained with contrast.  Sagittal and coronal reconstructions created and reviewed.    Findings:     Abdomen and pelvis: Normal hepatic parenchyma without focal lesions.  Gallbladder is partially dilated and unremarkable. No intra- or  extrahepatic biliary dilation. Spleen, pancreas, and adrenal glands  are unremarkable. Punctate calcifications in the bilateral mid  kidneys, otherwise, kidneys demonstrate symmetric enhancement without  solid lesions, hydronephrosis, or nephrolithiasis. Ureters and urinary  bladder are unremarkable. Previously demonstrated enhancing focus at  the bladder apex in 2018 is less conspicuous on present exam.     The stomach is unremarkable. Small bowel is largely decompressed, with  the exception of a prominent, fluid-filled terminal ileum. There is  diffuse dilatation measuring up to 14 mm with wall thickening of the  appendix. Significant periappendiceal fat stranding. Ill-defined  hypoattenuating material surrounding the appendiceal tip (series 3  image 343) measuring approximately 2.1 x 2.2 cm. Periappendiceal fat  stranding extending along the cecum. Scattered rectosigmoid  diverticula.    No evidence of perforation. No free fluid. No suspicious abdominal  adenopathy.     Abdominal aorta is normal in caliber and patent. Mild calcified  atherosclerotic disease of the infrarenal aorta. Celiac and mesenteric  artery origins are unremarkable. Portal veins and IVC are patent    Lower thorax: Unremarkable. Calcified granuloma in the left lung base.    Bones and soft tissues: No acute or suspicious osseous abnormality.  Multilevel degenerative changes of the thoracolumbar spine. No acute  osseous abnormalities.       Impression    Impression:   1. Acute appendicitis with extensive surrounding periappendiceal fat  stranding. Ill-defined, hypoattenuating soft tissue surrounding the  appendiceal tip, likely representing phlegmon. No definite abscess or  perforation.  2. Bilateral punctate nonobstructing renal stones.    [Urgent Result: Acute appendicitis]    Finding was identified on 7/14/2021 1:27 PM.     Dr. Estrada  was contacted by Dr. Douglas at 7/14/2021 1:36 PM and  verbalized understanding of the urgent finding.     I have personally reviewed the examination and initial interpretation  and I agree with the findings.    LUIGI CHRISTIANSON MD         SYSTEM ID:  [id number]              Assessment/Plan   66 year old year old person with BPH, history of asymptomatic stones  -Will return to clinic in one year with updated PSA< PVR and OLEKSANDR  -Can continue flomax in interim    CC:  Raul Dutta    How would you like to obtain your AVS? MyChart  If the video visit is dropped, the invitation should be resent by: Text to cell phone: [phone]  Will anyone else be joining your video visit? No      Video Start Time: 10:05  Video-Visit Details    Type of service:  Video Visit    Video End Time:10:10    Originating Location (pt. Location): Home    Distant Location (provider location):  [facility]OLOG[facility] [place]     Platform used for Video Visit: [employer]

## 2022-01-04 NOTE — NURSING NOTE
Chief Complaint   Patient presents with     RECHECK     one year follow-up with updated PSA lab     - Miranda BARNETT EMT  Urology Clinic

## 2022-01-04 NOTE — PROGRESS NOTES
Darnell is a 66 year old who is being evaluated via a billable video visit.            UROLOGY OUTPATIENT VISIT      Chief Complaint:   BPH/LUTS      Synopsis    Darnell Grant is a very pleasant AGE: 66 year old year old person previously followed by Dr. Lomeli for enlarged prostate and lower urinary tract symptoms.  He underwent Rezum treatement on 4/18.      Overall has done well since procedure.  He does take Flomax for some remnant LUTS but these are not particularly bothersome to him and he is not interested in further attention to this at this time.      Also has punctate nonobstructive and aysmpmtoatic renal stones, last seen o n CT during episode of appendicitis last summer.     PSA   Date Value Ref Range Status   08/06/2020 2.07 0 - 4 ug/L Final     Comment:     Assay Method:  Chemiluminescence using Siemens Vista analyzer   12/03/2019 2.08 0 - 4 ug/L Final     Comment:     Assay Method:  Chemiluminescence using Siemens Vista analyzer   04/30/2019 1.90 0 - 4 ug/L Final     Comment:     Assay Method:  Chemiluminescence using Siemens Vista analyzer   11/13/2018 4.51 (H) 0 - 4 ug/L Final     Comment:     Assay Method:  Chemiluminescence using Siemens Vista analyzer   03/06/2018 0.96 0 - 4 ug/L Final     Comment:     Assay Method:  Chemiluminescence using Siemens Vista analyzer     PSA Tumor Marker   Date Value Ref Range Status   01/03/2022 2.03 0.00 - 4.00 ug/L Final       Medical Comorbidities      Past Medical History:   Diagnosis Date     Bladder mass      BPH (benign prostatic hyperplasia)      GERD (gastroesophageal reflux disease)      Heart disease >5 years ago    PVCs     High serum parathyroid hormone (PTH) 2017     Hypertension      Metabolic syndrome      Nephrolithiasis      Obesity      LISETTE (obstructive sleep apnea)      Prediabetes      Uncomplicated asthma >5 years ago    Stress induced asthma               Medications     Current Outpatient Medications   Medication     acetaminophen (TYLENOL)  500 MG tablet     allopurinol (ZYLOPRIM) 100 MG tablet     amLODIPine (NORVASC) 10 MG tablet     buPROPion (WELLBUTRIN XL) 300 MG 24 hr tablet     candesartan (ATACAND) 32 MG tablet     clomiPHENE (CLOMID) 50 MG tablet     empagliflozin (JARDIANCE) 25 MG TABS tablet     gabapentin (NEURONTIN) 600 MG tablet     metFORMIN (GLUCOPHAGE) 1000 MG tablet     multivitamin, therapeutic with minerals (MULTI-VITAMIN) TABS tablet     omeprazole (PRILOSEC) 20 MG DR capsule     phentermine (ADIPEX-P) 37.5 MG tablet     rosuvastatin (CRESTOR) 40 MG tablet     Semaglutide, 1 MG/DOSE, 4 MG/3ML SOPN     tamsulosin (FLOMAX) 0.4 MG capsule     No current facility-administered medications for this visit.     Facility-Administered Medications Ordered in Other Visits   Medication     barium sulfate 40% (VARIBAR THIN HONEY) oral suspension         The following  distinct labs were reviewed    I personally reviewed all applicable laboratory data and went over findings with patient  Significant for:    CBC RESULTS:  Recent Labs   Lab Test 10/26/21  1229 07/14/21  1122 12/09/20  0815 08/06/20  0714   WBC 7.3 16.3* 6.7 6.6   HGB 18.2* 18.2* 17.3 16.6    170 162 170        BMP RESULTS:  Recent Labs   Lab Test 10/26/21  1229 07/15/21  0739 07/15/21  0221 07/14/21  1859 07/14/21  1122 06/11/21  0800 12/09/20  0815 09/15/20  0732 08/06/20  0714 12/10/19  0630 12/03/19  0836     --   --   --  139  --  141 142 141  --  138   POTASSIUM 4.3  --   --   --  4.2  --  4.2 3.9 4.1   < > 4.0   CHLORIDE 105  --   --   --  104  --  107 107 109  --  105   CO2 29  --   --   --  31  --  30 28 26  --  28   ANIONGAP 4  --   --   --  4  --  4 7 6  --  4   * 106* 122* 82 107*   < > 90 131* 88   < > 96   BUN 18  --   --   --  18  --  16 18 25  --  22   CR 0.84  --   --   --  0.93  --  0.98 0.94 1.00  --  0.83   GFRESTIMATED >90  --   --   --  85  --  80 84 78  --  >90   GFRESTBLACK  --   --   --   --   --   --  >90 >90 >90  --  >90    < > = values  in this interval not displayed.       CALCIUM RESULTS:  Recent Labs   Lab Test 10/26/21  1229 07/14/21  1122 06/11/21  0800 12/09/20  0815   ZHANE 9.9 10.3* 9.8 10.1       UA RESULTS:   Recent Labs   Lab Test 07/15/21  0245 12/09/20  0821 12/03/19  0832   SG 1.039* 1.017 1.024   URINEPH 6.0 5.0 6.0   NITRITE Positive* Negative Negative   RBCU 2  --  3*   WBCU 4  --  90*       PSA RESULTS  PSA   Date Value Ref Range Status   08/06/2020 2.07 0 - 4 ug/L Final     Comment:     Assay Method:  Chemiluminescence using Siemens Vista analyzer   12/03/2019 2.08 0 - 4 ug/L Final     Comment:     Assay Method:  Chemiluminescence using Siemens Vista analyzer   04/30/2019 1.90 0 - 4 ug/L Final     Comment:     Assay Method:  Chemiluminescence using Siemens Vista analyzer   11/13/2018 4.51 (H) 0 - 4 ug/L Final     Comment:     Assay Method:  Chemiluminescence using Siemens Vista analyzer   03/06/2018 0.96 0 - 4 ug/L Final     Comment:     Assay Method:  Chemiluminescence using Siemens Vista analyzer   08/31/2015 0.60 0 - 4 ug/L Final   07/14/2014 0.35 0 - 4 ug/L Final     Comment:     PSA results are about 7% lower than our prior method due to a methodology   change   on August 30, 2011.     02/10/2009 0.40 0 - 4 ug/L Final   07/21/2008 0.56 0 - 4 ug/L Final   07/09/2007 0.53 0 - 4 ug/L Final     PSA Tumor Marker   Date Value Ref Range Status   01/03/2022 2.03 0.00 - 4.00 ug/L Final         Recent Imaging Report    I personally reviewed all applicable imaging and went over the below findings with patient.    Results for orders placed or performed during the hospital encounter of 07/14/21   CT Abdomen Pelvis w Contrast     Value    Radiologist flags Acute appendicitis (Urgent)    Narrative    Examination:  CT ABDOMEN PELVIS W CONTRAST 7/14/2021 1:25 PM     History: Right lower quadrant pain, appendicitis suspected.    Comparison: CT AP 1/17/2018.    Technique: CT of the abdomen and pelvis were obtained with contrast.  Sagittal and  coronal reconstructions created and reviewed.    Findings:     Abdomen and pelvis: Normal hepatic parenchyma without focal lesions.  Gallbladder is partially dilated and unremarkable. No intra- or  extrahepatic biliary dilation. Spleen, pancreas, and adrenal glands  are unremarkable. Punctate calcifications in the bilateral mid  kidneys, otherwise, kidneys demonstrate symmetric enhancement without  solid lesions, hydronephrosis, or nephrolithiasis. Ureters and urinary  bladder are unremarkable. Previously demonstrated enhancing focus at  the bladder apex in 2018 is less conspicuous on present exam.     The stomach is unremarkable. Small bowel is largely decompressed, with  the exception of a prominent, fluid-filled terminal ileum. There is  diffuse dilatation measuring up to 14 mm with wall thickening of the  appendix. Significant periappendiceal fat stranding. Ill-defined  hypoattenuating material surrounding the appendiceal tip (series 3  image 343) measuring approximately 2.1 x 2.2 cm. Periappendiceal fat  stranding extending along the cecum. Scattered rectosigmoid  diverticula.    No evidence of perforation. No free fluid. No suspicious abdominal  adenopathy.     Abdominal aorta is normal in caliber and patent. Mild calcified  atherosclerotic disease of the infrarenal aorta. Celiac and mesenteric  artery origins are unremarkable. Portal veins and IVC are patent    Lower thorax: Unremarkable. Calcified granuloma in the left lung base.    Bones and soft tissues: No acute or suspicious osseous abnormality.  Multilevel degenerative changes of the thoracolumbar spine. No acute  osseous abnormalities.      Impression    Impression:   1. Acute appendicitis with extensive surrounding periappendiceal fat  stranding. Ill-defined, hypoattenuating soft tissue surrounding the  appendiceal tip, likely representing phlegmon. No definite abscess or  perforation.  2. Bilateral punctate nonobstructing renal stones.    [Urgent  Result: Acute appendicitis]    Finding was identified on 7/14/2021 1:27 PM.     Dr. Estrada  was contacted by Dr. Douglas at 7/14/2021 1:36 PM and  verbalized understanding of the urgent finding.     I have personally reviewed the examination and initial interpretation  and I agree with the findings.    LUIGI CHRISTIANSON MD         SYSTEM ID:  [id number]              Assessment/Plan   66 year old year old person with BPH, history of asymptomatic stones  -Will return to clinic in one year with updated PSA< PVR and OLEKSANDR  -Can continue flomax in interim    CC:  Raul Dutta    How would you like to obtain your AVS? MyChart  If the video visit is dropped, the invitation should be resent by: Text to cell phone: [phone]  Will anyone else be joining your video visit? No      Video Start Time: 10:05  Video-Visit Details    Type of service:  Video Visit    Video End Time:10:10    Originating Location (pt. Location): Home    Distant Location (provider location):  [facility] [place]     Platform used for Video Visit: [employer]

## 2022-01-05 ENCOUNTER — TELEPHONE (OUTPATIENT)
Dept: UROLOGY | Facility: CLINIC | Age: 67
End: 2022-01-05
Payer: COMMERCIAL

## 2022-01-05 ENCOUNTER — OFFICE VISIT (OUTPATIENT)
Dept: OPHTHALMOLOGY | Facility: CLINIC | Age: 67
End: 2022-01-05
Attending: OPHTHALMOLOGY
Payer: COMMERCIAL

## 2022-01-05 DIAGNOSIS — H52.203 HYPEROPIC ASTIGMATISM OF BOTH EYES: ICD-10-CM

## 2022-01-05 DIAGNOSIS — E88.810 METABOLIC SYNDROME: ICD-10-CM

## 2022-01-05 DIAGNOSIS — Z96.1 PSEUDOPHAKIA, BOTH EYES: Primary | ICD-10-CM

## 2022-01-05 DIAGNOSIS — H52.4 PRESBYOPIA: ICD-10-CM

## 2022-01-05 PROCEDURE — 92014 COMPRE OPH EXAM EST PT 1/>: CPT | Mod: GC | Performed by: OPHTHALMOLOGY

## 2022-01-05 PROCEDURE — 92015 DETERMINE REFRACTIVE STATE: CPT

## 2022-01-05 PROCEDURE — G0463 HOSPITAL OUTPT CLINIC VISIT: HCPCS | Mod: 25

## 2022-01-05 ASSESSMENT — CONF VISUAL FIELD
OD_NORMAL: 1
OS_NORMAL: 1
METHOD: COUNTING FINGERS

## 2022-01-05 ASSESSMENT — REFRACTION_MANIFEST
OS_AXIS: 037
OD_SPHERE: +0.25
OS_SPHERE: +0.75
OD_ADD: +2.50
OD_CYLINDER: +0.75
OS_CYLINDER: +0.50
OS_AXIS: 037
OD_AXIS: 171
OD_AXIS: 171
OD_CYLINDER: +0.75
OS_SPHERE: +2.50
OS_CYLINDER: +0.50
OS_ADD: +2.50
OD_SPHERE: +2.00

## 2022-01-05 ASSESSMENT — TONOMETRY
OS_IOP_MMHG: 18
OD_IOP_MMHG: 17
IOP_METHOD: TONOPEN

## 2022-01-05 ASSESSMENT — REFRACTION_WEARINGRX
OD_SPHERE: +0.50
OD_ADD: +2.00
OD_CYLINDER: SPHERE
OS_ADD: +2.00
OS_SPHERE: +1.00
OS_CYLINDER: SPHERE

## 2022-01-05 ASSESSMENT — CUP TO DISC RATIO
OD_RATIO: 0.1
OS_RATIO: 0.1

## 2022-01-05 ASSESSMENT — VISUAL ACUITY
OS_CC+: -1
METHOD: SNELLEN - LINEAR
CORRECTION_TYPE: GLASSES
OD_CC: 20/30
OD_CC+: -1+2
OD_PH_CC: 20/20
OS_CC: 20/20

## 2022-01-05 ASSESSMENT — EXTERNAL EXAM - LEFT EYE: OS_EXAM: NORMAL

## 2022-01-05 ASSESSMENT — SLIT LAMP EXAM - LIDS
COMMENTS: NORMAL
COMMENTS: NORMAL

## 2022-01-05 ASSESSMENT — EXTERNAL EXAM - RIGHT EYE: OD_EXAM: NORMAL

## 2022-01-05 NOTE — NURSING NOTE
Chief Complaints and History of Present Illnesses   Patient presents with     Annual Eye Exam     Chief Complaint(s) and History of Present Illness(es)     Annual Eye Exam     Laterality: both eyes    Quality: blurred vision    Severity: mild    Associated symptoms: Negative for eye pain, flashes and floaters    Pain scale: 0/10              Comments     Pt feels his vision BE isn't as clear as he would like it to be.  Ocular meds: None  Lab Results       Component                Value               Date                       A1C                      4.8                 07/15/2021                 A1C                      4.8                 07/14/2021                 A1C                      5.0                 12/09/2020                 A1C                      5.1                 08/06/2020                 A1C                      5.2                 12/03/2019                 A1C                      5.6                 04/30/2019                 A1C                      5.7                 11/13/2018              Alycia Mccabe OT 8:48 AM January 5, 2022

## 2022-01-05 NOTE — PROGRESS NOTES
HPI     Annual Eye Exam     In both eyes.  Charactertized as  blurred vision.  Severity is mild.  Associated symptoms include Negative for eye pain, flashes and floaters.  Pain was noted as 0/10.              Comments     Pt feels his vision BE isn't as clear as he would like it to be.  Ocular meds: None  Lab Results       Component                Value               Date                       A1C                      4.8                 07/15/2021                 A1C                      4.8                 07/14/2021                 A1C                      5.0                 12/09/2020                 A1C                      5.1                 08/06/2020                 A1C                      5.2                 12/03/2019                 A1C                      5.6                 04/30/2019                 A1C                      5.7                 11/13/2018              Alycia Mccabe OT 8:48 AM January 5, 2022             Last edited by Alycia Mccabe on 1/5/2022  8:48 AM. (History)          HPI:  Darnell Grant is a 66 year old male here for annual eye exam. Pt endorses a mild blur in each eye for several months. No eye pain, irritation, redness, photophobia, flashes, or floaters.     POH: S/p CE/IOL left eye 9/25/20 and right eye 10/2/20  PMH: metabolic syndrome    Assessment & Plan     (Z96.1) Pseudophakia, both eyes  Comment: Clear visual axis  Plan: Observe    (E88.81) Metabolic syndrome  Comment: On metformin. Last A1c 4.8 7/15/2021. No retinopathy.  Plan: Observe    (H52.203) Hyperopic astigmatism of both eyes  (H52.4) Presbyopia  Comment: Good vision with refraction  Plan: Given updated glasses Rx.        -----------------------------------------------------------------------------------    Patient disposition:   Return in about 1 year (around 1/5/2023) for dilated eye exam, or sooner as needed.     Srini Felipe MD  Ophthalmology PGY-4  [facility]  statement:  Complete documentation of historical and exam elements from today's encounter can be found in the full encounter summary report (not reduplicated in this progress note). I personally obtained the chief complaint(s) and history of present illness.  I confirmed and edited as necessary the review of systems, past medical/surgical history, family history, social history, and examination findings as documented by others; and I examined the patient myself. I personally reviewed the relevant tests, images, and reports as documented above.     I formulated and edited as necessary the assessment and plan and discussed the findings and management plan with the patient and family.      Keyla Tobin MD  Comprehensive Ophthalmology & Ocular Pathology  Department of Ophthalmology and Visual Neurosciences  [email or web address]  Pager [phone]

## 2022-01-06 ENCOUNTER — THERAPY VISIT (OUTPATIENT)
Dept: PHYSICAL THERAPY | Facility: CLINIC | Age: 67
End: 2022-01-06
Payer: COMMERCIAL

## 2022-01-06 DIAGNOSIS — M54.16 LUMBAR RADICULOPATHY: Primary | ICD-10-CM

## 2022-01-06 PROCEDURE — 97110 THERAPEUTIC EXERCISES: CPT | Mod: GP | Performed by: PHYSICAL THERAPIST

## 2022-01-06 PROCEDURE — 97112 NEUROMUSCULAR REEDUCATION: CPT | Mod: GP | Performed by: PHYSICAL THERAPIST

## 2022-01-10 ENCOUNTER — THERAPY VISIT (OUTPATIENT)
Dept: PHYSICAL THERAPY | Facility: CLINIC | Age: 67
End: 2022-01-10
Payer: COMMERCIAL

## 2022-01-10 DIAGNOSIS — M54.16 LUMBAR RADICULOPATHY: Primary | ICD-10-CM

## 2022-01-10 PROCEDURE — 97112 NEUROMUSCULAR REEDUCATION: CPT | Mod: GP | Performed by: PHYSICAL THERAPIST

## 2022-01-10 PROCEDURE — 97110 THERAPEUTIC EXERCISES: CPT | Mod: GP | Performed by: PHYSICAL THERAPIST

## 2022-01-25 ASSESSMENT — SLEEP AND FATIGUE QUESTIONNAIRES
HOW LIKELY ARE YOU TO NOD OFF OR FALL ASLEEP IN A CAR, WHILE STOPPED FOR A FEW MINUTES IN TRAFFIC: WOULD NEVER DOZE
HOW LIKELY ARE YOU TO NOD OFF OR FALL ASLEEP WHILE LYING DOWN TO REST IN THE AFTERNOON WHEN CIRCUMSTANCES PERMIT: HIGH CHANCE OF DOZING
HOW LIKELY ARE YOU TO NOD OFF OR FALL ASLEEP WHILE SITTING INACTIVE IN A PUBLIC PLACE: SLIGHT CHANCE OF DOZING
HOW LIKELY ARE YOU TO NOD OFF OR FALL ASLEEP WHILE SITTING AND TALKING TO SOMEONE: WOULD NEVER DOZE
HOW LIKELY ARE YOU TO NOD OFF OR FALL ASLEEP WHEN YOU ARE A PASSENGER IN A CAR FOR AN HOUR WITHOUT A BREAK: SLIGHT CHANCE OF DOZING
HOW LIKELY ARE YOU TO NOD OFF OR FALL ASLEEP WHILE WATCHING TV: WOULD NEVER DOZE
HOW LIKELY ARE YOU TO NOD OFF OR FALL ASLEEP WHILE SITTING QUIETLY AFTER LUNCH WITHOUT ALCOHOL: SLIGHT CHANCE OF DOZING
HOW LIKELY ARE YOU TO NOD OFF OR FALL ASLEEP WHILE SITTING AND READING: SLIGHT CHANCE OF DOZING

## 2022-01-28 ENCOUNTER — VIRTUAL VISIT (OUTPATIENT)
Dept: SLEEP MEDICINE | Facility: CLINIC | Age: 67
End: 2022-01-28
Payer: COMMERCIAL

## 2022-01-28 VITALS — WEIGHT: 228 LBS | BODY MASS INDEX: 29.26 KG/M2 | HEIGHT: 74 IN

## 2022-01-28 DIAGNOSIS — G47.33 OSA (OBSTRUCTIVE SLEEP APNEA): Primary | ICD-10-CM

## 2022-01-28 PROCEDURE — 99203 OFFICE O/P NEW LOW 30 MIN: CPT | Mod: 95 | Performed by: INTERNAL MEDICINE

## 2022-01-28 ASSESSMENT — MIFFLIN-ST. JEOR: SCORE: 1873.95

## 2022-01-28 NOTE — PROGRESS NOTES
Darnell is a 66 year old who is being evaluated via a billable video visit.      How would you like to obtain your AVS? MyChart  If the video visit is dropped, the invitation should be resent by: Send to e-mail at: [email or web address]  Will anyone else be joining your video visit? No      Video Start Time: 3:35 PM  Video-Visit Details    Type of service:  Video Visit    Video End Time:4:01 PM    Originating Location (pt. Location): Home    Distant Location (provider location):  [facility]     Platform used for Video Visit: Well    Sleep Consultation:    Date on this visit: 1/28/2022    Primary Physician: Raul Dutta Dr. presents to clinic for management of sleep apnea.     Medical history is significant for hypertension and prostatic hypertrophy.     Previous sleep evaluation was in 2015. A home sleep test on 10/25/2015 at a weight of 295 pounds showed an apnea-hypopnea index of 21/h, consistent with moderate obstructive sleep apnea. Only 9% of the night was in supine sleep. Supine AHI was 82/h, AHI on left side was 8.5 and right side was 25.8/h. Patient started CPAP therapy but did not tolerate treatment. Pressure intolerance and intolerance with tube and mask interface were main problems.    He has been successful in weight loss and current weight is 228 lbs.     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 He is also concerned about possible contribution of sleep apnea in hypertension.     Darnell does snore frequently. His wife reports that snoring is less compared to before. There is not report of gasping, choking. He has occasional snort arousals. He does not have witnessed apneas. They never sleep separately.  Patient sleeps on his side & back. He denies no morning headaches and restless legs. Darnell denies any sleep walking, sleep talking and dream enactment.    Darnell goes to sleep at 11:00 PM - 1:00 AM during the  week. He wakes up at 7:30 AM without an alarm. He falls asleep in 5 minutes.  Darnell denies difficulty falling asleep.  He wakes up 2-3 times a night for 5 minutes before falling back to sleep.  Darnell wakes up to go to the bathroom.  Patient gets an average of 7 hours of sleep per night.     Patient's [place] Sleepiness score 7/24 consistent with no daytime sleepiness.      Patient naps 5-6 times per week for 10-20 minutes, feels refreshed after naps. He takes some inadvertant naps.  He denies closing eyes, dozing and falling asleep while driving.      He uses 1-2 cups/day of coffee. Last caffeine intake is usually before noon.    Allergies:    Allergies   Allergen Reactions     Ragweeds      Watery eyes, itchy nose     Past Medical/Surgical History:  Past Medical History:   Diagnosis Date     Bladder mass      BPH (benign prostatic hyperplasia)      GERD (gastroesophageal reflux disease)      Heart disease >5 years ago    PVCs     High serum parathyroid hormone (PTH) 2017     Hypertension      Metabolic syndrome      Nephrolithiasis      Obesity      LISETTE (obstructive sleep apnea)      Prediabetes      Uncomplicated asthma >5 years ago    Stress induced asthma     Past Surgical History:   Procedure Laterality Date     ABDOMEN SURGERY  July, 2021     APPENDECTOMY OPEN N/A 7/14/2021    Procedure: APPENDECTOMY, OPEN;  Surgeon: Walter Boggs MD;  Location: [facility] OR     BACK SURGERY      repair disc     BIOPSY  >5 years ago    Prostate     CATARACT IOL, RT/LT       CYSTOSCOPY, TRANSURETHRAL RESECTION (TUR) TUMOR BLADDER, COMBINED N/A 1/31/2018    Procedure: COMBINED CYSTOSCOPY, TRANSURETHRAL RESECTION (TUR) TUMOR BLADDER;  Transurethral Biopsy and Resection of Bladder Tumor;  Surgeon: Yaya Lomeli MD;  [facility]     DECOMPRESSION, FUSION CERVICAL ANTERIOR TWO LEVELS, COMBINED Right 12/10/2019    Procedure: right approach anterior cervical 2-3, 3-4 resection of anterior cervical osteophytes causing  dysphagia, with microscope;  Surgeon: María Fitzgerald MD;  [facility] OR     HC BREATH HYDROGEN TEST  5/22/2013    Procedure: HYDROGEN BREATH TEST;  Surgeon: Donny Paris MD;  [facility]     LAPAROSCOPIC APPENDECTOMY N/A 7/14/2021    Procedure: APPENDECTOMY, LAPAROSCOPIC;  Surgeon: Walter Boggs MD;  [facility]     PHACOEMULSIFICATION CLEAR CORNEA WITH STANDARD INTRAOCULAR LENS IMPLANT Left 9/25/2020    Procedure: LEFT CATARACT REMOVAL WITH INTRAOCULAR LENS IMPLANT;  Surgeon: Keyla Tobin MD;  [facility]     PHACOEMULSIFICATION CLEAR CORNEA WITH STANDARD INTRAOCULAR LENS IMPLANT Right 10/2/2020    Procedure: RIGHT CATARACT REMOVAL WITH INTRAOCULAR LENS IMPLANT;  Surgeon: Keyla Tobin MD;  [facility]     TRANSURETHERAL DESTRUCTION OF PROSTATE BY THERMOTHERAPY N/A 4/13/2018    Procedure: TRANSURETHERAL DESTRUCTION OF PROSTATE BY THERMOTHERAPY;  Rezum Procedure;  Surgeon: Yaya Lomeli MD;  [facility]       Social History:  Social History     Socioeconomic History     Marital status:      Spouse name: Not on file     Number of children: Not on file     Years of education: Not on file     Highest education level: Not on file   Occupational History     Not on file   Tobacco Use     Smoking status: Never Smoker     Smokeless tobacco: Never Used   Vaping Use     Vaping Use: Never used   Substance and Sexual Activity     Alcohol use: No     Drug use: No     Sexual activity: Not Currently     Partners: Female     Birth control/protection: Post-menopausal   Other Topics Concern     Parent/sibling w/ CABG, MI or angioplasty before 65F 55M? No   Social History Narrative     Not on file     Social Determinants of Health     Financial Resource Strain: Not on file   Food Insecurity: Not on file   Transportation Needs: Not on file   Physical Activity: Not on file   Stress: Not on file   Social Connections: Not on file   Intimate Partner Violence: Not on  "file   Housing Stability: Not on file       Family History:  Family History   Problem Relation Age of Onset     Asthma Brother      Diabetes Maternal Grandmother      Cardiovascular Father      Coronary Artery Disease Father         at age 70s     Hypertension Father         at age 70s     Nephrolithiasis No family hx of      Parathyroid Disorders No family hx of      Thyroid Disease No family hx of      Glaucoma No family hx of      Macular Degeneration No family hx of      Anesthesia Reaction No family hx of      Deep Vein Thrombosis (DVT) No family hx of      Physical Examination:  Vitals: Ht 1.88 m (6' 2\")   Wt 103.4 kg (228 lb)   BMI 29.27 kg/m    BMI= Body mass index is 29.27 kg/m .         [place] Total Score 1/25/2022   Total score - [place] 7       BENITO Total Score: 10 (01/25/22 0830)    GENERAL APPEARANCE: alert, active and no distress  RESP: Negative for cough or dyspnea   NEURO: mentation intact and speech normal  PSYCH: mentation appears normal and affect normal/bright    Impression/Plan:    1. Obstructive sleep apnea    Dr. Grant was diagnosed with moderate obstructive sleep apnea in 2015 with a baseline apnea-hypopnea index of 21/h at a weight of 295 pounds. He was intolerant of CPAP therapy due to pressure intolerance and difficulty with tubes and mask. He was successful in losing weight and current weight is 228 pounds. He continues to snore and also complains of daytime fatigue, that motivates him to reevaluate sleep apnea. He also wants to manage sleep apnea to reduce any cardiovascular risk from untreated disease. An overnight sleep study is recommended for assessment of current degree of sleep apnea and inform treatment options. He prefers home sleep testing. Depending on severity of sleep apnea, we can consider CPAP again or oral appliance therapy.    Plan:     - Home sleep apnea testing   - Follow up after testing     Obstructive sleep apnea reviewed.  PSG, HST & limitations of HST reviewed. "   Complications of untreated sleep apnea were reviewed.    Dr. Sandip Gracia     CC: No ref. provider found

## 2022-01-28 NOTE — PATIENT INSTRUCTIONS

## 2022-01-29 ENCOUNTER — TELEPHONE (OUTPATIENT)
Dept: CARDIOLOGY | Facility: CLINIC | Age: 67
End: 2022-01-29
Payer: COMMERCIAL

## 2022-01-31 NOTE — PROGRESS NOTES
Patient has been contacted to schedule a sleep study per providers orders. A message was left for patient to contact the sleep clinic to schedule sleep testing and follow up appointment.   Paty Rivera MA

## 2022-02-01 ENCOUNTER — TELEPHONE (OUTPATIENT)
Dept: SLEEP MEDICINE | Facility: CLINIC | Age: 67
End: 2022-02-01
Payer: COMMERCIAL

## 2022-02-01 NOTE — TELEPHONE ENCOUNTER
Returned the patient's call to schedule their sleep study and left a message to call back to schedule.

## 2022-02-02 ENCOUNTER — THERAPY VISIT (OUTPATIENT)
Dept: PHYSICAL THERAPY | Facility: CLINIC | Age: 67
End: 2022-02-02
Payer: COMMERCIAL

## 2022-02-02 DIAGNOSIS — M54.16 LUMBAR RADICULOPATHY: Primary | ICD-10-CM

## 2022-02-02 PROCEDURE — 97530 THERAPEUTIC ACTIVITIES: CPT | Mod: GP | Performed by: PHYSICAL THERAPIST

## 2022-02-02 PROCEDURE — 97110 THERAPEUTIC EXERCISES: CPT | Mod: GP | Performed by: PHYSICAL THERAPIST

## 2022-02-02 PROCEDURE — 97112 NEUROMUSCULAR REEDUCATION: CPT | Mod: GP | Performed by: PHYSICAL THERAPIST

## 2022-02-05 ENCOUNTER — MYC MEDICAL ADVICE (OUTPATIENT)
Dept: INTERNAL MEDICINE | Facility: CLINIC | Age: 67
End: 2022-02-05
Payer: COMMERCIAL

## 2022-02-07 ENCOUNTER — LAB (OUTPATIENT)
Dept: LAB | Facility: CLINIC | Age: 67
End: 2022-02-07
Attending: INTERNAL MEDICINE
Payer: COMMERCIAL

## 2022-02-07 ENCOUNTER — TELEPHONE (OUTPATIENT)
Dept: INTERNAL MEDICINE | Facility: CLINIC | Age: 67
End: 2022-02-07

## 2022-02-07 DIAGNOSIS — I10 BENIGN ESSENTIAL HYPERTENSION: Primary | ICD-10-CM

## 2022-02-07 DIAGNOSIS — Z20.822 ENCOUNTER FOR LABORATORY TESTING FOR COVID-19 VIRUS: ICD-10-CM

## 2022-02-07 DIAGNOSIS — I10 BENIGN ESSENTIAL HYPERTENSION: ICD-10-CM

## 2022-02-07 LAB
ANION GAP SERPL CALCULATED.3IONS-SCNC: 8 MMOL/L (ref 3–14)
BUN SERPL-MCNC: 19 MG/DL (ref 7–30)
CALCIUM SERPL-MCNC: 9.7 MG/DL (ref 8.5–10.1)
CHLORIDE BLD-SCNC: 108 MMOL/L (ref 94–109)
CO2 SERPL-SCNC: 24 MMOL/L (ref 20–32)
CREAT SERPL-MCNC: 0.78 MG/DL (ref 0.66–1.25)
GFR SERPL CREATININE-BSD FRML MDRD: >90 ML/MIN/1.73M2
GLUCOSE BLD-MCNC: 120 MG/DL (ref 70–99)
POTASSIUM BLD-SCNC: 3.8 MMOL/L (ref 3.4–5.3)
SODIUM SERPL-SCNC: 140 MMOL/L (ref 133–144)

## 2022-02-07 PROCEDURE — 80048 BASIC METABOLIC PNL TOTAL CA: CPT | Performed by: PATHOLOGY

## 2022-02-07 PROCEDURE — 36415 COLL VENOUS BLD VENIPUNCTURE: CPT | Performed by: PATHOLOGY

## 2022-02-07 PROCEDURE — 86769 SARS-COV-2 COVID-19 ANTIBODY: CPT | Mod: 90 | Performed by: PATHOLOGY

## 2022-02-07 PROCEDURE — 99000 SPECIMEN HANDLING OFFICE-LAB: CPT | Performed by: PATHOLOGY

## 2022-02-08 ENCOUNTER — THERAPY VISIT (OUTPATIENT)
Dept: PHYSICAL THERAPY | Facility: CLINIC | Age: 67
End: 2022-02-08
Payer: COMMERCIAL

## 2022-02-08 DIAGNOSIS — M54.16 LUMBAR RADICULOPATHY: Primary | ICD-10-CM

## 2022-02-08 LAB
SARS-COV-2 AB SERPL IA-ACNC: >250 U/ML
SARS-COV-2 AB SERPL-IMP: POSITIVE

## 2022-02-08 PROCEDURE — 97110 THERAPEUTIC EXERCISES: CPT | Mod: GP | Performed by: PHYSICAL THERAPIST

## 2022-02-08 PROCEDURE — 97112 NEUROMUSCULAR REEDUCATION: CPT | Mod: GP | Performed by: PHYSICAL THERAPIST

## 2022-02-20 ENCOUNTER — HEALTH MAINTENANCE LETTER (OUTPATIENT)
Age: 67
End: 2022-02-20

## 2022-02-21 ENCOUNTER — THERAPY VISIT (OUTPATIENT)
Dept: PHYSICAL THERAPY | Facility: CLINIC | Age: 67
End: 2022-02-21
Payer: COMMERCIAL

## 2022-02-21 DIAGNOSIS — M54.16 LUMBAR RADICULOPATHY: Primary | ICD-10-CM

## 2022-02-21 PROCEDURE — 97110 THERAPEUTIC EXERCISES: CPT | Mod: GP | Performed by: PHYSICAL THERAPIST

## 2022-02-21 PROCEDURE — 97112 NEUROMUSCULAR REEDUCATION: CPT | Mod: GP | Performed by: PHYSICAL THERAPIST

## 2022-02-21 NOTE — PROGRESS NOTES
PROGRESS  REPORT    Progress reporting period is from 1/3/22 to 2/21/22.       SUBJECTIVE  Subjective: Patient reports a SANE score of 70-75. Still has difficulty with lifting.  Current pain level is 3/10  .     Changes in function:  Yes (See Goal flowsheet attached for changes in current functional level)  Adverse reaction to treatment or activity: None    OBJECTIVE  Changes noted in objective findings:  Yes  MMT: L LE  Hip flexion 4+/5  Knee extension 4/5  Ankle dorsiflexion 4/5  Significant improvement in gait pattern.  Continues to demonstrate decreased ankle DF during swing, but overall less shifted at the lumbar spine.        ASSESSMENT/PLAN  Updated problem list and treatment plan: Diagnosis 1:  S/p Laminectomies Lumbar  Pain -  hot/cold therapy  Decreased ROM/flexibility - manual therapy and therapeutic exercise  Decreased joint mobility - manual therapy and therapeutic exercise  Decreased strength - therapeutic exercise and therapeutic activities  Impaired balance - neuro re-education and therapeutic activities  Decreased proprioception - neuro re-education and therapeutic activities  Impaired gait - gait training  Impaired muscle performance - neuro re-education  Decreased function - therapeutic activities  STG/LTGs have been met or progress has been made towards goals:  Yes (See Goal flow sheet completed today.)  Assessment of Progress: The patient's condition is improving.  Self Management Plans:  Patient has been instructed in a home treatment program.  I have re-evaluated this patient and find that the nature, scope, duration and intensity of the therapy is appropriate for the medical condition of the patient.  Darnell continues to require the following intervention to meet STG and LTG's:  PT intervention is no longer required to meet STG/LTG.    Recommendations:  This patient is ready to be discharged from therapy and continue their home treatment program.    Please refer to the daily flowsheet for  treatment today, total treatment time and time spent performing 1:1 timed codes.

## 2022-02-25 ENCOUNTER — TRANSCRIBE ORDERS (OUTPATIENT)
Dept: OTHER | Age: 67
End: 2022-02-25
Payer: COMMERCIAL

## 2022-02-25 DIAGNOSIS — M70.72 BURSITIS OF LEFT HIP: ICD-10-CM

## 2022-02-25 DIAGNOSIS — M54.16 RADICULOPATHY, LUMBAR REGION: ICD-10-CM

## 2022-02-25 DIAGNOSIS — Z98.1 ARTHRODESIS STATUS: Primary | ICD-10-CM

## 2022-03-08 NOTE — TELEPHONE ENCOUNTER
RECORDS RECEIVED FROM:  Internal   DATE RECEIVED: 3.18.22   NOTES STATUS DETAILS   OFFICE NOTE from referring provider        OFFICE NOTE from other cardiologist        DISCHARGE SUMMARY from hospital        DISCHARGE REPORT from the ER       OPERATIVE REPORT        MEDICATION LIST   Internal    LABS     BMP   Internal 2.7.22   CBC   Care Everywhere 11.6.21   CMP       Lipids   Internal 6.11.21   TSH   Internal 12.9.20   DIAGNOSTIC PROCEDURES     EKG   Internal 10.27.21  2.2.21   Monitor Reports   Internal 1.14.19  1.12.18   IMAGING (DISC & REPORT)      Echo       Stress Tests       Cath       MRI/MRA   Internal 1.19.18   CT/CTA

## 2022-03-17 NOTE — PROGRESS NOTES
CARDIOLOGY CLINIC Follow Up    HPI:  Darnell Grant is a 66 year old male who receives primary care from Dr. Raul Dutta.  He has been followed in cardiology clinic by my colleague Dr Leo Dutta.  Returns to clinic today for routine follow-up.    Please see Dr. Dutta's note from 2/2/2021 for summary of Dr. Grant's medical history.  In brief, Dr. Grant has no history of clinical atherosclerotic CVD.,  He does have imaging evidence of with a coronary calcium score of 772 in 2017, putting him in the 90th percentile.  His CV risk factors include hypertension, metabolic syndrome, HLD, and family history of premature CAD.  His main cardiac related symptom has been intermittent palpitations and he has been diagnosed with PVCs with frequency ranging from 5 to 14% on cardiac monitors.    Since his last visit Dr. Grant has intentionally lost weight.  He has gone up more than 10 pounds since his visit 1 year ago.  With his weight loss his blood pressure has decreased and he does describe some orthostatic symptoms of lightheadedness when he stands up.  He has been trying to taper off his gabapentin and when he went quickly his blood pressure increased.  His main physical complaint is the pain and he uses a cane to walk right now so is getting less steps and then he had done in the past.  No chest pain or dyspnea; no orthopnea.    The ASCVD Risk score (Rochester DC Jr., et al., 2013) failed to calculate for the following reasons:    The valid total cholesterol range is 130 to 320 mg/dL     ASSESSMENT AND PLAN  Darnell Grant is a 66 year old male with evidence of CAD on coronary artery calcium scan, but no clinical symptoms; moderate PVC burden with history of short runs of NSVT; HTN; HLD; metabolic syndrome.  CV risk factors deprenyl controlled at this time.  Of note EKG done in clinic today was read as having an inferior infarct and an anterolateral infarct.  On direct comparison it is virtually unchanged from the EKG done  1 year ago.    #PVCs/NSVT: Currently symptom-free.  Dr. Dutta has been monitoring LV function intermittently to ensure no cardiomyopathy as a result of ventricular ectopy.  Last cardiac imaging was done in 2018.  -Schedule echocardiogram whenever is convenient     #HTN: Blood pressures well controlled and in fact is sometimes on the low side.  Dr. Dutta has been working with Dr. Grant to titrate his blood pressure medications.  I agree with the suggestion to cut back on his blood pressure medications if he continues to feel lightheaded.  The medication I will start by eliminating is hydrochlorothiazide.  Continue other prescribed medications.    #HLD: LDL is under excellent control at 26 mg/dL  -Continue rosuvastatin 40 mg    #Metabolic syndrome: Successful weight loss over the past year with guidance from endocrinology.  Currently taking multiple medications for appetite suppression and weight loss.  Hemoglobin A1c 4.8 last summer.  -Follow-up endocrinology    Follow-up in cardiology clinic 1 year    Thank you for the opportunity to participate in the care of Dr. Darnell Grant. Please feel free to contact me with any additional questions or concerns.    Abdon Fischer MD    Preventive Cardiology  Pager: [phone]    The total time of this encounter amounted to 30 minutes on the day of service. This time included time spent with the patient, reviewing records or previous testing, ordering tests, and performing post visit documentation.     PAST MEDICAL HISTORY:  Patient Active Problem List   Diagnosis     Adjustment disorder with mixed anxiety and depressed mood     Prostate nodule     Pure hypercholesterolemia     Essential hypertension, benign     Chronic cough     Diarrhea     Obstructive sleep apnea hypopnea, moderate [AHI 21 on CPAP 5-15]     Agatston coronary artery calcium score greater than 400     Mass of urinary bladder     Microscopic hematuria     Benign prostatic hyperplasia  with urinary obstruction     Combined forms of age-related cataract of both eyes     Myopia of both eyes with astigmatism and presbyopia     Esophageal dysphagia     Osteophyte of cervical spine     Hyperlipidemia     Hypertension     Lumbar radiculopathy     Metabolic syndrome     Obesity, unspecified     Dysphagia     Nuclear sclerotic cataract of both eyes     Acute appendicitis with localized peritonitis, without perforation, abscess, or gangrene     Past Medical History:   Diagnosis Date     Bladder mass      BPH (benign prostatic hyperplasia)      GERD (gastroesophageal reflux disease)      Heart disease >5 years ago    PVCs     High serum parathyroid hormone (PTH) 2017     Hypertension      Metabolic syndrome      Nephrolithiasis      Obesity      LISETTE (obstructive sleep apnea)      Prediabetes      Uncomplicated asthma >5 years ago    Stress induced asthma       CURRENT MEDICATIONS:  Current Outpatient Medications   Medication Sig Dispense Refill     acetaminophen (TYLENOL) 500 MG tablet Take 1,000 mg by mouth       allopurinol (ZYLOPRIM) 100 MG tablet take by mouth 1 tab ( 100 mg)  at bedtime 90 tablet 3     amLODIPine (NORVASC) 10 MG tablet Take 1 tablet (10 mg) by mouth At Bedtime 90 tablet 3     buPROPion (WELLBUTRIN XL) 300 MG 24 hr tablet Take 1 tablet (300 mg) by mouth every morning 90 tablet 3     candesartan (ATACAND) 32 MG tablet Take 16 mg by mouth At Bedtime 45 tablet 3     clomiPHENE (CLOMID) 50 MG tablet Take 1 tablet (50 mg) by mouth every other day Pt can't recall if medication was taken today or yesterday 12/3/19 90 tablet 3     empagliflozin (JARDIANCE) 25 MG TABS tablet Take 1 tablet (25 mg) by mouth every morning 90 tablet 3     gabapentin (NEURONTIN) 600 MG tablet Take 600 mg by mouth 3 times daily 1200 mg three times daily       hydrochlorothiazide (MICROZIDE) 12.5 MG capsule        metFORMIN (GLUCOPHAGE) 1000 MG tablet Take 2 tablets (2,000 mg) by mouth daily (with dinner) 180 tablet 3      multivitamin, therapeutic with minerals (MULTI-VITAMIN) TABS tablet Take 1 tablet by mouth At Bedtime       omeprazole (PRILOSEC) 20 MG DR capsule Take 1 capsule (20 mg) by mouth daily 90 capsule 3     phentermine (ADIPEX-P) 37.5 MG tablet Take 1.5 tablets (56 mg) by mouth every morning (before breakfast) 135 tablet 3     rosuvastatin (CRESTOR) 40 MG tablet Take 1 tablet (40 mg) by mouth daily 90 tablet 3     Semaglutide, 1 MG/DOSE, 4 MG/3ML SOPN Inject 1 mg Subcutaneous once a week 30 mL 3     tamsulosin (FLOMAX) 0.4 MG capsule Take 1 capsule (0.4 mg) by mouth daily 90 capsule 3       PAST SURGICAL HISTORY:  Past Surgical History:   Procedure Laterality Date     ABDOMEN SURGERY  July, 2021     APPENDECTOMY OPEN N/A 7/14/2021    Procedure: APPENDECTOMY, OPEN;  Surgeon: Walter Boggs MD;  Location: [facility]     BACK SURGERY      repair disc     BIOPSY  >5 years ago    Prostate     CATARACT IOL, RT/LT       CYSTOSCOPY, TRANSURETHRAL RESECTION (TUR) TUMOR BLADDER, COMBINED N/A 1/31/2018    Procedure: COMBINED CYSTOSCOPY, TRANSURETHRAL RESECTION (TUR) TUMOR BLADDER;  Transurethral Biopsy and Resection of Bladder Tumor;  Surgeon: Yaya Lomeli MD;  Location: [facility]     DECOMPRESSION, FUSION CERVICAL ANTERIOR TWO LEVELS, COMBINED Right 12/10/2019    Procedure: right approach anterior cervical 2-3, 3-4 resection of anterior cervical osteophytes causing dysphagia, with microscope;  Surgeon: María Fitzgerald MD;  Location: [facility]     HC BREATH HYDROGEN TEST  5/22/2013    Procedure: HYDROGEN BREATH TEST;  Surgeon: Donny Paris MD;  Location: [facility]     LAPAROSCOPIC APPENDECTOMY N/A 7/14/2021    Procedure: APPENDECTOMY, LAPAROSCOPIC;  Surgeon: Walter Boggs MD;  [facility]: [facility]     PHACOEMULSIFICATION CLEAR CORNEA WITH STANDARD INTRAOCULAR LENS IMPLANT Left 9/25/2020    Procedure: LEFT CATARACT REMOVAL WITH INTRAOCULAR LENS IMPLANT;  Surgeon: Keyla Tobin MD;  [facility]      "PHACOEMULSIFICATION CLEAR CORNEA WITH STANDARD INTRAOCULAR LENS IMPLANT Right 10/2/2020    Procedure: RIGHT CATARACT REMOVAL WITH INTRAOCULAR LENS IMPLANT;  Surgeon: Keyla Tobin MD;  [facility]     TRANSURETHERAL DESTRUCTION OF PROSTATE BY THERMOTHERAPY N/A 4/13/2018    Procedure: TRANSURETHERAL DESTRUCTION OF PROSTATE BY THERMOTHERAPY;  [facility] Procedure;  Surgeon: Yaya Lomeli MD;  Location: [facility]       ALLERGIES  [place]    FAMILY HX:  Family History   Problem Relation Age of Onset     Asthma Brother      Diabetes Maternal Grandmother      Cardiovascular Father      Coronary Artery Disease Father         at age 70s     Hypertension Father         at age 70s     Nephrolithiasis No family hx of      Parathyroid Disorders No family hx of      Thyroid Disease No family hx of      Glaucoma No family hx of      Macular Degeneration No family hx of      Anesthesia Reaction No family hx of      Deep Vein Thrombosis (DVT) No family hx of        SOCIAL HX:  Social History     Tobacco Use     Smoking status: Never Smoker     Smokeless tobacco: Never Used   Vaping Use     Vaping Use: Never used   Substance Use Topics     Alcohol use: No     Drug use: No        ROS:  Comprehensive ROS is negative except as noted in HPI.    VITAL SIGNS:  /67 (BP Location: Left arm, Patient Position: Sitting, Cuff Size: Adult Large)   Pulse 88   Ht 1.858 m (6' 1.15\")   Wt 106.9 kg (235 lb 9.6 oz)   SpO2 95%   BMI 30.96 kg/m    Body mass index is 30.96 kg/m .  Wt Readings from Last 2 Encounters:   03/18/22 106.9 kg (235 lb 9.6 oz)   01/28/22 103.4 kg (228 lb)       PHYSICAL EXAM  Gen: pleasant man sitting comfortably in NAD  Head: nc/at  Eyes: EOMI  CV: nml s1/s2, no murmur or gallop; no JVD  Chest: clear lungs  Abd: soft, NT, NABS  Ext: warm, no LE edema  Skin: no rash on limited exam  Neuro: awake, alert, oriented, nml speech    LABS: personally reviewed  CMP  Recent Labs   Lab Test 02/07/22  0805 10/26/21  1229 " 07/15/21  0739 07/15/21  0221 07/14/21  1859 07/14/21  1122 06/11/21  0800 12/09/20  0815 09/15/20  0732 08/06/20  0714 12/10/19  0630 12/03/19  0836 04/30/19  0608 01/08/19  1841 12/19/18  0626 11/13/18  0603    138  --   --   --  139  --  141 142 141  --  138  --   --    < > 140   POTASSIUM 3.8 4.3  --   --   --  4.2  --  4.2 3.9 4.1   < > 4.0  --   --    < > 4.1   CHLORIDE 108 105  --   --   --  104  --  107 107 109  --  105  --   --    < > 107   CO2 24 29  --   --   --  31  --  30 28 26  --  28  --   --    < > 29   ANIONGAP 8 4  --   --   --  4  --  4 7 6  --  4  --   --    < > 4   * 106* 106* 122*   < > 107* 93 90 131* 88   < > 96  --   --    < > 86   BUN 19 18  --   --   --  18  --  16 18 25  --  22  --   --    < > 16   CR 0.78 0.84  --   --   --  0.93  --  0.98 0.94 1.00  --  0.83 1.01  --    < > 0.94   GFRESTIMATED >90 >90  --   --   --  85  --  80 84 78  --  >90 77  --    < > 81   GFRESTBLACK  --   --   --   --   --   --   --  >90 >90 >90  --  >90 90  --    < > >90   ZHANE 9.7 9.9  --   --   --  10.3* 9.8 10.1 9.7 9.2  --  10.1 9.7  --    < > 8.9   MAG  --   --   --   --   --   --  2.3  --   --   --   --   --   --   --   --   --    PHOS  --   --   --   --   --   --  2.7  --   --   --   --   --  2.8 3.7  --   --    PROTTOTAL  --   --   --   --   --  7.7  --  7.1  --  6.9  --   --   --   --   --  7.3   ALBUMIN  --   --   --   --   --  4.2  --  4.0  --  3.6  --   --   --   --   --  3.9   BILITOTAL  --   --   --   --   --  1.0  --  0.5  --  0.4  --   --   --   --   --  0.6   ALKPHOS  --   --   --   --   --  69  --  71  --  78  --   --   --   --   --  61   AST  --   --   --   --   --  20  --  19  --  30  --   --   --   --   --  21   ALT  --   --   --   --   --  36  --  40  --  51  --   --   --   --   --  45    < > = values in this interval not displayed.     CBC  Recent Labs   Lab Test 10/26/21  1229 07/14/21  1122 12/09/20  0815 08/06/20  0714   WBC 7.3 16.3* 6.7 6.6   RBC 5.86 5.96* 5.60 5.28   HGB  18.2* 18.2* 17.3 16.6   HCT 52.9 55.1* 52.2 48.9   MCV 90 92 93 93   MCH 31.1 30.5 30.9 31.4   MCHC 34.4 33.0 33.1 33.9   RDW 14.9 14.3 13.8 14.0    170 162 170     INR  Recent Labs   Lab Test 12/03/19  0836   INR 0.91     Recent Labs   Lab Test 06/11/21  0800 12/09/20  0815 04/18/16  0808 08/31/15  0741 07/14/14  0801   CHOL 79 77   < > 117 120   HDL 35* 38*   < > 34* 32*   LDL 26 19   < > 45 59   TRIG 91 100   < > 190* 146   CHOLHDLRATIO  --   --   --  3.4 3.7    < > = values in this interval not displayed.     Recent Labs   Lab Test 07/15/21  0517 07/14/21  0816   A1C 4.8 4.8       Most recent EKG left anterior fascicular block; no acute ischemic changes reviewed and discussed with the patient  3/18/2022 normal sinus rhythm with 1 fusion beat    Most recent ECHO: reviewed  Echo done in 2013 showed normal biventricular function    Most recent STRESS TEST:  Reviewed  Cardiac MRI in 2018 see below    Other Imaging: reviewed  1/19/2018 cardiac MRI with stress  Clinical history: 62-year-old male with frequent PVCs and calcium score of 744, CMR to rule out ischemia.   Comparison CMR: None     1. The LV is normal in cavity size and wall thickness. The global systolic function is normal. The LVEF is  55%. There are no regional wall motion abnormalities.     2. The RV is normal in cavity size. The global systolic function is normal. The RVEF is 61%.      3. Both atria are normal in size.     4. There is no significant valvular disease.      5. Late gadolinium enhancement imaging shows no MI, fibrosis or infiltrative disease.      6. Regadenoson stress perfusion imaging shows no ischemia.     CONCLUSIONS: No ischemia or infarction. Normal LV and RV function, LVEF of 55% and RVEF of 61%. No obvious substrate for arrhythmia.

## 2022-03-18 ENCOUNTER — PRE VISIT (OUTPATIENT)
Dept: CARDIOLOGY | Facility: CLINIC | Age: 67
End: 2022-03-18
Payer: COMMERCIAL

## 2022-03-18 ENCOUNTER — OFFICE VISIT (OUTPATIENT)
Dept: CARDIOLOGY | Facility: CLINIC | Age: 67
End: 2022-03-18
Attending: INTERNAL MEDICINE
Payer: COMMERCIAL

## 2022-03-18 ENCOUNTER — TELEPHONE (OUTPATIENT)
Dept: CARDIOLOGY | Facility: CLINIC | Age: 67
End: 2022-03-18
Payer: COMMERCIAL

## 2022-03-18 VITALS
HEART RATE: 88 BPM | DIASTOLIC BLOOD PRESSURE: 67 MMHG | SYSTOLIC BLOOD PRESSURE: 105 MMHG | BODY MASS INDEX: 31.23 KG/M2 | OXYGEN SATURATION: 95 % | HEIGHT: 73 IN | WEIGHT: 235.6 LBS

## 2022-03-18 DIAGNOSIS — I25.10 CORONARY ARTERY DISEASE DUE TO CALCIFIED CORONARY LESION: Primary | ICD-10-CM

## 2022-03-18 DIAGNOSIS — I49.3 PVC'S (PREMATURE VENTRICULAR CONTRACTIONS): ICD-10-CM

## 2022-03-18 DIAGNOSIS — I10 ESSENTIAL HYPERTENSION: ICD-10-CM

## 2022-03-18 DIAGNOSIS — I25.84 CORONARY ARTERY DISEASE DUE TO CALCIFIED CORONARY LESION: Primary | ICD-10-CM

## 2022-03-18 DIAGNOSIS — E78.5 HYPERLIPIDEMIA LDL GOAL <70: ICD-10-CM

## 2022-03-18 PROCEDURE — G0463 HOSPITAL OUTPT CLINIC VISIT: HCPCS | Mod: 25

## 2022-03-18 PROCEDURE — 99214 OFFICE O/P EST MOD 30 MIN: CPT | Performed by: INTERNAL MEDICINE

## 2022-03-18 PROCEDURE — 93005 ELECTROCARDIOGRAM TRACING: CPT

## 2022-03-18 RX ORDER — HYDROCHLOROTHIAZIDE 12.5 MG/1
CAPSULE ORAL
COMMUNITY
Start: 2022-01-06 | End: 2022-09-13

## 2022-03-18 ASSESSMENT — PAIN SCALES - GENERAL: PAINLEVEL: NO PAIN (0)

## 2022-03-18 NOTE — PATIENT INSTRUCTIONS
"You were seen today in the Cardiovascular Clinic at the [facility].     Cardiology Providers you saw during your visit: Dr. Chris Fischer     Diagnosis:   Encounter Diagnoses   Name Primary?     HLD (hyperlipidemia) Yes     PVC's (premature ventricular contractions)         Recommendations:   1. Schedule an echocardiogram (heart ultrasound) whenever it is convenient.   2. Agree with cutting back on blood pressure medications if blood pressure remain low and you are symptomatic. I would start by discontinuing hydrochlorothiazide.   3. Follow up in cardiology clinic in 1 year.       Please feel free to call me with any questions or concerns.       Gosia Shannon RN     Questions: [phone].   First press #1 for the [facility] and then press #4 for \"Medical Questions\" to reach us Cardiology Nurses.     [phone].   First press #1 for the [facility] and then press #1     On Call Cardiologist for after hours or on weekends: [phone]   option #4 and ask to speak to the on-call Cardiologist.          If you need a medication refill please contact your pharmacy.  Please allow 3 business days for your refill to be completed.  ________________________________________________________________________________________________________________________________         "

## 2022-03-18 NOTE — NURSING NOTE
Chief Complaint   Patient presents with     New Patient     [place] pt, arhythmia per pt, typically followed by Dr. Dutta    Vitals were taken, medications reconciled, and EKG was performed.    Turner Kaur, EMT  2:42 PM

## 2022-03-18 NOTE — LETTER
3/18/2022      RE: Darnell Grant  [address]  [facility] [address]       Dear Colleague,    Thank you for the opportunity to participate in the care of your patient, Darnell Grant, at the [facility] [place] [facility]. Please see a copy of my visit note below.    CARDIOLOGY CLINIC Follow Up    HPI:  Darnell Grant is a 66 year old male who receives primary care from Dr. Raul Dutta.  He has been followed in cardiology clinic by my colleague Dr Leo Dutta.  Returns to clinic today for routine follow-up.    Please see Dr. Dutta's note from 2/2/2021 for summary of Dr. Grant's medical history.  In brief, Dr. Grant has no history of clinical atherosclerotic CVD.,  He does have imaging evidence of with a coronary calcium score of 772 in 2017, putting him in the 90th percentile.  His CV risk factors include hypertension, metabolic syndrome, HLD, and family history of premature CAD.  His main cardiac related symptom has been intermittent palpitations and he has been diagnosed with PVCs with frequency ranging from 5 to 14% on cardiac monitors.    Since his last visit Dr. Grant has intentionally lost weight.  He has gone up more than 10 pounds since his visit 1 year ago.  With his weight loss his blood pressure has decreased and he does describe some orthostatic symptoms of lightheadedness when he stands up.  He has been trying to taper off his gabapentin and when he went quickly his blood pressure increased.  His main physical complaint is the pain and he uses a cane to walk right now so is getting less steps and then he had done in the past.  No chest pain or dyspnea; no orthopnea.    The ASCVD Risk score (Reeder DC Jr., et al., 2013) failed to calculate for the following reasons:    The valid total cholesterol range is 130 to 320 mg/dL     ASSESSMENT AND PLAN  Darnell Grant is a 66 year old male with evidence of CAD on coronary artery  calcium scan, but no clinical symptoms; moderate PVC burden with history of short runs of NSVT; HTN; HLD; metabolic syndrome.  CV risk factors deprenyl controlled at this time.  Of note EKG done in clinic today was read as having an inferior infarct and an anterolateral infarct.  On direct comparison it is virtually unchanged from the EKG done 1 year ago.    #PVCs/NSVT: Currently symptom-free.  Dr. Dutta has been monitoring LV function intermittently to ensure no cardiomyopathy as a result of ventricular ectopy.  Last cardiac imaging was done in 2018.  -Schedule echocardiogram whenever is convenient     #HTN: Blood pressures well controlled and in fact is sometimes on the low side.  Dr. Dutta has been working with Dr. Grant to titrate his blood pressure medications.  I agree with the suggestion to cut back on his blood pressure medications if he continues to feel lightheaded.  The medication I will start by eliminating is hydrochlorothiazide.  Continue other prescribed medications.    #HLD: LDL is under excellent control at 26 mg/dL  -Continue rosuvastatin 40 mg    #Metabolic syndrome: Successful weight loss over the past year with guidance from endocrinology.  Currently taking multiple medications for appetite suppression and weight loss.  Hemoglobin A1c 4.8 last summer.  -Follow-up endocrinology    Follow-up in cardiology clinic 1 year    Thank you for the opportunity to participate in the care of Dr. Darnell Grant. Please feel free to contact me with any additional questions or concerns.    Abdon Fischer MD    Preventive Cardiology  Pager: [phone]    The total time of this encounter amounted to 30 minutes on the day of service. This time included time spent with the patient, reviewing records or previous testing, ordering tests, and performing post visit documentation.     PAST MEDICAL HISTORY:  Patient Active Problem List   Diagnosis     Adjustment disorder with mixed anxiety and  depressed mood     Prostate nodule     Pure hypercholesterolemia     Essential hypertension, benign     Chronic cough     Diarrhea     Obstructive sleep apnea hypopnea, moderate [AHI 21 on CPAP 5-15]     Agatston coronary artery calcium score greater than 400     Mass of urinary bladder     Microscopic hematuria     Benign prostatic hyperplasia with urinary obstruction     Combined forms of age-related cataract of both eyes     Myopia of both eyes with astigmatism and presbyopia     Esophageal dysphagia     Osteophyte of cervical spine     Hyperlipidemia     Hypertension     Lumbar radiculopathy     Metabolic syndrome     Obesity, unspecified     Dysphagia     Nuclear sclerotic cataract of both eyes     Acute appendicitis with localized peritonitis, without perforation, abscess, or gangrene     Past Medical History:   Diagnosis Date     Bladder mass      BPH (benign prostatic hyperplasia)      GERD (gastroesophageal reflux disease)      Heart disease >5 years ago    PVCs     High serum parathyroid hormone (PTH) 2017     Hypertension      Metabolic syndrome      Nephrolithiasis      Obesity      LISETTE (obstructive sleep apnea)      Prediabetes      Uncomplicated asthma >5 years ago    Stress induced asthma       CURRENT MEDICATIONS:  Current Outpatient Medications   Medication Sig Dispense Refill     acetaminophen (TYLENOL) 500 MG tablet Take 1,000 mg by mouth       allopurinol (ZYLOPRIM) 100 MG tablet take by mouth 1 tab ( 100 mg)  at bedtime 90 tablet 3     amLODIPine (NORVASC) 10 MG tablet Take 1 tablet (10 mg) by mouth At Bedtime 90 tablet 3     buPROPion (WELLBUTRIN XL) 300 MG 24 hr tablet Take 1 tablet (300 mg) by mouth every morning 90 tablet 3     candesartan (ATACAND) 32 MG tablet Take 16 mg by mouth At Bedtime 45 tablet 3     clomiPHENE (CLOMID) 50 MG tablet Take 1 tablet (50 mg) by mouth every other day Pt can't recall if medication was taken today or yesterday 12/3/19 90 tablet 3     empagliflozin  (JARDIANCE) 25 MG TABS tablet Take 1 tablet (25 mg) by mouth every morning 90 tablet 3     gabapentin (NEURONTIN) 600 MG tablet Take 600 mg by mouth 3 times daily 1200 mg three times daily       hydrochlorothiazide (MICROZIDE) 12.5 MG capsule        metFORMIN (GLUCOPHAGE) 1000 MG tablet Take 2 tablets (2,000 mg) by mouth daily (with dinner) 180 tablet 3     multivitamin, therapeutic with minerals (MULTI-VITAMIN) TABS tablet Take 1 tablet by mouth At Bedtime       omeprazole (PRILOSEC) 20 MG DR capsule Take 1 capsule (20 mg) by mouth daily 90 capsule 3     phentermine (ADIPEX-P) 37.5 MG tablet Take 1.5 tablets (56 mg) by mouth every morning (before breakfast) 135 tablet 3     rosuvastatin (CRESTOR) 40 MG tablet Take 1 tablet (40 mg) by mouth daily 90 tablet 3     Semaglutide, 1 MG/DOSE, 4 MG/3ML SOPN Inject 1 mg Subcutaneous once a week 30 mL 3     tamsulosin (FLOMAX) 0.4 MG capsule Take 1 capsule (0.4 mg) by mouth daily 90 capsule 3       PAST SURGICAL HISTORY:  Past Surgical History:   Procedure Laterality Date     ABDOMEN SURGERY  July, 2021     APPENDECTOMY OPEN N/A 7/14/2021    Procedure: APPENDECTOMY, OPEN;  Surgeon: Walter Boggs MD;  Location: [facility] OR     BACK SURGERY      repair disc     BIOPSY  >5 years ago    Prostate     CATARACT IOL, RT/LT       CYSTOSCOPY, TRANSURETHRAL RESECTION (TUR) TUMOR BLADDER, COMBINED N/A 1/31/2018    Procedure: COMBINED CYSTOSCOPY, TRANSURETHRAL RESECTION (TUR) TUMOR BLADDER;  Transurethral Biopsy and Resection of Bladder Tumor;  Surgeon: Yaya Lomeli MD;  Location: [facility] OR     DECOMPRESSION, FUSION CERVICAL ANTERIOR TWO LEVELS, COMBINED Right 12/10/2019    Procedure: right approach anterior cervical 2-3, 3-4 resection of anterior cervical osteophytes causing dysphagia, with microscope;  Surgeon: María Fitzgerald MD;  Location: [facility] OR     HC BREATH HYDROGEN TEST  5/22/2013    Procedure: HYDROGEN BREATH TEST;  Surgeon: Donny Paris MD;  Location:   "GI     LAPAROSCOPIC APPENDECTOMY N/A 7/14/2021    Procedure: APPENDECTOMY, LAPAROSCOPIC;  Surgeon: Walter Boggs MD;  Location: [facility] OR     PHACOEMULSIFICATION CLEAR CORNEA WITH STANDARD INTRAOCULAR LENS IMPLANT Left 9/25/2020    Procedure: LEFT CATARACT REMOVAL WITH INTRAOCULAR LENS IMPLANT;  Surgeon: Keyla Tobin MD;  [facility]: [facility]     PHACOEMULSIFICATION CLEAR CORNEA WITH STANDARD INTRAOCULAR LENS IMPLANT Right 10/2/2020    Procedure: RIGHT CATARACT REMOVAL WITH INTRAOCULAR LENS IMPLANT;  Surgeon: Keyla Tobin MD;  [facility]     TRANSURETHERAL DESTRUCTION OF PROSTATE BY THERMOTHERAPY N/A 4/13/2018    Procedure: TRANSURETHERAL DESTRUCTION OF PROSTATE BY THERMOTHERAPY;  Rezum Procedure;  Surgeon: Yaya Lomeli MD;  Location: [facility]       ALLERGIES  Ragweeds    FAMILY HX:  Family History   Problem Relation Age of Onset     Asthma Brother      Diabetes Maternal Grandmother      Cardiovascular Father      Coronary Artery Disease Father         at age 70s     Hypertension Father         at age 70s     Nephrolithiasis No family hx of      Parathyroid Disorders No family hx of      Thyroid Disease No family hx of      Glaucoma No family hx of      Macular Degeneration No family hx of      Anesthesia Reaction No family hx of      Deep Vein Thrombosis (DVT) No family hx of        SOCIAL HX:  Social History     Tobacco Use     Smoking status: Never Smoker     Smokeless tobacco: Never Used   Vaping Use     Vaping Use: Never used   Substance Use Topics     Alcohol use: No     Drug use: No        ROS:  Comprehensive ROS is negative except as noted in HPI.    VITAL SIGNS:  /67 (BP Location: Left arm, Patient Position: Sitting, Cuff Size: Adult Large)   Pulse 88   Ht 1.858 m (6' 1.15\")   Wt 106.9 kg (235 lb 9.6 oz)   SpO2 95%   BMI 30.96 kg/m    Body mass index is 30.96 kg/m .  Wt Readings from Last 2 Encounters:   03/18/22 106.9 kg (235 lb 9.6 oz)   01/28/22 103.4 kg (228 lb) "       PHYSICAL EXAM  Gen: pleasant man sitting comfortably in NAD  Head: nc/at  Eyes: EOMI  CV: nml s1/s2, no murmur or gallop; no JVD  Chest: clear lungs  Abd: soft, NT, NABS  Ext: warm, no LE edema  Skin: no rash on limited exam  Neuro: awake, alert, oriented, nml speech    LABS: personally reviewed  CMP  Recent Labs   Lab Test 02/07/22  0805 10/26/21  1229 07/15/21  0739 07/15/21  0221 07/14/21  1859 07/14/21  1122 06/11/21  0800 12/09/20  0815 09/15/20  0732 08/06/20  0714 12/10/19  0630 12/03/19  0836 04/30/19  0608 01/08/19  1841 12/19/18  0626 11/13/18  0603    138  --   --   --  139  --  141 142 141  --  138  --   --    < > 140   POTASSIUM 3.8 4.3  --   --   --  4.2  --  4.2 3.9 4.1   < > 4.0  --   --    < > 4.1   CHLORIDE 108 105  --   --   --  104  --  107 107 109  --  105  --   --    < > 107   CO2 24 29  --   --   --  31  --  30 28 26  --  28  --   --    < > 29   ANIONGAP 8 4  --   --   --  4  --  4 7 6  --  4  --   --    < > 4   * 106* 106* 122*   < > 107* 93 90 131* 88   < > 96  --   --    < > 86   BUN 19 18  --   --   --  18  --  16 18 25  --  22  --   --    < > 16   CR 0.78 0.84  --   --   --  0.93  --  0.98 0.94 1.00  --  0.83 1.01  --    < > 0.94   GFRESTIMATED >90 >90  --   --   --  85  --  80 84 78  --  >90 77  --    < > 81   GFRESTBLACK  --   --   --   --   --   --   --  >90 >90 >90  --  >90 90  --    < > >90   ZHANE 9.7 9.9  --   --   --  10.3* 9.8 10.1 9.7 9.2  --  10.1 9.7  --    < > 8.9   MAG  --   --   --   --   --   --  2.3  --   --   --   --   --   --   --   --   --    PHOS  --   --   --   --   --   --  2.7  --   --   --   --   --  2.8 3.7  --   --    PROTTOTAL  --   --   --   --   --  7.7  --  7.1  --  6.9  --   --   --   --   --  7.3   ALBUMIN  --   --   --   --   --  4.2  --  4.0  --  3.6  --   --   --   --   --  3.9   BILITOTAL  --   --   --   --   --  1.0  --  0.5  --  0.4  --   --   --   --   --  0.6   ALKPHOS  --   --   --   --   --  69  --  71  --  78  --   --   --   --    --  61   AST  --   --   --   --   --  20  --  19  --  30  --   --   --   --   --  21   ALT  --   --   --   --   --  36  --  40  --  51  --   --   --   --   --  45    < > = values in this interval not displayed.     CBC  Recent Labs   Lab Test 10/26/21  1229 07/14/21  1122 12/09/20  0815 08/06/20  0714   WBC 7.3 16.3* 6.7 6.6   RBC 5.86 5.96* 5.60 5.28   HGB 18.2* 18.2* 17.3 16.6   HCT 52.9 55.1* 52.2 48.9   MCV 90 92 93 93   MCH 31.1 30.5 30.9 31.4   MCHC 34.4 33.0 33.1 33.9   RDW 14.9 14.3 13.8 14.0    170 162 170     INR  Recent Labs   Lab Test 12/03/19  0836   INR 0.91     Recent Labs   Lab Test 06/11/21  0800 12/09/20  0815 04/18/16  0808 08/31/15  0741 07/14/14  0801   CHOL 79 77   < > 117 120   HDL 35* 38*   < > 34* 32*   LDL 26 19   < > 45 59   TRIG 91 100   < > 190* 146   CHOLHDLRATIO  --   --   --  3.4 3.7    < > = values in this interval not displayed.     Recent Labs   Lab Test 07/15/21  0517 07/14/21  0816   A1C 4.8 4.8       Most recent EKG left anterior fascicular block; no acute ischemic changes reviewed and discussed with the patient  3/18/2022 normal sinus rhythm with 1 fusion beat    Most recent ECHO: reviewed  Echo done in 2013 showed normal biventricular function    Most recent STRESS TEST:  Reviewed  Cardiac MRI in 2018 see below    Other Imaging: reviewed  1/19/2018 cardiac MRI with stress  Clinical history: 62-year-old male with frequent PVCs and calcium score of 744, CMR to rule out ischemia.   Comparison CMR: None     1. The LV is normal in cavity size and wall thickness. The global systolic function is normal. The LVEF is  55%. There are no regional wall motion abnormalities.     2. The RV is normal in cavity size. The global systolic function is normal. The RVEF is 61%.      3. Both atria are normal in size.     4. There is no significant valvular disease.      5. Late gadolinium enhancement imaging shows no MI, fibrosis or infiltrative disease.      6. Regadenoson stress perfusion  imaging shows no ischemia.     CONCLUSIONS: No ischemia or infarction. Normal LV and RV function, LVEF of 55% and RVEF of 61%. No obvious substrate for arrhythmia.       Please do not hesitate to contact me if you have any questions/concerns.     Sincerely,     Abdon Fischer MD

## 2022-03-19 ENCOUNTER — MYC MEDICAL ADVICE (OUTPATIENT)
Dept: CARDIOLOGY | Facility: CLINIC | Age: 67
End: 2022-03-19
Payer: COMMERCIAL

## 2022-03-19 LAB
ATRIAL RATE - MUSE: 89 BPM
DIASTOLIC BLOOD PRESSURE - MUSE: NORMAL MMHG
INTERPRETATION ECG - MUSE: NORMAL
P AXIS - MUSE: 73 DEGREES
PR INTERVAL - MUSE: 172 MS
QRS DURATION - MUSE: 84 MS
QT - MUSE: 352 MS
QTC - MUSE: 428 MS
R AXIS - MUSE: -59 DEGREES
SYSTOLIC BLOOD PRESSURE - MUSE: NORMAL MMHG
T AXIS - MUSE: 150 DEGREES
VENTRICULAR RATE- MUSE: 89 BPM

## 2022-03-23 ENCOUNTER — THERAPY VISIT (OUTPATIENT)
Dept: PHYSICAL THERAPY | Facility: CLINIC | Age: 67
End: 2022-03-23
Payer: COMMERCIAL

## 2022-03-23 DIAGNOSIS — M54.16 LUMBAR RADICULOPATHY: Primary | ICD-10-CM

## 2022-03-23 PROCEDURE — 97110 THERAPEUTIC EXERCISES: CPT | Mod: GP | Performed by: PHYSICAL THERAPIST

## 2022-03-23 PROCEDURE — 97140 MANUAL THERAPY 1/> REGIONS: CPT | Mod: GP | Performed by: PHYSICAL THERAPIST

## 2022-03-23 PROCEDURE — 97112 NEUROMUSCULAR REEDUCATION: CPT | Mod: GP | Performed by: PHYSICAL THERAPIST

## 2022-03-24 ENCOUNTER — OFFICE VISIT (OUTPATIENT)
Dept: SLEEP MEDICINE | Facility: CLINIC | Age: 67
End: 2022-03-24
Payer: COMMERCIAL

## 2022-03-24 DIAGNOSIS — G47.33 OSA (OBSTRUCTIVE SLEEP APNEA): ICD-10-CM

## 2022-03-24 PROCEDURE — G0399 HOME SLEEP TEST/TYPE 3 PORTA: HCPCS | Mod: 52 | Performed by: INTERNAL MEDICINE

## 2022-03-25 ENCOUNTER — DOCUMENTATION ONLY (OUTPATIENT)
Dept: SLEEP MEDICINE | Facility: CLINIC | Age: 67
End: 2022-03-25
Payer: COMMERCIAL

## 2022-03-28 ENCOUNTER — ANCILLARY PROCEDURE (OUTPATIENT)
Dept: CARDIOLOGY | Facility: CLINIC | Age: 67
End: 2022-03-28
Attending: INTERNAL MEDICINE
Payer: COMMERCIAL

## 2022-03-28 DIAGNOSIS — I49.3 PVC'S (PREMATURE VENTRICULAR CONTRACTIONS): ICD-10-CM

## 2022-03-28 LAB — LVEF ECHO: NORMAL

## 2022-03-28 PROCEDURE — 93306 TTE W/DOPPLER COMPLETE: CPT | Performed by: INTERNAL MEDICINE

## 2022-03-28 NOTE — PROCEDURES
"HOME SLEEP STUDY INTERPRETATION    Patient: Darnell Grant  MRN: [id number]  YOB: 1955  Study Date: 3/24/2022  PCP/Referring Provider: Raul Dutta;   Ordering Provider: Sandip Gracia MD, MD     Indications for Home Study: Darnell Grant is a 66 year old male who presents with symptoms suggestive of obstructive sleep apnea.    Estimated body mass index is 30.96 kg/m  as calculated from the following:    Height as of 3/18/22: 1.858 m (6' 1.15\").    Weight as of 3/18/22: 106.9 kg (235 lb 9.6 oz).  Total score - Kendall: 10 (2/3/2022 11:10 AM)  STOP-BAN/8    Data: A full night home sleep study was performed recording the standard physiologic parameters including body position, movement, sound, nasal pressure, thermal oral airflow, chest and abdominal movements with respiratory inductance plethysmography, and oxygen saturation by pulse oximetry. Pulse rate was estimated by oximetry recording. This study was considered adequate based on > 4 hours of quality oximetry and respiratory recording. As specified by the AASM Manual for the Scoring of Sleep and Associated events, version 2.3, Rule VIII.D 1B, 4% oxygen desaturation scoring for hypopneas is used as a standard of care on all home sleep apnea testing.    Analysis Time:  291 minutes    Respiration:   Sleep Associated Hypoxemia: sustained hypoxemia was not present. Baseline oxygen saturation was 96%.  Time with saturation less than or equal to 88% was 3.6 minutes. The lowest oxygen saturation was 85%.   Snoring: Snoring was present.  Respiratory events: The home study revealed a presence of 4 obstructive apneas and 7 mixed and central apneas. There were 57 hypopneas resulting in a combined apnea/hypopnea index [AHI] of 14.1 events per hour.  AHI was 31.7 per hour supine, - per hour prone, 9.6 per hour on left side, and 5.2 per hour on right side.   Pattern: Excluding events noted above, respiratory rate and pattern was Normal.    Position: " Percent of time spent: supine - 26.7%, prone - 0%, on left - 40.9%, on right - 31.7%.    Heart Rate: By pulse oximetry normal rate was noted.     Assessment:   Technically limited test due to poor signal quality in nasal cannula.   Mild obstructive sleep apnea with sleep apnea episodes predominantly occurring in supine sleep.   Sleep associated hypoxemia was not present.    Recommendations:  Depending on clinical indications for treatment, consider auto-CPAP at 5-15 cmH2O or oral appliance therapy.  Suggest optimizing sleep hygiene and avoiding sleep deprivation.  Weight management.    Diagnosis Code(s): Obstructive Sleep Apnea G47.33    Sandip Gracia MD, March 28, 2022   Diplomate, American Board of Psychiatry and Neurology, Sleep Medicine

## 2022-03-28 NOTE — PROGRESS NOTES
HST POST-STUDY QUESTIONNAIRE    1. What time did you go to bed?  12:05 am  2. How long do you think it took to fall asleep?  10-15 min  3. What time did you wake up to start the day?  5:05 am  4. Did you get up during the night at all?  1  5. If you woke up, do you remember approximately what time(s)? 4:40 am ?  6. Did you have any difficulty with the equipment?  Yes O2 monitor was uncomfortable, not sure if cannula stayed in place  7. Did you us any type of treatment with this study?  None  8. Was the head of the bed elevated? No  9. Did you sleep in a recliner?  No  10. Did you stop using CPAP at least 3 days before this test?  NA  11. Any other information you'd like us to know? No    This HSAT was performed using a Noxturnal T3 device which recorded snore, sound, movement activity, body position, nasal pressure, oronasal thermal airflow, pulse, oximetry and both chest and abdominal respiratory effort. HSAT data was restricted to the time patient states they were in bed.     HSAT was scored using 1B 4% hypopnea rule.     HST AHI (Non-PAT): 14.1  Snoring was reported as moderate.  Time with SpO2 below 89% was 10.3 minutes.   Overall signal quality was good     Pt will follow up with sleep provider to determine appropriate therapy.

## 2022-04-01 ENCOUNTER — THERAPY VISIT (OUTPATIENT)
Dept: PHYSICAL THERAPY | Facility: CLINIC | Age: 67
End: 2022-04-01
Payer: COMMERCIAL

## 2022-04-01 DIAGNOSIS — M54.16 LUMBAR RADICULOPATHY: Primary | ICD-10-CM

## 2022-04-01 PROCEDURE — 97140 MANUAL THERAPY 1/> REGIONS: CPT | Mod: GP | Performed by: PHYSICAL THERAPIST

## 2022-04-01 PROCEDURE — 97110 THERAPEUTIC EXERCISES: CPT | Mod: GP | Performed by: PHYSICAL THERAPIST

## 2022-04-07 ENCOUNTER — THERAPY VISIT (OUTPATIENT)
Dept: PHYSICAL THERAPY | Facility: CLINIC | Age: 67
End: 2022-04-07
Payer: COMMERCIAL

## 2022-04-07 DIAGNOSIS — M54.16 LUMBAR RADICULOPATHY: Primary | ICD-10-CM

## 2022-04-07 PROCEDURE — 97140 MANUAL THERAPY 1/> REGIONS: CPT | Mod: GP | Performed by: PHYSICAL THERAPIST

## 2022-04-07 PROCEDURE — 97035 APP MDLTY 1+ULTRASOUND EA 15: CPT | Mod: GP | Performed by: PHYSICAL THERAPIST

## 2022-04-18 ENCOUNTER — TELEPHONE (OUTPATIENT)
Dept: PULMONOLOGY | Facility: CLINIC | Age: 67
End: 2022-04-18
Payer: COMMERCIAL

## 2022-04-18 ENCOUNTER — OFFICE VISIT (OUTPATIENT)
Dept: PULMONOLOGY | Facility: CLINIC | Age: 67
End: 2022-04-18
Payer: COMMERCIAL

## 2022-04-18 ENCOUNTER — LAB (OUTPATIENT)
Dept: LAB | Facility: CLINIC | Age: 67
End: 2022-04-18
Payer: COMMERCIAL

## 2022-04-18 VITALS — SYSTOLIC BLOOD PRESSURE: 106 MMHG | HEART RATE: 97 BPM | OXYGEN SATURATION: 95 % | DIASTOLIC BLOOD PRESSURE: 72 MMHG

## 2022-04-18 DIAGNOSIS — J20.8 VIRAL BRONCHITIS: ICD-10-CM

## 2022-04-18 DIAGNOSIS — J45.31 MILD PERSISTENT ASTHMA WITH ACUTE EXACERBATION: Primary | ICD-10-CM

## 2022-04-18 DIAGNOSIS — R06.00 DYSPNEA, UNSPECIFIED TYPE: ICD-10-CM

## 2022-04-18 DIAGNOSIS — J45.909 ASTHMA: ICD-10-CM

## 2022-04-18 DIAGNOSIS — J45.909 ASTHMA: Primary | ICD-10-CM

## 2022-04-18 LAB
BASOPHILS # BLD AUTO: 0 10E3/UL (ref 0–0.2)
BASOPHILS NFR BLD AUTO: 1 %
EOSINOPHIL # BLD AUTO: 0.1 10E3/UL (ref 0–0.7)
EOSINOPHIL NFR BLD AUTO: 2 %
ERYTHROCYTE [DISTWIDTH] IN BLOOD BY AUTOMATED COUNT: 14.6 % (ref 10–15)
HCT VFR BLD AUTO: 46.8 % (ref 40–53)
HGB BLD-MCNC: 16.1 G/DL (ref 13.3–17.7)
IMM GRANULOCYTES # BLD: 0 10E3/UL
IMM GRANULOCYTES NFR BLD: 0 %
LYMPHOCYTES # BLD AUTO: 1.3 10E3/UL (ref 0.8–5.3)
LYMPHOCYTES NFR BLD AUTO: 24 %
MCH RBC QN AUTO: 31.3 PG (ref 26.5–33)
MCHC RBC AUTO-ENTMCNC: 34.4 G/DL (ref 31.5–36.5)
MCV RBC AUTO: 91 FL (ref 78–100)
MONOCYTES # BLD AUTO: 0.4 10E3/UL (ref 0–1.3)
MONOCYTES NFR BLD AUTO: 7 %
NEUTROPHILS # BLD AUTO: 3.5 10E3/UL (ref 1.6–8.3)
NEUTROPHILS NFR BLD AUTO: 66 %
NRBC # BLD AUTO: 0 10E3/UL
NRBC BLD AUTO-RTO: 0 /100
PLATELET # BLD AUTO: 174 10E3/UL (ref 150–450)
RBC # BLD AUTO: 5.14 10E6/UL (ref 4.4–5.9)
WBC # BLD AUTO: 5.3 10E3/UL (ref 4–11)

## 2022-04-18 PROCEDURE — 36415 COLL VENOUS BLD VENIPUNCTURE: CPT | Performed by: PATHOLOGY

## 2022-04-18 PROCEDURE — U0003 INFECTIOUS AGENT DETECTION BY NUCLEIC ACID (DNA OR RNA); SEVERE ACUTE RESPIRATORY SYNDROME CORONAVIRUS 2 (SARS-COV-2) (CORONAVIRUS DISEASE [COVID-19]), AMPLIFIED PROBE TECHNIQUE, MAKING USE OF HIGH THROUGHPUT TECHNOLOGIES AS DESCRIBED BY CMS-2020-01-R: HCPCS | Mod: 90 | Performed by: PATHOLOGY

## 2022-04-18 PROCEDURE — U0005 INFEC AGEN DETEC AMPLI PROBE: HCPCS | Mod: 90 | Performed by: PATHOLOGY

## 2022-04-18 PROCEDURE — 85025 COMPLETE CBC W/AUTO DIFF WBC: CPT | Performed by: PATHOLOGY

## 2022-04-18 PROCEDURE — 99204 OFFICE O/P NEW MOD 45 MIN: CPT

## 2022-04-18 PROCEDURE — 99000 SPECIMEN HANDLING OFFICE-LAB: CPT | Performed by: PATHOLOGY

## 2022-04-18 RX ORDER — PREDNISONE 20 MG/1
40 TABLET ORAL DAILY
Qty: 10 TABLET | Refills: 0 | Status: SHIPPED | OUTPATIENT
Start: 2022-04-18 | End: 2022-10-03

## 2022-04-18 RX ORDER — AZITHROMYCIN 500 MG/1
500 TABLET, FILM COATED ORAL DAILY
COMMUNITY
Start: 2022-04-13 | End: 2022-09-13

## 2022-04-18 NOTE — TELEPHONE ENCOUNTER
Received communication from Dr. Simons and Wilton, RN, to arrange pt for a last minute appt today. Opening with Dr. Rosario and per Dr. Simons, should be scheduled as return visit with labs and CXR prior; spoke with lab and CXR staff and ok'd by last minute add ons. Spoke with pt and discussed appt details and pt is agreeable to appt.

## 2022-04-18 NOTE — LETTER
4/18/2022     RE: Darnell Grant  [address]  [facility] [address]    Dear Colleague,    Thank you for referring your patient, Darnell Grant, to the [facility] FOR LUNG SCIENCE AND [facility] [place]. Please see a copy of my visit note below.    [facility]  Pulmonary Medicine  Visit Clinic Note  April 18, 2022       ASSESSMENT & PLAN     Post Infectious Cough   Viral induced asthma?    1 week history of a viral bronchitis.  Wheezing on exam. Currently on azithromycin and trelegy.  I will add 5 days of 40 mg prednisone to help with any airway inflammation.  CXR without any pneumonia.  COVID swab pending, but home antigen testing has been negative for him. He will get in touch if symptoms worsen.    Ronnie Rosario MD        Today's visit note:     Chief Complaint: cough    HISTORY OF PRESENT ILLNESS:    Darenll Grant is a 66 year old year old male who is being seen for a cough.    He has a history of viral induced asthma.  A week ago Monday he caught a virus that was spreading in his family.  He had a bit of a productive cough.  He started taking Mucinex to thin it out.  He took Claritin for a little bit, but stopped it because it made him too tired.  The respiratory complications of this cold are similar to what is happened him in the past.  Eventually he felt a spasm in his lungs on both inspiration and expiration.  He also feels malaise.  He was started on a 10-day course of azithromycin on April 13.  He started taking Trelegy 3 days ago.  His symptoms have been protracted.  Maybe things are slightly better for him, but he still struggles with coughing and wheezing on a daily basis.  Denies any fevers.           Past Medical and Surgical History:     Past Medical History:   Diagnosis Date     Bladder mass      BPH (benign prostatic hyperplasia)      GERD (gastroesophageal reflux disease)      Heart disease >5 years ago    PVCs     High serum parathyroid hormone  (PTH) 2017     Hypertension      Metabolic syndrome      Nephrolithiasis      Obesity      LISETTE (obstructive sleep apnea)      Prediabetes      Uncomplicated asthma >5 years ago    Stress induced asthma     Past Surgical History:   Procedure Laterality Date     ABDOMEN SURGERY  July, 2021     APPENDECTOMY OPEN N/A 7/14/2021    Procedure: APPENDECTOMY, OPEN;  Surgeon: Walter Boggs MD;  [facility]     BACK SURGERY      repair disc     BIOPSY  >5 years ago    Prostate     CATARACT IOL, RT/LT       CYSTOSCOPY, TRANSURETHRAL RESECTION (TUR) TUMOR BLADDER, COMBINED N/A 1/31/2018    Procedure: COMBINED CYSTOSCOPY, TRANSURETHRAL RESECTION (TUR) TUMOR BLADDER;  Transurethral Biopsy and Resection of Bladder Tumor;  Surgeon: Yaya Lomeli MD;  [facility]     DECOMPRESSION, FUSION CERVICAL ANTERIOR TWO LEVELS, COMBINED Right 12/10/2019    Procedure: right approach anterior cervical 2-3, 3-4 resection of anterior cervical osteophytes causing dysphagia, with microscope;  Surgeon: María Fitzgerald MD;  [facility]     HC BREATH HYDROGEN TEST  5/22/2013    Procedure: HYDROGEN BREATH TEST;  Surgeon: Donny Paris MD;  [facility]     LAPAROSCOPIC APPENDECTOMY N/A 7/14/2021    Procedure: APPENDECTOMY, LAPAROSCOPIC;  Surgeon: Walter Boggs MD;  [facility]     PHACOEMULSIFICATION CLEAR CORNEA WITH STANDARD INTRAOCULAR LENS IMPLANT Left 9/25/2020    Procedure: LEFT CATARACT REMOVAL WITH INTRAOCULAR LENS IMPLANT;  Surgeon: Keyla Tobin MD;  [facility]     PHACOEMULSIFICATION CLEAR CORNEA WITH STANDARD INTRAOCULAR LENS IMPLANT Right 10/2/2020    Procedure: RIGHT CATARACT REMOVAL WITH INTRAOCULAR LENS IMPLANT;  Surgeon: Keyla Tobin MD;  [facility]     TRANSURETHERAL DESTRUCTION OF PROSTATE BY THERMOTHERAPY N/A 4/13/2018    Procedure: TRANSURETHERAL DESTRUCTION OF PROSTATE BY THERMOTHERAPY;  [facility] Procedure;  Surgeon: Yaya Lomeli MD;  [facility]           Family History:     Family History   Problem Relation Age of Onset     Asthma Brother      Diabetes Maternal Grandmother      Cardiovascular Father      Coronary Artery Disease Father         at age 70s     Hypertension Father         at age 70s     Nephrolithiasis No family hx of      Parathyroid Disorders No family hx of      Thyroid Disease No family hx of      Glaucoma No family hx of      Macular Degeneration No family hx of      Anesthesia Reaction No family hx of      Deep Vein Thrombosis (DVT) No family hx of               Social History:     Social History     Socioeconomic History     Marital status:      Spouse name: Not on file     Number of children: Not on file     Years of education: Not on file     Highest education level: Not on file   Occupational History     Not on file   Tobacco Use     Smoking status: Never Smoker     Smokeless tobacco: Never Used   Vaping Use     Vaping Use: Never used   Substance and Sexual Activity     Alcohol use: No     Drug use: No     Sexual activity: Not Currently     Partners: Female     Birth control/protection: Post-menopausal   Other Topics Concern     Parent/sibling w/ CABG, MI or angioplasty before 65F 55M? No   Social History Narrative     Not on file     Social Determinants of Health     Financial Resource Strain: Not on file   Food Insecurity: Not on file   Transportation Needs: Not on file   Physical Activity: Not on file   Stress: Not on file   Social Connections: Not on file   Intimate Partner Violence: Not on file   Housing Stability: Not on file            Medications:     Current Outpatient Medications   Medication     acetaminophen (TYLENOL) 500 MG tablet     allopurinol (ZYLOPRIM) 100 MG tablet     amLODIPine (NORVASC) 10 MG tablet     azithromycin (ZITHROMAX) 500 MG tablet     buPROPion (WELLBUTRIN XL) 300 MG 24 hr tablet     candesartan (ATACAND) 32 MG tablet     clomiPHENE (CLOMID) 50 MG tablet     empagliflozin (JARDIANCE) 25 MG  TABS tablet     gabapentin (NEURONTIN) 600 MG tablet     hydrochlorothiazide (MICROZIDE) 12.5 MG capsule     metFORMIN (GLUCOPHAGE) 1000 MG tablet     multivitamin w/minerals (THERA-VIT-M) tablet     omeprazole (PRILOSEC) 20 MG DR capsule     phentermine (ADIPEX-P) 37.5 MG tablet     rosuvastatin (CRESTOR) 40 MG tablet     Semaglutide, 1 MG/DOSE, 4 MG/3ML SOPN     tamsulosin (FLOMAX) 0.4 MG capsule     No current facility-administered medications for this visit.     Facility-Administered Medications Ordered in Other Visits   Medication     barium sulfate 40% (VARIBAR THIN HONEY) oral suspension          Review of Systems:     A complete review of systems was otherwise negative except as noted in the HPI.        PHYSICAL EXAM:  /72   Pulse 97   SpO2 95%      General: Comfortable, No apparent distress.  Occasionally has a very coarse cough  Eyes: Anicteric  Ears: Hearing grossly normal  Mouth: Oral mucosa is moist, without any lesions. No oropharyngeal exudate.  Respiratory: Good air movement.  An inspiratory squeak is noted in the left upper lobe.  Cardiac: RRR, normal S1, S2. No murmurs.   Musculoskeletal: Extremities normal. No clubbing. No cyanosis. No edema.  Skin: No rash on limited exam  Neuro: Normal mentation. Normal speech.  Psych:Normal affect         Data:   All laboratory and imaging data reviewed.      PFT: 2018  FEV1/FVC 0.87.  FVC is 5.2 L which is 107% predicted.  The FEV1 is 4.51 L which is 121% predicted.      PFT Interpretation:  No airflow obstruction  Valid Maneuver    CXR: I have reviewed the CXR images from today and agree with the radiologist interpretation below:  FINDINGS: Heart size and shape appear normal. Lungs and pulmonary  vascularity appear normal. Bony structures appear grossly intact.  There are degenerative changes in the thoracic spine.      Chest CT: I have reviewed the chest CT images from 2018 and agree with the radiologist interpretation below:  LUNGS: The central  tracheobronchial tree is patent. No pleural  effusion or pneumothorax. No masses or consolidations. There is  central bronchial wall thickening. Calcified granuloma in the left  lower lobe. Scattered sub-6 mm pulmonary nodules including a 2 mm  solid nodule in the left upper lobe (series 4, image 104) a 2 mm solid  nodule in the right upper lobe (series 4, image 108).      MEDIASTINUM: The visualized thyroid gland is unremarkable in  appearance. The heart is normal in size. No pericardial effusion. The  ascending aorta main pulmonary artery are normal in caliber.  Atherosclerotic calcifications of the aorta and coronary arteries.  Typical branching pattern of the great vessels. No axillary or  mediastinal lymphadenopathy.     UPPER ABDOMEN: Limited evaluation of the upper abdomen. Visualized  portions of the liver, gallbladder, spleen, pancreas, adrenal glands  are unremarkable. 3 mm calculus in the right kidney.     BONES/SOFT TISSUES: Flowing syndesmophyte formation across multiple  thoracic vertebral bodies, consistent with diffuse idiopathic skeletal  hyperostosis. No acute osseous abnormalities or suspicious osseous  lesions.                                                                    IMPRESSION:   1. Mild central bronchial wall thickening. No acute airspace disease.  2. Scattered sub-6 mm pulmonary nodules. If this patient is at high  risk for lung cancer, consider optional follow up with LDCT in 12  months, per flexure Society criteria.  3. 3 mm calculus in the right kidney.    Recent Results (from the past 168 hour(s))   CBC with platelets and differential    Collection Time: 04/18/22  2:58 PM   Result Value Ref Range    WBC Count 5.3 4.0 - 11.0 10e3/uL    RBC Count 5.14 4.40 - 5.90 10e6/uL    Hemoglobin 16.1 13.3 - 17.7 g/dL    Hematocrit 46.8 40.0 - 53.0 %    MCV 91 78 - 100 fL    MCH 31.3 26.5 - 33.0 pg    MCHC 34.4 31.5 - 36.5 g/dL    RDW 14.6 10.0 - 15.0 %    Platelet Count 174 150 - 450 10e3/uL     % Neutrophils 66 %    % Lymphocytes 24 %    % Monocytes 7 %    % Eosinophils 2 %    % Basophils 1 %    % Immature Granulocytes 0 %    NRBCs per 100 WBC 0 <1 /100    Absolute Neutrophils 3.5 1.6 - 8.3 10e3/uL    Absolute Lymphocytes 1.3 0.8 - 5.3 10e3/uL    Absolute Monocytes 0.4 0.0 - 1.3 10e3/uL    Absolute Eosinophils 0.1 0.0 - 0.7 10e3/uL    Absolute Basophils 0.0 0.0 - 0.2 10e3/uL    Absolute Immature Granulocytes 0.0 <=0.4 10e3/uL    Absolute NRBCs 0.0 10e3/uL     Again, thank you for allowing me to participate in the care of your patient.      Sincerely,    Neo Rosario MD

## 2022-04-18 NOTE — PROGRESS NOTES
[facility]  Pulmonary Medicine  Visit Clinic Note  April 18, 2022         ASSESSMENT & PLAN     Post Infectious Cough   Viral induced asthma?    1 week history of a viral bronchitis.  Wheezing on exam. Currently on azithromycin and trelegy.  I will add 5 days of 40 mg prednisone to help with any airway inflammation.  CXR without any pneumonia.  COVID swab pending, but home antigen testing has been negative for him. He will get in touch if symptoms worsen.    Ronnie Rosario MD            Today's visit note:     Chief Complaint: cough    HISTORY OF PRESENT ILLNESS:    Darnell Grant is a 66 year old year old male who is being seen for a cough.    He has a history of viral induced asthma.  A week ago Monday he caught a virus that was spreading in his family.  He had a bit of a productive cough.  He started taking Mucinex to thin it out.  He took Claritin for a little bit, but stopped it because it made him too tired.  The respiratory complications of this cold are similar to what is happened him in the past.  Eventually he felt a spasm in his lungs on both inspiration and expiration.  He also feels malaise.  He was started on a 10-day course of azithromycin on April 13.  He started taking Trelegy 3 days ago.  His symptoms have been protracted.  Maybe things are slightly better for him, but he still struggles with coughing and wheezing on a daily basis.  Denies any fevers.           Past Medical and Surgical History:     Past Medical History:   Diagnosis Date     Bladder mass      BPH (benign prostatic hyperplasia)      GERD (gastroesophageal reflux disease)      Heart disease >5 years ago    PVCs     High serum parathyroid hormone (PTH) 2017     Hypertension      Metabolic syndrome      Nephrolithiasis      Obesity      LISETTE (obstructive sleep apnea)      Prediabetes      Uncomplicated asthma >5 years ago    Stress induced asthma     Past Surgical History:   Procedure Laterality Date     ABDOMEN  SURGERY  July, 2021     APPENDECTOMY OPEN N/A 7/14/2021    Procedure: APPENDECTOMY, OPEN;  Surgeon: Walter Boggs MD;  [facility]     BACK SURGERY      repair disc     BIOPSY  >5 years ago    Prostate     CATARACT IOL, RT/LT       CYSTOSCOPY, TRANSURETHRAL RESECTION (TUR) TUMOR BLADDER, COMBINED N/A 1/31/2018    Procedure: COMBINED CYSTOSCOPY, TRANSURETHRAL RESECTION (TUR) TUMOR BLADDER;  Transurethral Biopsy and Resection of Bladder Tumor;  Surgeon: Yaya Lomeli MD;  [facility]     DECOMPRESSION, FUSION CERVICAL ANTERIOR TWO LEVELS, COMBINED Right 12/10/2019    Procedure: right approach anterior cervical 2-3, 3-4 resection of anterior cervical osteophytes causing dysphagia, with microscope;  Surgeon: María Fitzgerald MD;  [facility]     HC BREATH HYDROGEN TEST  5/22/2013    Procedure: HYDROGEN BREATH TEST;  Surgeon: Donny Paris MD;  [facility]     LAPAROSCOPIC APPENDECTOMY N/A 7/14/2021    Procedure: APPENDECTOMY, LAPAROSCOPIC;  Surgeon: Walter Boggs MD;  [facility]     PHACOEMULSIFICATION CLEAR CORNEA WITH STANDARD INTRAOCULAR LENS IMPLANT Left 9/25/2020    Procedure: LEFT CATARACT REMOVAL WITH INTRAOCULAR LENS IMPLANT;  Surgeon: Keyla Tobin MD;  [facility]     PHACOEMULSIFICATION CLEAR CORNEA WITH STANDARD INTRAOCULAR LENS IMPLANT Right 10/2/2020    Procedure: RIGHT CATARACT REMOVAL WITH INTRAOCULAR LENS IMPLANT;  Surgeon: Keyla Tobin MD;  [facility]     TRANSURETHERAL DESTRUCTION OF PROSTATE BY THERMOTHERAPY N/A 4/13/2018    Procedure: TRANSURETHERAL DESTRUCTION OF PROSTATE BY THERMOTHERAPY;  [facility] Procedure;  Surgeon: Yaya Lomeli MD;  [facility]           Family History:     Family History   Problem Relation Age of Onset     Asthma Brother      Diabetes Maternal Grandmother      Cardiovascular Father      Coronary Artery Disease Father         at age 70s     Hypertension Father         at age 70s      Nephrolithiasis No family hx of      Parathyroid Disorders No family hx of      Thyroid Disease No family hx of      Glaucoma No family hx of      Macular Degeneration No family hx of      Anesthesia Reaction No family hx of      Deep Vein Thrombosis (DVT) No family hx of               Social History:     Social History     Socioeconomic History     Marital status:      Spouse name: Not on file     Number of children: Not on file     Years of education: Not on file     Highest education level: Not on file   Occupational History     Not on file   Tobacco Use     Smoking status: Never Smoker     Smokeless tobacco: Never Used   Vaping Use     Vaping Use: Never used   Substance and Sexual Activity     Alcohol use: No     Drug use: No     Sexual activity: Not Currently     Partners: Female     Birth control/protection: Post-menopausal   Other Topics Concern     Parent/sibling w/ CABG, MI or angioplasty before 65F 55M? No   Social History Narrative     Not on file     Social Determinants of Health     Financial Resource Strain: Not on file   Food Insecurity: Not on file   Transportation Needs: Not on file   Physical Activity: Not on file   Stress: Not on file   Social Connections: Not on file   Intimate Partner Violence: Not on file   Housing Stability: Not on file            Medications:     Current Outpatient Medications   Medication     acetaminophen (TYLENOL) 500 MG tablet     allopurinol (ZYLOPRIM) 100 MG tablet     amLODIPine (NORVASC) 10 MG tablet     azithromycin (ZITHROMAX) 500 MG tablet     buPROPion (WELLBUTRIN XL) 300 MG 24 hr tablet     candesartan (ATACAND) 32 MG tablet     clomiPHENE (CLOMID) 50 MG tablet     empagliflozin (JARDIANCE) 25 MG TABS tablet     gabapentin (NEURONTIN) 600 MG tablet     hydrochlorothiazide (MICROZIDE) 12.5 MG capsule     metFORMIN (GLUCOPHAGE) 1000 MG tablet     multivitamin w/minerals (THERA-VIT-M) tablet     omeprazole (PRILOSEC) 20 MG DR capsule     phentermine  (ADIPEX-P) 37.5 MG tablet     rosuvastatin (CRESTOR) 40 MG tablet     Semaglutide, 1 MG/DOSE, 4 MG/3ML SOPN     tamsulosin (FLOMAX) 0.4 MG capsule     No current facility-administered medications for this visit.     Facility-Administered Medications Ordered in Other Visits   Medication     barium sulfate 40% (VARIBAR THIN HONEY) oral suspension            Review of Systems:       A complete review of systems was otherwise negative except as noted in the HPI.      PHYSICAL EXAM:  /72   Pulse 97   SpO2 95%      General: Comfortable, No apparent distress.  Occasionally has a very coarse cough  Eyes: Anicteric  Ears: Hearing grossly normal  Mouth: Oral mucosa is moist, without any lesions. No oropharyngeal exudate.  Respiratory: Good air movement.  An inspiratory squeak is noted in the left upper lobe.  Cardiac: RRR, normal S1, S2. No murmurs.   Musculoskeletal: Extremities normal. No clubbing. No cyanosis. No edema.  Skin: No rash on limited exam  Neuro: Normal mentation. Normal speech.  Psych:Normal affect           Data:   All laboratory and imaging data reviewed.      PFT: 2018  FEV1/FVC 0.87.  FVC is 5.2 L which is 107% predicted.  The FEV1 is 4.51 L which is 121% predicted.      PFT Interpretation:  No airflow obstruction  Valid Maneuver    CXR: I have reviewed the CXR images from today and agree with the radiologist interpretation below:  FINDINGS: Heart size and shape appear normal. Lungs and pulmonary  vascularity appear normal. Bony structures appear grossly intact.  There are degenerative changes in the thoracic spine.      Chest CT: I have reviewed the chest CT images from 2018 and agree with the radiologist interpretation below:  LUNGS: The central tracheobronchial tree is patent. No pleural  effusion or pneumothorax. No masses or consolidations. There is  central bronchial wall thickening. Calcified granuloma in the left  lower lobe. Scattered sub-6 mm pulmonary nodules including a 2 mm  solid  nodule in the left upper lobe (series 4, image 104) a 2 mm solid  nodule in the right upper lobe (series 4, image 108).      MEDIASTINUM: The visualized thyroid gland is unremarkable in  appearance. The heart is normal in size. No pericardial effusion. The  ascending aorta main pulmonary artery are normal in caliber.  Atherosclerotic calcifications of the aorta and coronary arteries.  Typical branching pattern of the great vessels. No axillary or  mediastinal lymphadenopathy.     UPPER ABDOMEN: Limited evaluation of the upper abdomen. Visualized  portions of the liver, gallbladder, spleen, pancreas, adrenal glands  are unremarkable. 3 mm calculus in the right kidney.     BONES/SOFT TISSUES: Flowing syndesmophyte formation across multiple  thoracic vertebral bodies, consistent with diffuse idiopathic skeletal  hyperostosis. No acute osseous abnormalities or suspicious osseous  lesions.                                                                      IMPRESSION:   1. Mild central bronchial wall thickening. No acute airspace disease.  2. Scattered sub-6 mm pulmonary nodules. If this patient is at high  risk for lung cancer, consider optional follow up with LDCT in 12  months, per flexure Society criteria.  3. 3 mm calculus in the right kidney.      Recent Results (from the past 168 hour(s))   CBC with platelets and differential    Collection Time: 04/18/22  2:58 PM   Result Value Ref Range    WBC Count 5.3 4.0 - 11.0 10e3/uL    RBC Count 5.14 4.40 - 5.90 10e6/uL    Hemoglobin 16.1 13.3 - 17.7 g/dL    Hematocrit 46.8 40.0 - 53.0 %    MCV 91 78 - 100 fL    MCH 31.3 26.5 - 33.0 pg    MCHC 34.4 31.5 - 36.5 g/dL    RDW 14.6 10.0 - 15.0 %    Platelet Count 174 150 - 450 10e3/uL    % Neutrophils 66 %    % Lymphocytes 24 %    % Monocytes 7 %    % Eosinophils 2 %    % Basophils 1 %    % Immature Granulocytes 0 %    NRBCs per 100 WBC 0 <1 /100    Absolute Neutrophils 3.5 1.6 - 8.3 10e3/uL    Absolute Lymphocytes 1.3 0.8 -  5.3 10e3/uL    Absolute Monocytes 0.4 0.0 - 1.3 10e3/uL    Absolute Eosinophils 0.1 0.0 - 0.7 10e3/uL    Absolute Basophils 0.0 0.0 - 0.2 10e3/uL    Absolute Immature Granulocytes 0.0 <=0.4 10e3/uL    Absolute NRBCs 0.0 10e3/uL

## 2022-04-19 LAB — SARS-COV-2 RNA RESP QL NAA+PROBE: NEGATIVE

## 2022-04-21 ENCOUNTER — MYC MEDICAL ADVICE (OUTPATIENT)
Dept: INTERNAL MEDICINE | Facility: CLINIC | Age: 67
End: 2022-04-21

## 2022-04-21 DIAGNOSIS — I10 ESSENTIAL HYPERTENSION, BENIGN: ICD-10-CM

## 2022-04-21 DIAGNOSIS — K21.9 GASTROESOPHAGEAL REFLUX DISEASE WITHOUT ESOPHAGITIS: ICD-10-CM

## 2022-04-25 DIAGNOSIS — I10 ESSENTIAL HYPERTENSION, BENIGN: ICD-10-CM

## 2022-04-25 RX ORDER — CANDESARTAN 32 MG/1
16 TABLET ORAL AT BEDTIME
Qty: 45 TABLET | Refills: 3 | Status: SHIPPED | OUTPATIENT
Start: 2022-04-25 | End: 2022-06-06

## 2022-04-28 RX ORDER — CANDESARTAN 32 MG/1
TABLET ORAL
Qty: 90 TABLET | Refills: 0 | OUTPATIENT
Start: 2022-04-28

## 2022-04-29 ENCOUNTER — THERAPY VISIT (OUTPATIENT)
Dept: PHYSICAL THERAPY | Facility: CLINIC | Age: 67
End: 2022-04-29
Payer: COMMERCIAL

## 2022-04-29 DIAGNOSIS — M54.16 LUMBAR RADICULOPATHY: Primary | ICD-10-CM

## 2022-04-29 PROCEDURE — 97110 THERAPEUTIC EXERCISES: CPT | Mod: GP | Performed by: PHYSICAL THERAPIST

## 2022-04-29 PROCEDURE — 97035 APP MDLTY 1+ULTRASOUND EA 15: CPT | Mod: GP | Performed by: PHYSICAL THERAPIST

## 2022-04-29 PROCEDURE — 97140 MANUAL THERAPY 1/> REGIONS: CPT | Mod: GP | Performed by: PHYSICAL THERAPIST

## 2022-05-02 ENCOUNTER — THERAPY VISIT (OUTPATIENT)
Dept: PHYSICAL THERAPY | Facility: CLINIC | Age: 67
End: 2022-05-02
Payer: COMMERCIAL

## 2022-05-02 DIAGNOSIS — M54.16 LUMBAR RADICULOPATHY: Primary | ICD-10-CM

## 2022-05-02 PROCEDURE — 97035 APP MDLTY 1+ULTRASOUND EA 15: CPT | Mod: GP | Performed by: PHYSICAL THERAPIST

## 2022-05-02 PROCEDURE — 97140 MANUAL THERAPY 1/> REGIONS: CPT | Mod: GP | Performed by: PHYSICAL THERAPIST

## 2022-05-02 PROCEDURE — 97110 THERAPEUTIC EXERCISES: CPT | Mod: GP | Performed by: PHYSICAL THERAPIST

## 2022-05-10 ENCOUNTER — MYC MEDICAL ADVICE (OUTPATIENT)
Dept: INTERNAL MEDICINE | Facility: CLINIC | Age: 67
End: 2022-05-10
Payer: COMMERCIAL

## 2022-05-10 DIAGNOSIS — I10 ESSENTIAL HYPERTENSION, BENIGN: ICD-10-CM

## 2022-05-11 ENCOUNTER — THERAPY VISIT (OUTPATIENT)
Dept: PHYSICAL THERAPY | Facility: CLINIC | Age: 67
End: 2022-05-11
Payer: COMMERCIAL

## 2022-05-11 DIAGNOSIS — M54.16 LUMBAR RADICULOPATHY: Primary | ICD-10-CM

## 2022-05-11 PROCEDURE — 97110 THERAPEUTIC EXERCISES: CPT | Mod: GP | Performed by: PHYSICAL THERAPIST

## 2022-05-11 PROCEDURE — 97140 MANUAL THERAPY 1/> REGIONS: CPT | Mod: GP | Performed by: PHYSICAL THERAPIST

## 2022-05-11 PROCEDURE — 97035 APP MDLTY 1+ULTRASOUND EA 15: CPT | Mod: GP | Performed by: PHYSICAL THERAPIST

## 2022-05-23 ENCOUNTER — THERAPY VISIT (OUTPATIENT)
Dept: PHYSICAL THERAPY | Facility: CLINIC | Age: 67
End: 2022-05-23
Payer: COMMERCIAL

## 2022-05-23 DIAGNOSIS — M54.16 LUMBAR RADICULOPATHY: Primary | ICD-10-CM

## 2022-05-23 PROCEDURE — 97035 APP MDLTY 1+ULTRASOUND EA 15: CPT | Mod: GP | Performed by: PHYSICAL THERAPIST

## 2022-05-23 PROCEDURE — 97110 THERAPEUTIC EXERCISES: CPT | Mod: GP | Performed by: PHYSICAL THERAPIST

## 2022-05-23 PROCEDURE — 97140 MANUAL THERAPY 1/> REGIONS: CPT | Mod: GP | Performed by: PHYSICAL THERAPIST

## 2022-06-03 ENCOUNTER — LAB (OUTPATIENT)
Dept: LAB | Facility: CLINIC | Age: 67
End: 2022-06-03
Payer: COMMERCIAL

## 2022-06-03 DIAGNOSIS — E23.0 HYPOGONADOTROPIC HYPOGONADISM (H): ICD-10-CM

## 2022-06-03 DIAGNOSIS — R73.01 IMPAIRED FASTING GLUCOSE: ICD-10-CM

## 2022-06-03 DIAGNOSIS — E55.9 VITAMIN D DEFICIENCY: ICD-10-CM

## 2022-06-03 DIAGNOSIS — N25.81 SECONDARY HYPERPARATHYROIDISM (H): Primary | ICD-10-CM

## 2022-06-03 LAB
C PEPTIDE SERPL-MCNC: 2.6 NG/ML (ref 0.9–6.9)
CALCIUM SERPL-MCNC: 9.9 MG/DL (ref 8.5–10.1)
DEPRECATED CALCIDIOL+CALCIFEROL SERPL-MC: 63 UG/L (ref 20–75)
ESTRADIOL SERPL-MCNC: 28 PG/ML
FASTING STATUS PATIENT QL REPORTED: NORMAL
FSH SERPL-ACNC: 7 IU/L (ref 0.7–10.8)
GLUCOSE BLD-MCNC: 86 MG/DL (ref 70–99)
HBA1C MFR BLD: 5 % (ref 0–5.6)
LH SERPL-ACNC: 10.1 IU/L (ref 1.5–9.3)
MAGNESIUM SERPL-MCNC: 2.1 MG/DL (ref 1.6–2.3)
PHOSPHATE SERPL-MCNC: 2.1 MG/DL (ref 2.5–4.5)
PTH-INTACT SERPL-MCNC: 45 PG/ML (ref 15–65)

## 2022-06-03 PROCEDURE — 83735 ASSAY OF MAGNESIUM: CPT | Performed by: PATHOLOGY

## 2022-06-03 PROCEDURE — 84403 ASSAY OF TOTAL TESTOSTERONE: CPT | Mod: 90 | Performed by: PATHOLOGY

## 2022-06-03 PROCEDURE — 82310 ASSAY OF CALCIUM: CPT | Performed by: PATHOLOGY

## 2022-06-03 PROCEDURE — 82306 VITAMIN D 25 HYDROXY: CPT | Mod: 90 | Performed by: PATHOLOGY

## 2022-06-03 PROCEDURE — 83970 ASSAY OF PARATHORMONE: CPT | Performed by: PATHOLOGY

## 2022-06-03 PROCEDURE — 83036 HEMOGLOBIN GLYCOSYLATED A1C: CPT | Performed by: PATHOLOGY

## 2022-06-03 PROCEDURE — 36415 COLL VENOUS BLD VENIPUNCTURE: CPT | Performed by: PATHOLOGY

## 2022-06-03 PROCEDURE — 82947 ASSAY GLUCOSE BLOOD QUANT: CPT | Performed by: PATHOLOGY

## 2022-06-03 PROCEDURE — 84100 ASSAY OF PHOSPHORUS: CPT | Performed by: PATHOLOGY

## 2022-06-03 PROCEDURE — 99000 SPECIMEN HANDLING OFFICE-LAB: CPT | Performed by: PATHOLOGY

## 2022-06-03 PROCEDURE — 83002 ASSAY OF GONADOTROPIN (LH): CPT | Mod: 90 | Performed by: PATHOLOGY

## 2022-06-03 PROCEDURE — 83001 ASSAY OF GONADOTROPIN (FSH): CPT | Mod: 90 | Performed by: PATHOLOGY

## 2022-06-03 PROCEDURE — 84681 ASSAY OF C-PEPTIDE: CPT | Mod: 90 | Performed by: PATHOLOGY

## 2022-06-03 PROCEDURE — 82670 ASSAY OF TOTAL ESTRADIOL: CPT | Mod: 90 | Performed by: PATHOLOGY

## 2022-06-06 RX ORDER — CANDESARTAN 32 MG/1
32 TABLET ORAL AT BEDTIME
Qty: 90 TABLET | Refills: 3 | Status: SHIPPED | OUTPATIENT
Start: 2022-06-06 | End: 2022-10-03

## 2022-06-07 LAB — TESTOST SERPL-MCNC: 462 NG/DL (ref 240–950)

## 2022-06-28 ENCOUNTER — THERAPY VISIT (OUTPATIENT)
Dept: PHYSICAL THERAPY | Facility: CLINIC | Age: 67
End: 2022-06-28
Payer: COMMERCIAL

## 2022-06-28 DIAGNOSIS — M54.16 LUMBAR RADICULOPATHY: Primary | ICD-10-CM

## 2022-06-28 PROCEDURE — 97112 NEUROMUSCULAR REEDUCATION: CPT | Mod: GP | Performed by: PHYSICAL THERAPIST

## 2022-06-28 PROCEDURE — 97110 THERAPEUTIC EXERCISES: CPT | Mod: GP | Performed by: PHYSICAL THERAPIST

## 2022-06-28 NOTE — PROGRESS NOTES
DISCHARGE REPORT    Progress reporting period is from 3/23/22 to 6/28/22.       SUBJECTIVE   Subjective: Patient states that he has been working diligently on his SLR abd x 1 week.  Feels his strength has improved, but balance still problematic.    Changes in function:  Yes (See Goal flowsheet attached for changes in current functional level)  Adverse reaction to treatment or activity: None    OBJECTIVE  Changes noted in objective findings:  Yes  Objective: Ankle DF mmt 4/5 on L.  Unable to stand on L limb without moderate UE support.     ASSESSMENT/PLAN  Updated problem list and treatment plan: Diagnosis 1:  Leg weakness  STG/LTGs have been met or progress has been made towards goals:  Yes (See Goal flow sheet completed today.)  Assessment of Progress: The patient's condition is improving.  Self Management Plans:  Patient has been instructed in a home treatment program.  I have re-evaluated this patient and find that the nature, scope, duration and intensity of the therapy is appropriate for the medical condition of the patient.  Darnell continues to require the following intervention to meet STG and LTG's:  PT intervention is no longer required to meet STG/LTG.    Recommendations:  This patient is ready to be discharged from therapy and continue their home treatment program.    Please refer to the daily flowsheet for treatment today, total treatment time and time spent performing 1:1 timed codes.

## 2022-08-08 ENCOUNTER — ALLIED HEALTH/NURSE VISIT (OUTPATIENT)
Dept: INTERNAL MEDICINE | Facility: CLINIC | Age: 67
End: 2022-08-08
Payer: COMMERCIAL

## 2022-08-08 DIAGNOSIS — Z23 ENCOUNTER FOR IMMUNIZATION: Primary | ICD-10-CM

## 2022-08-08 PROCEDURE — 0064A COVID-19,PF,MODERNA (18+ YRS BOOSTER .25ML): CPT

## 2022-08-08 PROCEDURE — 91306 COVID-19,PF,MODERNA (18+ YRS BOOSTER .25ML): CPT

## 2022-08-08 NOTE — PROGRESS NOTES
Darnell Grant received the fifth dose of the COVID-19 Moderna immunization series today in clinic at the request of Dr. Raul Dutta. The immunization was given under the supervision of Dr. Walter Morris if assistance was needed. The patient does not report a history of adverse reactions associated with vaccine administration. The immunization site was cleaned with an alcohol prep wipe. The immunization was given without incident--see immunization list for administration details. No swelling or redness was observed at the site of injection after the immunization was given. The patient was advised to remain in fourth floor lobby of the [facility] for fifteen minutes after the injection in case of an averse reaction.     Alexandro Caban, EMT at 9:13 AM on 8/8/2022

## 2022-08-17 ENCOUNTER — TRANSFERRED RECORDS (OUTPATIENT)
Dept: HEALTH INFORMATION MANAGEMENT | Facility: CLINIC | Age: 67
End: 2022-08-17

## 2022-08-23 ENCOUNTER — TRANSFERRED RECORDS (OUTPATIENT)
Dept: HEALTH INFORMATION MANAGEMENT | Facility: CLINIC | Age: 67
End: 2022-08-23

## 2022-09-13 ENCOUNTER — OFFICE VISIT (OUTPATIENT)
Dept: OPHTHALMOLOGY | Facility: CLINIC | Age: 67
End: 2022-09-13
Attending: OPHTHALMOLOGY
Payer: COMMERCIAL

## 2022-09-13 DIAGNOSIS — Z96.1 PSEUDOPHAKIA, BOTH EYES: Primary | ICD-10-CM

## 2022-09-13 DIAGNOSIS — H04.123 BILATERAL DRY EYES: ICD-10-CM

## 2022-09-13 PROCEDURE — G0463 HOSPITAL OUTPT CLINIC VISIT: HCPCS | Mod: 25

## 2022-09-13 PROCEDURE — 92014 COMPRE OPH EXAM EST PT 1/>: CPT | Mod: GC | Performed by: OPHTHALMOLOGY

## 2022-09-13 PROCEDURE — 92015 DETERMINE REFRACTIVE STATE: CPT

## 2022-09-13 ASSESSMENT — TONOMETRY
IOP_METHOD: TONOPEN
OS_IOP_MMHG: 17
OD_IOP_MMHG: 16

## 2022-09-13 ASSESSMENT — REFRACTION_WEARINGRX
OD_SPHERE: +0.25
OD_CYLINDER: +0.75
OS_ADD: +2.50
OS_AXIS: 040
OD_AXIS: 170
OD_ADD: +2.50
OS_SPHERE: +0.75
OS_CYLINDER: +0.50

## 2022-09-13 ASSESSMENT — VISUAL ACUITY
CORRECTION_TYPE: GLASSES
METHOD: SNELLEN - LINEAR
OS_CC: 20/20
OD_CC+: -2
OD_CC: 20/25

## 2022-09-13 ASSESSMENT — REFRACTION_MANIFEST
OS_CYLINDER: +0.50
OS_AXIS: 059
OS_SPHERE: +0.50
OD_SPHERE: -0.50
OD_ADD: +2.50
OS_ADD: +2.50
OD_AXIS: 130
OD_CYLINDER: +0.75

## 2022-09-13 ASSESSMENT — CUP TO DISC RATIO
OS_RATIO: 0.1
OD_RATIO: 0.1

## 2022-09-13 ASSESSMENT — EXTERNAL EXAM - RIGHT EYE: OD_EXAM: NORMAL

## 2022-09-13 ASSESSMENT — SLIT LAMP EXAM - LIDS
COMMENTS: NORMAL
COMMENTS: NORMAL

## 2022-09-13 ASSESSMENT — EXTERNAL EXAM - LEFT EYE: OS_EXAM: NORMAL

## 2022-09-13 ASSESSMENT — CONF VISUAL FIELD
OD_NORMAL: 1
METHOD: COUNTING FINGERS
OS_NORMAL: 1

## 2022-09-13 NOTE — PROGRESS NOTES
"Chief Complaint(s) and History of Present Illness(es)     COMPREHENSIVE EYE EXAM     Laterality: both eyes    Course: gradually worsening    Associated symptoms: Negative for dryness, eye pain, redness and tearing    Treatments tried: no treatments    Pain scale: 0/10         Comments     Patient feels that his vision seems decreased in both eyes since his last   exam in January.  He describes his vision as \"not sharp\" in the distance.    Lab Results       Component                Value               Date                       A1C                      5.0                 06/03/2022                 A1C                      4.8                 07/15/2021                 A1C                      4.8                 07/14/2021                 A1C                      5.0                 12/09/2020                 A1C                      5.1                 08/06/2020                 A1C                      5.2                 12/03/2019                 A1C                      5.6                 04/30/2019                 A1C                      5.7                 11/13/2018              Kathryn Swanson, COT 9:13 AM  September 13, 2022     S/p CE IOL each eye, 9-10/2020 - Has noticed right eye not as sharp in distance for a few months. No eye pain, discomfort.  Current pair of glasses is about 8-9 months old.      Review of systems for the eyes was negative other than the pertinent positives/negatives listed in the HPI.      Assessment & Plan      Darnell Grant is a 66 year old male with the following diagnoses:   1. Pseudophakia, both eyes    2. Bilateral dry eyes         # Pseudophakia, each eye   - s/p CE IOL 2020 (right eye: 10/2020, left eye: 9/2020) by Dr. Tobin    # Refractive Error, each eye   - Results of new MRx discussed with patient, and new prescription provided.   - Start artificial tears twice a day and as needed       Patient disposition:   Return in about 1 year (around 9/13/2023) for " DFE.    Reno Chaney MD  Department of Ophthalmology  Pager: [phone]    Attending Physician Attestation:  Complete documentation of historical and exam elements from today's encounter can be found in the full encounter summary report (not reduplicated in this progress note).  I personally obtained the chief complaint(s) and history of present illness.  I confirmed and edited as necessary the review of systems, past medical/surgical history, family history, social history, and examination findings as documented by others; and I examined the patient myself.  I personally reviewed the relevant tests, images, and reports as documented above.  I formulated and edited as necessary the assessment and plan and discussed the findings and management plan with the patient and family. . - Jermaine Jeffries MD

## 2022-09-13 NOTE — NURSING NOTE
"Chief Complaints and History of Present Illnesses   Patient presents with     COMPREHENSIVE EYE EXAM     Chief Complaint(s) and History of Present Illness(es)     COMPREHENSIVE EYE EXAM     Laterality: both eyes    Course: gradually worsening    Associated symptoms: Negative for dryness, eye pain, redness and tearing    Treatments tried: no treatments    Pain scale: 0/10              Comments     Patient feels that his vision seems decreased in both eyes since his last exam in January.  He describes his vision as \"not sharp\" in the distance.    Kathryn Swanson, COT 9:13 AM  September 13, 2022                     "

## 2022-10-03 ENCOUNTER — OFFICE VISIT (OUTPATIENT)
Dept: INTERNAL MEDICINE | Facility: CLINIC | Age: 67
End: 2022-10-03
Payer: COMMERCIAL

## 2022-10-03 VITALS
BODY MASS INDEX: 28.85 KG/M2 | WEIGHT: 217.7 LBS | SYSTOLIC BLOOD PRESSURE: 117 MMHG | OXYGEN SATURATION: 96 % | DIASTOLIC BLOOD PRESSURE: 76 MMHG | HEART RATE: 100 BPM | HEIGHT: 73 IN

## 2022-10-03 DIAGNOSIS — M10.9 GOUT, UNSPECIFIED CAUSE, UNSPECIFIED CHRONICITY, UNSPECIFIED SITE: ICD-10-CM

## 2022-10-03 DIAGNOSIS — Z13.89 SCREENING FOR DIABETIC PERIPHERAL NEUROPATHY: ICD-10-CM

## 2022-10-03 DIAGNOSIS — I10 ESSENTIAL HYPERTENSION, BENIGN: Primary | ICD-10-CM

## 2022-10-03 DIAGNOSIS — R35.1 NOCTURIA: ICD-10-CM

## 2022-10-03 DIAGNOSIS — R53.83 OTHER FATIGUE: ICD-10-CM

## 2022-10-03 DIAGNOSIS — K21.9 GASTROESOPHAGEAL REFLUX DISEASE WITHOUT ESOPHAGITIS: ICD-10-CM

## 2022-10-03 DIAGNOSIS — Z11.59 NEED FOR HEPATITIS C SCREENING TEST: ICD-10-CM

## 2022-10-03 DIAGNOSIS — N40.0 BENIGN NON-NODULAR PROSTATIC HYPERPLASIA WITHOUT LOWER URINARY TRACT SYMPTOMS: ICD-10-CM

## 2022-10-03 DIAGNOSIS — Z00.00 ROUTINE GENERAL MEDICAL EXAMINATION AT A HEALTH CARE FACILITY: ICD-10-CM

## 2022-10-03 DIAGNOSIS — N40.0 BENIGN PROSTATIC HYPERPLASIA, UNSPECIFIED WHETHER LOWER URINARY TRACT SYMPTOMS PRESENT: ICD-10-CM

## 2022-10-03 DIAGNOSIS — E78.5 HYPERLIPIDEMIA LDL GOAL <100: ICD-10-CM

## 2022-10-03 DIAGNOSIS — E11.69 TYPE 2 DIABETES MELLITUS WITH OTHER SPECIFIED COMPLICATION, WITHOUT LONG-TERM CURRENT USE OF INSULIN (H): ICD-10-CM

## 2022-10-03 DIAGNOSIS — I10 ESSENTIAL HYPERTENSION: ICD-10-CM

## 2022-10-03 PROCEDURE — 0124A COVID-19,PF,PFIZER BOOSTER BIVALENT: CPT | Performed by: INTERNAL MEDICINE

## 2022-10-03 PROCEDURE — 90662 IIV NO PRSV INCREASED AG IM: CPT | Performed by: INTERNAL MEDICINE

## 2022-10-03 PROCEDURE — 90471 IMMUNIZATION ADMIN: CPT | Performed by: INTERNAL MEDICINE

## 2022-10-03 PROCEDURE — 99214 OFFICE O/P EST MOD 30 MIN: CPT | Mod: 25 | Performed by: INTERNAL MEDICINE

## 2022-10-03 PROCEDURE — 91312 COVID-19,PF,PFIZER BOOSTER BIVALENT: CPT | Performed by: INTERNAL MEDICINE

## 2022-10-03 RX ORDER — BUPROPION HYDROCHLORIDE 300 MG/1
300 TABLET ORAL EVERY MORNING
Qty: 90 TABLET | Refills: 3 | Status: SHIPPED | OUTPATIENT
Start: 2022-10-03 | End: 2023-05-08

## 2022-10-03 RX ORDER — SEMAGLUTIDE 2.68 MG/ML
INJECTION, SOLUTION SUBCUTANEOUS
COMMUNITY
Start: 2022-10-02 | End: 2022-10-03

## 2022-10-03 RX ORDER — SEMAGLUTIDE 2.68 MG/ML
2 INJECTION, SOLUTION SUBCUTANEOUS WEEKLY
Qty: 9 ML | Refills: 3 | Status: SHIPPED | OUTPATIENT
Start: 2022-10-03 | End: 2024-08-19

## 2022-10-03 RX ORDER — ROSUVASTATIN CALCIUM 40 MG/1
40 TABLET, COATED ORAL DAILY
Qty: 90 TABLET | Refills: 3 | Status: SHIPPED | OUTPATIENT
Start: 2022-10-03 | End: 2023-12-19

## 2022-10-03 RX ORDER — ALLOPURINOL 100 MG/1
TABLET ORAL
Qty: 90 TABLET | Refills: 3 | Status: SHIPPED | OUTPATIENT
Start: 2022-10-03 | End: 2023-05-08

## 2022-10-03 RX ORDER — AMLODIPINE BESYLATE 10 MG/1
10 TABLET ORAL AT BEDTIME
Qty: 90 TABLET | Refills: 3 | Status: SHIPPED | OUTPATIENT
Start: 2022-10-03 | End: 2023-05-08

## 2022-10-03 RX ORDER — TAMSULOSIN HYDROCHLORIDE 0.4 MG/1
0.4 CAPSULE ORAL DAILY
Qty: 90 CAPSULE | Refills: 3 | Status: SHIPPED | OUTPATIENT
Start: 2022-10-03 | End: 2024-01-16

## 2022-10-03 RX ORDER — CANDESARTAN 32 MG/1
32 TABLET ORAL AT BEDTIME
Qty: 90 TABLET | Refills: 3 | Status: SHIPPED | OUTPATIENT
Start: 2022-10-03 | End: 2023-05-08

## 2022-10-03 RX ORDER — ALBUTEROL SULFATE 90 UG/1
AEROSOL, METERED RESPIRATORY (INHALATION)
COMMUNITY
Start: 2022-05-13 | End: 2022-10-03

## 2022-10-03 NOTE — NURSING NOTE
Darnell Grant is a 66 year old male that presents in clinic today for the following:     Chief Complaint   Patient presents with     Follow Up       The patient's allergies and medications were reviewed. The patient's vitals were obtained without incident. The patient does not have any other questions for the provider.     Zak Brody, EMT at 1:56 PM on 10/3/2022.  Primary Care Clinic: [phone]

## 2022-10-03 NOTE — PROGRESS NOTES
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 He is doing well- sees Endo quarterly, me every 6 mos and also sees Pulmonology for post-infectious reactive airways. Coronary calcium score is elevated but no findings on stress testing and functional status good. No anginal symptoms. BPs in good range. No SEs noted from Meds. Aggressive weight management program with 80 lb weight loss over past 5 years. BMI now 28. Mild LISETTE not using CPAP or dental appliance- has lost quite a bit of weight since that diagnosis.     Current Outpatient Medications   Medication     allopurinol (ZYLOPRIM) 100 MG tablet     amLODIPine (NORVASC) 10 MG tablet     buPROPion (WELLBUTRIN XL) 300 MG 24 hr tablet     candesartan (ATACAND) 32 MG tablet     metFORMIN (GLUCOPHAGE) 1000 MG tablet     OZEMPIC, 2 MG/DOSE, 8 MG/3ML SOPN     rosuvastatin (CRESTOR) 40 MG tablet     tamsulosin (FLOMAX) 0.4 MG capsule     acetaminophen (TYLENOL) 500 MG tablet     clomiPHENE (CLOMID) 50 MG tablet     empagliflozin (JARDIANCE) 25 MG TABS tablet     multivitamin w/minerals (THERA-VIT-M) tablet     omeprazole (PRILOSEC) 20 MG DR capsule     phentermine (ADIPEX-P) 37.5 MG tablet     No current facility-administered medications for this visit.     Facility-Administered Medications Ordered in Other Visits   Medication     barium sulfate 40% (VARIBAR THIN HONEY) oral suspension     Labs and notes in Epic reviewed.    O) /76 (BP Location: Right arm, Patient Position: Sitting, Cuff Size: Adult Large)   Pulse 100   Ht 1.858 m (6' 1.15\")   Wt 98.7 kg (217 lb 11.2 oz)   SpO2 96%   BMI 28.60 kg/m    Well appearing  HEENT nl  Cor RRR no MRG  Lungs clear bilaterally- no wheezing  Abd soft, nl  Skin nl  Nl gait, coordination, mentation    A/P) 1) DM2- marked improvement in sugars and A1C now 5 on metformin, ozempic, jardiance. No low sugars. Check urine " micro-albumin.   2) HTN. BP well controlled on candesartan and amlodipine without side effects- no changes.   3) ASCVD risk- high coronary calcium, no angina or known events. Rosuvastatin mx dose at 40 mg/d.   4) BPH- tamsulosin daily with good results. No change. Will see urology in a few months.   5) Overweight- marked weight reduction over 5 years with aggressive medical wt mgmt approach-- phenteramine, jardiance, ozempic, metformin all contribute. Would consider dropping the phenteramine given longer-term side effects and BP effects.   6) LBP- spinal DJD with recent surgery for acute nerve root impingement now continuing PT.     30 minutes spent on the date of the encounter performing chart review, history and exam, documentation and further activities as noted above.    Christian Dutta MD  Primary Care Center  [facility]

## 2022-11-23 ENCOUNTER — TELEPHONE (OUTPATIENT)
Dept: CARDIOLOGY | Facility: CLINIC | Age: 67
End: 2022-11-23

## 2022-11-23 NOTE — TELEPHONE ENCOUNTER
Left Voicemail (1st Attempt) and Sent Mychart (1st Attempt) for the patient to call back and schedule the following:    Appointment type: Cardiology- Return General  Provider: Chris Fischer  Return date: 03/18/23  Specialty phone number: [phone]  Additional appointment(s) needed: fasting labs  Additonal Notes: none    Kelechi Nava, Visit Facilitator/MA.

## 2022-11-28 ENCOUNTER — TELEPHONE (OUTPATIENT)
Dept: CARDIOLOGY | Facility: CLINIC | Age: 67
End: 2022-11-28

## 2022-11-28 NOTE — TELEPHONE ENCOUNTER
[facility] Call Center    Phone Message    May a detailed message be left on voicemail: yes     Reason for Call: Other: Darnell calling to report that he will be having labs drawn this week and wants to clarify if that will suffice for his March appointment with Dr. Chris Fischer or if he will have to get them drawn again. Please call him back to discuss     Action Taken: Message routed to:  Other: Cardiology    Travel Screening: Not Applicable

## 2022-11-30 ENCOUNTER — LAB (OUTPATIENT)
Dept: LAB | Facility: CLINIC | Age: 67
End: 2022-11-30
Payer: COMMERCIAL

## 2022-11-30 DIAGNOSIS — E11.69 TYPE 2 DIABETES MELLITUS WITH OTHER SPECIFIED COMPLICATION, WITHOUT LONG-TERM CURRENT USE OF INSULIN (H): ICD-10-CM

## 2022-11-30 DIAGNOSIS — R73.01 IMPAIRED FASTING GLUCOSE: ICD-10-CM

## 2022-11-30 DIAGNOSIS — E78.5 DYSLIPIDEMIA: Primary | ICD-10-CM

## 2022-11-30 DIAGNOSIS — N25.81 HYPERPARATHYROIDISM, SECONDARY (H): ICD-10-CM

## 2022-11-30 DIAGNOSIS — E78.5 HYPERLIPIDEMIA LDL GOAL <100: ICD-10-CM

## 2022-11-30 DIAGNOSIS — M10.9 GOUT: ICD-10-CM

## 2022-11-30 DIAGNOSIS — E23.0 HYPOGONADOTROPIC HYPOGONADISM (H): ICD-10-CM

## 2022-11-30 DIAGNOSIS — E55.9 VITAMIN D DEFICIENCY: ICD-10-CM

## 2022-11-30 DIAGNOSIS — I10 ESSENTIAL HYPERTENSION, BENIGN: ICD-10-CM

## 2022-11-30 LAB
ANION GAP SERPL CALCULATED.3IONS-SCNC: 10 MMOL/L (ref 7–15)
BUN SERPL-MCNC: 15.8 MG/DL (ref 8–23)
C PEPTIDE SERPL-MCNC: 2.7 NG/ML (ref 0.9–6.9)
CALCIUM SERPL-MCNC: 10.1 MG/DL (ref 8.8–10.2)
CALCIUM SERPL-MCNC: 10.3 MG/DL (ref 8.8–10.2)
CHLORIDE SERPL-SCNC: 104 MMOL/L (ref 98–107)
CHOLEST SERPL-MCNC: 77 MG/DL
CHOLEST SERPL-MCNC: 81 MG/DL
CREAT SERPL-MCNC: 0.74 MG/DL (ref 0.67–1.17)
CREAT UR-MCNC: 158 MG/DL
CRP SERPL-MCNC: <3 MG/L
DEPRECATED CALCIDIOL+CALCIFEROL SERPL-MC: 70 UG/L (ref 20–75)
DEPRECATED HCO3 PLAS-SCNC: 27 MMOL/L (ref 22–29)
FASTING STATUS PATIENT QL REPORTED: YES
FSH SERPL IRP2-ACNC: 6.7 MIU/ML (ref 1.5–12.4)
GFR SERPL CREATININE-BSD FRML MDRD: >90 ML/MIN/1.73M2
GLUCOSE SERPL-MCNC: 92 MG/DL (ref 70–99)
GLUCOSE SERPL-MCNC: 94 MG/DL (ref 70–99)
HBA1C MFR BLD: 4.9 %
HDLC SERPL-MCNC: 42 MG/DL
HDLC SERPL-MCNC: 43 MG/DL
LDLC SERPL CALC-MCNC: 21 MG/DL
LDLC SERPL CALC-MCNC: 24 MG/DL
LH SERPL-ACNC: 15.8 MIU/ML (ref 1.7–8.6)
MAGNESIUM SERPL-MCNC: 2 MG/DL (ref 1.7–2.3)
MICROALBUMIN UR-MCNC: 12.6 MG/L
MICROALBUMIN/CREAT UR: 7.97 MG/G CR (ref 0–17)
NONHDLC SERPL-MCNC: 35 MG/DL
NONHDLC SERPL-MCNC: 38 MG/DL
PHOSPHATE SERPL-MCNC: 2.4 MG/DL (ref 2.5–4.5)
POTASSIUM SERPL-SCNC: 4.1 MMOL/L (ref 3.4–5.3)
PTH-INTACT SERPL-MCNC: 60 PG/ML (ref 15–65)
SODIUM SERPL-SCNC: 141 MMOL/L (ref 136–145)
TRIGL SERPL-MCNC: 68 MG/DL
TRIGL SERPL-MCNC: 70 MG/DL
URATE SERPL-MCNC: 3.8 MG/DL (ref 3.4–7)

## 2022-11-30 PROCEDURE — 83001 ASSAY OF GONADOTROPIN (FSH): CPT | Mod: 90 | Performed by: PATHOLOGY

## 2022-11-30 PROCEDURE — 83970 ASSAY OF PARATHORMONE: CPT | Performed by: PATHOLOGY

## 2022-11-30 PROCEDURE — 99000 SPECIMEN HANDLING OFFICE-LAB: CPT | Performed by: PATHOLOGY

## 2022-11-30 PROCEDURE — 84100 ASSAY OF PHOSPHORUS: CPT | Performed by: PATHOLOGY

## 2022-11-30 PROCEDURE — 84403 ASSAY OF TOTAL TESTOSTERONE: CPT | Mod: 90 | Performed by: PATHOLOGY

## 2022-11-30 PROCEDURE — 80061 LIPID PANEL: CPT | Performed by: PATHOLOGY

## 2022-11-30 PROCEDURE — 84681 ASSAY OF C-PEPTIDE: CPT | Mod: 90 | Performed by: PATHOLOGY

## 2022-11-30 PROCEDURE — 83735 ASSAY OF MAGNESIUM: CPT | Performed by: PATHOLOGY

## 2022-11-30 PROCEDURE — 86140 C-REACTIVE PROTEIN: CPT | Performed by: PATHOLOGY

## 2022-11-30 PROCEDURE — 36415 COLL VENOUS BLD VENIPUNCTURE: CPT | Performed by: PATHOLOGY

## 2022-11-30 PROCEDURE — 80048 BASIC METABOLIC PNL TOTAL CA: CPT | Performed by: PATHOLOGY

## 2022-11-30 PROCEDURE — 82043 UR ALBUMIN QUANTITATIVE: CPT | Mod: 90 | Performed by: PATHOLOGY

## 2022-11-30 PROCEDURE — 84550 ASSAY OF BLOOD/URIC ACID: CPT | Mod: 90 | Performed by: PATHOLOGY

## 2022-11-30 PROCEDURE — 82306 VITAMIN D 25 HYDROXY: CPT | Mod: 90 | Performed by: PATHOLOGY

## 2022-11-30 PROCEDURE — 83002 ASSAY OF GONADOTROPIN (LH): CPT | Mod: 90 | Performed by: PATHOLOGY

## 2022-11-30 PROCEDURE — 83036 HEMOGLOBIN GLYCOSYLATED A1C: CPT | Mod: 90 | Performed by: PATHOLOGY

## 2022-12-02 LAB — TESTOST SERPL-MCNC: 494 NG/DL (ref 240–950)

## 2022-12-27 ENCOUNTER — PRE VISIT (OUTPATIENT)
Dept: UROLOGY | Facility: CLINIC | Age: 67
End: 2022-12-27

## 2022-12-27 NOTE — TELEPHONE ENCOUNTER
Reason for visit: Follow-up    Dx/Hx/Sx: Enlarged prostate    Records/images: In EPIC    At rooming: paper AUA, void pvr; collect urine    JON Shields  December 27, 2022  10:04 AM

## 2023-01-24 ENCOUNTER — OFFICE VISIT (OUTPATIENT)
Dept: UROLOGY | Facility: CLINIC | Age: 68
End: 2023-01-24
Payer: COMMERCIAL

## 2023-01-24 ENCOUNTER — LAB (OUTPATIENT)
Dept: LAB | Facility: CLINIC | Age: 68
End: 2023-01-24
Payer: COMMERCIAL

## 2023-01-24 VITALS
SYSTOLIC BLOOD PRESSURE: 116 MMHG | WEIGHT: 210 LBS | BODY MASS INDEX: 26.95 KG/M2 | DIASTOLIC BLOOD PRESSURE: 78 MMHG | OXYGEN SATURATION: 98 % | HEART RATE: 104 BPM | HEIGHT: 74 IN

## 2023-01-24 DIAGNOSIS — N40.0 ENLARGED PROSTATE: ICD-10-CM

## 2023-01-24 DIAGNOSIS — N40.0 ENLARGED PROSTATE: Primary | ICD-10-CM

## 2023-01-24 LAB — PSA SERPL-MCNC: 1.29 NG/ML (ref 0–4.5)

## 2023-01-24 PROCEDURE — 99000 SPECIMEN HANDLING OFFICE-LAB: CPT | Performed by: PATHOLOGY

## 2023-01-24 PROCEDURE — 36415 COLL VENOUS BLD VENIPUNCTURE: CPT | Performed by: PATHOLOGY

## 2023-01-24 PROCEDURE — 84153 ASSAY OF PSA TOTAL: CPT | Mod: 90 | Performed by: PATHOLOGY

## 2023-01-24 PROCEDURE — 99213 OFFICE O/P EST LOW 20 MIN: CPT | Performed by: UROLOGY

## 2023-01-24 ASSESSMENT — PAIN SCALES - GENERAL: PAINLEVEL: NO PAIN (0)

## 2023-01-24 NOTE — NURSING NOTE
"Chief Complaint   Patient presents with     Follow Up     Enlarged prostate       Blood pressure 116/78, pulse 104, height 1.88 m (6' 2\"), weight 95.3 kg (210 lb), SpO2 98 %. Body mass index is 26.96 kg/m .    Patient Active Problem List   Diagnosis     Adjustment disorder with mixed anxiety and depressed mood     Prostate nodule     Pure hypercholesterolemia     Essential hypertension, benign     Chronic cough     Diarrhea     Obstructive sleep apnea hypopnea, moderate [AHI 21 on CPAP 5-15]     Agatston coronary artery calcium score greater than 400     Mass of urinary bladder     Microscopic hematuria     Benign prostatic hyperplasia with urinary obstruction     Combined forms of age-related cataract of both eyes     Myopia of both eyes with astigmatism and presbyopia     Esophageal dysphagia     Osteophyte of cervical spine     Hyperlipidemia     Hypertension     Lumbar radiculopathy     Metabolic syndrome     Obesity, unspecified     Dysphagia     Nuclear sclerotic cataract of both eyes     Acute appendicitis with localized peritonitis, without perforation, abscess, or gangrene       Allergies   Allergen Reactions     Ragweeds      Watery eyes, itchy nose       Current Outpatient Medications   Medication Sig Dispense Refill     acetaminophen (TYLENOL) 500 MG tablet Take 1,000 mg by mouth       allopurinol (ZYLOPRIM) 100 MG tablet take by mouth 1 tab ( 100 mg)  at bedtime 90 tablet 3     amLODIPine (NORVASC) 10 MG tablet Take 1 tablet (10 mg) by mouth At Bedtime 90 tablet 3     buPROPion (WELLBUTRIN XL) 300 MG 24 hr tablet Take 1 tablet (300 mg) by mouth every morning 90 tablet 3     candesartan (ATACAND) 32 MG tablet Take 32 mg by mouth At Bedtime 90 tablet 3     clomiPHENE (CLOMID) 50 MG tablet Take 1 tablet (50 mg) by mouth every other day Pt can't recall if medication was taken today or yesterday 12/3/19 90 tablet 3     empagliflozin (JARDIANCE) 25 MG TABS tablet Take 1 tablet (25 mg) by mouth every morning " 90 tablet 3     metFORMIN (GLUCOPHAGE) 1000 MG tablet Take 2 tablets (2,000 mg) by mouth daily (with dinner) 180 tablet 3     multivitamin w/minerals (THERA-VIT-M) tablet Take 1 tablet by mouth At Bedtime       omeprazole (PRILOSEC) 20 MG DR capsule Take 1 capsule (20 mg) by mouth daily 90 capsule 3     OZEMPIC, 2 MG/DOSE, 8 MG/3ML SOPN Inject 2 mg Subcutaneous once a week 9 mL 3     phentermine (ADIPEX-P) 37.5 MG tablet Take 1.5 tablets (56 mg) by mouth every morning (before breakfast) 135 tablet 3     rosuvastatin (CRESTOR) 40 MG tablet Take 1 tablet (40 mg) by mouth daily 90 tablet 3     tamsulosin (FLOMAX) 0.4 MG capsule Take 1 capsule (0.4 mg) by mouth daily 90 capsule 3       Social History     Tobacco Use     Smoking status: Never     Smokeless tobacco: Never   Vaping Use     Vaping Use: Never used   Substance Use Topics     Alcohol use: No     Drug use: No       Gerardo Camarena, JON  1/24/2023  1:39 PM

## 2023-01-24 NOTE — PROGRESS NOTES
UROLOGY OUTPATIENT VISIT      Chief Complaint:   BPH      Synopsis    Darnell Grant is a very pleasant AGE: 67 year old year old person    He has a history of BPH, underwent Rezum several years ago  Has no particular urinary concerns at this time, AUA ss is 9, PVR is 75 mL  Here for PSA screening, has known prostate nodule which has been biopsied in the past  Denies hematuria or UTI  Takes flomax as prescribed by PMD    OLEKSANDR today with small pea sized right nodule, seems distinct from prostate, stable from prior reported exams  PSA is low per below:    PSA   Date Value Ref Range Status   08/06/2020 2.07 0 - 4 ug/L Final     Comment:     Assay Method:  Chemiluminescence using Siemens Vista analyzer   12/03/2019 2.08 0 - 4 ug/L Final     Comment:     Assay Method:  Chemiluminescence using Siemens Vista analyzer   04/30/2019 1.90 0 - 4 ug/L Final     Comment:     Assay Method:  Chemiluminescence using Siemens Vista analyzer   11/13/2018 4.51 (H) 0 - 4 ug/L Final     Comment:     Assay Method:  Chemiluminescence using Siemens Vista analyzer   03/06/2018 0.96 0 - 4 ug/L Final     Comment:     Assay Method:  Chemiluminescence using Siemens Vista analyzer     PSA Tumor Marker   Date Value Ref Range Status   01/24/2023 1.29 0.00 - 4.50 ng/mL Final   01/03/2022 2.03 0.00 - 4.00 ug/L Final              Medications     Current Outpatient Medications   Medication     acetaminophen (TYLENOL) 500 MG tablet     allopurinol (ZYLOPRIM) 100 MG tablet     amLODIPine (NORVASC) 10 MG tablet     buPROPion (WELLBUTRIN XL) 300 MG 24 hr tablet     candesartan (ATACAND) 32 MG tablet     clomiPHENE (CLOMID) 50 MG tablet     empagliflozin (JARDIANCE) 25 MG TABS tablet     metFORMIN (GLUCOPHAGE) 1000 MG tablet     multivitamin w/minerals (THERA-VIT-M) tablet     omeprazole (PRILOSEC) 20 MG DR capsule     OZEMPIC, 2 MG/DOSE, 8 MG/3ML SOPN     phentermine (ADIPEX-P) 37.5 MG tablet     rosuvastatin (CRESTOR) 40 MG tablet     tamsulosin  (FLOMAX) 0.4 MG capsule     No current facility-administered medications for this visit.     Facility-Administered Medications Ordered in Other Visits   Medication     barium sulfate 40% (VARIBAR THIN HONEY) oral suspension              Assessment/Plan   67 year old year old person with hx of BPH, prostate nodule  No change in symptoms, exam or PSA  Continue q1y surveillance, PSA ordered for next year  Continue flomax, being managed and refilled by PMD

## 2023-01-24 NOTE — LETTER
Date:January 30, 2023      Provider requested that no letter be sent. Do not send.       [facility]

## 2023-01-24 NOTE — LETTER
1/24/2023       RE: Darnell Grant  [address]  [facility] [address]     Dear Colleague,    Thank you for referring your patient, Darnell Grant, to the [facility] [place] [facility]. Please see a copy of my visit note below.          UROLOGY OUTPATIENT VISIT      Chief Complaint:   BPH      Synopsis    Darnell Grant is a very pleasant AGE: 67 year old year old person    He has a history of BPH, underwent Rezum several years ago  Has no particular urinary concerns at this time, AUA ss is 9, PVR is 75 mL  Here for PSA screening, has known prostate nodule which has been biopsied in the past  Denies hematuria or UTI  Takes flomax as prescribed by PMD    OLEKSANDR today with small pea sized right nodule, seems distinct from prostate, stable from prior reported exams  PSA is low per below:    PSA   Date Value Ref Range Status   08/06/2020 2.07 0 - 4 ug/L Final     Comment:     Assay Method:  Chemiluminescence using Siemens Vista analyzer   12/03/2019 2.08 0 - 4 ug/L Final     Comment:     Assay Method:  Chemiluminescence using Siemens Vista analyzer   04/30/2019 1.90 0 - 4 ug/L Final     Comment:     Assay Method:  Chemiluminescence using Siemens Vista analyzer   11/13/2018 4.51 (H) 0 - 4 ug/L Final     Comment:     Assay Method:  Chemiluminescence using Siemens Vista analyzer   03/06/2018 0.96 0 - 4 ug/L Final     Comment:     Assay Method:  Chemiluminescence using Siemens Vista analyzer     PSA Tumor Marker   Date Value Ref Range Status   01/24/2023 1.29 0.00 - 4.50 ng/mL Final   01/03/2022 2.03 0.00 - 4.00 ug/L Final              Medications     Current Outpatient Medications   Medication     acetaminophen (TYLENOL) 500 MG tablet     allopurinol (ZYLOPRIM) 100 MG tablet     amLODIPine (NORVASC) 10 MG tablet     buPROPion (WELLBUTRIN XL) 300 MG 24 hr tablet     candesartan (ATACAND) 32 MG tablet     clomiPHENE (CLOMID) 50 MG tablet      empagliflozin (JARDIANCE) 25 MG TABS tablet     metFORMIN (GLUCOPHAGE) 1000 MG tablet     multivitamin w/minerals (THERA-VIT-M) tablet     omeprazole (PRILOSEC) 20 MG DR capsule     OZEMPIC, 2 MG/DOSE, 8 MG/3ML SOPN     phentermine (ADIPEX-P) 37.5 MG tablet     rosuvastatin (CRESTOR) 40 MG tablet     tamsulosin (FLOMAX) 0.4 MG capsule     No current facility-administered medications for this visit.     Facility-Administered Medications Ordered in Other Visits   Medication     barium sulfate 40% (VARIBAR THIN HONEY) oral suspension              Assessment/Plan   67 year old year old person with hx of BPH, prostate nodule  No change in symptoms, exam or PSA  Continue q1y surveillance, PSA ordered for next year  Continue flomax, being managed and refilled by PMD        Again, thank you for allowing me to participate in the care of your patient.      Sincerely,    Diaz Pitt MD

## 2023-01-24 NOTE — PATIENT INSTRUCTIONS
Please schedule a lab appointment today for a PSA blood draw.    Please schedule a 1-year follow up appointment with Dr. Pitt.    It was a pleasure meeting with you today.  Thank you for allowing me and my team the privilege of caring for you today.  YOU are the reason we are here, and I truly hope we provided you with the excellent service you deserve.  Please let us know if there is anything else we can do for you so that we can be sure you are leaving completely satisfied with your care experience.

## 2023-02-10 DIAGNOSIS — Z71.84 TRAVEL ADVICE ENCOUNTER: Primary | ICD-10-CM

## 2023-02-12 ENCOUNTER — MYC MEDICAL ADVICE (OUTPATIENT)
Dept: INTERNAL MEDICINE | Facility: CLINIC | Age: 68
End: 2023-02-12
Payer: COMMERCIAL

## 2023-03-08 NOTE — PROGRESS NOTES
CARDIOLOGY CLINIC Follow Up  Darnell Grant is a 67 year old male who receives primary care from Dr. Raul Dutta.  Returns to clinic today for routine follow-up.    3/18/22  Dr. Dutta's note from 2/2/2021 has a nice summary of Dr. Grant's medical history.  In brief, Dr. Grant has no history of clinical atherosclerotic CVD.,  He does have imaging evidence of with a coronary calcium score of 772 in 2017, putting him in the 90th percentile.  His CV risk factors include hypertension, metabolic syndrome, HLD, and family history of premature CAD.  His main cardiac related symptom has been intermittent palpitations and he has been diagnosed with PVCs with frequency ranging from 5 to 14% on cardiac monitors.    Since his last visit Dr. Grant has intentionally lost weight.  With his weight loss his blood pressure has decreased and he does describe some orthostatic symptoms of lightheadedness when he stands up.      Interval History 3/10/23    Kateryna presents today for annual follow up. He reports no chest pain, exertional dyspnea, LE swelling, orthopnea/PND. No changes in medication. BP at home typically runs in 110's/70's mmHg. HR typically in the 90s bpm range. TTE last year reassuring showed normal Biventricular function, LVEF 55-60%. No significant valvular dysfunction or LVH. No lightheadedness since lowering BP meds with PCP and since we stopped hydrochlorothiazide previously.    The ASCVD Risk score (Felipe GUTIERRES, et al., 2019) failed to calculate for the following reasons:    The valid total cholesterol range is 130 to 320 mg/dL     ASSESSMENT AND PLAN  Darnell Grant is a 67 year old male with evidence of CAD on coronary artery calcium scan, but no clinical symptoms; moderate PVC burden with history of short runs of NSVT; HTN; HLD; metabolic syndrome.  CV risk factors are well controlled at this time.      #PVCs/NSVT: Remains symptom-free. We will continue to monitor LV function intermittently to ensure no  cardiomyopathy as a result of ventricular ectopy.  Last cardiac imaging was done in 2022, no structural disease.    #HTN: Blood pressures remains well controlled. Dr. Dutta had previously lowered Dr. Grant's blood pressure medications in the setting of lightheadedness.    - at some point, may consider stopping phentermine to lower blood pressure and heart rate  - continue amlodipine, candesartan    #HLD: LDL is under excellent control at 26 mg/dL  -Continue rosuvastatin 40 mg    #Metabolic syndrome: Successful weight loss over the past year with guidance from endocrinology.  Currently taking multiple medications for appetite suppression and weight loss.  Hemoglobin A1c 4.9.  -Follow-up endocrinology    Follow-up in cardiology clinic 1 year    Alcides Valerio MD, MSc  Cardiovascular Disease Fellow  [facility]    Attending: Patient seen and examined with Dr. Valerio. The history and physical findings are accurate as recorded. My additional findings, if any, have been incorporated into the body of the note. All relevant labs and imaging studies and new ECG data have been reviewed personally. The assessment and recommendations outlined reflect our joint decision making.    Thank you for the opportunity to participate in the care of Dr. Darnell Grant. Please feel free to contact me with any additional questions or concerns.    Abdon Fischer MD    Preventive Cardiology  Pager: [phone]    PAST MEDICAL HISTORY:  Patient Active Problem List   Diagnosis     Adjustment disorder with mixed anxiety and depressed mood     Prostate nodule     Pure hypercholesterolemia     Essential hypertension, benign     Chronic cough     Diarrhea     Obstructive sleep apnea hypopnea, moderate [AHI 21 on CPAP 5-15]     Agatston coronary artery calcium score greater than 400     Mass of urinary bladder     Microscopic hematuria     Benign prostatic hyperplasia with urinary obstruction      Combined forms of age-related cataract of both eyes     Myopia of both eyes with astigmatism and presbyopia     Esophageal dysphagia     Osteophyte of cervical spine     Hyperlipidemia     Hypertension     Lumbar radiculopathy     Metabolic syndrome     Obesity, unspecified     Dysphagia     Nuclear sclerotic cataract of both eyes     Acute appendicitis with localized peritonitis, without perforation, abscess, or gangrene     Past Medical History:   Diagnosis Date     Bladder mass      BPH (benign prostatic hyperplasia)      GERD (gastroesophageal reflux disease)      Heart disease >5 years ago    PVCs     High serum parathyroid hormone (PTH) 2017     Hypertension      Metabolic syndrome      Nephrolithiasis      Obesity      LISETTE (obstructive sleep apnea)      Prediabetes      Uncomplicated asthma >5 years ago    Stress induced asthma       CURRENT MEDICATIONS:  Current Outpatient Medications   Medication Sig Dispense Refill     acetaminophen (TYLENOL) 500 MG tablet Take 1,000 mg by mouth       allopurinol (ZYLOPRIM) 100 MG tablet take by mouth 1 tab ( 100 mg)  at bedtime 90 tablet 3     amLODIPine (NORVASC) 10 MG tablet Take 1 tablet (10 mg) by mouth At Bedtime 90 tablet 3     buPROPion (WELLBUTRIN XL) 300 MG 24 hr tablet Take 1 tablet (300 mg) by mouth every morning 90 tablet 3     candesartan (ATACAND) 32 MG tablet Take 32 mg by mouth At Bedtime 90 tablet 3     clomiPHENE (CLOMID) 50 MG tablet Take 1 tablet (50 mg) by mouth every other day Pt can't recall if medication was taken today or yesterday 12/3/19 90 tablet 3     empagliflozin (JARDIANCE) 25 MG TABS tablet Take 1 tablet (25 mg) by mouth every morning 90 tablet 3     metFORMIN (GLUCOPHAGE) 1000 MG tablet Take 2 tablets (2,000 mg) by mouth daily (with dinner) 180 tablet 3     multivitamin w/minerals (THERA-VIT-M) tablet Take 1 tablet by mouth At Bedtime       omeprazole (PRILOSEC) 20 MG DR capsule Take 1 capsule (20 mg) by mouth daily 90 capsule 3      OZEMPIC, 2 MG/DOSE, 8 MG/3ML SOPN Inject 2 mg Subcutaneous once a week 9 mL 3     phentermine (ADIPEX-P) 37.5 MG tablet Take 1.5 tablets (56 mg) by mouth every morning (before breakfast) 135 tablet 3     rosuvastatin (CRESTOR) 40 MG tablet Take 1 tablet (40 mg) by mouth daily 90 tablet 3     tamsulosin (FLOMAX) 0.4 MG capsule Take 1 capsule (0.4 mg) by mouth daily 90 capsule 3       PAST SURGICAL HISTORY:  Past Surgical History:   Procedure Laterality Date     ABDOMEN SURGERY  July, 2021     APPENDECTOMY OPEN N/A 7/14/2021    Procedure: APPENDECTOMY, OPEN;  Surgeon: Walter Boggs MD;  Location: [facility] OR     BACK SURGERY      repair disc     BIOPSY  >5 years ago    Prostate     CATARACT IOL, RT/LT       CYSTOSCOPY, TRANSURETHRAL RESECTION (TUR) TUMOR BLADDER, COMBINED N/A 1/31/2018    Procedure: COMBINED CYSTOSCOPY, TRANSURETHRAL RESECTION (TUR) TUMOR BLADDER;  Transurethral Biopsy and Resection of Bladder Tumor;  Surgeon: Yaya Lomeli MD;  Location: [facility]     DECOMPRESSION, FUSION CERVICAL ANTERIOR TWO LEVELS, COMBINED Right 12/10/2019    Procedure: right approach anterior cervical 2-3, 3-4 resection of anterior cervical osteophytes causing dysphagia, with microscope;  Surgeon: María Fitzgerald MD;  Location: [facility] OR     HC BREATH HYDROGEN TEST  5/22/2013    Procedure: HYDROGEN BREATH TEST;  Surgeon: Donny Paris MD;  Location: [facility]     LAPAROSCOPIC APPENDECTOMY N/A 7/14/2021    Procedure: APPENDECTOMY, LAPAROSCOPIC;  Surgeon: Walter Boggs MD;  [facility]: [facility]     PHACOEMULSIFICATION CLEAR CORNEA WITH STANDARD INTRAOCULAR LENS IMPLANT Left 9/25/2020    Procedure: LEFT CATARACT REMOVAL WITH INTRAOCULAR LENS IMPLANT;  Surgeon: Keyla Tobin MD;  Location: [facility]     PHACOEMULSIFICATION CLEAR CORNEA WITH STANDARD INTRAOCULAR LENS IMPLANT Right 10/2/2020    Procedure: RIGHT CATARACT REMOVAL WITH INTRAOCULAR LENS IMPLANT;  Surgeon: Keyla Tobin MD;  Location: [facility]      TRANSURETHERAL DESTRUCTION OF PROSTATE BY THERMOTHERAPY N/A 4/13/2018    Procedure: TRANSURETHERAL DESTRUCTION OF PROSTATE BY THERMOTHERAPY;  [facility] Procedure;  Surgeon: Yaya Lomeli MD;  Location: [facility]       ALLERGIES  Ragweeds    FAMILY HX:  Family History   Problem Relation Age of Onset     Asthma Brother      Diabetes Maternal Grandmother      Cardiovascular Father      Coronary Artery Disease Father         at age 70s     Hypertension Father         at age 70s     Nephrolithiasis No family hx of      Parathyroid Disorders No family hx of      Thyroid Disease No family hx of      Glaucoma No family hx of      Macular Degeneration No family hx of      Anesthesia Reaction No family hx of      Deep Vein Thrombosis (DVT) No family hx of        SOCIAL HX:  Social History     Tobacco Use     Smoking status: Never     Smokeless tobacco: Never   Vaping Use     Vaping Use: Never used   Substance Use Topics     Alcohol use: No     Drug use: No      VITAL SIGNS:  /85 (BP Location: Right arm, Patient Position: Chair, Cuff Size: Adult Large)   Pulse 113   Wt 96.7 kg (213 lb 3.2 oz)   SpO2 98%   BMI 27.37 kg/m    Body mass index is 27.37 kg/m .  Wt Readings from Last 2 Encounters:   03/10/23 96.7 kg (213 lb 3.2 oz)   01/24/23 95.3 kg (210 lb)       PHYSICAL EXAM  Gen: pleasant man sitting comfortably in NAD  Head: nc/at  CV: nml s1/s2, no murmur or gallop; no JVD  Chest: clear lungs  Ext: warm, no LE edema  Skin: no rash on limited exam  Neuro: awake, alert, oriented, nml speech    LABS: personally reviewed  CMP  Recent Labs   Lab Test 11/30/22  0746 06/03/22  0804 02/07/22  0805 10/26/21  1229 07/14/21  1859 07/14/21  1122 06/11/21  0800 12/09/20  0815 09/15/20  0732 08/06/20  0714 12/10/19  0630 12/03/19  0836 04/30/19  0608 12/19/18  0626 11/13/18  0603   0000     --  140 138  --  139  --  141 142 141  --  138  --    < > 140  --    POTASSIUM 4.1  --  3.8 4.3  --  4.2  --  4.2 3.9 4.1   < > 4.0  --     < > 4.1  --    CHLORIDE 104  --  108 105  --  104  --  107 107 109  --  105  --    < > 107  --    CO2 27  --  24 29  --  31  --  30 28 26  --  28  --    < > 29  --    ANIONGAP 10  --  8 4  --  4  --  4 7 6  --  4  --    < > 4  --    GLC 94  92 86 120* 106*   < > 107* 93 90 131* 88   < > 96  --    < > 86   < >   BUN 15.8  --  19 18  --  18  --  16 18 25  --  22  --    < > 16  --    CR 0.74  --  0.78 0.84  --  0.93  --  0.98 0.94 1.00  --  0.83 1.01   < > 0.94  --    GFRESTIMATED >90  --  >90 >90  --  85  --  80 84 78  --  >90 77   < > 81  --    GFRESTBLACK  --   --   --   --   --   --   --  >90 >90 >90  --  >90 90   < > >90  --    ZHANE 10.1  10.3* 9.9 9.7 9.9  --  10.3* 9.8 10.1 9.7 9.2  --  10.1 9.7   < > 8.9   < >   MAG 2.0 2.1  --   --   --   --  2.3  --   --   --   --   --   --   --   --   --    PHOS 2.4* 2.1*  --   --   --   --  2.7  --   --   --   --   --  2.8   < >  --   --    PROTTOTAL  --   --   --   --   --  7.7  --  7.1  --  6.9  --   --   --   --  7.3  --    ALBUMIN  --   --   --   --   --  4.2  --  4.0  --  3.6  --   --   --   --  3.9  --    BILITOTAL  --   --   --   --   --  1.0  --  0.5  --  0.4  --   --   --   --  0.6  --    ALKPHOS  --   --   --   --   --  69  --  71  --  78  --   --   --   --  61  --    AST  --   --   --   --   --  20  --  19  --  30  --   --   --   --  21  --    ALT  --   --   --   --   --  36  --  40  --  51  --   --   --   --  45  --     < > = values in this interval not displayed.     CBC  Recent Labs   Lab Test 04/18/22  1458 10/26/21  1229 07/14/21  1122 12/09/20  0815   WBC 5.3 7.3 16.3* 6.7   RBC 5.14 5.86 5.96* 5.60   HGB 16.1 18.2* 18.2* 17.3   HCT 46.8 52.9 55.1* 52.2   MCV 91 90 92 93   MCH 31.3 31.1 30.5 30.9   MCHC 34.4 34.4 33.0 33.1   RDW 14.6 14.9 14.3 13.8    175 170 162     INR  Recent Labs   Lab Test 12/03/19  0836   INR 0.91     Recent Labs   Lab Test 11/30/22  0746 06/11/21  0800 04/18/16  0808 08/31/15  0741   CHOL 77  81 79   < > 117   HDL 42  43  35*   < > 34*   LDL 21  24 26   < > 45   TRIG 70  68 91   < > 190*   CHOLHDLRATIO  --   --   --  3.4    < > = values in this interval not displayed.     Recent Labs   Lab Test 22  0746 22  0804   A1C 4.9 5.0       EKG   3/18/2022 normal sinus rhythm left anterior fascicular block; no acute ischemic changes     Most recent ECHO:   Echo done in  showed normal biventricular function    Most recent STRESS TEST:    3/28/22 TTE  Global and regional left ventricular function is normal with an EF of 55-60%.  Right ventricular function, chamber size, wall motion, and thickness are  normal.  The inferior vena cava is normal.  No pericardial effusion is present.    Other [id number] cardiac MRI with stress  Clinical history: 62-year-old male with frequent PVCs and calcium score of 744, CMR to rule out ischemia.   Comparison CMR: None     1. The LV is normal in cavity size and wall thickness. The global systolic function is normal. The LVEF is  55%. There are no regional wall motion abnormalities.     2. The RV is normal in cavity size. The global systolic function is normal. The RVEF is 61%.      3. Both atria are normal in size.     4. There is no significant valvular disease.      5. Late gadolinium enhancement imaging shows no MI, fibrosis or infiltrative disease.      6. Regadenoson stress perfusion imaging shows no ischemia.     CONCLUSIONS: No ischemia or infarction. Normal LV and RV function, LVEF of 55% and RVEF of 61%. No obvious substrate for arrhythmia.

## 2023-03-10 ENCOUNTER — OFFICE VISIT (OUTPATIENT)
Dept: CARDIOLOGY | Facility: CLINIC | Age: 68
End: 2023-03-10
Attending: INTERNAL MEDICINE
Payer: COMMERCIAL

## 2023-03-10 VITALS
DIASTOLIC BLOOD PRESSURE: 85 MMHG | HEART RATE: 113 BPM | BODY MASS INDEX: 27.37 KG/M2 | OXYGEN SATURATION: 98 % | WEIGHT: 213.2 LBS | SYSTOLIC BLOOD PRESSURE: 126 MMHG

## 2023-03-10 DIAGNOSIS — I25.10 CORONARY ARTERY DISEASE DUE TO CALCIFIED CORONARY LESION: Primary | ICD-10-CM

## 2023-03-10 DIAGNOSIS — E78.5 HYPERLIPIDEMIA LDL GOAL <70: ICD-10-CM

## 2023-03-10 DIAGNOSIS — I10 ESSENTIAL HYPERTENSION: ICD-10-CM

## 2023-03-10 DIAGNOSIS — I25.84 CORONARY ARTERY DISEASE DUE TO CALCIFIED CORONARY LESION: Primary | ICD-10-CM

## 2023-03-10 DIAGNOSIS — I49.3 PVC'S (PREMATURE VENTRICULAR CONTRACTIONS): ICD-10-CM

## 2023-03-10 PROCEDURE — G0463 HOSPITAL OUTPT CLINIC VISIT: HCPCS | Performed by: INTERNAL MEDICINE

## 2023-03-10 PROCEDURE — 99214 OFFICE O/P EST MOD 30 MIN: CPT | Mod: GC | Performed by: INTERNAL MEDICINE

## 2023-03-10 ASSESSMENT — PAIN SCALES - GENERAL: PAINLEVEL: NO PAIN (0)

## 2023-03-10 NOTE — LETTER
3/10/2023      RE: Darnell Grant  [address]  [facility] [address]       Dear Colleague,    Thank you for the opportunity to participate in the care of your patient, Darnell Grant, at the [facility] [place] [facility]. Please see a copy of my visit note below.    CARDIOLOGY CLINIC Follow Up  Darnell Grant is a 67 year old male who receives primary care from Dr. Raul Dutta.  Returns to clinic today for routine follow-up.    3/18/22  Dr. Dutta's note from 2/2/2021 has a nice summary of Dr. Grant's medical history.  In brief,  [place] has no history of clinical atherosclerotic CVD.,  He does have imaging evidence of with a coronary calcium score of 772 in 2017, putting him in the 90th percentile.  His CV risk factors include hypertension, metabolic syndrome, HLD, and family history of premature CAD.  His main cardiac related symptom has been intermittent palpitations and he has been diagnosed with PVCs with frequency ranging from 5 to 14% on cardiac monitors.    Since his last visit Dr. Grant has intentionally lost weight.  With his weight loss his blood pressure has decreased and he does describe some orthostatic symptoms of lightheadedness when he stands up.      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 He reports no chest pain, exertional dyspnea, LE swelling, orthopnea/PND. No changes in medication. BP at home typically runs in 110's/70's mmHg. HR typically in the 90s bpm range. TTE last year reassuring showed normal Biventricular function, LVEF 55-60%. No significant valvular dysfunction or LVH. No lightheadedness since lowering BP meds with PCP and since we stopped hydrochlorothiazide previously.    The ASCVD Risk score (Felipe GUTIERRES, et al., 2019) failed to calculate for the following reasons:    The valid total cholesterol range is 130 to 320 mg/dL     ASSESSMENT AND PLAN  Darnell Grant is  a 67 year old male with evidence of CAD on coronary artery calcium scan, but no clinical symptoms; moderate PVC burden with history of short runs of NSVT; HTN; HLD; metabolic syndrome.  CV risk factors are well controlled at this time.      #PVCs/NSVT: Remains symptom-free. We will continue to monitor LV function intermittently to ensure no cardiomyopathy as a result of ventricular ectopy.  Last cardiac imaging was done in 2022, no structural disease.    #HTN: Blood pressures remains well controlled. Dr. Dutta had previously lowered Dr. Grant's blood pressure medications in the setting of lightheadedness.    - at some point, may consider stopping phentermine to lower blood pressure and heart rate  - continue amlodipine, candesartan    #HLD: LDL is under excellent control at 26 mg/dL  -Continue rosuvastatin 40 mg    #Metabolic syndrome: Successful weight loss over the past year with guidance from endocrinology.  Currently taking multiple medications for appetite suppression and weight loss.  Hemoglobin A1c 4.9.  -Follow-up endocrinology    Follow-up in cardiology clinic 1 year    Alcides Valerio MD, MSc  Cardiovascular Disease Fellow  [facility]    Attending: Patient seen and examined with Dr. Valerio. The history and physical findings are accurate as recorded. My additional findings, if any, have been incorporated into the body of the note. All relevant labs and imaging studies and new ECG data have been reviewed personally. The assessment and recommendations outlined reflect our joint decision making.    Thank you for the opportunity to participate in the care of Dr. Darnell Grant. Please feel free to contact me with any additional questions or concerns.    Abdon Fischer MD    Preventive Cardiology  Pager: [phone]    PAST MEDICAL HISTORY:  Patient Active Problem List   Diagnosis     Adjustment disorder with mixed anxiety and depressed mood     Prostate nodule      Pure hypercholesterolemia     Essential hypertension, benign     Chronic cough     Diarrhea     Obstructive sleep apnea hypopnea, moderate [AHI 21 on CPAP 5-15]     Agatston coronary artery calcium score greater than 400     Mass of urinary bladder     Microscopic hematuria     Benign prostatic hyperplasia with urinary obstruction     Combined forms of age-related cataract of both eyes     Myopia of both eyes with astigmatism and presbyopia     Esophageal dysphagia     Osteophyte of cervical spine     Hyperlipidemia     Hypertension     Lumbar radiculopathy     Metabolic syndrome     Obesity, unspecified     Dysphagia     Nuclear sclerotic cataract of both eyes     Acute appendicitis with localized peritonitis, without perforation, abscess, or gangrene     Past Medical History:   Diagnosis Date     Bladder mass      BPH (benign prostatic hyperplasia)      GERD (gastroesophageal reflux disease)      Heart disease >5 years ago    PVCs     High serum parathyroid hormone (PTH) 2017     Hypertension      Metabolic syndrome      Nephrolithiasis      Obesity      LISETTE (obstructive sleep apnea)      Prediabetes      Uncomplicated asthma >5 years ago    Stress induced asthma       CURRENT MEDICATIONS:  Current Outpatient Medications   Medication Sig Dispense Refill     acetaminophen (TYLENOL) 500 MG tablet Take 1,000 mg by mouth       allopurinol (ZYLOPRIM) 100 MG tablet take by mouth 1 tab ( 100 mg)  at bedtime 90 tablet 3     amLODIPine (NORVASC) 10 MG tablet Take 1 tablet (10 mg) by mouth At Bedtime 90 tablet 3     buPROPion (WELLBUTRIN XL) 300 MG 24 hr tablet Take 1 tablet (300 mg) by mouth every morning 90 tablet 3     candesartan (ATACAND) 32 MG tablet Take 32 mg by mouth At Bedtime 90 tablet 3     clomiPHENE (CLOMID) 50 MG tablet Take 1 tablet (50 mg) by mouth every other day Pt can't recall if medication was taken today or yesterday 12/3/19 90 tablet 3     empagliflozin (JARDIANCE) 25 MG TABS tablet Take 1 tablet  (25 mg) by mouth every morning 90 tablet 3     metFORMIN (GLUCOPHAGE) 1000 MG tablet Take 2 tablets (2,000 mg) by mouth daily (with dinner) 180 tablet 3     multivitamin w/minerals (THERA-VIT-M) tablet Take 1 tablet by mouth At Bedtime       omeprazole (PRILOSEC) 20 MG DR capsule Take 1 capsule (20 mg) by mouth daily 90 capsule 3     OZEMPIC, 2 MG/DOSE, 8 MG/3ML SOPN Inject 2 mg Subcutaneous once a week 9 mL 3     phentermine (ADIPEX-P) 37.5 MG tablet Take 1.5 tablets (56 mg) by mouth every morning (before breakfast) 135 tablet 3     rosuvastatin (CRESTOR) 40 MG tablet Take 1 tablet (40 mg) by mouth daily 90 tablet 3     tamsulosin (FLOMAX) 0.4 MG capsule Take 1 capsule (0.4 mg) by mouth daily 90 capsule 3       PAST SURGICAL HISTORY:  Past Surgical History:   Procedure Laterality Date     ABDOMEN SURGERY  July, 2021     APPENDECTOMY OPEN N/A 7/14/2021    Procedure: APPENDECTOMY, OPEN;  Surgeon: Walter Boggs MD;  Location: [facility] OR     BACK SURGERY      repair disc     BIOPSY  >5 years ago    Prostate     CATARACT IOL, RT/LT       CYSTOSCOPY, TRANSURETHRAL RESECTION (TUR) TUMOR BLADDER, COMBINED N/A 1/31/2018    Procedure: COMBINED CYSTOSCOPY, TRANSURETHRAL RESECTION (TUR) TUMOR BLADDER;  Transurethral Biopsy and Resection of Bladder Tumor;  Surgeon: Yaya Lomeli MD;  [facility]     DECOMPRESSION, FUSION CERVICAL ANTERIOR TWO LEVELS, COMBINED Right 12/10/2019    Procedure: right approach anterior cervical 2-3, 3-4 resection of anterior cervical osteophytes causing dysphagia, with microscope;  Surgeon: María Fitzgerald MD;  Location: [facility] OR     HC BREATH HYDROGEN TEST  5/22/2013    Procedure: HYDROGEN BREATH TEST;  Surgeon: Donny Paris MD;  [facility]: [facility] GI     LAPAROSCOPIC APPENDECTOMY N/A 7/14/2021    Procedure: APPENDECTOMY, LAPAROSCOPIC;  Surgeon: Walter Boggs MD;  [facility]     PHACOEMULSIFICATION CLEAR CORNEA WITH STANDARD INTRAOCULAR LENS IMPLANT Left  9/25/2020    Procedure: LEFT CATARACT REMOVAL WITH INTRAOCULAR LENS IMPLANT;  Surgeon: Keyla Tobin MD;  [facility]     PHACOEMULSIFICATION CLEAR CORNEA WITH STANDARD INTRAOCULAR LENS IMPLANT Right 10/2/2020    Procedure: RIGHT CATARACT REMOVAL WITH INTRAOCULAR LENS IMPLANT;  Surgeon: Keyla Tobin MD;  [facility]     TRANSURETHERAL DESTRUCTION OF PROSTATE BY THERMOTHERAPY N/A 4/13/2018    Procedure: TRANSURETHERAL DESTRUCTION OF PROSTATE BY THERMOTHERAPY;  [facility] Procedure;  Surgeon: Yaya Lomeli MD;  [facility]       ALLERGIES  [place]    FAMILY HX:  Family History   Problem Relation Age of Onset     Asthma Brother      Diabetes Maternal Grandmother      Cardiovascular Father      Coronary Artery Disease Father         at age 70s     Hypertension Father         at age 70s     Nephrolithiasis No family hx of      Parathyroid Disorders No family hx of      Thyroid Disease No family hx of      Glaucoma No family hx of      Macular Degeneration No family hx of      Anesthesia Reaction No family hx of      Deep Vein Thrombosis (DVT) No family hx of        SOCIAL HX:  Social History     Tobacco Use     Smoking status: Never     Smokeless tobacco: Never   Vaping Use     Vaping Use: Never used   Substance Use Topics     Alcohol use: No     Drug use: No      VITAL SIGNS:  /85 (BP Location: Right arm, Patient Position: Chair, Cuff Size: Adult Large)   Pulse 113   Wt 96.7 kg (213 lb 3.2 oz)   SpO2 98%   BMI 27.37 kg/m    Body mass index is 27.37 kg/m .  Wt Readings from Last 2 Encounters:   03/10/23 96.7 kg (213 lb 3.2 oz)   01/24/23 95.3 kg (210 lb)       PHYSICAL EXAM  Gen: pleasant man sitting comfortably in NAD  Head: nc/at  CV: nml s1/s2, no murmur or gallop; no JVD  Chest: clear lungs  Ext: warm, no LE edema  Skin: no rash on limited exam  Neuro: awake, alert, oriented, nml speech    LABS: personally reviewed  CMP  Recent Labs   Lab Test 11/30/22  0746 06/03/22  0804  02/07/22  0805 10/26/21  1229 07/14/21  1859 07/14/21  1122 06/11/21  0800 12/09/20  0815 09/15/20  0732 08/06/20  0714 12/10/19  0630 12/03/19  0836 04/30/19  0608 12/19/18  0626 11/13/18  0603   0000     --  140 138  --  139  --  141 142 141  --  138  --    < > 140  --    POTASSIUM 4.1  --  3.8 4.3  --  4.2  --  4.2 3.9 4.1   < > 4.0  --    < > 4.1  --    CHLORIDE 104  --  108 105  --  104  --  107 107 109  --  105  --    < > 107  --    CO2 27  --  24 29  --  31  --  30 28 26  --  28  --    < > 29  --    ANIONGAP 10  --  8 4  --  4  --  4 7 6  --  4  --    < > 4  --    GLC 94  92 86 120* 106*   < > 107* 93 90 131* 88   < > 96  --    < > 86   < >   BUN 15.8  --  19 18  --  18  --  16 18 25  --  22  --    < > 16  --    CR 0.74  --  0.78 0.84  --  0.93  --  0.98 0.94 1.00  --  0.83 1.01   < > 0.94  --    GFRESTIMATED >90  --  >90 >90  --  85  --  80 84 78  --  >90 77   < > 81  --    GFRESTBLACK  --   --   --   --   --   --   --  >90 >90 >90  --  >90 90   < > >90  --    ZHANE 10.1  10.3* 9.9 9.7 9.9  --  10.3* 9.8 10.1 9.7 9.2  --  10.1 9.7   < > 8.9   < >   MAG 2.0 2.1  --   --   --   --  2.3  --   --   --   --   --   --   --   --   --    PHOS 2.4* 2.1*  --   --   --   --  2.7  --   --   --   --   --  2.8   < >  --   --    PROTTOTAL  --   --   --   --   --  7.7  --  7.1  --  6.9  --   --   --   --  7.3  --    ALBUMIN  --   --   --   --   --  4.2  --  4.0  --  3.6  --   --   --   --  3.9  --    BILITOTAL  --   --   --   --   --  1.0  --  0.5  --  0.4  --   --   --   --  0.6  --    ALKPHOS  --   --   --   --   --  69  --  71  --  78  --   --   --   --  61  --    AST  --   --   --   --   --  20  --  19  --  30  --   --   --   --  21  --    ALT  --   --   --   --   --  36  --  40  --  51  --   --   --   --  45  --     < > = values in this interval not displayed.     CBC  Recent Labs   Lab Test 04/18/22  1458 10/26/21  1229 07/14/21  1122 12/09/20  0815   WBC 5.3 7.3 16.3* 6.7   RBC 5.14 5.86 5.96* 5.60   HGB 16.1  18.2* 18.2* 17.3   HCT 46.8 52.9 55.1* 52.2   MCV 91 90 92 93   MCH 31.3 31.1 30.5 30.9   MCHC 34.4 34.4 33.0 33.1   RDW 14.6 14.9 14.3 13.8    175 170 162     INR  Recent Labs   Lab Test 19  0836   INR 0.91     Recent Labs   Lab Test 22  0746 21  0800 16  0808 08/31/15  0741   CHOL 77  81 79   < > 117   HDL 42  43 35*   < > 34*   LDL 21  24 26   < > 45   TRIG 70  68 91   < > 190*   CHOLHDLRATIO  --   --   --  3.4    < > = values in this interval not displayed.     Recent Labs   Lab Test 22  0746 22  0804   A1C 4.9 5.0       EKG   3/18/2022 normal sinus rhythm left anterior fascicular block; no acute ischemic changes     Most recent ECHO:   Echo done in  showed normal biventricular function    Most recent STRESS TEST:    3/28/22 TTE  Global and regional left ventricular function is normal with an EF of 55-60%.  Right ventricular function, chamber size, wall motion, and thickness are  normal.  The inferior vena cava is normal.  No pericardial effusion is present.    Other Imagin2018 cardiac MRI with stress  Clinical history: 62-year-old male with frequent PVCs and calcium score of 744, CMR to rule out ischemia.   Comparison CMR: None     1. The LV is normal in cavity size and wall thickness. The global systolic function is normal. The LVEF is  55%. There are no regional wall motion abnormalities.     2. The RV is normal in cavity size. The global systolic function is normal. The RVEF is 61%.      3. Both atria are normal in size.     4. There is no significant valvular disease.      5. Late gadolinium enhancement imaging shows no MI, fibrosis or infiltrative disease.      6. Regadenoson stress perfusion imaging shows no ischemia.     CONCLUSIONS: No ischemia or infarction. Normal LV and RV function, LVEF of 55% and RVEF of 61%. No obvious substrate for arrhythmia.       Please do not hesitate to contact me if you have any questions/concerns.     Sincerely,      Abdon Fischer MD

## 2023-03-10 NOTE — NURSING NOTE
Chief Complaint   Patient presents with     Follow Up     [facility] F/U     Vitals were taken and medications reconciled.    Toño Shelton, EMT  8:03 AM

## 2023-03-10 NOTE — PATIENT INSTRUCTIONS
March 10, 2023    Cardiology Provider You Saw During Your Visit: Dr. Chris Fischer      Medication Changes: None      Follow Up:   With Dr. Chris Fischer in 1 year        Follow the American Heart Association Diet and Lifestyle Recommendations:  -Limit saturated fat, trans fat, sodium, red meat, sweets and sugar-sweetened beverages. If you choose to eat red meat, compare labels and select the leanest cuts available.  -Aim for at least 150 minutes of moderate physical activity or 75 minutes of vigorous physical activity - or an equal combination of both - each week.      To Reach Us:  -During business hours: [phone], press option # 1 to schedule an appointment or to leave a message for your care team.     -After hours, weekends or holidays: [phone], press option #4 and ask to speak to the on-call cardiologist. Inform them you are a patient at the [facility].      Gosia Shannon RN  Cardiology Care Coordinator - General Cardiology  [facility]

## 2023-04-01 ENCOUNTER — HEALTH MAINTENANCE LETTER (OUTPATIENT)
Age: 68
End: 2023-04-01

## 2023-05-08 ENCOUNTER — OFFICE VISIT (OUTPATIENT)
Dept: INTERNAL MEDICINE | Facility: CLINIC | Age: 68
End: 2023-05-08
Payer: COMMERCIAL

## 2023-05-08 VITALS
OXYGEN SATURATION: 98 % | HEIGHT: 73 IN | DIASTOLIC BLOOD PRESSURE: 72 MMHG | TEMPERATURE: 98.5 F | WEIGHT: 208.6 LBS | BODY MASS INDEX: 27.65 KG/M2 | SYSTOLIC BLOOD PRESSURE: 111 MMHG | HEART RATE: 90 BPM

## 2023-05-08 DIAGNOSIS — R35.1 NOCTURIA: ICD-10-CM

## 2023-05-08 DIAGNOSIS — Z11.59 NEED FOR HEPATITIS C SCREENING TEST: ICD-10-CM

## 2023-05-08 DIAGNOSIS — Z00.00 ROUTINE GENERAL MEDICAL EXAMINATION AT A HEALTH CARE FACILITY: ICD-10-CM

## 2023-05-08 DIAGNOSIS — I10 ESSENTIAL HYPERTENSION, BENIGN: ICD-10-CM

## 2023-05-08 DIAGNOSIS — N40.0 BENIGN NON-NODULAR PROSTATIC HYPERPLASIA WITHOUT LOWER URINARY TRACT SYMPTOMS: ICD-10-CM

## 2023-05-08 DIAGNOSIS — R53.83 OTHER FATIGUE: ICD-10-CM

## 2023-05-08 DIAGNOSIS — I10 ESSENTIAL HYPERTENSION: ICD-10-CM

## 2023-05-08 DIAGNOSIS — Z13.89 SCREENING FOR DIABETIC PERIPHERAL NEUROPATHY: ICD-10-CM

## 2023-05-08 DIAGNOSIS — M10.9 GOUT, UNSPECIFIED CAUSE, UNSPECIFIED CHRONICITY, UNSPECIFIED SITE: ICD-10-CM

## 2023-05-08 DIAGNOSIS — K21.9 GASTROESOPHAGEAL REFLUX DISEASE WITHOUT ESOPHAGITIS: ICD-10-CM

## 2023-05-08 DIAGNOSIS — E78.5 HYPERLIPIDEMIA LDL GOAL <100: ICD-10-CM

## 2023-05-08 DIAGNOSIS — E88.810 METABOLIC SYNDROME X: ICD-10-CM

## 2023-05-08 DIAGNOSIS — E66.811 CLASS 1 OBESITY WITH SERIOUS COMORBIDITY AND BODY MASS INDEX (BMI) OF 31.0 TO 31.9 IN ADULT, UNSPECIFIED OBESITY TYPE: ICD-10-CM

## 2023-05-08 PROCEDURE — 99214 OFFICE O/P EST MOD 30 MIN: CPT | Performed by: INTERNAL MEDICINE

## 2023-05-08 RX ORDER — AMLODIPINE BESYLATE 10 MG/1
10 TABLET ORAL AT BEDTIME
Qty: 90 TABLET | Refills: 3 | Status: SHIPPED | OUTPATIENT
Start: 2023-05-08 | End: 2024-01-29

## 2023-05-08 RX ORDER — ALLOPURINOL 100 MG/1
TABLET ORAL
Qty: 90 TABLET | Refills: 3 | Status: SHIPPED | OUTPATIENT
Start: 2023-05-08 | End: 2024-01-29

## 2023-05-08 RX ORDER — CANDESARTAN 32 MG/1
32 TABLET ORAL AT BEDTIME
Qty: 90 TABLET | Refills: 3 | Status: SHIPPED | OUTPATIENT
Start: 2023-05-08 | End: 2024-01-29

## 2023-05-08 RX ORDER — BUPROPION HYDROCHLORIDE 300 MG/1
300 TABLET ORAL EVERY MORNING
Qty: 90 TABLET | Refills: 3 | Status: SHIPPED | OUTPATIENT
Start: 2023-05-08 | End: 2024-01-29

## 2023-05-08 NOTE — PROGRESS NOTES
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 No concerns raised on complete ROS today. Back is much better and he's doing PT regularly. Walking between 5 and 10k steps a day. Plans for [facility] in 2028- recent purchased a home in [place] for that purpose. Meds reviewed and renewed. No low sugars. No falls.     Current Outpatient Medications   Medication     acetaminophen (TYLENOL) 500 MG tablet     allopurinol (ZYLOPRIM) 100 MG tablet     amLODIPine (NORVASC) 10 MG tablet     buPROPion (WELLBUTRIN XL) 300 MG 24 hr tablet     candesartan (ATACAND) 32 MG tablet     clomiPHENE (CLOMID) 50 MG tablet     empagliflozin (JARDIANCE) 25 MG TABS tablet     metFORMIN (GLUCOPHAGE) 1000 MG tablet     multivitamin w/minerals (THERA-VIT-M) tablet     omeprazole (PRILOSEC) 20 MG DR capsule     OZEMPIC, 2 MG/DOSE, 8 MG/3ML SOPN     phentermine (ADIPEX-P) 37.5 MG tablet     rosuvastatin (CRESTOR) 40 MG tablet     tamsulosin (FLOMAX) 0.4 MG capsule     No current facility-administered medications for this visit.     Facility-Administered Medications Ordered in Other Visits   Medication     barium sulfate 40% (VARIBAR THIN HONEY) oral suspension       O) /72 (BP Location: Right arm, Patient Position: Sitting, Cuff Size: Adult Regular)   Pulse 90   Temp 98.5  F (36.9  C) (Oral)   Ht 1.854 m (6' 1\")   Wt 94.6 kg (208 lb 9.6 oz)   SpO2 98%   BMI 27.52 kg/m    Well appearing  HEENT nl  Chest clear bilaterally  Cor RRR no MRG  Abd benign  No rashes  Nl mentation    A/P) 1) DM2- marked improvement in sugars and A1C now <5 on metformin, ozempic, jardiance. No low sugars. No evidence of nephropathy, retinopathy, or neuropathy.  Lab Results   Component Value Date    A1C 4.9 11/30/2022    A1C 5.0 06/03/2022    A1C 4.8 07/15/2021    A1C 4.8 07/14/2021    A1C 5.0 12/09/2020    A1C 5.1 08/06/2020    A1C 5.2 12/03/2019    A1C 5.6 " 04/30/2019    A1C 5.7 11/13/2018       2) HTN. BP well controlled on candesartan and amlodipine without side effects- no changes.   3) ASCVD risk- high coronary calcium, no angina or known events. Rosuvastatin mx dose at 40 mg/d.   4) BPH- tamsulosin daily with good results. No change. Will f/u with Urology as needed.   5) Overweight- ~60 lb weight loss over 5 years (90 total) with aggressive medical wt mgmt approach-- phenteramine, jardiance, ozempic, metformin all contribute. Given the success and ongoing BP and HR effects of phenteramine, would recommend tapering this off.    6) LBP- spinal DJD s/p surgeries with on-going PT.   7) Travel- upcoming trip to [place]-- has had pre-travel vaccines and meds.     30 minutes spent on the date of the encounter performing chart review, history and exam, documentation and further activities as noted above.    Christian Dutta MD  Primary Care Center  [facility]

## 2023-05-08 NOTE — NURSING NOTE
"Darnell Grant is a 67 year old male patient that presents today in clinic for the following:    Chief Complaint   Patient presents with     RECHECK     The patient's allergies and medications were reviewed as noted. A set of vitals were recorded as noted without incident: /72 (BP Location: Right arm, Patient Position: Sitting, Cuff Size: Adult Regular)   Pulse 90   Temp 98.5  F (36.9  C) (Oral)   Ht 1.854 m (6' 1\")   Wt 94.6 kg (208 lb 9.6 oz)   SpO2 98%   BMI 27.52 kg/m  . The patient does not have any other questions for the provider.    Breanne Conde, EMT at 5:32 PM on 5/8/2023.  Primary care clinic: [phone]  "

## 2023-06-06 ENCOUNTER — LAB (OUTPATIENT)
Dept: LAB | Facility: CLINIC | Age: 68
End: 2023-06-06
Payer: COMMERCIAL

## 2023-06-06 DIAGNOSIS — M10.9 GOUT, UNSPECIFIED: ICD-10-CM

## 2023-06-06 DIAGNOSIS — R73.01 IMPAIRED FASTING GLUCOSE: ICD-10-CM

## 2023-06-06 DIAGNOSIS — N25.81 HYPERPARATHYROIDISM, SECONDARY (H): ICD-10-CM

## 2023-06-06 DIAGNOSIS — E23.0 HYPOGONADOTROPIC HYPOGONADISM SYNDROME, MALE (H): ICD-10-CM

## 2023-06-06 DIAGNOSIS — E55.9 VITAMIN D DEFICIENCY: ICD-10-CM

## 2023-06-06 DIAGNOSIS — E78.5 DYSLIPIDEMIA: Primary | ICD-10-CM

## 2023-06-06 LAB
C PEPTIDE SERPL-MCNC: 2.9 NG/ML (ref 0.9–6.9)
CALCIUM SERPL-MCNC: 10.1 MG/DL (ref 8.8–10.2)
CHOLEST SERPL-MCNC: 92 MG/DL
CRP SERPL-MCNC: <3 MG/L
DEPRECATED CALCIDIOL+CALCIFEROL SERPL-MC: 60 UG/L (ref 20–75)
FASTING STATUS PATIENT QL REPORTED: YES
FSH SERPL IRP2-ACNC: 5.5 MIU/ML (ref 1.5–12.4)
GLUCOSE SERPL-MCNC: 91 MG/DL (ref 70–99)
HBA1C MFR BLD: 4.9 %
HDLC SERPL-MCNC: 48 MG/DL
LDLC SERPL CALC-MCNC: 34 MG/DL
LH SERPL-ACNC: 9.3 MIU/ML (ref 1.7–8.6)
MAGNESIUM SERPL-MCNC: 2.1 MG/DL (ref 1.7–2.3)
NONHDLC SERPL-MCNC: 44 MG/DL
PHOSPHATE SERPL-MCNC: 3.1 MG/DL (ref 2.5–4.5)
PTH-INTACT SERPL-MCNC: 67 PG/ML (ref 15–65)
TRIGL SERPL-MCNC: 51 MG/DL
URATE SERPL-MCNC: 4.1 MG/DL (ref 3.4–7)

## 2023-06-06 PROCEDURE — 84681 ASSAY OF C-PEPTIDE: CPT | Mod: 90 | Performed by: PATHOLOGY

## 2023-06-06 PROCEDURE — 82947 ASSAY GLUCOSE BLOOD QUANT: CPT | Performed by: PATHOLOGY

## 2023-06-06 PROCEDURE — 82310 ASSAY OF CALCIUM: CPT | Performed by: PATHOLOGY

## 2023-06-06 PROCEDURE — 84403 ASSAY OF TOTAL TESTOSTERONE: CPT | Mod: 90 | Performed by: PATHOLOGY

## 2023-06-06 PROCEDURE — 36415 COLL VENOUS BLD VENIPUNCTURE: CPT | Performed by: PATHOLOGY

## 2023-06-06 PROCEDURE — 99000 SPECIMEN HANDLING OFFICE-LAB: CPT | Performed by: PATHOLOGY

## 2023-06-06 PROCEDURE — 80061 LIPID PANEL: CPT | Performed by: PATHOLOGY

## 2023-06-06 PROCEDURE — 83970 ASSAY OF PARATHORMONE: CPT | Performed by: PATHOLOGY

## 2023-06-06 PROCEDURE — 84550 ASSAY OF BLOOD/URIC ACID: CPT | Performed by: PATHOLOGY

## 2023-06-06 PROCEDURE — 84100 ASSAY OF PHOSPHORUS: CPT | Performed by: PATHOLOGY

## 2023-06-06 PROCEDURE — 83001 ASSAY OF GONADOTROPIN (FSH): CPT | Mod: 90 | Performed by: PATHOLOGY

## 2023-06-06 PROCEDURE — 82306 VITAMIN D 25 HYDROXY: CPT | Mod: 90 | Performed by: PATHOLOGY

## 2023-06-06 PROCEDURE — 86140 C-REACTIVE PROTEIN: CPT | Performed by: PATHOLOGY

## 2023-06-06 PROCEDURE — 83002 ASSAY OF GONADOTROPIN (LH): CPT | Mod: 90 | Performed by: PATHOLOGY

## 2023-06-06 PROCEDURE — 83036 HEMOGLOBIN GLYCOSYLATED A1C: CPT | Mod: 90 | Performed by: PATHOLOGY

## 2023-06-06 PROCEDURE — 83735 ASSAY OF MAGNESIUM: CPT | Performed by: PATHOLOGY

## 2023-06-07 LAB — TESTOST SERPL-MCNC: 359 NG/DL (ref 240–950)

## 2023-06-26 NOTE — PROGRESS NOTES
AUDIOLOGY REPORT    SUBJECTIVE:  Darnell Grant is a 67 year old male who was seen in the Audiology Clinic at the [facility] for audiologic evaluation, referred by Raul Dutta MD. The patient reports that sometimes it is hard to understand conversation so he would like a baseline test of his hearing.  Darnell reports bilateral tinnitus only when tired.  Cerumen build-up has intermittently been a problem for him.  The patient denies ear pain, drainage from the ears, aural fullness, dizziness, history of ear surgery, or history of noise exposure.      OBJECTIVE:  Abuse Screening:  Do you feel unsafe at home or work/school? No  Do you feel threatened by someone? No  Does anyone try to keep you from having contact with others, or doing things outside of your home? No  Physical signs of abuse present? No     Fall Risk Screen:  1. Have you fallen two or more times in the past year? No  2. Have you fallen and had an injury in the past year? No    Timed Up and Go Score (in seconds): not tested  Is patient a fall risk? No  Referral initiated: No  Fall Risk Assessment Completed by Audiology    Otoscopic exam indicates:  Right ear - completely occluded with cerumen  Left ear - small amount of non-occluding cerumen     Due to the right cerumen, we did not proceed with hearing assessment.  I spoke with Nisha Enciso LPN in ENT Clinic who reported there was not an ENT provider available today to clean the patient's ear.      ASSESSMENT:   Right cerumen impaction     Today s results were discussed with the patient in detail.     PLAN:    Patient will return for ear cleaning in ENT Clinic.  Following ear cleaning, patient will undergo hearing test.    The patient expressed understanding and agreement with this plan.    Carola Pardo, CCC-A, [facility]  Licensed Audiologist  [id number] #[id number]    Cc Raul Dutta MD

## 2023-07-12 ENCOUNTER — OFFICE VISIT (OUTPATIENT)
Dept: AUDIOLOGY | Facility: CLINIC | Age: 68
End: 2023-07-12
Payer: COMMERCIAL

## 2023-07-12 DIAGNOSIS — H61.21 IMPACTED CERUMEN OF RIGHT EAR: Primary | ICD-10-CM

## 2023-07-12 DIAGNOSIS — H90.8 MIXED CONDUCTIVE AND SENSORINEURAL HEARING LOSS, UNSPECIFIED LATERALITY: ICD-10-CM

## 2023-07-12 PROCEDURE — 99207 PR ASSESSMENT FOR HEARING AID: CPT | Performed by: AUDIOLOGIST-HEARING AID FITTER

## 2023-07-13 ENCOUNTER — OFFICE VISIT (OUTPATIENT)
Dept: AUDIOLOGY | Facility: CLINIC | Age: 68
End: 2023-07-13
Payer: COMMERCIAL

## 2023-07-13 ENCOUNTER — OFFICE VISIT (OUTPATIENT)
Dept: OTOLARYNGOLOGY | Facility: CLINIC | Age: 68
End: 2023-07-13
Payer: COMMERCIAL

## 2023-07-13 VITALS
TEMPERATURE: 98.1 F | WEIGHT: 216 LBS | HEART RATE: 99 BPM | SYSTOLIC BLOOD PRESSURE: 106 MMHG | BODY MASS INDEX: 27.72 KG/M2 | OXYGEN SATURATION: 97 % | DIASTOLIC BLOOD PRESSURE: 59 MMHG | HEIGHT: 74 IN

## 2023-07-13 DIAGNOSIS — H61.21 IMPACTED CERUMEN OF RIGHT EAR: Primary | ICD-10-CM

## 2023-07-13 DIAGNOSIS — H90.3 SENSORINEURAL HEARING LOSS (SNHL) OF BOTH EARS: Primary | ICD-10-CM

## 2023-07-13 PROCEDURE — 69210 REMOVE IMPACTED EAR WAX UNI: CPT | Mod: RT | Performed by: REGISTERED NURSE

## 2023-07-13 PROCEDURE — 92557 COMPREHENSIVE HEARING TEST: CPT | Performed by: AUDIOLOGIST

## 2023-07-13 ASSESSMENT — PAIN SCALES - GENERAL: PAINLEVEL: NO PAIN (0)

## 2023-07-13 NOTE — NURSING NOTE
"Chief Complaint   Patient presents with     RECHECK     Ear cleaning  right ear      Blood pressure 106/59, pulse 99, temperature 98.1  F (36.7  C), height 1.88 m (6' 2\"), weight 98 kg (216 lb), SpO2 97 %.    Ambrose Kent LPN    "

## 2023-07-13 NOTE — PROGRESS NOTES
Otolaryngology Clinic  July 13, 2023    HPI:  Darnell Grant is here for cerumen impaction removal as requested by the audiology team prior to audiogram. Patient scheduled for audiogram yesterday for a baseline hearing test. Patient found to have right cerumen impaction and non-occluding cerumen in the left ear. Recommended ear cleaning under microscopy before proceeding with audiogram. Presents to clinic today for ear cleaning.    Patient denies any otalgia, otorrhea, history of frequent ear infections, or ear surgeries.      Otologic microscope exam:    Biocular Microscopy exam is needed due to deep impaction of cerumen of bilateral ears, requiring direct visualization for use of cleaning instruments.    Right ear was examined under the microscope.  Cerumen impaction noted. It was cleaned with suction and alligator forceps. Once cleaned, TM visualized under microscope. Normal appearing TM, nicely aerated middle ear space.     Left ear was also examined under the microscope. Normal appearing TM, nicely aerated middle ear space.     The patient noted improvement of symptoms.    Assessment and Plan:  1. Impacted cerumen of right ear  Patient presents with cerumen impaction of right ear.  The patient's ears are cleaned today.  Ear exam is normal after cleaning. May proceed with audiogram as scheduled. Return as needed for cleaning.     Patient will follow up as needed.    Cammie Gilmore DNP, APRN, CNP  Otolaryngology  Head & Neck Surgery  [phone]    15 minutes spent on the date of the encounter doing chart review, history and exam, documentation and further activities per the note excluding time performing ear cleaning under microscopy.

## 2023-07-13 NOTE — PROGRESS NOTES
AUDIOLOGY REPORT     SUMMARY: Audiology visit completed. See audiogram for results.       RECOMMENDATIONS: Follow-up with ENT/per physician recommendation.     Joceline Jackson. CCC-A  Licensed Audiologist   [place] #[id number]

## 2023-07-13 NOTE — LETTER
7/13/2023       RE: Darnell Grant  [address]  [facility] [address]     Dear Colleague,    Thank you for referring your patient, Darnell Grant, to the [facility] [place] at [facility]. Please see a copy of my visit note below.      Otolaryngology Clinic  July 13, 2023    HPI:  Darnell Grant is here for cerumen impaction removal as requested by the audiology team prior to audiogram. Patient scheduled for audiogram yesterday for a baseline hearing test. Patient found to have right cerumen impaction and non-occluding cerumen in the left ear. Recommended ear cleaning under microscopy before proceeding with audiogram. Presents to clinic today for ear cleaning.    Patient denies any otalgia, otorrhea, history of frequent ear infections, or ear surgeries.      Otologic microscope exam:    Biocular Microscopy exam is needed due to deep impaction of cerumen of bilateral ears, requiring direct visualization for use of cleaning instruments.    Right ear was examined under the microscope.  Cerumen impaction noted. It was cleaned with suction and alligator forceps. Once cleaned, TM visualized under microscope. Normal appearing TM, nicely aerated middle ear space.     Left ear was also examined under the microscope. Normal appearing TM, nicely aerated middle ear space.     The patient noted improvement of symptoms.    Assessment and Plan:  1. Impacted cerumen of right ear  Patient presents with cerumen impaction of right ear.  The patient's ears are cleaned today.  Ear exam is normal after cleaning. May proceed with audiogram as scheduled. Return as needed for cleaning.     Patient will follow up as needed.    Cammie Gilmore DNP, APRN, CNP  Otolaryngology  Head & Neck Surgery  [phone]    15 minutes spent on the date of the encounter doing chart review, history and exam, documentation and further activities per the note excluding  time performing ear cleaning under microscopy.        Again, thank you for allowing me to participate in the care of your patient.      Sincerely,    Zena Gilmore NP

## 2023-07-14 ENCOUNTER — DOCUMENTATION ONLY (OUTPATIENT)
Dept: AUDIOLOGY | Facility: CLINIC | Age: 68
End: 2023-07-14

## 2023-07-14 ENCOUNTER — OFFICE VISIT (OUTPATIENT)
Dept: AUDIOLOGY | Facility: CLINIC | Age: 68
End: 2023-07-14
Payer: COMMERCIAL

## 2023-07-14 DIAGNOSIS — H90.3 SENSORINEURAL HEARING LOSS (SNHL) OF BOTH EARS: Primary | ICD-10-CM

## 2023-07-14 PROCEDURE — 92591 PR HEARING AID EXAM BINAURAL: CPT | Performed by: AUDIOLOGIST

## 2023-07-14 NOTE — PROGRESS NOTES
AUDIOLOGY REPORT    SUBJECTIVE: Darnell Grant is a 67 year old male was seen in the Audiology Clinic at  [facility] on 7/14/23 to discuss concerns with hearing and functional communication difficulties. The patient was not accompanied by anyone. Darnell has been seen previously on 7/13/23, and results revealed normal hearing sloping to a mild sensorineural hearing loss bilaterally. Darnell notes difficulty with communication in a variety of listening situations.    OBJECTIVE: Briefly discussed that because patient was scheduled last minute, we were unable to verify hearing aid benefits. Darnell was encouraged to reach out to his insurance for additional information regarding coverage. Patient is a hearing aid candidate pending motivation. Patient would like to move forward with a hearing aid evaluation today. Therefore, the patient was presented with different options for amplification to help aid in communication. Discussed styles, levels of technology and monaural vs. binaural fitting. Discussed disposable vs rechargeable batteries and connectivity options. Patient was initially interested in an IIC or CIC device. However, after discussion patient was interested in a DRISS style. He would like a quieter  to minimize circuit noise.     The hearing aid(s) mutually chosen were:  Binaural: ReSound Omnia 5  COLOR: 71/Warm Grey  BATTERY SIZE: rechargeable  EARMOLD/TIPS: medium open   CANAL/ LENGTH: 1/LP    ASSESSMENT: Reviewed purchase information and warranty information with patient. The 45 day trial period was explained to patient. The patient was given a copy of the [place] Department of Health consumer brochure on purchasing hearing instruments. Patient risk factors have been provided to the patient in writing prior to the sale of the hearing aid per FDA regulation. The risk factors are also available in the User Instructional Booklet to be presented on the  day of the hearing aid fitting. Hearing aid(s) ordered. Hearing aid evaluation completed.    PLAN: Darnell is scheduled to return in a few months for a hearing aid fitting and programming. Purchase agreement will be completed on that date. Please contact this clinic with any questions or concerns.      Joceline Jackson. CCC-A  Licensed Audiologist   [place] #[id number]

## 2023-07-14 NOTE — PROGRESS NOTES
Called and spoke with [employer] representative Nimesh TELLO about hearing aid benefits. Coverage for hearing aids only applies for 18 years and younger. [employer] is hearing aid vendor for this plan, could have hearing aid benefits. Phone number is [phone], patient may call to explore discount.    Coverage for those 18 years and younger: One hearing aid per ear, every three years for hearing loss that is correctable. Covers dispensing fee and batteries. No co pay, coinsurance of 20%.     Reference #: [id number].    Liane London, Audiology Clinic Assistant

## 2023-08-20 NOTE — PATIENT INSTRUCTIONS
Thank you for your visit today.  Please call me with any questions or concerns.   Robby Guzman RN  Cardiology Care Coordinator  [phone], press option [phone] then option 3   sent by [facility] for cough x 2 weeks. negative cxr and covid swab. poor PO intake today. sent by [facility] for cough x 2 weeks. negative cxr and covid swab. poor PO intake today. sent by [facility] for cough x 2 weeks. negative cxr and covid swab. poor PO intake today.

## 2023-08-22 DIAGNOSIS — J45.30 MILD PERSISTENT ASTHMA WITHOUT COMPLICATION: Primary | ICD-10-CM

## 2023-08-22 RX ORDER — ALBUTEROL SULFATE 90 UG/1
2 AEROSOL, METERED RESPIRATORY (INHALATION) EVERY 4 HOURS PRN
Qty: 18 G | Refills: 11 | Status: SHIPPED | OUTPATIENT
Start: 2023-08-22 | End: 2024-08-19

## 2023-09-09 ENCOUNTER — MYC MEDICAL ADVICE (OUTPATIENT)
Dept: INTERNAL MEDICINE | Facility: CLINIC | Age: 68
End: 2023-09-09
Payer: COMMERCIAL

## 2023-09-22 ENCOUNTER — TELEPHONE (OUTPATIENT)
Dept: PULMONOLOGY | Facility: CLINIC | Age: 68
End: 2023-09-22
Payer: COMMERCIAL

## 2023-09-22 NOTE — TELEPHONE ENCOUNTER
Health Call Center    Phone Message    May a detailed message be left on voicemail: yes     Reason for Call: Medication Question or concern regarding medication   Prescription Clarification  Name of Medication: Trilogy -called in RX on 9/21/23  Prescribing Provider: Dr. Simons   Pharmacy: [facility] - [place], [place] - [address] [place] [address]    What on the order needs clarification? Caller needs to clarify strength on RX sent over. Asap.     Thank you       Action Taken: Other: PULM     Travel Screening: Not Applicable

## 2023-09-22 NOTE — CONFIDENTIAL NOTE
Writer confirmed with Dr. Simons that he called in the prescription for Trelegy. He confirmed it should be 200/62/5/25 strength.    Writer called pharmacy to clarify.

## 2023-09-27 ENCOUNTER — ANCILLARY PROCEDURE (OUTPATIENT)
Dept: GENERAL RADIOLOGY | Facility: CLINIC | Age: 68
End: 2023-09-27
Attending: INTERNAL MEDICINE
Payer: COMMERCIAL

## 2023-09-27 ENCOUNTER — TELEPHONE (OUTPATIENT)
Dept: PULMONOLOGY | Facility: CLINIC | Age: 68
End: 2023-09-27

## 2023-09-27 DIAGNOSIS — J45.30 MILD PERSISTENT ASTHMA WITHOUT COMPLICATION: Primary | ICD-10-CM

## 2023-09-27 DIAGNOSIS — J45.30 MILD PERSISTENT ASTHMA WITHOUT COMPLICATION: ICD-10-CM

## 2023-09-27 PROCEDURE — 71046 X-RAY EXAM CHEST 2 VIEWS: CPT | Performed by: RADIOLOGY

## 2023-09-27 NOTE — TELEPHONE ENCOUNTER
Called patient to schedule a CXR TODAY per provider request and also to add him onto Dr Simons's clinic on Friday 9/29 at 8AM as a double book- OK per provider. Patient is aware of the day/time of the appts.

## 2023-09-29 ENCOUNTER — TELEPHONE (OUTPATIENT)
Dept: PULMONOLOGY | Facility: CLINIC | Age: 68
End: 2023-09-29
Payer: COMMERCIAL

## 2023-09-29 ENCOUNTER — OFFICE VISIT (OUTPATIENT)
Dept: PULMONOLOGY | Facility: CLINIC | Age: 68
End: 2023-09-29
Attending: INTERNAL MEDICINE
Payer: COMMERCIAL

## 2023-09-29 VITALS
HEIGHT: 74 IN | DIASTOLIC BLOOD PRESSURE: 78 MMHG | SYSTOLIC BLOOD PRESSURE: 122 MMHG | RESPIRATION RATE: 17 BRPM | WEIGHT: 205 LBS | OXYGEN SATURATION: 96 % | BODY MASS INDEX: 26.31 KG/M2 | HEART RATE: 105 BPM

## 2023-09-29 DIAGNOSIS — J45.31 MILD PERSISTENT ASTHMA WITH ACUTE EXACERBATION: Primary | ICD-10-CM

## 2023-09-29 DIAGNOSIS — I47.10 SUPRAVENTRICULAR TACHYCARDIA (H): ICD-10-CM

## 2023-09-29 DIAGNOSIS — D58.2 ELEVATED HEMOGLOBIN (H): ICD-10-CM

## 2023-09-29 DIAGNOSIS — J45.30 MILD PERSISTENT ASTHMA WITHOUT COMPLICATION: ICD-10-CM

## 2023-09-29 PROCEDURE — G0463 HOSPITAL OUTPT CLINIC VISIT: HCPCS | Performed by: INTERNAL MEDICINE

## 2023-09-29 PROCEDURE — 99215 OFFICE O/P EST HI 40 MIN: CPT | Performed by: INTERNAL MEDICINE

## 2023-09-29 RX ORDER — OMEPRAZOLE 40 MG/1
40 CAPSULE, DELAYED RELEASE ORAL DAILY
Qty: 30 CAPSULE | Refills: 11 | Status: SHIPPED | OUTPATIENT
Start: 2023-09-29 | End: 2024-01-29

## 2023-09-29 RX ORDER — MONTELUKAST SODIUM 10 MG/1
10 TABLET ORAL EVERY EVENING
Qty: 30 TABLET | Refills: 11 | Status: SHIPPED | OUTPATIENT
Start: 2023-09-29 | End: 2024-08-19

## 2023-09-29 ASSESSMENT — ASTHMA QUESTIONNAIRES
QUESTION_2 LAST FOUR WEEKS HOW OFTEN HAVE YOU HAD SHORTNESS OF BREATH: MORE THAN ONCE A DAY
QUESTION_5 LAST FOUR WEEKS HOW WOULD YOU RATE YOUR ASTHMA CONTROL: NOT CONTROLLED AT ALL
ACT_TOTALSCORE: 10
QUESTION_1 LAST FOUR WEEKS HOW MUCH OF THE TIME DID YOUR ASTHMA KEEP YOU FROM GETTING AS MUCH DONE AT WORK, SCHOOL OR AT HOME: MOST OF THE TIME
QUESTION_4 LAST FOUR WEEKS HOW OFTEN HAVE YOU USED YOUR RESCUE INHALER OR NEBULIZER MEDICATION (SUCH AS ALBUTEROL): THREE OR MORE TIMES PER DAY
QUESTION_3 LAST FOUR WEEKS HOW OFTEN DID YOUR ASTHMA SYMPTOMS (WHEEZING, COUGHING, SHORTNESS OF BREATH, CHEST TIGHTNESS OR PAIN) WAKE YOU UP AT NIGHT OR EARLIER THAN USUAL IN THE MORNING: NOT AT ALL
ACT_TOTALSCORE: 10

## 2023-09-29 ASSESSMENT — PAIN SCALES - GENERAL: PAINLEVEL: NO PAIN (0)

## 2023-09-29 NOTE — PROGRESS NOTES
Reason for Visit  Darnell Grant is a 67 year old year old male who is being seen for acute persistent exacerbation of mild intermittent asthma.    Pulmonary HPI:    The patient was seen and examined by Walter Simons MD     I had the pleasure of seeing Mr. Grant today at the [facility] in follow-up for his acute persistent exacerbation of mild intermittent asthma.    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  He recovers from the slowly and they typically are triggered by upper respiratory infections present, presumably due to virus.    Approximately 2 weeks ago he contacted me by phone telling me that he was having a significant flare again.  This started after upper respiratory infection occurred 2 days after his wife became ill.  Both of them tested COVID-negative at that time.  He was having difficulty with coughing, wheezing, mucus and some shortness of breath.  I restarted his high-strength Trelegy daily inhaler and begin course of prednisone starting at 30 to 40 mg/day.  I have been in touch with him multiple times since that and refilled the prednisone.  He initially was on a plateau and was not improving but feels that over the last several days he is beginning to improve.  The coughing and wheezing is worsened over the course of the day and by excessive talking.  He feels that the dyspnea on exertion component has improved somewhat.  He denies any other recent triggers.  He does have a prior history of some GERD for which she takes 20 mg/day of omeprazole in the evening.  He denies any recent history of allergies, rhinitis, sinus pain or headaches, and other sinus or postnasal drip.  He also has not had fevers, chills sweats or pleuritic pain.  He  has not been coughing up sputum.  Although I have help to care for him as an outpatient in the past, this is his first official clinic visit with me.  His primary care physician is Dr. Christian Dutta.  Consequently I reviewed in detail his relevant past medical history.    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  He had a more significant cough for 6 months that was paroxysmal through the day not related to meals and not at night that was worsened by cold weather dust and other irritants.  PFTs at that time were normal and chest CT scan 5/20/2013 showed some pleural thickening with possible calcification left lower lung fields and a tiny pulmonary nodule with normal cardiac echo on 6/14/2013.  He taken a 4-day burst of prednisone without improvement and he tried various medications including Advair and beta agonist not used regularly.  He was on a PPI at that time and was asymptomatic from that standpoint.  Dr. Miller's exam was normal and he felt that he had a most likely had a cough variant asthma.  He was treated with Advair 500/50 twice daily regular use per month with a possible need for methacholine challenge in the future.    He next appeared in pulmonary clinic on 4/18/2022 when he was seen by Dr. Ronnie Cavazos.  He had a postinfectious cough with probable viral induced asthma with a 1 week history of wheezing post URI.  He was on azithromycin and Trelegy and Dr. Cavazos added prednisone with a negative chest x-ray at that time and COVID-negative.  Dr. Jaimes reviewed the chest CT that I ordered and 2018 that showed a calcified granuloma in the left lower lobe and central bronchial wall thickening with scattered sub-6 mm pulmonary nodules including a 2 mm, solid nodule in the left upper lobe and a 2 mm solid nodule in the right upper lobe.  That is the last formal pulmonary visit.    Other recent medical visits include  cardiovascular follow-up with Abdon higgins on 3/10/2023.  It was felt that he was doing very well with no chest pain or exertional dyspnea or other symptoms.  She had good control of his blood pressure and had negative cardiac imaging in 2022.  He has a history of short runs of NSVT without symptoms.  He was on amlodipine and candesartan at that time.  Previously had been cared for in clinic by Dr. Leo Dutta.  He saw Dr. Dutta on 5/8/2023 and was generally doing very well.  He was walking between 5000 and 10,000 steps per day with significant weight loss over the last 6 months post spinal surgery.  His cardiovascular risk was based on a high coronary calcium score and was on rosuvastatin but was not otherwise evidencing any sign of angina or active disease.  His weight was down 60 pounds over 5 years with phentermine, Jardiance, Ozempic and metformin.  I also reviewed past sleep studies done in 10/2015 by Dr. Conroy that showed severe sleep apnea with hypoxemia with recommendation for auto CPAP and the more recent study home testing 3/24/2222 that showed mild obstructive sleep apnea primarily occurring during supine sleep.  His most recent prior PFTs of 9/14/2018 were also reviewed by me that showed FEV1/FVC of 4.5/5.2 (121/107% predicted, respectively) for ratio of 87% with normal TLC and DLCO.    I reviewed his labs and 4/2023 CBC did not show increased eosinophils      Current Outpatient Medications   Medication    albuterol (PROAIR HFA/PROVENTIL HFA/VENTOLIN HFA) 108 (90 Base) MCG/ACT inhaler    allopurinol (ZYLOPRIM) 100 MG tablet    amLODIPine (NORVASC) 10 MG tablet    buPROPion (WELLBUTRIN XL) 300 MG 24 hr tablet    candesartan (ATACAND) 32 MG tablet    empagliflozin (JARDIANCE) 25 MG TABS tablet    metFORMIN (GLUCOPHAGE) 1000 MG tablet    montelukast (SINGULAIR) 10 MG tablet    multivitamin w/minerals (THERA-VIT-M) tablet    omeprazole (PRILOSEC) 20 MG DR capsule    OZEMPIC, 2 MG/DOSE, 8 MG/3ML SOPN     phentermine (ADIPEX-P) 37.5 MG tablet    rosuvastatin (CRESTOR) 40 MG tablet    tamsulosin (FLOMAX) 0.4 MG capsule    omeprazole (PRILOSEC) 40 MG DR capsule     No current facility-administered medications for this visit.     Facility-Administered Medications Ordered in Other Visits   Medication    barium sulfate 40% (VARIBAR THIN HONEY) oral suspension     Allergies   Allergen Reactions    Ragweeds      Watery eyes, itchy nose     Past Medical History:   Diagnosis Date    Bladder mass     BPH (benign prostatic hyperplasia)     GERD (gastroesophageal reflux disease)     Heart disease >5 years ago    PVCs    High serum parathyroid hormone (PTH) 2017    Hypertension     Metabolic syndrome     Nephrolithiasis     Obesity     LISETTE (obstructive sleep apnea)     Prediabetes     Uncomplicated asthma >5 years ago    Stress induced asthma     Past Surgical History:   Procedure Laterality Date    ABDOMEN SURGERY  July, 2021    APPENDECTOMY OPEN N/A 7/14/2021    Procedure: APPENDECTOMY, OPEN;  Surgeon: Walter Boggs MD;  Location: [facility] OR    BACK SURGERY      repair disc    BIOPSY  >5 years ago    Prostate    CATARACT IOL, RT/LT      CYSTOSCOPY, TRANSURETHRAL RESECTION (TUR) TUMOR BLADDER, COMBINED N/A 1/31/2018    Procedure: COMBINED CYSTOSCOPY, TRANSURETHRAL RESECTION (TUR) TUMOR BLADDER;  Transurethral Biopsy and Resection of Bladder Tumor;  Surgeon: Yaya Lomeli MD;  [facility]: [facility] OR    DECOMPRESSION, FUSION CERVICAL ANTERIOR TWO LEVELS, COMBINED Right 12/10/2019    Procedure: right approach anterior cervical 2-3, 3-4 resection of anterior cervical osteophytes causing dysphagia, with microscope;  Surgeon: María Fitzgerald MD;  Location: [facility] OR    HC BREATH HYDROGEN TEST  5/22/2013    Procedure: HYDROGEN BREATH TEST;  Surgeon: Donny Paris MD;  [facility]: [facility]    LAPAROSCOPIC APPENDECTOMY N/A 7/14/2021    Procedure: APPENDECTOMY, LAPAROSCOPIC;  Surgeon: Walter Boggs MD;  [facility]: [facility]  "   PHACOEMULSIFICATION CLEAR CORNEA WITH STANDARD INTRAOCULAR LENS IMPLANT Left 9/25/2020    Procedure: LEFT CATARACT REMOVAL WITH INTRAOCULAR LENS IMPLANT;  Surgeon: Keyla Tobin MD;  [facility]    PHACOEMULSIFICATION CLEAR CORNEA WITH STANDARD INTRAOCULAR LENS IMPLANT Right 10/2/2020    Procedure: RIGHT CATARACT REMOVAL WITH INTRAOCULAR LENS IMPLANT;  Surgeon: Keyla Tobin MD;  [facility]    TRANSURETHERAL DESTRUCTION OF PROSTATE BY THERMOTHERAPY N/A 4/13/2018    Procedure: TRANSURETHERAL DESTRUCTION OF PROSTATE BY THERMOTHERAPY;  [facility] Procedure;  Surgeon: Yaya Lomeli MD;  Location: [facility] OR     Social History     Socioeconomic History    Marital status:      Spouse name: Not on file    Number of children: Not on file    Years of education: Not on file    Highest education level: Not on file   Occupational History    Not on file   Tobacco Use    Smoking status: Never    Smokeless tobacco: Never   Vaping Use    Vaping Use: Never used   Substance and Sexual Activity    Alcohol use: No    Drug use: No    Sexual activity: Not Currently     Partners: Female     Birth control/protection: Post-menopausal   Other Topics Concern    Parent/sibling w/ CABG, MI or angioplasty before 65F 55M? No   Social History Narrative    Not on file     Social Determinants of Health     Financial Resource Strain: Not on file   Food Insecurity: Not on file   Transportation Needs: Not on file   Physical Activity: Not on file   Stress: Not on file   Social Connections: Not on file   Interpersonal Safety: Not on file   Housing Stability: Not on file       ROS Pulmonary:  A complete ROS was otherwise negative except as noted in the HPI.    Exam:   /78   Pulse 105   Resp 17   Ht 1.88 m (6' 2\")   Wt 93 kg (205 lb)   SpO2 96%   BMI 26.32 kg/m    GENERAL APPEARANCE: Well developed, well nourished, alert, and in no apparent distress. No tachypnea, stridor, cough, or audible wheeze.  EYES: PERRL, " EOMI  HENT: Nasal mucosa with no edema and no hyperemia. No nasal polyps. No sinus tenderness.  MOUTH: Oral mucosa is moist, without any lesions, no tonsillar enlargement, no oropharyngeal exudate.  NECK: supple, no masses, no thyromegaly.  LYMPHATICS: No significant axillary, cervical, or supraclavicular nodes.  RESP: normal inspection, palpation, percussion, with good air flow throughout.  No crackles. No rhonchi. No wheezes.   CV: Normal S1, S2, regular rhythm, normal rate. No murmur.  No rub. No gallop. No LE edema.   ABDOMEN:  deferred  MS: extremities normal. No clubbing. No cyanosis.  SKIN: no rash on limited exam  NEURO: Mentation intact, speech normal, normal strength and tone, normal gait and stance  PSYCH: mentation appears normal. and affect normal/bright    Results:  No results found for this or any previous visit (from the past 168 hour(s)).    Assessment and plan:  Acute persistent exacerbation due to URI superimposed on mild intermittent asthma.     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  He likely has bronchiolitis with this.  I do not think imaging or PFTs would likely be helpful at the present time.  He did have a chest x-ray done 2 days ago that I reviewed personally and is normal.  It is not clear to me why all his asthma flares are so difficult to treat and so prolonged.  I am somewhat concerned that there may be either a genetic predisposition to ground bronchial reactivity and/or bronchiolitis.  However, not sure what we would do about this.  I do not think it would be worth chronic therapy with biologic asthma agents at this point in time.  He also does not have evidence of eosinophilia underlying this.  I think it might be worth obtaining high-resolution chest CT scan with inspiratory and expiratory views and also PFTs when he is back at baseline to be sure he really  does return to normal in between these relatively severe episodes.  In clinic today, we plan to have him reduce his prednisone by 10 mg each 5 to 7 days as tolerated.  I also added Singulair for use while he is on the prednisone and to continue for at least 1 week afterwards try to inhibit any component of leukotriene pathway contribution.  I also am somewhat worried that there could be contribution from GERD and I recommended that he go to omeprazole twice daily.  However apparently his insurance will not cover 20 twice daily but will cover 40 once per day.'s consequently we switch to that.  He will contact me upon return from his week in [place] to give me an update on how he was doing.  Did not schedule a routine follow-up at this time but he has my contact information readily available.    In addition to getting hte updated COVID and flu vaccines, I recommended he get vaccinated for RSV - all when off steroids for 2 weeks    I spent a total of 40 minutes dedicated to his care today, 09/29/23, including review of his past medical records, review of past images, reports and PFTs with the patient today and in documentation.     aWlter Simons MD  Professor of Medicine  Past President, [employer]

## 2023-09-29 NOTE — NURSING NOTE
Chief Complaint   Patient presents with    RECHECK     Return visit    Medications reviewed and vital signs taken.   Yeimy Vega CMA

## 2023-09-29 NOTE — PATIENT INSTRUCTIONS
Acute exacerbation of mild persistent asthma    Plan  Continue high strength trelegy daily  Gradual taper prednisone dose by 10 mg reduction each 5-7 days as tolerated  Add singulair 10 mg in evening while on prednisone and then can try stopping 1 week later  Increase omeprazole to twice daily  Get updated COVID and flu vaccines AND RSV vaccine - all when off steroids for 2 weeks  Call or email me for problems or issues.

## 2023-09-29 NOTE — LETTER
9/29/2023         RE: Darnell Grant  [address]  [facility] [address]        Dear Colleague,    Thank you for referring your patient, Darnell Grant, to the [facility] [place]. Please see a copy of my visit note below.    Reason for Visit  Darnell Grant is a 67 year old year old male who is being seen for acute persistent exacerbation of mild intermittent asthma.    Pulmonary HPI:    The patient was seen and examined by Walter Simons MD     I had the pleasure of seeing Mr. Grant today at the [facility] in follow-up for his acute persistent exacerbation of mild intermittent asthma.    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  He recovers from the slowly and they typically are triggered by upper respiratory infections present, presumably due to virus.    Approximately 2 weeks ago he contacted me by phone telling me that he was having a significant flare again.  This started after upper respiratory infection occurred 2 days after his wife became ill.  Both of them tested COVID-negative at that time.  He was having difficulty with coughing, wheezing, mucus and some shortness of breath.  I restarted his high-strength Trelegy daily inhaler and begin course of prednisone starting at 30 to 40 mg/day.  I have been in touch with him multiple times since that and refilled the prednisone.  He initially was on a plateau and was not improving but feels that over the last several days he is beginning to improve.  The coughing and wheezing is worsened over the course of the day and by excessive talking.  He feels that the dyspnea on exertion component has improved somewhat.  He denies any other recent  triggers.  He does have a prior history of some GERD for which she takes 20 mg/day of omeprazole in the evening.  He denies any recent history of allergies, rhinitis, sinus pain or headaches, and other sinus or postnasal drip.  He also has not had fevers, chills sweats or pleuritic pain.  He has not been coughing up sputum.  Although I have help to care for him as an outpatient in the past, this is his first official clinic visit with me.  His primary care physician is Dr. Christian Dutta.  Consequently I reviewed in detail his relevant past medical history.    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  He had a more significant cough for 6 months that was paroxysmal through the day not related to meals and not at night that was worsened by cold weather dust and other irritants.  PFTs at that time were normal and chest CT scan 5/20/2013 showed some pleural thickening with possible calcification left lower lung fields and a tiny pulmonary nodule with normal cardiac echo on 6/14/2013.  He taken a 4-day burst of prednisone without improvement and he tried various medications including Advair and beta agonist not used regularly.  He was on a PPI at that time and was asymptomatic from that standpoint.  Dr. Miller's exam was normal and he felt that he had a most likely had a cough variant asthma.  He was treated with Advair 500/50 twice daily regular use per month with a possible need for methacholine challenge in the future.    He next appeared in pulmonary clinic on 4/18/2022 when he was seen by Dr. Ronnie Cavazos.  He had a postinfectious cough with probable viral induced asthma with a 1 week history of wheezing post URI.  He was on azithromycin and Trelegy and Dr. Cavazos added prednisone with a negative chest x-ray at that time and COVID-negative.  Dr. Jaimes reviewed the chest CT that I ordered and 2018 that showed a calcified  granuloma in the left lower lobe and central bronchial wall thickening with scattered sub-6 mm pulmonary nodules including a 2 mm, solid nodule in the left upper lobe and a 2 mm solid nodule in the right upper lobe.  That is the last formal pulmonary visit.    Other recent medical visits include cardiovascular follow-up with Abdon salamanca ronaldo on 3/10/2023.  It was felt that he was doing very well with no chest pain or exertional dyspnea or other symptoms.  She had good control of his blood pressure and had negative cardiac imaging in 2022.  He has a history of short runs of NSVT without symptoms.  He was on amlodipine and candesartan at that time.  Previously had been cared for in clinic by Dr. Leo Dutta.  He saw Dr. Dutta on 5/8/2023 and was generally doing very well.  He was walking between 5000 and 10,000 steps per day with significant weight loss over the last 6 months post spinal surgery.  His cardiovascular risk was based on a high coronary calcium score and was on rosuvastatin but was not otherwise evidencing any sign of angina or active disease.  His weight was down 60 pounds over 5 years with phentermine, Jardiance, Ozempic and metformin.  I also reviewed past sleep studies done in 10/2015 by Dr. Conroy that showed severe sleep apnea with hypoxemia with recommendation for auto CPAP and the more recent study home testing 3/24/2222 that showed mild obstructive sleep apnea primarily occurring during supine sleep.  His most recent prior PFTs of 9/14/2018 were also reviewed by me that showed FEV1/FVC of 4.5/5.2 (121/107% predicted, respectively) for ratio of 87% with normal TLC and DLCO.    I reviewed his labs and 4/2023 CBC did not show increased eosinophils      Current Outpatient Medications   Medication    albuterol (PROAIR HFA/PROVENTIL HFA/VENTOLIN HFA) 108 (90 Base) MCG/ACT inhaler    allopurinol (ZYLOPRIM) 100 MG tablet    amLODIPine (NORVASC) 10 MG tablet    buPROPion (WELLBUTRIN XL) 300 MG 24 hr  tablet    candesartan (ATACAND) 32 MG tablet    empagliflozin (JARDIANCE) 25 MG TABS tablet    metFORMIN (GLUCOPHAGE) 1000 MG tablet    montelukast (SINGULAIR) 10 MG tablet    multivitamin w/minerals (THERA-VIT-M) tablet    omeprazole (PRILOSEC) 20 MG DR capsule    OZEMPIC, 2 MG/DOSE, 8 MG/3ML SOPN    phentermine (ADIPEX-P) 37.5 MG tablet    rosuvastatin (CRESTOR) 40 MG tablet    tamsulosin (FLOMAX) 0.4 MG capsule    omeprazole (PRILOSEC) 40 MG DR capsule     No current facility-administered medications for this visit.     Facility-Administered Medications Ordered in Other Visits   Medication    barium sulfate 40% (VARIBAR THIN HONEY) oral suspension     Allergies   Allergen Reactions    Ragweeds      Watery eyes, itchy nose     Past Medical History:   Diagnosis Date    Bladder mass     BPH (benign prostatic hyperplasia)     GERD (gastroesophageal reflux disease)     Heart disease >5 years ago    PVCs    High serum parathyroid hormone (PTH) 2017    Hypertension     Metabolic syndrome     Nephrolithiasis     Obesity     LISETTE (obstructive sleep apnea)     Prediabetes     Uncomplicated asthma >5 years ago    Stress induced asthma     Past Surgical History:   Procedure Laterality Date    ABDOMEN SURGERY  July, 2021    APPENDECTOMY OPEN N/A 7/14/2021    Procedure: APPENDECTOMY, OPEN;  Surgeon: Walter Boggs MD;  Location: [facility] OR    BACK SURGERY      repair disc    BIOPSY  >5 years ago    Prostate    CATARACT IOL, RT/LT      CYSTOSCOPY, TRANSURETHRAL RESECTION (TUR) TUMOR BLADDER, COMBINED N/A 1/31/2018    Procedure: COMBINED CYSTOSCOPY, TRANSURETHRAL RESECTION (TUR) TUMOR BLADDER;  Transurethral Biopsy and Resection of Bladder Tumor;  Surgeon: Yaya Lomeli MD;  [facility]    DECOMPRESSION, FUSION CERVICAL ANTERIOR TWO LEVELS, COMBINED Right 12/10/2019    Procedure: right approach anterior cervical 2-3, 3-4 resection of anterior cervical osteophytes causing dysphagia, with microscope;  Surgeon: Amilcar  María Kong MD;  Location: [facility] OR    HC BREATH HYDROGEN TEST  5/22/2013    Procedure: HYDROGEN BREATH TEST;  Surgeon: Donny Paris MD;  [facility]: [facility]    LAPAROSCOPIC APPENDECTOMY N/A 7/14/2021    Procedure: APPENDECTOMY, LAPAROSCOPIC;  Surgeon: Walter Boggs MD;  [facility]    PHACOEMULSIFICATION CLEAR CORNEA WITH STANDARD INTRAOCULAR LENS IMPLANT Left 9/25/2020    Procedure: LEFT CATARACT REMOVAL WITH INTRAOCULAR LENS IMPLANT;  Surgeon: Keyla Tobin MD;  [facility]    PHACOEMULSIFICATION CLEAR CORNEA WITH STANDARD INTRAOCULAR LENS IMPLANT Right 10/2/2020    Procedure: RIGHT CATARACT REMOVAL WITH INTRAOCULAR LENS IMPLANT;  Surgeon: Keyla Tobin MD;  [facility]    TRANSURETHERAL DESTRUCTION OF PROSTATE BY THERMOTHERAPY N/A 4/13/2018    Procedure: TRANSURETHERAL DESTRUCTION OF PROSTATE BY THERMOTHERAPY;  [facility] Procedure;  Surgeon: Yaya Lomeli MD;  [facility]     Social History     Socioeconomic History    Marital status:      Spouse name: Not on file    Number of children: Not on file    Years of education: Not on file    Highest education level: Not on file   Occupational History    Not on file   Tobacco Use    Smoking status: Never    Smokeless tobacco: Never   Vaping Use    Vaping Use: Never used   Substance and Sexual Activity    Alcohol use: No    Drug use: No    Sexual activity: Not Currently     Partners: Female     Birth control/protection: Post-menopausal   Other Topics Concern    Parent/sibling w/ CABG, MI or angioplasty before 65F 55M? No   Social History Narrative    Not on file     Social Determinants of Health     Financial Resource Strain: Not on file   Food Insecurity: Not on file   Transportation Needs: Not on file   Physical Activity: Not on file   Stress: Not on file   Social Connections: Not on file   Interpersonal Safety: Not on file   Housing Stability: Not on file       ROS Pulmonary:  A complete ROS was otherwise negative  "except as noted in the HPI.    Exam:   /78   Pulse 105   Resp 17   Ht 1.88 m (6' 2\")   Wt 93 kg (205 lb)   SpO2 96%   BMI 26.32 kg/m    GENERAL APPEARANCE: Well developed, well nourished, alert, and in no apparent distress. No tachypnea, stridor, cough, or audible wheeze.  EYES: PERRL, EOMI  HENT: Nasal mucosa with no edema and no hyperemia. No nasal polyps. No sinus tenderness.  MOUTH: Oral mucosa is moist, without any lesions, no tonsillar enlargement, no oropharyngeal exudate.  NECK: supple, no masses, no thyromegaly.  LYMPHATICS: No significant axillary, cervical, or supraclavicular nodes.  RESP: normal inspection, palpation, percussion, with good air flow throughout.  No crackles. No rhonchi. No wheezes.   CV: Normal S1, S2, regular rhythm, normal rate. No murmur.  No rub. No gallop. No LE edema.   ABDOMEN:  deferred  MS: extremities normal. No clubbing. No cyanosis.  SKIN: no rash on limited exam  NEURO: Mentation intact, speech normal, normal strength and tone, normal gait and stance  PSYCH: mentation appears normal. and affect normal/bright    Results:  No results found for this or any previous visit (from the past 168 hour(s)).    Assessment and plan:  Acute persistent exacerbation due to URI superimposed on mild intermittent asthma.     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  He likely has bronchiolitis with this.  I do not think imaging or PFTs would likely be helpful at the present time.  He did have a chest x-ray done 2 days ago that I reviewed personally and is normal.  It is not clear to me why all his asthma flares are so difficult to treat and so prolonged.  I am somewhat concerned that there may be either a genetic predisposition to ground bronchial reactivity and/or bronchiolitis.  However, not sure what we would do about this.  I do not think it would be worth " chronic therapy with biologic asthma agents at this point in time.  He also does not have evidence of eosinophilia underlying this.  I think it might be worth obtaining high-resolution chest CT scan with inspiratory and expiratory views and also PFTs when he is back at baseline to be sure he really does return to normal in between these relatively severe episodes.  In clinic today, we plan to have him reduce his prednisone by 10 mg each 5 to 7 days as tolerated.  I also added Singulair for use while he is on the prednisone and to continue for at least 1 week afterwards try to inhibit any component of leukotriene pathway contribution.  I also am somewhat worried that there could be contribution from GERD and I recommended that he go to omeprazole twice daily.  However apparently his insurance will not cover 20 twice daily but will cover 40 once per day.'s consequently we switch to that.  He will contact me upon return from his week in [place] to give me an update on how he was doing.  Did not schedule a routine follow-up at this time but he has my contact information readily available.    In addition to getting hte updated COVID and flu vaccines, I recommended he get vaccinated for RSV - all when off steroids for 2 weeks    I spent a total of 40 minutes dedicated to his care today, 09/29/23, including review of his past medical records, review of past images, reports and PFTs with the patient today and in documentation.         Again, thank you for allowing me to participate in the care of your patient.      Sincerely,    Walter Simons MD

## 2023-09-29 NOTE — TELEPHONE ENCOUNTER
[facility] Call Center    Phone Message    May a detailed message be left on voicemail: yes     Reason for Call: Other: Pharmacy called in regards to the Omeprezole doasage increase. Insurance won't pay for BID prescription, but will pay for 40mg once a day. Please call pharmacy to clarify     Action Taken:  [facility]    Travel Screening: Not Applicable

## 2023-10-11 ENCOUNTER — OFFICE VISIT (OUTPATIENT)
Dept: AUDIOLOGY | Facility: CLINIC | Age: 68
End: 2023-10-11
Payer: COMMERCIAL

## 2023-10-11 DIAGNOSIS — H90.3 SENSORINEURAL HEARING LOSS (SNHL) OF BOTH EARS: Primary | ICD-10-CM

## 2023-10-11 PROCEDURE — V5011 HEARING AID FITTING/CHECKING: HCPCS | Performed by: AUDIOLOGIST

## 2023-10-11 PROCEDURE — V5261 HEARING AID, DIGIT, BIN, BTE: HCPCS | Performed by: AUDIOLOGIST

## 2023-10-11 PROCEDURE — V5160 DISPENSING FEE BINAURAL: HCPCS | Performed by: AUDIOLOGIST

## 2023-10-11 PROCEDURE — V5020 CONFORMITY EVALUATION: HCPCS | Performed by: AUDIOLOGIST

## 2023-10-11 NOTE — PROGRESS NOTES
AUDIOLOGY REPORT    SUBJECTIVE: Darnell Grant is a 67 year old male who was seen in the Audiology Clinic at the [facility] for a fitting of ReSound Omnia 5 (561-R) -in-the-ear hearing aids. Previous results have revealed normal hearing sloping to a mild sensorineural hearing loss bilaterally. The patient has not worn hearing aids before.     OBJECTIVE: The hearing aid conformity evaluation was completed.The hearing aids were placed and they provided a good fit. Real-ear-probe-microphone measurements were completed on the [employer] system and were a good match to NAL-NL1 target with soft sounds audible, moderate sounds comfortable, and loud sounds below discomfort. UCLs are verified through maximum power output measures and demonstrate appropriate limiting of loud inputs. Darnell was oriented to proper hearing aid use, care, cleaning (no water, dry brush), batteries (size: rechargeable, insertion/removal, toxicity, low-battery signal), aid insertion/removal, user booklet, warranty information, storage cases, and other hearing aid details. The patient confirmed understanding of hearing aid use and care, and showed proper insertion of hearing aid and batteries while in the office today. Darnell reported a tinny sound quality. Overall gain from 6-8 kHz was reduced by 2 dB and sound shaper was activated. Improvement was noted. He also reported right sounded louder than left. Overall gain on right was reduced by 1 and left was increased by 1. Following this patient reported good volume and sound quality today.   Hearing aids were programmed as follows:  Program 1:All Around   Program 2: Restaurant  Push button is activated for start of streaming and manual power on/off     EAR(S) FIT: Bilateral  HEARING AID MODEL NAME: ReSound Omnia 5 (561-R)   HEARING AID STYLE: -in-the-ear behind-the-ear  EARMOLDS/TIP: Medium Open   SERIAL NUMBERS: Right: [id number] Left:  [id number]  WARRANTY END DATE: 8/19/26 (request sent for updated warranty to reflect fitting date)  ACCESSORY: Multi Sloan (SN: [id number])    The hearing aids were successfully connected to the patient's iPhone and to the ReSound Smart 3D talat, as well as patient's Apple Watch. They were shown how to utilize the bluetooth to stream audio and phone calls and the talat was demonstrated. Multi Sloan was connected to patient's hearing aids and proper use was demonstrated. The patient was given additional resources regarding connectivity and bluetooth support to reference if needed.  ASSESSMENT: Binaural ReSound Omnia 5 -in-the-ear hearing aid(s) were fit today. Verification measures were performed. Darnell signed the Hearing Aid Purchase Agreement and was given a copy, as well as details on his hearing aids. Patient was counseled that exact out of pocket amounts cannot be determined for hearing aid claims being sent to insurance. Any insurance coverage information presented to the patient is an estimate only, and is not a guarantee of payment. Patient has been advised to check with their own insurance.    PLAN: Darnell will return for follow-up in a few weeks for a hearing aid review appointment. Please call this clinic with questions regarding today s appointment.    Joceline Jackson. CCC-A  Licensed Audiologist   [place] #[id number]

## 2023-10-16 ENCOUNTER — OFFICE VISIT (OUTPATIENT)
Dept: INTERNAL MEDICINE | Facility: CLINIC | Age: 68
End: 2023-10-16
Payer: COMMERCIAL

## 2023-10-16 VITALS
HEART RATE: 96 BPM | WEIGHT: 203 LBS | DIASTOLIC BLOOD PRESSURE: 68 MMHG | OXYGEN SATURATION: 98 % | SYSTOLIC BLOOD PRESSURE: 120 MMHG | BODY MASS INDEX: 26.06 KG/M2

## 2023-10-16 DIAGNOSIS — M10.9 GOUT, UNSPECIFIED CAUSE, UNSPECIFIED CHRONICITY, UNSPECIFIED SITE: ICD-10-CM

## 2023-10-16 DIAGNOSIS — N40.0 BENIGN PROSTATIC HYPERPLASIA, UNSPECIFIED WHETHER LOWER URINARY TRACT SYMPTOMS PRESENT: ICD-10-CM

## 2023-10-16 DIAGNOSIS — I10 ESSENTIAL HYPERTENSION, BENIGN: ICD-10-CM

## 2023-10-16 DIAGNOSIS — E11.69 TYPE 2 DIABETES MELLITUS WITH OTHER SPECIFIED COMPLICATION, WITHOUT LONG-TERM CURRENT USE OF INSULIN (H): Primary | ICD-10-CM

## 2023-10-16 PROCEDURE — 99214 OFFICE O/P EST MOD 30 MIN: CPT | Performed by: INTERNAL MEDICINE

## 2023-10-16 NOTE — PROGRESS NOTES
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 Recent asthma flare-- off steroids as of today after 18 days (up to 40 mg prednisone a day). Back is much better and he's walking between 5 and 10k steps a day. Meds reviewed. No low sugars. No falls.      Current Outpatient Medications   Medication    albuterol (PROAIR HFA/PROVENTIL HFA/VENTOLIN HFA) 108 (90 Base) MCG/ACT inhaler    allopurinol (ZYLOPRIM) 100 MG tablet    amLODIPine (NORVASC) 10 MG tablet    buPROPion (WELLBUTRIN XL) 300 MG 24 hr tablet    candesartan (ATACAND) 32 MG tablet    empagliflozin (JARDIANCE) 25 MG TABS tablet    metFORMIN (GLUCOPHAGE) 1000 MG tablet    montelukast (SINGULAIR) 10 MG tablet    multivitamin w/minerals (THERA-VIT-M) tablet    omeprazole (PRILOSEC) 20 MG DR capsule    OZEMPIC, 2 MG/DOSE, 8 MG/3ML SOPN    phentermine (ADIPEX-P) 37.5 MG tablet    rosuvastatin (CRESTOR) 40 MG tablet    tamsulosin (FLOMAX) 0.4 MG capsule    omeprazole (PRILOSEC) 40 MG DR capsule     No current facility-administered medications for this visit.     Facility-Administered Medications Ordered in Other Visits   Medication    barium sulfate 40% (VARIBAR THIN HONEY) oral suspension       O) /68 (BP Location: Right arm, Patient Position: Sitting, Cuff Size: Adult Large)   Pulse 96   Wt 92.1 kg (203 lb)   SpO2 98%   BMI 26.06 kg/m      Well appearing  HEENT nl  Chest clear bilaterally  Cor RRR no MRG  Abd benign  No rashes  Nl mentation     A/P) 1) Metabolic Syndrome- marked improvement in sugars and A1C now <5 on metformin, ozempic, jardiance. No low sugars. No evidence of nephropathy, retinopathy, or neuropathy.  2) HTN. BP well controlled on candesartan and amlodipine without side effects- no changes.   3) ASCVD risk- high coronary calcium, no angina or known events. Rosuvastatin mx dose at 40 mg/d.   4) BPH- tamsulosin daily with reasonable results. No  change. Urology f/up ordered.  5) Overweight- ~65 lb weight loss over 5 years (95 total) with aggressive medical wt mgmt approach-- phenteramine, jardiance, ozempic, metformin all contribute. Given the success and ongoing BP and HR effects of phenteramine, would recommend a plan for tapering this off.    6) LBP- spinal DJD s/p surgeries with on-going PT.   7) Travel- Heading to [place] in November and has all travel related meds/vaccines.   8) HCM. Flu, RSV and COVID in 2 weeks (due to recent steroid use). PCV-20 next year. Eye exam referral.  9) Asthma. Post-viral flares have been severe. Exam is benign. Would consider paradoxical vocal fold motion and ENT exam during a flare to rule this out.      30 minutes spent on the date of the encounter performing chart review, history and exam, documentation and further activities as noted above.     Christian Dutta MD  Primary Care Center  [facility]

## 2023-10-16 NOTE — PROGRESS NOTES
Darnell is a 67 year old that presents in clinic today for the following:     Chief Complaint   Patient presents with    Follow Up     Pt reports no new concerns         10/16/2023    11:07 AM   Additional Questions   Roomed by JON Corona     Screenings from encounters over the past 10 days    No data recorded     JON Zeng at 11:11 AM on 10/16/2023

## 2023-11-06 ENCOUNTER — ALLIED HEALTH/NURSE VISIT (OUTPATIENT)
Dept: INTERNAL MEDICINE | Facility: CLINIC | Age: 68
End: 2023-11-06
Payer: COMMERCIAL

## 2023-11-06 DIAGNOSIS — Z23 NEED FOR VACCINATION: Primary | ICD-10-CM

## 2023-11-06 PROCEDURE — 99207 PR NO CHARGE NURSE ONLY: CPT

## 2023-11-06 PROCEDURE — 90480 ADMN SARSCOV2 VAC 1/ONLY CMP: CPT

## 2023-11-06 PROCEDURE — 90471 IMMUNIZATION ADMIN: CPT

## 2023-11-06 PROCEDURE — 91320 SARSCV2 VAC 30MCG TRS-SUC IM: CPT

## 2023-11-06 PROCEDURE — 90662 IIV NO PRSV INCREASED AG IM: CPT

## 2023-11-06 NOTE — PROGRESS NOTES
Darnell Arguetalanigabino received the flu and covid vaccinations today in clinic at the request of Dr Dutta. The immunization was given under the supervision of Dr Morris if assistance was needed. The patient does not report a history of adverse reactions associated with vaccine administration. The immunization site was cleaned with an alcohol prep wipe. The immunization was given without incident--see immunization list for administration details. No swelling or redness was observed at the site of injection after the immunization was given. The patient was advised to remain in fourth floor lobby of the [facility] for fifteen minutes after the injection in case of an adverse reaction.     Josef Dc, EMT 12:31 PM on 11/6/2023

## 2023-11-09 ENCOUNTER — LAB (OUTPATIENT)
Dept: LAB | Facility: CLINIC | Age: 68
End: 2023-11-09
Attending: INTERNAL MEDICINE
Payer: COMMERCIAL

## 2023-11-09 ENCOUNTER — DOCUMENTATION ONLY (OUTPATIENT)
Dept: INTERNAL MEDICINE | Facility: CLINIC | Age: 68
End: 2023-11-09

## 2023-11-09 DIAGNOSIS — M10.9 GOUT: ICD-10-CM

## 2023-11-09 DIAGNOSIS — E29.1 HYPOGONADISM MALE: ICD-10-CM

## 2023-11-09 DIAGNOSIS — E78.5 DYSLIPIDEMIA: Primary | ICD-10-CM

## 2023-11-09 DIAGNOSIS — N40.0 BENIGN PROSTATIC HYPERPLASIA, UNSPECIFIED WHETHER LOWER URINARY TRACT SYMPTOMS PRESENT: ICD-10-CM

## 2023-11-09 DIAGNOSIS — E11.69 TYPE 2 DIABETES MELLITUS WITH OTHER SPECIFIED COMPLICATION, WITHOUT LONG-TERM CURRENT USE OF INSULIN (H): ICD-10-CM

## 2023-11-09 DIAGNOSIS — M10.9 GOUT, UNSPECIFIED CAUSE, UNSPECIFIED CHRONICITY, UNSPECIFIED SITE: ICD-10-CM

## 2023-11-09 DIAGNOSIS — E55.9 VITAMIN D DEFICIENCY: ICD-10-CM

## 2023-11-09 DIAGNOSIS — R73.01 IFG (IMPAIRED FASTING GLUCOSE): ICD-10-CM

## 2023-11-09 DIAGNOSIS — N25.81 SECONDARY HYPERPARATHYROIDISM, RENAL (H): ICD-10-CM

## 2023-11-09 DIAGNOSIS — I10 ESSENTIAL HYPERTENSION, BENIGN: ICD-10-CM

## 2023-11-09 DIAGNOSIS — Z12.5 SCREENING FOR PROSTATE CANCER: ICD-10-CM

## 2023-11-09 DIAGNOSIS — E78.5 DYSLIPIDEMIA: ICD-10-CM

## 2023-11-09 LAB
ALBUMIN SERPL BCG-MCNC: 4.2 G/DL (ref 3.5–5.2)
ALP SERPL-CCNC: 82 U/L (ref 40–129)
ALT SERPL W P-5'-P-CCNC: 37 U/L (ref 0–70)
ANION GAP SERPL CALCULATED.3IONS-SCNC: 11 MMOL/L (ref 7–15)
AST SERPL W P-5'-P-CCNC: 30 U/L (ref 0–45)
BILIRUB SERPL-MCNC: 0.6 MG/DL
BUN SERPL-MCNC: 17 MG/DL (ref 8–23)
C PEPTIDE SERPL-MCNC: 3.2 NG/ML (ref 0.9–6.9)
CALCIUM SERPL-MCNC: 10.1 MG/DL (ref 8.8–10.2)
CALCIUM SERPL-MCNC: 10.1 MG/DL (ref 8.8–10.2)
CHLORIDE SERPL-SCNC: 105 MMOL/L (ref 98–107)
CHOLEST SERPL-MCNC: 85 MG/DL
CREAT SERPL-MCNC: 0.7 MG/DL (ref 0.67–1.17)
CREAT UR-MCNC: 83.9 MG/DL
CRP SERPL-MCNC: <3 MG/L
DEPRECATED HCO3 PLAS-SCNC: 25 MMOL/L (ref 22–29)
EGFRCR SERPLBLD CKD-EPI 2021: >90 ML/MIN/1.73M2
FASTING STATUS PATIENT QL REPORTED: YES
FSH SERPL IRP2-ACNC: 6 MIU/ML (ref 1.5–12.4)
GLUCOSE SERPL-MCNC: 90 MG/DL (ref 70–99)
GLUCOSE SERPL-MCNC: 90 MG/DL (ref 70–99)
HBA1C MFR BLD: 4.8 %
HDLC SERPL-MCNC: 43 MG/DL
HOLD SPECIMEN: NORMAL
LDLC SERPL CALC-MCNC: 28 MG/DL
LH SERPL-ACNC: 7.6 MIU/ML (ref 1.7–8.6)
MAGNESIUM SERPL-MCNC: 2 MG/DL (ref 1.7–2.3)
MICROALBUMIN UR-MCNC: <12 MG/L
MICROALBUMIN/CREAT UR: NORMAL MG/G{CREAT}
NONHDLC SERPL-MCNC: 42 MG/DL
PHOSPHATE SERPL-MCNC: 3.4 MG/DL (ref 2.5–4.5)
POTASSIUM SERPL-SCNC: 4.1 MMOL/L (ref 3.4–5.3)
PROT SERPL-MCNC: 6.6 G/DL (ref 6.4–8.3)
PTH-INTACT SERPL-MCNC: 60 PG/ML (ref 15–65)
SODIUM SERPL-SCNC: 141 MMOL/L (ref 135–145)
TRIGL SERPL-MCNC: 71 MG/DL
URATE SERPL-MCNC: 3.8 MG/DL (ref 3.4–7)

## 2023-11-09 PROCEDURE — 83001 ASSAY OF GONADOTROPIN (FSH): CPT | Performed by: INTERNAL MEDICINE

## 2023-11-09 PROCEDURE — 83735 ASSAY OF MAGNESIUM: CPT | Performed by: PATHOLOGY

## 2023-11-09 PROCEDURE — 80053 COMPREHEN METABOLIC PANEL: CPT | Performed by: PATHOLOGY

## 2023-11-09 PROCEDURE — 84550 ASSAY OF BLOOD/URIC ACID: CPT | Performed by: PATHOLOGY

## 2023-11-09 PROCEDURE — 84681 ASSAY OF C-PEPTIDE: CPT | Performed by: INTERNAL MEDICINE

## 2023-11-09 PROCEDURE — 83970 ASSAY OF PARATHORMONE: CPT | Performed by: PATHOLOGY

## 2023-11-09 PROCEDURE — 86140 C-REACTIVE PROTEIN: CPT | Performed by: PATHOLOGY

## 2023-11-09 PROCEDURE — 99000 SPECIMEN HANDLING OFFICE-LAB: CPT | Performed by: PATHOLOGY

## 2023-11-09 PROCEDURE — 84100 ASSAY OF PHOSPHORUS: CPT | Performed by: PATHOLOGY

## 2023-11-09 PROCEDURE — 83036 HEMOGLOBIN GLYCOSYLATED A1C: CPT | Performed by: INTERNAL MEDICINE

## 2023-11-09 PROCEDURE — 83002 ASSAY OF GONADOTROPIN (LH): CPT | Performed by: INTERNAL MEDICINE

## 2023-11-09 PROCEDURE — G0103 PSA SCREENING: HCPCS | Performed by: PATHOLOGY

## 2023-11-09 PROCEDURE — 36415 COLL VENOUS BLD VENIPUNCTURE: CPT | Performed by: PATHOLOGY

## 2023-11-09 PROCEDURE — 82570 ASSAY OF URINE CREATININE: CPT | Performed by: INTERNAL MEDICINE

## 2023-11-09 PROCEDURE — 84403 ASSAY OF TOTAL TESTOSTERONE: CPT | Performed by: INTERNAL MEDICINE

## 2023-11-09 PROCEDURE — 80061 LIPID PANEL: CPT | Performed by: PATHOLOGY

## 2023-11-09 PROCEDURE — 82306 VITAMIN D 25 HYDROXY: CPT | Performed by: INTERNAL MEDICINE

## 2023-11-10 LAB
PSA SERPL DL<=0.01 NG/ML-MCNC: 1.31 NG/ML (ref 0–4.5)
TESTOST SERPL-MCNC: 347 NG/DL (ref 240–950)

## 2023-11-12 LAB
DEPRECATED CALCIDIOL+CALCIFEROL SERPL-MC: <58 UG/L (ref 20–75)
VITAMIN D2 SERPL-MCNC: <5 UG/L
VITAMIN D3 SERPL-MCNC: 53 UG/L

## 2023-11-21 ENCOUNTER — MYC MEDICAL ADVICE (OUTPATIENT)
Dept: INTERNAL MEDICINE | Facility: CLINIC | Age: 68
End: 2023-11-21
Payer: COMMERCIAL

## 2023-11-30 ENCOUNTER — MYC MEDICAL ADVICE (OUTPATIENT)
Dept: INTERNAL MEDICINE | Facility: CLINIC | Age: 68
End: 2023-11-30
Payer: COMMERCIAL

## 2023-12-18 DIAGNOSIS — R35.1 NOCTURIA: ICD-10-CM

## 2023-12-18 DIAGNOSIS — Z13.89 SCREENING FOR DIABETIC PERIPHERAL NEUROPATHY: ICD-10-CM

## 2023-12-18 DIAGNOSIS — Z00.00 ROUTINE GENERAL MEDICAL EXAMINATION AT A HEALTH CARE FACILITY: ICD-10-CM

## 2023-12-18 DIAGNOSIS — E78.5 HYPERLIPIDEMIA LDL GOAL <100: ICD-10-CM

## 2023-12-18 DIAGNOSIS — Z11.59 NEED FOR HEPATITIS C SCREENING TEST: ICD-10-CM

## 2023-12-18 DIAGNOSIS — I10 ESSENTIAL HYPERTENSION: ICD-10-CM

## 2023-12-18 DIAGNOSIS — I10 ESSENTIAL HYPERTENSION, BENIGN: ICD-10-CM

## 2023-12-18 DIAGNOSIS — R53.83 OTHER FATIGUE: ICD-10-CM

## 2023-12-18 DIAGNOSIS — K21.9 GASTROESOPHAGEAL REFLUX DISEASE WITHOUT ESOPHAGITIS: ICD-10-CM

## 2023-12-18 DIAGNOSIS — N40.0 BENIGN NON-NODULAR PROSTATIC HYPERPLASIA WITHOUT LOWER URINARY TRACT SYMPTOMS: ICD-10-CM

## 2023-12-19 ENCOUNTER — OFFICE VISIT (OUTPATIENT)
Dept: OPHTHALMOLOGY | Facility: CLINIC | Age: 68
End: 2023-12-19
Attending: OPHTHALMOLOGY
Payer: COMMERCIAL

## 2023-12-19 DIAGNOSIS — H02.88A MEIBOMIAN GLAND DYSFUNCTION (MGD), BILATERAL, BOTH UPPER AND LOWER LIDS: ICD-10-CM

## 2023-12-19 DIAGNOSIS — H04.123 BILATERAL DRY EYES: ICD-10-CM

## 2023-12-19 DIAGNOSIS — Z96.1 PSEUDOPHAKIA, BOTH EYES: Primary | ICD-10-CM

## 2023-12-19 DIAGNOSIS — H02.88B MEIBOMIAN GLAND DYSFUNCTION (MGD), BILATERAL, BOTH UPPER AND LOWER LIDS: ICD-10-CM

## 2023-12-19 PROCEDURE — 99213 OFFICE O/P EST LOW 20 MIN: CPT | Performed by: OPHTHALMOLOGY

## 2023-12-19 PROCEDURE — 92014 COMPRE OPH EXAM EST PT 1/>: CPT | Performed by: OPHTHALMOLOGY

## 2023-12-19 RX ORDER — ROSUVASTATIN CALCIUM 40 MG/1
40 TABLET, COATED ORAL DAILY
Qty: 90 TABLET | Refills: 3 | Status: SHIPPED | OUTPATIENT
Start: 2023-12-19 | End: 2024-08-19

## 2023-12-19 ASSESSMENT — REFRACTION_WEARINGRX
OD_ADD: +2.50
OS_SPHERE: +0.50
OD_SPHERE: -0.50
OD_AXIS: 130
SPECS_TYPE: PAL
OS_ADD: +2.50
OS_AXIS: 059
OS_CYLINDER: +0.50
OD_CYLINDER: +0.75

## 2023-12-19 ASSESSMENT — TONOMETRY
OS_IOP_MMHG: 20
IOP_METHOD: TONOPEN
OD_IOP_MMHG: 21

## 2023-12-19 ASSESSMENT — REFRACTION_MANIFEST
OD_CYLINDER: +0.75
OS_SPHERE: +0.50
OD_SPHERE: -0.50
OD_AXIS: 125
OS_CYLINDER: +0.50
OS_ADD: +2.50
OD_ADD: +2.50
OS_AXIS: 045

## 2023-12-19 ASSESSMENT — CONF VISUAL FIELD
OS_SUPERIOR_TEMPORAL_RESTRICTION: 0
OS_SUPERIOR_NASAL_RESTRICTION: 0
OD_INFERIOR_NASAL_RESTRICTION: 0
OD_SUPERIOR_TEMPORAL_RESTRICTION: 0
OS_NORMAL: 1
METHOD: COUNTING FINGERS
OD_SUPERIOR_NASAL_RESTRICTION: 0
OD_NORMAL: 1
OS_INFERIOR_NASAL_RESTRICTION: 0
OD_INFERIOR_TEMPORAL_RESTRICTION: 0
OS_INFERIOR_TEMPORAL_RESTRICTION: 0

## 2023-12-19 ASSESSMENT — VISUAL ACUITY
OS_CC: 20/25
OS_CC+: +1
METHOD: SNELLEN - LINEAR
OD_CC+: -3
OD_CC: 20/20

## 2023-12-19 ASSESSMENT — CUP TO DISC RATIO
OS_RATIO: 0.1
OD_RATIO: 0.1

## 2023-12-19 ASSESSMENT — EXTERNAL EXAM - RIGHT EYE: OD_EXAM: BROW PTOSIS

## 2023-12-19 ASSESSMENT — EXTERNAL EXAM - LEFT EYE: OS_EXAM: BROW PTOSIS

## 2023-12-19 NOTE — TELEPHONE ENCOUNTER
10/16/2023  [facility] Internal Medicine [place]     Raul Dutta MD  Internal Medicine   [facility] 11/9/23

## 2023-12-19 NOTE — NURSING NOTE
Chief Complaints and History of Present Illnesses   Patient presents with    Annual Eye Exam     Pt here for annual eye exam.      Chief Complaint(s) and History of Present Illness(es)       Annual Eye Exam              Associated symptoms: Negative for eye pain and headache    Comments: Pt here for annual eye exam.               Comments    Pt has decreased vision since last exam. No other concerns.  Lab Results       Component                Value               Date                       A1C                      4.8                 11/09/2023                 A1C                      4.9                 06/06/2023                 A1C                      4.9                 11/30/2022                 A1C                      5.0                 06/03/2022                 A1C                      4.8                 07/15/2021                 A1C                      5.0                 12/09/2020                 A1C                      5.1                 08/06/2020                 A1C                      5.2                 12/03/2019                 A1C                      5.6                 04/30/2019                 A1C                      5.7                 11/13/2018

## 2023-12-19 NOTE — PROGRESS NOTES
Chief Complaint(s) and History of Present Illness(es)     Annual Eye Exam            Associated symptoms: Negative for eye pain and headache    Comments: Pt here for annual eye exam.           Comments    Pt has decreased vision since last exam. No other concerns.  Lab Results       Component                Value               Date                       A1C                      4.8                 11/09/2023                 A1C                      4.9                 06/06/2023                 A1C                      4.9                 11/30/2022                 A1C                      5.0                 06/03/2022                 A1C                      4.8                 07/15/2021                 A1C                      5.0                 12/09/2020                 A1C                      5.1                 08/06/2020                 A1C                      5.2                 12/03/2019                 A1C                      5.6                 04/30/2019                 A1C                      5.7                 11/13/2018                    Review of systems for the eyes was negative other than the pertinent positives/negatives listed in the HPI.      Assessment & Plan      Darnell Grant is a 68 year old male with the following diagnoses:   1. Pseudophakia, both eyes    2. Meibomian gland dysfunction (MGD), bilateral, both upper and lower lids    3. Bilateral dry eyes       Here for annual dilated fundus exam   s/p CE IOL 2020 (right eye: 10/2020, left eye: 9/2020) by Dr. Tobin  Noting difficulty with vision since cataract extraction.  Had previously improved some with blinking/rubbing, but now seems constant in both eyes     Dilated fundus exam looks stable and healthy in both eyes   Minimal prescription change on refraction today  Posterior capsular opacity (PCO) not visually significant   Discussed likely quality impact of irregular tear surface  Previous rigid gas permeable lens wearer,  but did not tolerate comfort  Offered scleral lens eval, he will defer for now    Start artificial tears 2-4x daily   Warm compresses 1-2x daily   Breaks from computer throughout the day  Return precautions reviewed     Patient disposition:   Return in about 1 year (around 12/19/2024) for DFE.           Attending Physician Attestation:  Complete documentation of historical and exam elements from today's encounter can be found in the full encounter summary report (not reduplicated in this progress note).  I personally obtained the chief complaint(s) and history of present illness.  I confirmed and edited as necessary the review of systems, past medical/surgical history, family history, social history, and examination findings as documented by others; and I examined the patient myself.  I personally reviewed the relevant tests, images, and reports as documented above.  I formulated and edited as necessary the assessment and plan and discussed the findings and management plan with the patient and family. . - Jermaine Jeffries MD

## 2023-12-20 ENCOUNTER — TELEPHONE (OUTPATIENT)
Dept: GASTROENTEROLOGY | Facility: CLINIC | Age: 68
End: 2023-12-20
Payer: COMMERCIAL

## 2023-12-20 NOTE — TELEPHONE ENCOUNTER
"Endoscopy Scheduling Screen    Have you had a positive Covid test in the last 14 days?  No    Are you active on MyChart?   Yes    What insurance is in the chart?  Other:  [employer]    Ordering/Referring Provider: Raul Dutta MD     (If ordering provider performs procedure, schedule with ordering provider unless otherwise instructed. )    BMI: Estimated body mass index is 26.06 kg/m  as calculated from the following:    Height as of 9/29/23: 1.88 m (6' 2\").    Weight as of 10/16/23: 92.1 kg (203 lb).     Sedation Ordered  moderate sedation.   If patient BMI > 50 do not schedule in [facility].    If patient BMI > 45 do not schedule at [facility].    Are you taking methadone or Suboxone?  No    Are you taking any prescription medications for pain 3 or more times per week?   NO - No RN review required.    Do you have a history of malignant hyperthermia or adverse reaction to anesthesia?  No    (Females) Are you currently pregnant?   No     Have you been diagnosed or told you have pulmonary hypertension?   No    Do you have an LVAD?  No    Have you been told you have moderate to severe sleep apnea?  No    Have you been told you have COPD, asthma, or any other lung disease?  No    Do you have any heart conditions?  No     Have you ever had an organ transplant?   No    Have you ever had or are you awaiting a heart or lung transplant?   No    Have you had a stroke or transient ischemic attack (TIA aka \"mini stroke\" in the last 6 months?   No    Have you been diagnosed with or been told you have cirrhosis of the liver?   No    Are you currently on dialysis?   No    Do you need assistance transferring?   No    BMI: Estimated body mass index is 26.06 kg/m  as calculated from the following:    Height as of 9/29/23: 1.88 m (6' 2\").    Weight as of 10/16/23: 92.1 kg (203 lb).     Is patients BMI > 40 and scheduling location [facility]?  No    Do you take an injectable medication for weight loss or diabetes (excluding insulin)?  Yes, hold time " can be up to 7 days. Please check with you prescribing provider for recommendation.     Do you take the medication Naltrexone?  No    Do you take blood thinners?  No       Prep   Are you currently on dialysis or do you have chronic kidney disease?  No    Do you have a diagnosis of diabetes?  No    Do you have a diagnosis of cystic fibrosis (CF)?  No    On a regular basis do you go 3 -5 days between bowel movements?  No    BMI > 40?  No    Preferred Pharmacy:      [facility] - [place], [place] - [address]  [address] [address]  Phone: [phone] Fax: [phone] Alternate Fax: [phone], [phone]      Final Scheduling Details   Colonoscopy prep sent?  Standard MiraLAX    Procedure scheduled  Colonoscopy    Surgeon:  SAÚL     Date of procedure:  3/27     Pre-OP / PAC:   No - Not required for this site.    Location  [facility] - Patient preference.    Sedation   Moderate Sedation - Per order.      Patient Reminders:   You will receive a call from a Nurse to review instructions and health history.  This assessment must be completed prior to your procedure.  Failure to complete the Nurse assessment may result in the procedure being cancelled.      On the day of your procedure, please designate an adult(s) who can drive you home stay with you for the next 24 hours. The medicines used in the exam will make you sleepy. You will not be able to drive.      You cannot take public transportation, ride share services, or non-medical taxi service without a responsible caregiver.  Medical transport services are allowed with the requirement that a responsible caregiver will receive you at your destination.  We require that drivers and caregivers are confirmed prior to your procedure.

## 2023-12-22 ENCOUNTER — TELEPHONE (OUTPATIENT)
Dept: CARDIOLOGY | Facility: CLINIC | Age: 68
End: 2023-12-22
Payer: COMMERCIAL

## 2023-12-22 NOTE — TELEPHONE ENCOUNTER
----- Message from hSaron Lucas RN sent at 12/22/2023  4:29 AM CST -----  Charles Jane,    Yes, okay to schedule in June and put him on the waitlist. Thank you!    Da  ----- Message -----  From: Lucinda Patrick  Sent: 12/21/2023   5:26 PM CST  To: Sharon Lucas RN; Cardiology Ph Nurse-[facility],    This pt needs his return cardiology appt w/ Dr. Chris Fischer rescheduled, but I'm not seeing anything until June 14, 2024. This was supposed to be a 1 year follow-up in March. Is it ok to schedule him 3 months past his follow-up date and put him on the waitlist or should he see a different provider? Let me know and I will give him a call to reschedule.     Thanks,    Lucinda

## 2023-12-22 NOTE — TELEPHONE ENCOUNTER
Left Voicemail (1st Attempt) and Sent Mychart (1st Attempt) for the patient to call back and schedule the following:    Appointment type: Return Cardiology  Provider: Dr. Chris iFscher  Return date: 1 year follow-up (3/10/2024)  Specialty phone number: [phone] option 1  Additional appointment(s) needed: NA  Additonal Notes: 12/22 LVM and MYC for pt to call back to reschedule Return Cardiology w/ Dr. Chris Fischer- June dates OK, can also add to waitlist. MB

## 2023-12-26 ENCOUNTER — TELEPHONE (OUTPATIENT)
Dept: CARDIOLOGY | Facility: CLINIC | Age: 68
End: 2023-12-26
Payer: COMMERCIAL

## 2024-01-11 ENCOUNTER — TELEPHONE (OUTPATIENT)
Dept: CARDIOLOGY | Facility: CLINIC | Age: 69
End: 2024-01-11
Payer: COMMERCIAL

## 2024-01-11 NOTE — TELEPHONE ENCOUNTER
Waitlist call for the patient to call back and schedule the following:    Appointment type: rt cardio  Provider: alberta hughes  Return date: 06/28/24  Specialty phone number: [phone] opt 1  Additional appointment(s) needed: n/a   Additonal Notes: n/a

## 2024-01-16 ENCOUNTER — OFFICE VISIT (OUTPATIENT)
Dept: UROLOGY | Facility: CLINIC | Age: 69
End: 2024-01-16
Payer: COMMERCIAL

## 2024-01-16 ENCOUNTER — PRE VISIT (OUTPATIENT)
Dept: UROLOGY | Facility: CLINIC | Age: 69
End: 2024-01-16

## 2024-01-16 VITALS — SYSTOLIC BLOOD PRESSURE: 114 MMHG | DIASTOLIC BLOOD PRESSURE: 72 MMHG

## 2024-01-16 DIAGNOSIS — N40.0 BENIGN PROSTATIC HYPERPLASIA, UNSPECIFIED WHETHER LOWER URINARY TRACT SYMPTOMS PRESENT: ICD-10-CM

## 2024-01-16 PROCEDURE — 99213 OFFICE O/P EST LOW 20 MIN: CPT | Mod: 25 | Performed by: UROLOGY

## 2024-01-16 PROCEDURE — 51798 US URINE CAPACITY MEASURE: CPT | Performed by: UROLOGY

## 2024-01-16 RX ORDER — TAMSULOSIN HYDROCHLORIDE 0.4 MG/1
0.4 CAPSULE ORAL DAILY
Qty: 90 CAPSULE | Refills: 3 | Status: SHIPPED | OUTPATIENT
Start: 2024-01-16 | End: 2024-08-19

## 2024-01-16 ASSESSMENT — PAIN SCALES - GENERAL: PAINLEVEL: NO PAIN (0)

## 2024-01-16 NOTE — LETTER
1/16/2024       RE: Darnell Grant  [address]  [place] [address]     Dear Colleague,    Thank you for referring your patient, Darnell Grant, to the [facility] [place] [facility]. Please see a copy of my visit note below.          UROLOGY OUTPATIENT VISIT      Chief Complaint:   Enlarged prostate      Synopsis    Darnell Grant is a very pleasant AGE: 68 year old year old person    He has been followed for several years for LUTS\BPH.  He did undergo thermotherapy of the prostate several years ago.  While initially this may have had some relief he has noticed that his urinary symptoms are progressive since then.  In particular the last year he has noticed increasing degree of frequency, slow stream, hesitancy.  This is reflected in his AUA symptom score which is risen considerably from this point in time last year.    He notes that he has not had any gross hematuria or urinary tract infection.    He is not interested in any definitive interventions at this point though would consider it if symptoms continue to take the trajectory they are currently on    PSA is stable per trend below    He remains on Flomax    Digital rectal exam reveals a stable pea-sized nodule that is mobile and distinct from the prostate on the right side, prostate is otherwise median in size no irregularities noted aside from after mentioned nodule     Latest Ref Rng 1/16/2024  2:06 PM   UROLOGY RESULTS     PSA 0.00 - 4.00 ug/L    PSA Tumor Marker 0.00 - 4.50 ng/mL    PSA Free ng/mL    Urea Nitrogen 7 - 30 mg/dL    Urea Nitrogen 8.0 - 23.0 mg/dL    Creatinine 0.67 - 1.17 mg/dL    AUA     1. Over the past month, how often have you had a sensation of not emptying your bladder completely after you finished urinating?  4    2. Over the past month, how often have you had to urinate again less than two hours after you finished urinating?  4    3. Over the past  month, how often have you found you stopped and started again several times when you urinated?  3    4. Over the past month, how often have you found it difficult to postpone urination?  3    5. Over the past month, how often have you had a weak urine stream?  3    6. Over the past month, how often have you had to push or strain to begin urinating?  3    7. Over the past month, how many times did you typically get up to urinate from the time you went to bed at night until the time you got up in the morning?  3    AUA Score  23    1. Over the past month, how often have you had a sensation of not emptying your bladder completely after you finished urinating?     2. Over the past month, how often have you had to urinate again less than two hours after you finished urinating?     3. Over the past month, how often have you found you stopped and started again several times when you urinated?     4. Over the past month, how often have you found it difficult to postpone urination?     5. Over the past month, how often have you had a weak urine stream?     6. Over the past month, how often have you had to push or strain to begin urinating?     7. Over the past month, how many times did you typically get up to urinate from the time you went to bed at night until the time you got up in the morning?     The Disease-specific Quality of Life Question: If you were to spend the rest of your life with your urinary condition just the way it is now, how would you feel about that? (highlight or underline)     AUA - Total Score     ump uro prostate review     Peak Flow     Average Flow     Residual Volume by Ultrasound     Character of Curve     AUA Score  23    Sexual Health History for Men (BARBER)        Latest Ref Rng 1/16/2024  2:29 PM   UROLOGY RESULTS     PSA 0.00 - 4.00 ug/L    PSA Tumor Marker 0.00 - 4.50 ng/mL    PSA Free ng/mL    Urea Nitrogen 7 - 30 mg/dL    Urea Nitrogen 8.0 - 23.0 mg/dL    Creatinine 0.67 - 1.17 mg/dL    AUA      1. Over the past month, how often have you had a sensation of not emptying your bladder completely after you finished urinating?     2. Over the past month, how often have you had to urinate again less than two hours after you finished urinating?     3. Over the past month, how often have you found you stopped and started again several times when you urinated?     4. Over the past month, how often have you found it difficult to postpone urination?     5. Over the past month, how often have you had a weak urine stream?     6. Over the past month, how often have you had to push or strain to begin urinating?     7. Over the past month, how many times did you typically get up to urinate from the time you went to bed at night until the time you got up in the morning?     AUA Score     1. Over the past month, how often have you had a sensation of not emptying your bladder completely after you finished urinating?     2. Over the past month, how often have you had to urinate again less than two hours after you finished urinating?     3. Over the past month, how often have you found you stopped and started again several times when you urinated?     4. Over the past month, how often have you found it difficult to postpone urination?     5. Over the past month, how often have you had a weak urine stream?     6. Over the past month, how often have you had to push or strain to begin urinating?     7. Over the past month, how many times did you typically get up to urinate from the time you went to bed at night until the time you got up in the morning?     The Disease-specific Quality of Life Question: If you were to spend the rest of your life with your urinary condition just the way it is now, how would you feel about that? (highlight or underline)     AUA - Total Score     ump uro prostate review     Peak Flow     Average Flow     Residual Volume by Ultrasound  54 mL    Character of Curve     AUA Score     Sexual Health  History for Men (BARBER)              Medications     Current Outpatient Medications   Medication    tamsulosin (FLOMAX) 0.4 MG capsule    albuterol (PROAIR HFA/PROVENTIL HFA/VENTOLIN HFA) 108 (90 Base) MCG/ACT inhaler    allopurinol (ZYLOPRIM) 100 MG tablet    amLODIPine (NORVASC) 10 MG tablet    buPROPion (WELLBUTRIN XL) 300 MG 24 hr tablet    candesartan (ATACAND) 32 MG tablet    empagliflozin (JARDIANCE) 25 MG TABS tablet    metFORMIN (GLUCOPHAGE) 1000 MG tablet    montelukast (SINGULAIR) 10 MG tablet    multivitamin w/minerals (THERA-VIT-M) tablet    omeprazole (PRILOSEC) 20 MG DR capsule    omeprazole (PRILOSEC) 40 MG DR capsule    OZEMPIC, 2 MG/DOSE, 8 MG/3ML SOPN    phentermine (ADIPEX-P) 37.5 MG tablet    rosuvastatin (CRESTOR) 40 MG tablet     No current facility-administered medications for this visit.     Facility-Administered Medications Ordered in Other Visits   Medication    barium sulfate 40% (VARIBAR THIN HONEY) oral suspension         The following  distinct labs were reviewed    I personally reviewed all applicable laboratory data and went over findings with patient  Significant for:    CBC RESULTS:  Recent Labs   Lab Test 04/18/22  1458 10/26/21  1229 07/14/21  1122 12/09/20  0815   WBC 5.3 7.3 16.3* 6.7   HGB 16.1 18.2* 18.2* 17.3    175 170 162        BMP RESULTS:  Recent Labs   Lab Test 11/09/23  0740 06/06/23  0751 11/30/22  0746 06/03/22  0804 02/07/22  0805 10/26/21  1229 06/11/21  0800 12/09/20  0815 09/15/20  0732 08/06/20  0714 12/10/19  0630 12/03/19  0836     --  141  --  140 138   < > 141 142 141  --  138   POTASSIUM 4.1  --  4.1  --  3.8 4.3   < > 4.2 3.9 4.1   < > 4.0   CHLORIDE 105  --  104  --  108 105   < > 107 107 109  --  105   CO2 25  --  27  --  24 29   < > 30 28 26  --  28   ANIONGAP 11  --  10  --  8 4   < > 4 7 6  --  4   GLC 90  90 91 94  92 86 120* 106*   < > 90 131* 88   < > 96   BUN 17.0  --  15.8  --  19 18   < > 16 18 25  --  22   CR 0.70  --  0.74  --   0.78 0.84   < > 0.98 0.94 1.00  --  0.83   GFRESTIMATED >90  --  >90  --  >90 >90   < > 80 84 78  --  >90   GFRESTBLACK  --   --   --   --   --   --   --  >90 >90 >90  --  >90    < > = values in this interval not displayed.       CALCIUM RESULTS:  Recent Labs   Lab Test 11/09/23  0740 06/06/23  0751 11/30/22  0746 06/03/22  0804   ZHANE 10.1  10.1 10.1 10.1  10.3* 9.9       PTH RESULTS:  Recent Labs   Lab Test 11/09/23 0740 06/06/23 0751 11/30/22  0746 06/03/22  0804   PTHI 60 67* 60 45       HGB A1C RESULTS:  Lab Results   Component Value Date    A1C 4.8 11/09/2023    A1C 4.9 06/06/2023    A1C 4.9 11/30/2022    A1C 5.0 06/03/2022    A1C 4.8 07/15/2021    A1C 5.0 12/09/2020    A1C 5.1 08/06/2020    A1C 5.2 12/03/2019    A1C 5.6 04/30/2019    A1C 5.7 11/13/2018       PSA RESULTS  PSA   Date Value Ref Range Status   08/06/2020 2.07 0 - 4 ug/L Final     Comment:     Assay Method:  Chemiluminescence using Siemens Vista analyzer   12/03/2019 2.08 0 - 4 ug/L Final     Comment:     Assay Method:  Chemiluminescence using Siemens Vista analyzer   04/30/2019 1.90 0 - 4 ug/L Final     Comment:     Assay Method:  Chemiluminescence using Siemens Vista analyzer   11/13/2018 4.51 (H) 0 - 4 ug/L Final     Comment:     Assay Method:  Chemiluminescence using Siemens Vista analyzer   03/06/2018 0.96 0 - 4 ug/L Final     Comment:     Assay Method:  Chemiluminescence using Siemens Vista analyzer   08/31/2015 0.60 0 - 4 ug/L Final   07/14/2014 0.35 0 - 4 ug/L Final     Comment:     PSA results are about 7% lower than our prior method due to a methodology   change   on August 30, 2011.     02/10/2009 0.40 0 - 4 ug/L Final   07/21/2008 0.56 0 - 4 ug/L Final   07/09/2007 0.53 0 - 4 ug/L Final     Prostate Specific Antigen Screen   Date Value Ref Range Status   11/09/2023 1.31 0.00 - 4.50 ng/mL Final     PSA Tumor Marker   Date Value Ref Range Status   01/24/2023 1.29 0.00 - 4.50 ng/mL Final   01/03/2022 2.03 0.00 - 4.00 ug/L Final                   Assessment/Plan   68 year old year old person with enlarged prostate  -Will continue to monitor his urinary symptoms.  We briefly touched upon some of the alternative available treatment options but he is not interested in definitive therapy at this point.  Will plan to bring him back in 1 year with an updated PSA and for symptom check    CC:  Raul Dutta        Again, thank you for allowing me to participate in the care of your patient.      Sincerely,    Diaz Pitt MD

## 2024-01-16 NOTE — TELEPHONE ENCOUNTER
Reason for visit:   Enlarged prostate Annual follow-up      Relevant information:   LOV 1/4/22 virtual  POC 1 yr follow-up with updated PSA and OLEKSANDR  S/p Sivakumar April 2021    Records/imaging/labs/orders:   PSA 11/9/23    Pt called: No need for a call    At Rooming: BAYRON De La Rosa LPN  1/16/2024  9:03 AM

## 2024-01-17 ENCOUNTER — TELEPHONE (OUTPATIENT)
Dept: UROLOGY | Facility: CLINIC | Age: 69
End: 2024-01-17
Payer: COMMERCIAL

## 2024-01-17 NOTE — TELEPHONE ENCOUNTER
Spoke with pt to schedule yearly follow up with Dr. Pitt 2025, pt will call back later this year to schedule follow up.

## 2024-01-22 NOTE — PROGRESS NOTES
UROLOGY OUTPATIENT VISIT      Chief Complaint:   Enlarged prostate      Synopsis    Darnell Grant is a very pleasant AGE: 68 year old year old person    He has been followed for several years for LUTS\BPH.  He did undergo thermotherapy of the prostate several years ago.  While initially this may have had some relief he has noticed that his urinary symptoms are progressive since then.  In particular the last year he has noticed increasing degree of frequency, slow stream, hesitancy.  This is reflected in his AUA symptom score which is risen considerably from this point in time last year.    He notes that he has not had any gross hematuria or urinary tract infection.    He is not interested in any definitive interventions at this point though would consider it if symptoms continue to take the trajectory they are currently on    PSA is stable per trend below    He remains on Flomax    Digital rectal exam reveals a stable pea-sized nodule that is mobile and distinct from the prostate on the right side, prostate is otherwise median in size no irregularities noted aside from after mentioned nodule     Latest Ref Rng 1/16/2024  2:06 PM   UROLOGY RESULTS     PSA 0.00 - 4.00 ug/L    PSA Tumor Marker 0.00 - 4.50 ng/mL    PSA Free ng/mL    Urea Nitrogen 7 - 30 mg/dL    Urea Nitrogen 8.0 - 23.0 mg/dL    Creatinine 0.67 - 1.17 mg/dL    AUA     1. Over the past month, how often have you had a sensation of not emptying your bladder completely after you finished urinating?  4    2. Over the past month, how often have you had to urinate again less than two hours after you finished urinating?  4    3. Over the past month, how often have you found you stopped and started again several times when you urinated?  3    4. Over the past month, how often have you found it difficult to postpone urination?  3    5. Over the past month, how often have you had a weak urine stream?  3    6. Over the past month, how often have you had to  push or strain to begin urinating?  3    7. Over the past month, how many times did you typically get up to urinate from the time you went to bed at night until the time you got up in the morning?  3    AUA Score  23    1. Over the past month, how often have you had a sensation of not emptying your bladder completely after you finished urinating?     2. Over the past month, how often have you had to urinate again less than two hours after you finished urinating?     3. Over the past month, how often have you found you stopped and started again several times when you urinated?     4. Over the past month, how often have you found it difficult to postpone urination?     5. Over the past month, how often have you had a weak urine stream?     6. Over the past month, how often have you had to push or strain to begin urinating?     7. Over the past month, how many times did you typically get up to urinate from the time you went to bed at night until the time you got up in the morning?     The Disease-specific Quality of Life Question: If you were to spend the rest of your life with your urinary condition just the way it is now, how would you feel about that? (highlight or underline)     AUA - Total Score     ump uro prostate review     Peak Flow     Average Flow     Residual Volume by Ultrasound     Character of Curve     AUA Score  23    Sexual Health History for Men (BARBER)        Latest Ref Rng 1/16/2024  2:29 PM   UROLOGY RESULTS     PSA 0.00 - 4.00 ug/L    PSA Tumor Marker 0.00 - 4.50 ng/mL    PSA Free ng/mL    Urea Nitrogen 7 - 30 mg/dL    Urea Nitrogen 8.0 - 23.0 mg/dL    Creatinine 0.67 - 1.17 mg/dL    AUA     1. Over the past month, how often have you had a sensation of not emptying your bladder completely after you finished urinating?     2. Over the past month, how often have you had to urinate again less than two hours after you finished urinating?     3. Over the past month, how often have you found you stopped  and started again several times when you urinated?     4. Over the past month, how often have you found it difficult to postpone urination?     5. Over the past month, how often have you had a weak urine stream?     6. Over the past month, how often have you had to push or strain to begin urinating?     7. Over the past month, how many times did you typically get up to urinate from the time you went to bed at night until the time you got up in the morning?     AUA Score     1. Over the past month, how often have you had a sensation of not emptying your bladder completely after you finished urinating?     2. Over the past month, how often have you had to urinate again less than two hours after you finished urinating?     3. Over the past month, how often have you found you stopped and started again several times when you urinated?     4. Over the past month, how often have you found it difficult to postpone urination?     5. Over the past month, how often have you had a weak urine stream?     6. Over the past month, how often have you had to push or strain to begin urinating?     7. Over the past month, how many times did you typically get up to urinate from the time you went to bed at night until the time you got up in the morning?     The Disease-specific Quality of Life Question: If you were to spend the rest of your life with your urinary condition just the way it is now, how would you feel about that? (highlight or underline)     AUA - Total Score     ump uro prostate review     Peak Flow     Average Flow     Residual Volume by Ultrasound  54 mL    Character of Curve     AUA Score     Sexual Health History for Men (BARBER)              Medications     Current Outpatient Medications   Medication    tamsulosin (FLOMAX) 0.4 MG capsule    albuterol (PROAIR HFA/PROVENTIL HFA/VENTOLIN HFA) 108 (90 Base) MCG/ACT inhaler    allopurinol (ZYLOPRIM) 100 MG tablet    amLODIPine (NORVASC) 10 MG tablet    buPROPion (WELLBUTRIN  XL) 300 MG 24 hr tablet    candesartan (ATACAND) 32 MG tablet    empagliflozin (JARDIANCE) 25 MG TABS tablet    metFORMIN (GLUCOPHAGE) 1000 MG tablet    montelukast (SINGULAIR) 10 MG tablet    multivitamin w/minerals (THERA-VIT-M) tablet    omeprazole (PRILOSEC) 20 MG DR capsule    omeprazole (PRILOSEC) 40 MG DR capsule    OZEMPIC, 2 MG/DOSE, 8 MG/3ML SOPN    phentermine (ADIPEX-P) 37.5 MG tablet    rosuvastatin (CRESTOR) 40 MG tablet     No current facility-administered medications for this visit.     Facility-Administered Medications Ordered in Other Visits   Medication    barium sulfate 40% (VARIBAR THIN HONEY) oral suspension         The following  distinct labs were reviewed    I personally reviewed all applicable laboratory data and went over findings with patient  Significant for:    CBC RESULTS:  Recent Labs   Lab Test 04/18/22  1458 10/26/21  1229 07/14/21  1122 12/09/20  0815   WBC 5.3 7.3 16.3* 6.7   HGB 16.1 18.2* 18.2* 17.3    175 170 162        BMP RESULTS:  Recent Labs   Lab Test 11/09/23  0740 06/06/23  0751 11/30/22  0746 06/03/22  0804 02/07/22  0805 10/26/21  1229 06/11/21  0800 12/09/20  0815 09/15/20  0732 08/06/20  0714 12/10/19  0630 12/03/19  0836     --  141  --  140 138   < > 141 142 141  --  138   POTASSIUM 4.1  --  4.1  --  3.8 4.3   < > 4.2 3.9 4.1   < > 4.0   CHLORIDE 105  --  104  --  108 105   < > 107 107 109  --  105   CO2 25  --  27  --  24 29   < > 30 28 26  --  28   ANIONGAP 11  --  10  --  8 4   < > 4 7 6  --  4   GLC 90  90 91 94  92 86 120* 106*   < > 90 131* 88   < > 96   BUN 17.0  --  15.8  --  19 18   < > 16 18 25  --  22   CR 0.70  --  0.74  --  0.78 0.84   < > 0.98 0.94 1.00  --  0.83   GFRESTIMATED >90  --  >90  --  >90 >90   < > 80 84 78  --  >90   GFRESTBLACK  --   --   --   --   --   --   --  >90 >90 >90  --  >90    < > = values in this interval not displayed.       CALCIUM RESULTS:  Recent Labs   Lab Test 11/09/23  0740 06/06/23  0751 11/30/22  0746  06/03/22  0804   ZHANE 10.1  10.1 10.1 10.1  10.3* 9.9       PTH RESULTS:  Recent Labs   Lab Test 11/09/23  0740 06/06/23  0751 11/30/22  0746 06/03/22  0804   PTHI 60 67* 60 45       HGB A1C RESULTS:  Lab Results   Component Value Date    A1C 4.8 11/09/2023    A1C 4.9 06/06/2023    A1C 4.9 11/30/2022    A1C 5.0 06/03/2022    A1C 4.8 07/15/2021    A1C 5.0 12/09/2020    A1C 5.1 08/06/2020    A1C 5.2 12/03/2019    A1C 5.6 04/30/2019    A1C 5.7 11/13/2018       PSA RESULTS  PSA   Date Value Ref Range Status   08/06/2020 2.07 0 - 4 ug/L Final     Comment:     Assay Method:  Chemiluminescence using Siemens Vista analyzer   12/03/2019 2.08 0 - 4 ug/L Final     Comment:     Assay Method:  Chemiluminescence using Siemens Vista analyzer   04/30/2019 1.90 0 - 4 ug/L Final     Comment:     Assay Method:  Chemiluminescence using Siemens Vista analyzer   11/13/2018 4.51 (H) 0 - 4 ug/L Final     Comment:     Assay Method:  Chemiluminescence using Siemens Vista analyzer   03/06/2018 0.96 0 - 4 ug/L Final     Comment:     Assay Method:  Chemiluminescence using Siemens Vista analyzer   08/31/2015 0.60 0 - 4 ug/L Final   07/14/2014 0.35 0 - 4 ug/L Final     Comment:     PSA results are about 7% lower than our prior method due to a methodology   change   on August 30, 2011.     02/10/2009 0.40 0 - 4 ug/L Final   07/21/2008 0.56 0 - 4 ug/L Final   07/09/2007 0.53 0 - 4 ug/L Final     Prostate Specific Antigen Screen   Date Value Ref Range Status   11/09/2023 1.31 0.00 - 4.50 ng/mL Final     PSA Tumor Marker   Date Value Ref Range Status   01/24/2023 1.29 0.00 - 4.50 ng/mL Final   01/03/2022 2.03 0.00 - 4.00 ug/L Final                  Assessment/Plan   68 year old year old person with enlarged prostate  -Will continue to monitor his urinary symptoms.  We briefly touched upon some of the alternative available treatment options but he is not interested in definitive therapy at this point.  Will plan to bring him back in 1 year with an  updated PSA and for symptom check    CC:  Raul Dutta

## 2024-01-29 ENCOUNTER — OFFICE VISIT (OUTPATIENT)
Dept: INTERNAL MEDICINE | Facility: CLINIC | Age: 69
End: 2024-01-29
Payer: COMMERCIAL

## 2024-01-29 VITALS
BODY MASS INDEX: 26.64 KG/M2 | OXYGEN SATURATION: 99 % | DIASTOLIC BLOOD PRESSURE: 76 MMHG | HEART RATE: 101 BPM | WEIGHT: 207.6 LBS | HEIGHT: 74 IN | SYSTOLIC BLOOD PRESSURE: 130 MMHG

## 2024-01-29 DIAGNOSIS — E78.5 HYPERLIPIDEMIA LDL GOAL <100: ICD-10-CM

## 2024-01-29 DIAGNOSIS — K21.9 GASTROESOPHAGEAL REFLUX DISEASE WITHOUT ESOPHAGITIS: ICD-10-CM

## 2024-01-29 DIAGNOSIS — R53.83 OTHER FATIGUE: ICD-10-CM

## 2024-01-29 DIAGNOSIS — M10.9 GOUT, UNSPECIFIED CAUSE, UNSPECIFIED CHRONICITY, UNSPECIFIED SITE: ICD-10-CM

## 2024-01-29 DIAGNOSIS — Z00.00 ROUTINE GENERAL MEDICAL EXAMINATION AT A HEALTH CARE FACILITY: ICD-10-CM

## 2024-01-29 DIAGNOSIS — Z13.89 SCREENING FOR DIABETIC PERIPHERAL NEUROPATHY: ICD-10-CM

## 2024-01-29 DIAGNOSIS — E66.811 CLASS 1 OBESITY WITH SERIOUS COMORBIDITY AND BODY MASS INDEX (BMI) OF 31.0 TO 31.9 IN ADULT, UNSPECIFIED OBESITY TYPE: ICD-10-CM

## 2024-01-29 DIAGNOSIS — N40.0 BENIGN NON-NODULAR PROSTATIC HYPERPLASIA WITHOUT LOWER URINARY TRACT SYMPTOMS: ICD-10-CM

## 2024-01-29 DIAGNOSIS — R35.1 NOCTURIA: ICD-10-CM

## 2024-01-29 DIAGNOSIS — Z11.59 NEED FOR HEPATITIS C SCREENING TEST: ICD-10-CM

## 2024-01-29 DIAGNOSIS — I10 ESSENTIAL HYPERTENSION, BENIGN: ICD-10-CM

## 2024-01-29 DIAGNOSIS — I10 ESSENTIAL HYPERTENSION: ICD-10-CM

## 2024-01-29 DIAGNOSIS — E88.810 METABOLIC SYNDROME X: ICD-10-CM

## 2024-01-29 PROCEDURE — 99214 OFFICE O/P EST MOD 30 MIN: CPT | Performed by: INTERNAL MEDICINE

## 2024-01-29 RX ORDER — BUPROPION HYDROCHLORIDE 300 MG/1
300 TABLET ORAL EVERY MORNING
Qty: 90 TABLET | Refills: 3 | Status: SHIPPED | OUTPATIENT
Start: 2024-01-29 | End: 2024-08-19

## 2024-01-29 RX ORDER — ALLOPURINOL 100 MG/1
TABLET ORAL
Qty: 90 TABLET | Refills: 3 | Status: SHIPPED | OUTPATIENT
Start: 2024-01-29 | End: 2024-08-19

## 2024-01-29 RX ORDER — AMLODIPINE BESYLATE 10 MG/1
10 TABLET ORAL AT BEDTIME
Qty: 90 TABLET | Refills: 3 | Status: SHIPPED | OUTPATIENT
Start: 2024-01-29 | End: 2024-08-19

## 2024-01-29 RX ORDER — CANDESARTAN 32 MG/1
32 TABLET ORAL AT BEDTIME
Qty: 90 TABLET | Refills: 3 | Status: SHIPPED | OUTPATIENT
Start: 2024-01-29 | End: 2024-08-19

## 2024-01-29 NOTE — PROGRESS NOTES
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 His asthma flare has resolved and his acute low back pain is much better now after surgical intervention and PT/OT. He continues to walk between 5 and 10k steps a day. Meds reviewed. No low sugars. No falls. Immunizations up to date.    Current Outpatient Medications   Medication    albuterol (PROAIR HFA/PROVENTIL HFA/VENTOLIN HFA) 108 (90 Base) MCG/ACT inhaler    allopurinol (ZYLOPRIM) 100 MG tablet    amLODIPine (NORVASC) 10 MG tablet    buPROPion (WELLBUTRIN XL) 300 MG 24 hr tablet    candesartan (ATACAND) 32 MG tablet    empagliflozin (JARDIANCE) 25 MG TABS tablet    metFORMIN (GLUCOPHAGE) 1000 MG tablet    montelukast (SINGULAIR) 10 MG tablet    multivitamin w/minerals (THERA-VIT-M) tablet    omeprazole (PRILOSEC) 20 MG DR capsule    omeprazole (PRILOSEC) 40 MG DR capsule    OZEMPIC, 2 MG/DOSE, 8 MG/3ML SOPN    phentermine (ADIPEX-P) 37.5 MG tablet    rosuvastatin (CRESTOR) 40 MG tablet    tamsulosin (FLOMAX) 0.4 MG capsule     No current facility-administered medications for this visit.     Facility-Administered Medications Ordered in Other Visits   Medication    barium sulfate 40% (VARIBAR THIN HONEY) oral suspension     O) /76 (BP Location: Right arm, Patient Position: Sitting, Cuff Size: Adult Regular)   Pulse 101   Ht 1.88 m (6' 2.02\")   Wt 94.2 kg (207 lb 9.6 oz)   SpO2 99%   BMI 26.64 kg/m    Well appearing  HEENT nl  Lungs clear  Cor RRR no MRG  Nl gait  Nl mentation  No rashes    A/P) 1) HCM. Immunizations UTD. Does not need IPV. Strength work with  and balance work with PT in process. Fall prevention critical. correction plan at age 73.   2) ASCVD risk is high related to multiple RFs. Max RF reduction with statin, tight BP control, treatment of metabolic syndrome. LDL is 28! A1C is 4.8. No changes.  3) HTN. Good BP control on current " regimen with acceptable BMP results. No changes. Last Comprehensive Metabolic Panel:  Lab Results   Component Value Date     11/09/2023    POTASSIUM 4.1 11/09/2023    CHLORIDE 105 11/09/2023    CO2 25 11/09/2023    ANIONGAP 11 11/09/2023    GLC 90 11/09/2023    GLC 90 11/09/2023    BUN 17.0 11/09/2023    CR 0.70 11/09/2023    GFRESTIMATED >90 11/09/2023    ZHANE 10.1 11/09/2023    ZHANE 10.1 11/09/2023     4) Weight loss. He has lost over 50 lbs and is now at a point where maintenance of this weight is the goal. He will discuss backing off on medication therapies over time with Endocrinology.  5) Gout. Urate <4 and no recurrent joint inflammation. No changes.     30 minutes spent on the date of the encounter performing chart review, history and exam, documentation and further activities as noted above.    Christian Dutta MD  [facility]  [facility]

## 2024-03-11 ENCOUNTER — TELEPHONE (OUTPATIENT)
Dept: CARDIOLOGY | Facility: CLINIC | Age: 69
End: 2024-03-11
Payer: COMMERCIAL

## 2024-03-11 NOTE — TELEPHONE ENCOUNTER
3/11 Offered to schedule Return Cardiology w/ Jazlyn Cornejo. Pt declined. Said he prefers to wait to see Dr. Chris Fischer. Said he doesn't want to be on the waitlist since it doesn't really work for him and he will call back if the 6/28 appt doesn't work. YONG

## 2024-03-14 ENCOUNTER — TELEPHONE (OUTPATIENT)
Dept: GASTROENTEROLOGY | Facility: CLINIC | Age: 69
End: 2024-03-14
Payer: COMMERCIAL

## 2024-03-14 DIAGNOSIS — Z12.11 ENCOUNTER FOR SCREENING COLONOSCOPY: Primary | ICD-10-CM

## 2024-03-14 RX ORDER — BISACODYL 5 MG/1
TABLET, DELAYED RELEASE ORAL
Qty: 4 TABLET | Refills: 0 | Status: SHIPPED | OUTPATIENT
Start: 2024-03-14 | End: 2024-08-19

## 2024-03-14 NOTE — TELEPHONE ENCOUNTER
Pre visit planning completed.      Procedure details:    Patient scheduled for Colonoscopy  on 3/27/2024.     Arrival time: 1245. Procedure time 1345    Facility location: [facility]; [address], [place] [address]. Check in location: Main entrance at registration desk.    Sedation type: Conscious sedation     Pre op exam needed? N/A    Indication for procedure: Screening for colorectal cancer [Z12.11, Z12.12]       Chart review:     Electronic implanted devices? No    Recent diagnosis of diverticulitis within the last 6 weeks? No    Diabetic? Yes. Diabetic medication HOLDING recommendations: Oral diabetic medications: Yes:  Jardiance (empagliflozin): HOLD  3 days before procedure.  Metformin (glucophage): HOLD day of procedure. Diabetic injectables: Yes- Ozempic (Semaglutide). Weekly dosing of medication.  Hold 7 days before procedure.  Follow up with managing provider.  Insulin: No      Medication review:    Anticoagulants? No    NSAIDS? No NSAID medications per patient's medication list.  RN will verify with pre-assessment call.    Other medication HOLDING recommendations:  N/A      Prep for procedure:     Bowel prep recommendation: Standard Golytely. Bowel prep prescription sent to [facility] [place], [place] - [address]  Due to: standard bowel prep.    Prep instructions sent via [employer] Erazo RN  Endoscopy Procedure Pre Assessment RN  [phone] option 4

## 2024-03-14 NOTE — TELEPHONE ENCOUNTER
Pre assessment completed for upcoming procedure.   (Please see previous telephone encounter notes for complete details)    Patient  returned call.       Procedure details:    Arrival time and facility location reviewed.    Pre op exam needed? N/A    Designated  policy reviewed. Instructed to have someone stay 6  hours post procedure.       Medication review:    Medications reviewed. Please see supporting documentation below. Holding recommendations discussed (if applicable).   Weight loss injectable medications: Ozempic (Semaglutide). Weekly dosing of medication.  Hold 7 days before procedure.  Follow up with managing provider.   Phentermine: HOLD 7 days before procedure.  Oral diabetic medications: Yes:  Jardiance (empagliflozin): HOLD  3 days before procedure.  Metformin (glucophage): HOLD day of procedure.       Prep for procedure:     Procedure prep instructions reviewed.        Any additional information needed:  Patient states he is not a diabetic and takes oral diabetic medications for weight loss.       Patient  verbalized understanding and had no questions or concerns at this time.      Lavinia Rosales RN  Endoscopy Procedure Pre Assessment RN  [phone] option 4

## 2024-03-14 NOTE — TELEPHONE ENCOUNTER
Attempted to contact patient in order to complete pre assessment questions.     Pt unable to talk at this time. Pt will return call to [phone] option 4    Missed call communication sent via [employer]    Sherri Erazo RN  Endoscopy Procedure Pre Assessment RN

## 2024-03-20 NOTE — TELEPHONE ENCOUNTER
Incoming call from patient.     Patient states they got a missed call but they don't know from who so they called endoscopy team. Pre assessment was completed on 3/14/24. Apologized to patient but it was not pre assessment team that called. Patient had no further questions at this time.     Jaja San RN  Endoscopy Procedure Pre Assessment RN  [phone] option 4

## 2024-03-27 ENCOUNTER — HOSPITAL ENCOUNTER (OUTPATIENT)
Facility: CLINIC | Age: 69
Discharge: HOME | End: 2024-03-27
Attending: INTERNAL MEDICINE | Admitting: INTERNAL MEDICINE
Payer: COMMERCIAL

## 2024-03-27 VITALS
OXYGEN SATURATION: 100 % | SYSTOLIC BLOOD PRESSURE: 131 MMHG | RESPIRATION RATE: 13 BRPM | HEART RATE: 59 BPM | DIASTOLIC BLOOD PRESSURE: 80 MMHG

## 2024-03-27 LAB — COLONOSCOPY: NORMAL

## 2024-03-27 PROCEDURE — 88305 TISSUE EXAM BY PATHOLOGIST: CPT | Mod: TC | Performed by: INTERNAL MEDICINE

## 2024-03-27 PROCEDURE — 250N000011 HC RX IP 250 OP 636: Performed by: INTERNAL MEDICINE

## 2024-03-27 PROCEDURE — 45385 COLONOSCOPY W/LESION REMOVAL: CPT | Mod: PT | Performed by: INTERNAL MEDICINE

## 2024-03-27 PROCEDURE — 88305 TISSUE EXAM BY PATHOLOGIST: CPT | Mod: 26 | Performed by: PATHOLOGY

## 2024-03-27 PROCEDURE — G0500 MOD SEDAT ENDO SERVICE >5YRS: HCPCS | Mod: PT | Performed by: INTERNAL MEDICINE

## 2024-03-27 PROCEDURE — 99153 MOD SED SAME PHYS/QHP EA: CPT | Mod: PT | Performed by: INTERNAL MEDICINE

## 2024-03-27 RX ORDER — NALOXONE HYDROCHLORIDE 0.4 MG/ML
0.4 INJECTION, SOLUTION INTRAMUSCULAR; INTRAVENOUS; SUBCUTANEOUS
Status: CANCELLED | OUTPATIENT
Start: 2024-03-27

## 2024-03-27 RX ORDER — FLUMAZENIL 0.1 MG/ML
0.2 INJECTION, SOLUTION INTRAVENOUS
Status: CANCELLED | OUTPATIENT
Start: 2024-03-27 | End: 2024-03-28

## 2024-03-27 RX ORDER — PROCHLORPERAZINE MALEATE 5 MG
5 TABLET ORAL EVERY 6 HOURS PRN
Status: CANCELLED | OUTPATIENT
Start: 2024-03-27

## 2024-03-27 RX ORDER — LIDOCAINE 40 MG/G
CREAM TOPICAL
Status: DISCONTINUED | OUTPATIENT
Start: 2024-03-27 | End: 2024-03-27 | Stop reason: HOSPADM

## 2024-03-27 RX ORDER — NALOXONE HYDROCHLORIDE 0.4 MG/ML
0.2 INJECTION, SOLUTION INTRAMUSCULAR; INTRAVENOUS; SUBCUTANEOUS
Status: CANCELLED | OUTPATIENT
Start: 2024-03-27

## 2024-03-27 RX ORDER — ONDANSETRON 4 MG/1
4 TABLET, ORALLY DISINTEGRATING ORAL EVERY 6 HOURS PRN
Status: CANCELLED | OUTPATIENT
Start: 2024-03-27

## 2024-03-27 RX ORDER — FENTANYL CITRATE 50 UG/ML
INJECTION, SOLUTION INTRAMUSCULAR; INTRAVENOUS PRN
Status: DISCONTINUED | OUTPATIENT
Start: 2024-03-27 | End: 2024-03-27 | Stop reason: HOSPADM

## 2024-03-27 RX ORDER — EPINEPHRINE 0.1 MG/ML
INJECTION INTRAVENOUS PRN
Status: DISCONTINUED | OUTPATIENT
Start: 2024-03-27 | End: 2024-03-27 | Stop reason: HOSPADM

## 2024-03-27 RX ORDER — ONDANSETRON 2 MG/ML
4 INJECTION INTRAMUSCULAR; INTRAVENOUS EVERY 6 HOURS PRN
Status: CANCELLED | OUTPATIENT
Start: 2024-03-27

## 2024-03-27 RX ORDER — ONDANSETRON 2 MG/ML
4 INJECTION INTRAMUSCULAR; INTRAVENOUS
Status: DISCONTINUED | OUTPATIENT
Start: 2024-03-27 | End: 2024-03-27 | Stop reason: HOSPADM

## 2024-03-27 ASSESSMENT — ACTIVITIES OF DAILY LIVING (ADL)
ADLS_ACUITY_SCORE: 37
ADLS_ACUITY_SCORE: 37

## 2024-03-27 NOTE — H&P
Gastroenterology Pre-op History and Physical    Darnell Grant MRN# [id number]   Age: 68 year old YOB: 1955      Date of Surgery: 03/27/24  [facility]      Date of Exam 3/27/2024 Facility Same Day       Primary care provider: Raul Dutta         Chief Complaint and/or Reason for Procedure:   69 yo male for routine screening colonoscopy. No symptoms. Last exam 10 yrs ago with small hyperplastic polyp.    No symptoms. No anticoagulation.         Past Medical and Surgical History:     Past Medical History:   Diagnosis Date    Bladder mass     BPH (benign prostatic hyperplasia)     GERD (gastroesophageal reflux disease)     Heart disease >5 years ago    PVCs    High serum parathyroid hormone (PTH) 2017    Hypertension     Metabolic syndrome     Nephrolithiasis     Obesity     LISETTE (obstructive sleep apnea)     Prediabetes     Uncomplicated asthma >5 years ago    Stress induced asthma     Past Surgical History:   Procedure Laterality Date    ABDOMEN SURGERY  July, 2021    APPENDECTOMY OPEN N/A 7/14/2021    Procedure: APPENDECTOMY, OPEN;  Surgeon: Walter Boggs MD;  Location: [facility] OR    BACK SURGERY      repair disc    BIOPSY  >5 years ago    Prostate    CATARACT IOL, RT/LT      CYSTOSCOPY, TRANSURETHRAL RESECTION (TUR) TUMOR BLADDER, COMBINED N/A 1/31/2018    Procedure: COMBINED CYSTOSCOPY, TRANSURETHRAL RESECTION (TUR) TUMOR BLADDER;  Transurethral Biopsy and Resection of Bladder Tumor;  Surgeon: Yaya Lomeli MD;  [facility] OR    DECOMPRESSION, FUSION CERVICAL ANTERIOR TWO LEVELS, COMBINED Right 12/10/2019    Procedure: right approach anterior cervical 2-3, 3-4 resection of anterior cervical osteophytes causing dysphagia, with microscope;  Surgeon: María Fitzgerald MD;  Location: [facility] OR    HC BREATH HYDROGEN TEST  5/22/2013    Procedure: HYDROGEN BREATH TEST;  Surgeon: Donny Paris MD;  [facility]: [facility]    LAPAROSCOPIC  APPENDECTOMY N/A 7/14/2021    Procedure: APPENDECTOMY, LAPAROSCOPIC;  Surgeon: Walter Boggs MD;  [facility]    PHACOEMULSIFICATION CLEAR CORNEA WITH STANDARD INTRAOCULAR LENS IMPLANT Left 9/25/2020    Procedure: LEFT CATARACT REMOVAL WITH INTRAOCULAR LENS IMPLANT;  Surgeon: Keyla Tobin MD;  [facility]    PHACOEMULSIFICATION CLEAR CORNEA WITH STANDARD INTRAOCULAR LENS IMPLANT Right 10/2/2020    Procedure: RIGHT CATARACT REMOVAL WITH INTRAOCULAR LENS IMPLANT;  Surgeon: Keyla Tobin MD;  [facility]    TRANSURETHERAL DESTRUCTION OF PROSTATE BY THERMOTHERAPY N/A 4/13/2018    Procedure: TRANSURETHERAL DESTRUCTION OF PROSTATE BY THERMOTHERAPY;  [facility] Procedure;  Surgeon: Yaya Lomeli MD;  [facility]            Medications (include herbals and vitamins):        Medications Prior to Admission   Medication Sig Dispense Refill Last Dose    albuterol (PROAIR HFA/PROVENTIL HFA/VENTOLIN HFA) 108 (90 Base) MCG/ACT inhaler Inhale 2 puffs into the lungs every 4 hours as needed for shortness of breath, wheezing or cough 18 g 11 More than a month    allopurinol (ZYLOPRIM) 100 MG tablet take by mouth 1 tab ( 100 mg)  at bedtime 90 tablet 3 3/26/2024    amLODIPine (NORVASC) 10 MG tablet Take 1 tablet (10 mg) by mouth at bedtime 90 tablet 3 3/26/2024    bisacodyl (DULCOLAX) 5 MG EC tablet Take 2 tablets at 3 pm the day before your procedure. If your procedure is before 11 am, take 2 additional tablets at 11 pm. If your procedure is after 11 am, take 2 additional tablets at 6 am. For additional instructions refer to your colonoscopy prep instructions. 4 tablet 0 3/27/2024 at 0615    buPROPion (WELLBUTRIN XL) 300 MG 24 hr tablet Take 1 tablet (300 mg) by mouth every morning 90 tablet 3 3/27/2024 at 0800    candesartan (ATACAND) 32 MG tablet Take 32 mg by mouth at bedtime 90 tablet 3 3/26/2024    empagliflozin (JARDIANCE) 25 MG TABS tablet Take 1 tablet (25 mg) by mouth every morning 90  tablet 3 Past Week    metFORMIN (GLUCOPHAGE) 1000 MG tablet Take 2 tablets (2,000 mg) by mouth daily (with dinner) 180 tablet 3 3/26/2024    montelukast (SINGULAIR) 10 MG tablet Take 1 tablet (10 mg) by mouth every evening 30 tablet 11 More than a month    multivitamin w/minerals (THERA-VIT-M) tablet Take 1 tablet by mouth At Bedtime   3/26/2024    omeprazole (PRILOSEC) 20 MG DR capsule Take 1 capsule (20 mg) by mouth daily 90 capsule 3 3/26/2024    OZEMPIC, 2 MG/DOSE, 8 MG/3ML SOPN Inject 2 mg Subcutaneous once a week 9 mL 3 Past Month    phentermine (ADIPEX-P) 37.5 MG tablet Take 1.5 tablets (56 mg) by mouth every morning (before breakfast) 135 tablet 3 Past Week    polyethylene glycol (GOLYTELY) 236 g suspension The night before the exam at 6 pm drink an 8-ounce glass every 15 minutes until the jug is half empty. If you arrive before 11 AM: Drink the other half of the Golytely jug at 11 PM night before procedure. If you arrive after 11 AM: Drink the other half of the Golytely jug at 6 AM day of procedure. For additional instructions refer to your colonoscopy prep instructions. 4000 mL 0 3/27/2024 at 0725    rosuvastatin (CRESTOR) 40 MG tablet TAKE ONE TABLET BY MOUTH ONCE DAILY 90 tablet 3 3/27/2024 at 0800    tamsulosin (FLOMAX) 0.4 MG capsule Take 1 capsule (0.4 mg) by mouth daily 90 capsule 3 3/26/2024             Allergies:      Allergies   Allergen Reactions    Ragweeds      Watery eyes, itchy nose               Physical Exam:   All vitals have been reviewed  Patient Vitals for the past 8 hrs:   BP Pulse Resp SpO2   03/27/24 1318 130/80 87 16 96 %     No intake/output data recorded.  Airway assessment:   Patient is able to open mouth wide  Patient is able to stick out tongue  Mallampatti classification: Class I (visualization of the soft palate, fauces, uvula, anterior and posterior pillars)}      Lungs:   No increased work of breathing, good air exchange, clear to auscultation bilaterally, no crackles or  wheezing     Cardiovascular:   regular rate and rhythm and normal S1 and S2                 Anesthetic risk and/or ASA classification:     ASA 2    Srini Gao MD

## 2024-03-27 NOTE — OR NURSING
Procedure: colonoscopy with polypectomy x 6; 1 clip placed in sigmoid, 1 clip placed in rectum. Epi injected postpolypectomy with cold snare at same sites as the clips placed  Sedation: conscious sedation   Specimens: verified, sent to lab.   O2: ra  Tolerated procedure: well  Pt to recovery area in stable condition accompanied by RN, bedside report given to recovery RN  Other:  assurance clip card given to pt    All belongings with patient at time of transfer.

## 2024-06-02 ENCOUNTER — HEALTH MAINTENANCE LETTER (OUTPATIENT)
Age: 69
End: 2024-06-02

## 2024-06-28 ENCOUNTER — OFFICE VISIT (OUTPATIENT)
Dept: CARDIOLOGY | Facility: CLINIC | Age: 69
End: 2024-06-28
Attending: INTERNAL MEDICINE
Payer: COMMERCIAL

## 2024-06-28 ENCOUNTER — ALLIED HEALTH/NURSE VISIT (OUTPATIENT)
Dept: INTERNAL MEDICINE | Facility: CLINIC | Age: 69
End: 2024-06-28
Payer: COMMERCIAL

## 2024-06-28 VITALS
HEART RATE: 101 BPM | DIASTOLIC BLOOD PRESSURE: 72 MMHG | WEIGHT: 212.9 LBS | SYSTOLIC BLOOD PRESSURE: 114 MMHG | OXYGEN SATURATION: 97 % | BODY MASS INDEX: 27.32 KG/M2

## 2024-06-28 DIAGNOSIS — I25.84 CORONARY ARTERY DISEASE DUE TO CALCIFIED CORONARY LESION: Primary | ICD-10-CM

## 2024-06-28 DIAGNOSIS — I25.10 CORONARY ARTERY DISEASE DUE TO CALCIFIED CORONARY LESION: Primary | ICD-10-CM

## 2024-06-28 DIAGNOSIS — E78.5 HYPERLIPIDEMIA LDL GOAL <70: ICD-10-CM

## 2024-06-28 DIAGNOSIS — I10 ESSENTIAL HYPERTENSION: ICD-10-CM

## 2024-06-28 DIAGNOSIS — I49.3 PVC'S (PREMATURE VENTRICULAR CONTRACTIONS): ICD-10-CM

## 2024-06-28 DIAGNOSIS — Z23 NEED FOR VACCINATION: Primary | ICD-10-CM

## 2024-06-28 DIAGNOSIS — R00.0 TACHYCARDIA: ICD-10-CM

## 2024-06-28 LAB
ATRIAL RATE - MUSE: 91 BPM
DIASTOLIC BLOOD PRESSURE - MUSE: NORMAL MMHG
INTERPRETATION ECG - MUSE: NORMAL
P AXIS - MUSE: 34 DEGREES
PR INTERVAL - MUSE: 158 MS
QRS DURATION - MUSE: 126 MS
QT - MUSE: 380 MS
QTC - MUSE: 467 MS
R AXIS - MUSE: -60 DEGREES
SYSTOLIC BLOOD PRESSURE - MUSE: NORMAL MMHG
T AXIS - MUSE: 79 DEGREES
VENTRICULAR RATE- MUSE: 91 BPM

## 2024-06-28 PROCEDURE — 93005 ELECTROCARDIOGRAM TRACING: CPT

## 2024-06-28 PROCEDURE — 99213 OFFICE O/P EST LOW 20 MIN: CPT | Mod: GC | Performed by: INTERNAL MEDICINE

## 2024-06-28 PROCEDURE — 90480 ADMN SARSCOV2 VAC 1/ONLY CMP: CPT

## 2024-06-28 PROCEDURE — 91320 SARSCV2 VAC 30MCG TRS-SUC IM: CPT

## 2024-06-28 PROCEDURE — 99207 PR NO CHARGE NURSE ONLY: CPT

## 2024-06-28 PROCEDURE — 99213 OFFICE O/P EST LOW 20 MIN: CPT | Performed by: INTERNAL MEDICINE

## 2024-06-28 PROCEDURE — 93010 ELECTROCARDIOGRAM REPORT: CPT | Performed by: INTERNAL MEDICINE

## 2024-06-28 ASSESSMENT — PAIN SCALES - GENERAL: PAINLEVEL: NO PAIN (0)

## 2024-06-28 NOTE — NURSING NOTE
Chief Complaint   Patient presents with    Follow Up     [facility] F/U       Vitals were taken, medications reconciled and EKG performed.    Theodora Luna, Clinic Assistant   7:00 AM

## 2024-06-28 NOTE — PROGRESS NOTES
Darnell PLAZA Juanita received the COVID booster today in clinic at the request of Dr. Dutta. The immunization was given under the supervision of Dr. Erazo if assistance was needed. The patient does not report a history of adverse reactions associated with vaccine administration. The immunization site was cleaned with an alcohol prep wipe. The immunization was given without incident--see immunization list for administration details. No swelling or redness was observed at the site of injection after the immunization was given. The patient was advised to remain in fourth floor lobby of the [facility] for fifteen minutes after the injection in case of an adverse reaction.       JON Del Toro at 8:43 AM on 6/28/2024.

## 2024-06-28 NOTE — LETTER
6/28/2024      RE: Darnell Grant  [address]  [facility] [address]       Dear Colleague,    Thank you for the opportunity to participate in the care of your patient, Darnell Grant, at the [facility] [place] [facility]. Please see a copy of my visit note below.    CARDIOLOGY CLINIC Follow Up  Darnell Grant is a 68 year old male who receives primary care from Dr. Raul Dutta.  Returns to clinic today for routine follow-up.    3/18/22  Dr. Dutta's note from 2/2/2021 has a nice summary of Dr. Grant's medical history.  In brief, Dr. Grant has no history of clinical atherosclerotic CVD.,  He does have imaging evidence of with a coronary calcium score of 772 in 2017, putting him in the 90th percentile.  His CV risk factors include hypertension, metabolic syndrome, HLD, and family history of premature CAD.  His main cardiac related symptom has been intermittent palpitations and he has been diagnosed with PVCs with frequency ranging from 5 to 14% on cardiac monitors.    Since his last visit Dr. Grant has intentionally lost weight.  With his weight loss his blood pressure has decreased and he does describe some orthostatic symptoms of lightheadedness when he stands up.      3/10/23    Kateryna presents today for annual follow up. He reports no chest pain, exertional dyspnea, LE swelling, orthopnea/PND. No changes in medication. BP at home typically runs in 110's/70's mmHg. HR typically in the 90s bpm range. TTE last year reassuring showed normal Biventricular function, LVEF 55-60%. No significant valvular dysfunction or LVH. No lightheadedness since lowering BP meds with PCP and since we stopped hydrochlorothiazide previously.    06/28/2024 Interval history:   Kateryna does not report any symptoms at all today. Denies any palpitations, lightheadedness or syncope. Denies any chest pain, orthopnea, LOPEZ. His weight has remained stable over  the past year and he remains on the appetite supression medications. He did cut back on his phentermine dose last week. He tolerates exercise well, does 5000 steps a day and has a  and goes to the gym 3 times a week. He has remained on the same BP medications and has not had any episodes of lightheadedness - does not check his BP at  home, but here it has remained normal while on these meds.    ASSESSMENT AND PLAN  Darnell Grant is a 68 year old male with evidence of CAD on coronary artery calcium scan, but no clinical symptoms; history of moderate PVC burden with short runs of NSVT, but no signs or symptoms recently; HTN; HLD; metabolic syndrome.  CV risk factors are well controlled at this time.      #PVCs/NSVT: Remains symptom-free. We will continue to monitor LV function intermittently to ensure no cardiomyopathy as a result of ventricular ectopy.  Last cardiac imaging was done in 2022, no structural disease.  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 Discussed signs and symptoms of progressing conduction delays     #HTN: Blood pressures remains well controlled. No recent change in medications  - continue amlodipine, candesartan    #HLD: LDL is under excellent control at 28 mg/dL  -Continue rosuvastatin 40 mg    #Metabolic syndrome: Successful weight loss over several years with guidance from endocrinology.  Weight is now stable. Currently taking multiple medications for appetite suppression and weight loss.  Hemoglobin A1c 4.8.  -Follow-up endocrinology    Follow-up in cardiology clinic 1 year    Monica Peng MD PhD  Cardiology fellow    Seen with Dr Ally Fischer    Attending: Patient seen and examined with Dr. Peng. The history and physical findings are accurate as recorded. My additional findings, if any, have been incorporated into the body of the note. All relevant labs and imaging studies and new ECG  data have been reviewed personally. The assessment and recommendations outlined reflect our joint decision making.     Abdon Fischer MD, MS  Preventive Cardiology    PAST MEDICAL HISTORY:  Patient Active Problem List   Diagnosis    Adjustment disorder with mixed anxiety and depressed mood    Prostate nodule    Pure hypercholesterolemia    Essential hypertension, benign    Chronic cough    Diarrhea    Obstructive sleep apnea hypopnea, moderate [AHI 21 on CPAP 5-15]    Agatston coronary artery calcium score greater than 400    Mass of urinary bladder    Microscopic hematuria    Benign prostatic hyperplasia with urinary obstruction    Combined forms of age-related cataract of both eyes    Myopia of both eyes with astigmatism and presbyopia    Esophageal dysphagia    Osteophyte of cervical spine    Hyperlipidemia    Hypertension    Lumbar radiculopathy    Metabolic syndrome    Obesity, unspecified    Dysphagia    Nuclear sclerotic cataract of both eyes    Acute appendicitis with localized peritonitis, without perforation, abscess, or gangrene    Supraventricular tachycardia (H24)    Elevated hemoglobin (H24)     Past Medical History:   Diagnosis Date    Bladder mass     BPH (benign prostatic hyperplasia)     GERD (gastroesophageal reflux disease)     Heart disease >5 years ago    PVCs    High serum parathyroid hormone (PTH) 2017    Hypertension     Metabolic syndrome     Nephrolithiasis     Obesity     LISETTE (obstructive sleep apnea)     Prediabetes     Uncomplicated asthma >5 years ago    Stress induced asthma       CURRENT MEDICATIONS:  Current Outpatient Medications   Medication Sig Dispense Refill    albuterol (PROAIR HFA/PROVENTIL HFA/VENTOLIN HFA) 108 (90 Base) MCG/ACT inhaler Inhale 2 puffs into the lungs every 4 hours as needed for shortness of breath, wheezing or cough 18 g 11    allopurinol (ZYLOPRIM) 100 MG tablet take by mouth 1 tab ( 100 mg)  at bedtime 90 tablet 3    amLODIPine (NORVASC) 10 MG tablet  Take 1 tablet (10 mg) by mouth at bedtime 90 tablet 3    buPROPion (WELLBUTRIN XL) 300 MG 24 hr tablet Take 1 tablet (300 mg) by mouth every morning 90 tablet 3    candesartan (ATACAND) 32 MG tablet Take 32 mg by mouth at bedtime 90 tablet 3    empagliflozin (JARDIANCE) 25 MG TABS tablet Take 1 tablet (25 mg) by mouth every morning 90 tablet 3    metFORMIN (GLUCOPHAGE) 1000 MG tablet Take 2 tablets (2,000 mg) by mouth daily (with dinner) 180 tablet 3    montelukast (SINGULAIR) 10 MG tablet Take 1 tablet (10 mg) by mouth every evening 30 tablet 11    multivitamin w/minerals (THERA-VIT-M) tablet Take 1 tablet by mouth At Bedtime      omeprazole (PRILOSEC) 20 MG DR capsule Take 1 capsule (20 mg) by mouth daily 90 capsule 3    OZEMPIC, 2 MG/DOSE, 8 MG/3ML SOPN Inject 2 mg Subcutaneous once a week 9 mL 3    phentermine (ADIPEX-P) 37.5 MG tablet Take 1.5 tablets (56 mg) by mouth every morning (before breakfast) 135 tablet 3    rosuvastatin (CRESTOR) 40 MG tablet TAKE ONE TABLET BY MOUTH ONCE DAILY 90 tablet 3    tamsulosin (FLOMAX) 0.4 MG capsule Take 1 capsule (0.4 mg) by mouth daily 90 capsule 3    bisacodyl (DULCOLAX) 5 MG EC tablet Take 2 tablets at 3 pm the day before your procedure. If your procedure is before 11 am, take 2 additional tablets at 11 pm. If your procedure is after 11 am, take 2 additional tablets at 6 am. For additional instructions refer to your colonoscopy prep instructions. (Patient not taking: Reported on 6/28/2024) 4 tablet 0    polyethylene glycol (GOLYTELY) 236 g suspension The night before the exam at 6 pm drink an 8-ounce glass every 15 minutes until the jug is half empty. If you arrive before 11 AM: Drink the other half of the Golytely jug at 11 PM night before procedure. If you arrive after 11 AM: Drink the other half of the Golytely jug at 6 AM day of procedure. For additional instructions refer to your colonoscopy prep instructions. (Patient not taking: Reported on 6/28/2024) 4000 mL 0        PAST SURGICAL HISTORY:  Past Surgical History:   Procedure Laterality Date    ABDOMEN SURGERY  July, 2021    APPENDECTOMY OPEN N/A 7/14/2021    Procedure: APPENDECTOMY, OPEN;  Surgeon: Walter Boggs MD;  Location: [facility] OR    BACK SURGERY      repair disc    BIOPSY  >5 years ago    Prostate    CATARACT IOL, RT/LT      COLONOSCOPY N/A 3/27/2024    Procedure: COLONOSCOPY, FLEXIBLE, WITH LESION REMOVAL USING SNARE;  Surgeon: Srini Gao MD;  Location: [facility]    CYSTOSCOPY, TRANSURETHRAL RESECTION (TUR) TUMOR BLADDER, COMBINED N/A 1/31/2018    Procedure: COMBINED CYSTOSCOPY, TRANSURETHRAL RESECTION (TUR) TUMOR BLADDER;  Transurethral Biopsy and Resection of Bladder Tumor;  Surgeon: Yaya Lomeli MD;  Location: [facility] OR    DECOMPRESSION, FUSION CERVICAL ANTERIOR TWO LEVELS, COMBINED Right 12/10/2019    Procedure: right approach anterior cervical 2-3, 3-4 resection of anterior cervical osteophytes causing dysphagia, with microscope;  Surgeon: María Fitzgerald MD;  Location: [facility] OR    HC BREATH HYDROGEN TEST  5/22/2013    Procedure: HYDROGEN BREATH TEST;  Surgeon: Donny Paris MD;  Location: [facility]    LAPAROSCOPIC APPENDECTOMY N/A 7/14/2021    Procedure: APPENDECTOMY, LAPAROSCOPIC;  Surgeon: Walter Boggs MD;  [facility]: [facility]    PHACOEMULSIFICATION CLEAR CORNEA WITH STANDARD INTRAOCULAR LENS IMPLANT Left 9/25/2020    Procedure: LEFT CATARACT REMOVAL WITH INTRAOCULAR LENS IMPLANT;  Surgeon: Keyla Tobin MD;  [facility]    PHACOEMULSIFICATION CLEAR CORNEA WITH STANDARD INTRAOCULAR LENS IMPLANT Right 10/2/2020    Procedure: RIGHT CATARACT REMOVAL WITH INTRAOCULAR LENS IMPLANT;  Surgeon: Keyla Tobin MD;  [facility]    TRANSURETHERAL DESTRUCTION OF PROSTATE BY THERMOTHERAPY N/A 4/13/2018    Procedure: TRANSURETHERAL DESTRUCTION OF PROSTATE BY THERMOTHERAPY;  Re[facility]um Procedure;  Surgeon: Yaya Lomeli MD;  Location: [facility]        ALLERGIES  [place]    FAMILY HX:  Family History   Problem Relation Age of Onset    Asthma Brother     Diabetes Maternal Grandmother     Cardiovascular Father     Coronary Artery Disease Father         at age 70s    Hypertension Father         at age 70s    Nephrolithiasis No family hx of     Parathyroid Disorders No family hx of     Thyroid Disease No family hx of     Glaucoma No family hx of     Macular Degeneration No family hx of     Anesthesia Reaction No family hx of     Deep Vein Thrombosis (DVT) No family hx of        SOCIAL HX:  Social History     Tobacco Use    Smoking status: Never    Smokeless tobacco: Never   Vaping Use    Vaping status: Never Used   Substance Use Topics    Alcohol use: No    Drug use: No      VITAL SIGNS:  /72 (BP Location: Right arm, Patient Position: Chair, Cuff Size: Adult Large)   Pulse 101   Wt 96.6 kg (212 lb 14.4 oz)   SpO2 97%   BMI 27.32 kg/m    Body mass index is 27.32 kg/m .  Wt Readings from Last 2 Encounters:   06/28/24 96.6 kg (212 lb 14.4 oz)   01/29/24 94.2 kg (207 lb 9.6 oz)       PHYSICAL EXAM  Gen: pleasant man sitting comfortably in NAD  Head: nc/at  CV: nml s1/s2, systolic murmur, increasing on inspiration  Chest: clear lungs  Ext: warm, no LE edema  Skin: no rash on limited exam  Neuro: awake, alert, oriented, nml speech    LABS: personally reviewed  CMP  Recent Labs   Lab Test 11/09/23  0740 06/06/23  0751 11/30/22  0746 06/03/22  0804 02/07/22  0805 10/26/21  1229 07/14/21  1859 07/14/21  1122 06/11/21  0800 12/09/20  0815 09/15/20  0732 08/06/20  0714 12/10/19  0630 12/03/19  0836     --  141  --  140 138  --  139   < > 141 142 141  --  138   POTASSIUM 4.1  --  4.1  --  3.8 4.3  --  4.2   < > 4.2 3.9 4.1   < > 4.0   CHLORIDE 105  --  104  --  108 105  --  104   < > 107 107 109  --  105   CO2 25  --  27  --  24 29  --  31   < > 30 28 26  --  28   ANIONGAP 11  --  10  --  8 4  --  4   < > 4 7 6  --  4   GLC 90  90 91 94  92 86 120* 106*    < > 107*   < > 90 131* 88   < > 96   BUN 17.0  --  15.8  --  19 18  --  18   < > 16 18 25  --  22   CR 0.70  --  0.74  --  0.78 0.84  --  0.93   < > 0.98 0.94 1.00  --  0.83   GFRESTIMATED >90  --  >90  --  >90 >90  --  85   < > 80 84 78  --  >90   GFRESTBLACK  --   --   --   --   --   --   --   --   --  >90 >90 >90  --  >90   ZHANE 10.1  10.1 10.1 10.1  10.3* 9.9 9.7 9.9  --  10.3*   < > 10.1 9.7 9.2  --  10.1   MAG 2.0 2.1 2.0 2.1  --   --   --   --    < >  --   --   --   --   --    PHOS 3.4 3.1 2.4* 2.1*  --   --   --   --    < >  --   --   --   --   --    PROTTOTAL 6.6  --   --   --   --   --   --  7.7  --  7.1  --  6.9  --   --    ALBUMIN 4.2  --   --   --   --   --   --  4.2  --  4.0  --  3.6  --   --    BILITOTAL 0.6  --   --   --   --   --   --  1.0  --  0.5  --  0.4  --   --    ALKPHOS 82  --   --   --   --   --   --  69  --  71  --  78  --   --    AST 30  --   --   --   --   --   --  20  --  19  --  30  --   --    ALT 37  --   --   --   --   --   --  36  --  40  --  51  --   --     < > = values in this interval not displayed.     CBC  Recent Labs   Lab Test 04/18/22  1458 10/26/21  1229 07/14/21  1122 12/09/20  0815   WBC 5.3 7.3 16.3* 6.7   RBC 5.14 5.86 5.96* 5.60   HGB 16.1 18.2* 18.2* 17.3   HCT 46.8 52.9 55.1* 52.2   MCV 91 90 92 93   MCH 31.3 31.1 30.5 30.9   MCHC 34.4 34.4 33.0 33.1   RDW 14.6 14.9 14.3 13.8    175 170 162     INR  Recent Labs   Lab Test 12/03/19  0836   INR 0.91     Recent Labs   Lab Test 11/09/23  0740 06/06/23  0751   CHOL 85 92   HDL 43 48   LDL 28 34   TRIG 71 51     Recent Labs   Lab Test 11/09/23  0740 06/06/23  0751   A1C 4.8 4.9       EKG   3/18/2022 normal sinus rhythm left anterior fascicular block; no acute ischemic changes   6/28/2024: NSR, RBBB, BIA no acute ischemic changes.    ECHO:   3/28/22 TTE  Global and regional left ventricular function is normal with an EF of 55-60%.  Right ventricular function, chamber size, wall motion, and thickness are  normal.  The  inferior vena cava is normal.  No pericardial effusion is present.    Other [id number] cardiac MRI with stress  Clinical history: 62-year-old male with frequent PVCs and calcium score of 744, CMR to rule out ischemia.   Comparison CMR: None     1. The LV is normal in cavity size and wall thickness. The global systolic function is normal. The LVEF is  55%. There are no regional wall motion abnormalities.     2. The RV is normal in cavity size. The global systolic function is normal. The RVEF is 61%.      3. Both atria are normal in size.     4. There is no significant valvular disease.      5. Late gadolinium enhancement imaging shows no MI, fibrosis or infiltrative disease.      6. Regadenoson stress perfusion imaging shows no ischemia.     CONCLUSIONS: No ischemia or infarction. Normal LV and RV function, LVEF of 55% and RVEF of 61%. No obvious substrate for arrhythmia.     Abdon Fischer MD

## 2024-06-28 NOTE — PATIENT INSTRUCTIONS
"You were seen today in the Cardiovascular Clinic at the [facility].     Cardiology Providers you saw during your visit: Dr. Chris Fischer     Diagnosis:   Encounter Diagnoses   Name Primary?    PVC's (premature ventricular contractions)     Coronary artery disease due to calcified coronary lesion Yes    Tachycardia     Hyperlipidemia LDL goal <70     Essential hypertension         Recommendations:   1. No change in medications.  2. Work on increasing average daily steps to 7,500 daily.  3. Follow up with Dr. Chris Fischer in 1 year.       Please feel free to call me with any questions or concerns.       Gosia Shannon RN     Questions: [phone].   First press #1 for the [employer] and then press #4 for \"Medical Questions\" to reach us Cardiology Nurses.     [phone].   First press #1 for the [employer] and then press #1     On Call Cardiologist for after hours or on weekends: [phone]   option #4 and ask to speak to the on-call Cardiologist.          If you need a medication refill please contact your pharmacy.  Please allow 3 business days for your refill to be completed.  ________________________________________________________________________________________________________________________________         "

## 2024-06-28 NOTE — PROGRESS NOTES
CARDIOLOGY CLINIC Follow Up  Darnell Grant is a 68 year old male who receives primary care from Dr. Raul Dutta.  Returns to clinic today for routine follow-up.    3/18/22  Dr. Dutta's note from 2/2/2021 has a nice summary of Dr. Grant's medical history.  In brief, Dr. Grant has no history of clinical atherosclerotic CVD.,  He does have imaging evidence of with a coronary calcium score of 772 in 2017, putting him in the 90th percentile.  His CV risk factors include hypertension, metabolic syndrome, HLD, and family history of premature CAD.  His main cardiac related symptom has been intermittent palpitations and he has been diagnosed with PVCs with frequency ranging from 5 to 14% on cardiac monitors.    Since his last visit Dr. Grant has intentionally lost weight.  With his weight loss his blood pressure has decreased and he does describe some orthostatic symptoms of lightheadedness when he stands up.      3/10/23    Kateryna presents today for annual follow up. He reports no chest pain, exertional dyspnea, LE swelling, orthopnea/PND. No changes in medication. BP at home typically runs in 110's/70's mmHg. HR typically in the 90s bpm range. TTE last year reassuring showed normal Biventricular function, LVEF 55-60%. No significant valvular dysfunction or LVH. No lightheadedness since lowering BP meds with PCP and since we stopped hydrochlorothiazide previously.    06/28/2024 Interval history:  Dr Avendaño does not report any symptoms at all today. Denies any palpitations, lightheadedness or syncope. Denies any chest pain, orthopnea, LOPEZ. His weight has remained stable over the past year and he remains on the appetite supression medications. He did cut back on his phentermine dose last week. He tolerates exercise well, does 5000 steps a day and has a  and goes to the gym 3 times a week. He has remained on the same BP medications and has not had any episodes of lightheadedness - does not check his  BP at  home, but here it has remained normal while on these meds.    ASSESSMENT AND PLAN  Darnell Grant is a 68 year old male with evidence of CAD on coronary artery calcium scan, but no clinical symptoms; history of moderate PVC burden with short runs of NSVT, but no signs or symptoms recently; HTN; HLD; metabolic syndrome.  CV risk factors are well controlled at this time.      #PVCs/NSVT: Remains symptom-free. We will continue to monitor LV function intermittently to ensure no cardiomyopathy as a result of ventricular ectopy.  Last cardiac imaging was done in 2022, no structural disease.  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 Discussed signs and symptoms of progressing conduction delays     #HTN: Blood pressures remains well controlled. No recent change in medications  - continue amlodipine, candesartan    #HLD: LDL is under excellent control at 28 mg/dL  -Continue rosuvastatin 40 mg    #Metabolic syndrome: Successful weight loss over several years with guidance from endocrinology.  Weight is now stable. Currently taking multiple medications for appetite suppression and weight loss.  Hemoglobin A1c 4.8.  -Follow-up endocrinology    Follow-up in cardiology clinic 1 year    Monica Peng MD PhD  Cardiology fellow    Seen with Dr Ally Fischer    Attending: Patient seen and examined with Dr. Peng. The history and physical findings are accurate as recorded. My additional findings, if any, have been incorporated into the body of the note. All relevant labs and imaging studies and new ECG data have been reviewed personally. The assessment and recommendations outlined reflect our joint decision making.     Abdon Fischer MD, MS  Preventive Cardiology    PAST MEDICAL HISTORY:  Patient Active Problem List   Diagnosis    Adjustment disorder with mixed anxiety and depressed mood    Prostate nodule    Pure hypercholesterolemia     Essential hypertension, benign    Chronic cough    Diarrhea    Obstructive sleep apnea hypopnea, moderate [AHI 21 on CPAP 5-15]    Agatston coronary artery calcium score greater than 400    Mass of urinary bladder    Microscopic hematuria    Benign prostatic hyperplasia with urinary obstruction    Combined forms of age-related cataract of both eyes    Myopia of both eyes with astigmatism and presbyopia    Esophageal dysphagia    Osteophyte of cervical spine    Hyperlipidemia    Hypertension    Lumbar radiculopathy    Metabolic syndrome    Obesity, unspecified    Dysphagia    Nuclear sclerotic cataract of both eyes    Acute appendicitis with localized peritonitis, without perforation, abscess, or gangrene    Supraventricular tachycardia (H24)    Elevated hemoglobin (H24)     Past Medical History:   Diagnosis Date    Bladder mass     BPH (benign prostatic hyperplasia)     GERD (gastroesophageal reflux disease)     Heart disease >5 years ago    PVCs    High serum parathyroid hormone (PTH) 2017    Hypertension     Metabolic syndrome     Nephrolithiasis     Obesity     LISETTE (obstructive sleep apnea)     Prediabetes     Uncomplicated asthma >5 years ago    Stress induced asthma       CURRENT MEDICATIONS:  Current Outpatient Medications   Medication Sig Dispense Refill    albuterol (PROAIR HFA/PROVENTIL HFA/VENTOLIN HFA) 108 (90 Base) MCG/ACT inhaler Inhale 2 puffs into the lungs every 4 hours as needed for shortness of breath, wheezing or cough 18 g 11    allopurinol (ZYLOPRIM) 100 MG tablet take by mouth 1 tab ( 100 mg)  at bedtime 90 tablet 3    amLODIPine (NORVASC) 10 MG tablet Take 1 tablet (10 mg) by mouth at bedtime 90 tablet 3    buPROPion (WELLBUTRIN XL) 300 MG 24 hr tablet Take 1 tablet (300 mg) by mouth every morning 90 tablet 3    candesartan (ATACAND) 32 MG tablet Take 32 mg by mouth at bedtime 90 tablet 3    empagliflozin (JARDIANCE) 25 MG TABS tablet Take 1 tablet (25 mg) by mouth every morning 90 tablet 3     metFORMIN (GLUCOPHAGE) 1000 MG tablet Take 2 tablets (2,000 mg) by mouth daily (with dinner) 180 tablet 3    montelukast (SINGULAIR) 10 MG tablet Take 1 tablet (10 mg) by mouth every evening 30 tablet 11    multivitamin w/minerals (THERA-VIT-M) tablet Take 1 tablet by mouth At Bedtime      omeprazole (PRILOSEC) 20 MG DR capsule Take 1 capsule (20 mg) by mouth daily 90 capsule 3    OZEMPIC, 2 MG/DOSE, 8 MG/3ML SOPN Inject 2 mg Subcutaneous once a week 9 mL 3    phentermine (ADIPEX-P) 37.5 MG tablet Take 1.5 tablets (56 mg) by mouth every morning (before breakfast) 135 tablet 3    rosuvastatin (CRESTOR) 40 MG tablet TAKE ONE TABLET BY MOUTH ONCE DAILY 90 tablet 3    tamsulosin (FLOMAX) 0.4 MG capsule Take 1 capsule (0.4 mg) by mouth daily 90 capsule 3    bisacodyl (DULCOLAX) 5 MG EC tablet Take 2 tablets at 3 pm the day before your procedure. If your procedure is before 11 am, take 2 additional tablets at 11 pm. If your procedure is after 11 am, take 2 additional tablets at 6 am. For additional instructions refer to your colonoscopy prep instructions. (Patient not taking: Reported on 6/28/2024) 4 tablet 0    polyethylene glycol (GOLYTELY) 236 g suspension The night before the exam at 6 pm drink an 8-ounce glass every 15 minutes until the jug is half empty. If you arrive before 11 AM: Drink the other half of the Golytely jug at 11 PM night before procedure. If you arrive after 11 AM: Drink the other half of the Golytely jug at 6 AM day of procedure. For additional instructions refer to your colonoscopy prep instructions. (Patient not taking: Reported on 6/28/2024) 4000 mL 0       PAST SURGICAL HISTORY:  Past Surgical History:   Procedure Laterality Date    ABDOMEN SURGERY  July, 2021    APPENDECTOMY OPEN N/A 7/14/2021    Procedure: APPENDECTOMY, OPEN;  Surgeon: Walter Boggs MD;  Location: [facility]    BACK SURGERY      repair disc    BIOPSY  >5 years ago    Prostate    CATARACT IOL, RT/LT      COLONOSCOPY N/A  3/27/2024    Procedure: COLONOSCOPY, FLEXIBLE, WITH LESION REMOVAL USING SNARE;  Surgeon: Srini Gao MD;  [facility]: [facility]    CYSTOSCOPY, TRANSURETHRAL RESECTION (TUR) TUMOR BLADDER, COMBINED N/A 1/31/2018    Procedure: COMBINED CYSTOSCOPY, TRANSURETHRAL RESECTION (TUR) TUMOR BLADDER;  Transurethral Biopsy and Resection of Bladder Tumor;  Surgeon: Yaya Lomeli MD;  [facility]    DECOMPRESSION, FUSION CERVICAL ANTERIOR TWO LEVELS, COMBINED Right 12/10/2019    Procedure: right approach anterior cervical 2-3, 3-4 resection of anterior cervical osteophytes causing dysphagia, with microscope;  Surgeon: María Fitzgerald MD;  Location: [facility]    HC BREATH HYDROGEN TEST  5/22/2013    Procedure: HYDROGEN BREATH TEST;  Surgeon: Donny Paris MD;  Location: [facility]    LAPAROSCOPIC APPENDECTOMY N/A 7/14/2021    Procedure: APPENDECTOMY, LAPAROSCOPIC;  Surgeon: Walter Boggs MD;  [facility]    PHACOEMULSIFICATION CLEAR CORNEA WITH STANDARD INTRAOCULAR LENS IMPLANT Left 9/25/2020    Procedure: LEFT CATARACT REMOVAL WITH INTRAOCULAR LENS IMPLANT;  Surgeon: Keyla Tobin MD;  [facility]    PHACOEMULSIFICATION CLEAR CORNEA WITH STANDARD INTRAOCULAR LENS IMPLANT Right 10/2/2020    Procedure: RIGHT CATARACT REMOVAL WITH INTRAOCULAR LENS IMPLANT;  Surgeon: Keyla Tobin MD;  [facility]    TRANSURETHERAL DESTRUCTION OF PROSTATE BY THERMOTHERAPY N/A 4/13/2018    Procedure: TRANSURETHERAL DESTRUCTION OF PROSTATE BY THERMOTHERAPY;  [facility] Procedure;  Surgeon: Yaya Lomeli MD;  [facility]       ALLERGIES  [place]weeds    FAMILY HX:  Family History   Problem Relation Age of Onset    Asthma Brother     Diabetes Maternal Grandmother     Cardiovascular Father     Coronary Artery Disease Father         at age 70s    Hypertension Father         at age 70s    Nephrolithiasis No family hx of     Parathyroid Disorders No family hx of     Thyroid Disease No family hx of      Glaucoma No family hx of     Macular Degeneration No family hx of     Anesthesia Reaction No family hx of     Deep Vein Thrombosis (DVT) No family hx of        SOCIAL HX:  Social History     Tobacco Use    Smoking status: Never    Smokeless tobacco: Never   Vaping Use    Vaping status: Never Used   Substance Use Topics    Alcohol use: No    Drug use: No      VITAL SIGNS:  /72 (BP Location: Right arm, Patient Position: Chair, Cuff Size: Adult Large)   Pulse 101   Wt 96.6 kg (212 lb 14.4 oz)   SpO2 97%   BMI 27.32 kg/m    Body mass index is 27.32 kg/m .  Wt Readings from Last 2 Encounters:   06/28/24 96.6 kg (212 lb 14.4 oz)   01/29/24 94.2 kg (207 lb 9.6 oz)       PHYSICAL EXAM  Gen: pleasant man sitting comfortably in NAD  Head: nc/at  CV: nml s1/s2, systolic murmur, increasing on inspiration  Chest: clear lungs  Ext: warm, no LE edema  Skin: no rash on limited exam  Neuro: awake, alert, oriented, nml speech    LABS: personally reviewed  CMP  Recent Labs   Lab Test 11/09/23  0740 06/06/23  0751 11/30/22  0746 06/03/22  0804 02/07/22  0805 10/26/21  1229 07/14/21  1859 07/14/21  1122 06/11/21  0800 12/09/20  0815 09/15/20  0732 08/06/20  0714 12/10/19  0630 12/03/19  0836     --  141  --  140 138  --  139   < > 141 142 141  --  138   POTASSIUM 4.1  --  4.1  --  3.8 4.3  --  4.2   < > 4.2 3.9 4.1   < > 4.0   CHLORIDE 105  --  104  --  108 105  --  104   < > 107 107 109  --  105   CO2 25  --  27  --  24 29  --  31   < > 30 28 26  --  28   ANIONGAP 11  --  10  --  8 4  --  4   < > 4 7 6  --  4   GLC 90  90 91 94  92 86 120* 106*   < > 107*   < > 90 131* 88   < > 96   BUN 17.0  --  15.8  --  19 18  --  18   < > 16 18 25  --  22   CR 0.70  --  0.74  --  0.78 0.84  --  0.93   < > 0.98 0.94 1.00  --  0.83   GFRESTIMATED >90  --  >90  --  >90 >90  --  85   < > 80 84 78  --  >90   GFRESTBLACK  --   --   --   --   --   --   --   --   --  >90 >90 >90  --  >90   ZHANE 10.1  10.1 10.1 10.1  10.3* 9.9 9.7 9.9  --   10.3*   < > 10.1 9.7 9.2  --  10.1   MAG 2.0 2.1 2.0 2.1  --   --   --   --    < >  --   --   --   --   --    PHOS 3.4 3.1 2.4* 2.1*  --   --   --   --    < >  --   --   --   --   --    PROTTOTAL 6.6  --   --   --   --   --   --  7.7  --  7.1  --  6.9  --   --    ALBUMIN 4.2  --   --   --   --   --   --  4.2  --  4.0  --  3.6  --   --    BILITOTAL 0.6  --   --   --   --   --   --  1.0  --  0.5  --  0.4  --   --    ALKPHOS 82  --   --   --   --   --   --  69  --  71  --  78  --   --    AST 30  --   --   --   --   --   --  20  --  19  --  30  --   --    ALT 37  --   --   --   --   --   --  36  --  40  --  51  --   --     < > = values in this interval not displayed.     CBC  Recent Labs   Lab Test 22  1458 10/26/21  1229 21  1122 20  0815   WBC 5.3 7.3 16.3* 6.7   RBC 5.14 5.86 5.96* 5.60   HGB 16.1 18.2* 18.2* 17.3   HCT 46.8 52.9 55.1* 52.2   MCV 91 90 92 93   MCH 31.3 31.1 30.5 30.9   MCHC 34.4 34.4 33.0 33.1   RDW 14.6 14.9 14.3 13.8    175 170 162     INR  Recent Labs   Lab Test 19  0836   INR 0.91     Recent Labs   Lab Test 23  0740 23  0751   CHOL 85 92   HDL 43 48   LDL 28 34   TRIG 71 51     Recent Labs   Lab Test 23  0740 23  0751   A1C 4.8 4.9       EKG   3/18/2022 normal sinus rhythm left anterior fascicular block; no acute ischemic changes   2024: NSR, RBBB, BIA no acute ischemic changes.    ECHO:   3/28/22 TTE  Global and regional left ventricular function is normal with an EF of 55-60%.  Right ventricular function, chamber size, wall motion, and thickness are  normal.  The inferior vena cava is normal.  No pericardial effusion is present.    Other [id number] cardiac MRI with stress  Clinical history: 62-year-old male with frequent PVCs and calcium score of 744, CMR to rule out ischemia.   Comparison CMR: None     1. The LV is normal in cavity size and wall thickness. The global systolic function is normal. The LVEF is  55%. There are  no regional wall motion abnormalities.     2. The RV is normal in cavity size. The global systolic function is normal. The RVEF is 61%.      3. Both atria are normal in size.     4. There is no significant valvular disease.      5. Late gadolinium enhancement imaging shows no MI, fibrosis or infiltrative disease.      6. Regadenoson stress perfusion imaging shows no ischemia.     CONCLUSIONS: No ischemia or infarction. Normal LV and RV function, LVEF of 55% and RVEF of 61%. No obvious substrate for arrhythmia.

## 2024-07-28 ENCOUNTER — TRANSFERRED RECORDS (OUTPATIENT)
Dept: HEALTH INFORMATION MANAGEMENT | Facility: CLINIC | Age: 69
End: 2024-07-28
Payer: COMMERCIAL

## 2024-08-19 ENCOUNTER — OFFICE VISIT (OUTPATIENT)
Dept: INTERNAL MEDICINE | Facility: CLINIC | Age: 69
End: 2024-08-19
Payer: COMMERCIAL

## 2024-08-19 VITALS
HEART RATE: 86 BPM | OXYGEN SATURATION: 96 % | DIASTOLIC BLOOD PRESSURE: 77 MMHG | SYSTOLIC BLOOD PRESSURE: 120 MMHG | WEIGHT: 210.1 LBS | BODY MASS INDEX: 26.96 KG/M2

## 2024-08-19 DIAGNOSIS — Z00.00 ROUTINE GENERAL MEDICAL EXAMINATION AT A HEALTH CARE FACILITY: Primary | ICD-10-CM

## 2024-08-19 DIAGNOSIS — M10.9 GOUT, UNSPECIFIED CAUSE, UNSPECIFIED CHRONICITY, UNSPECIFIED SITE: ICD-10-CM

## 2024-08-19 DIAGNOSIS — E11.69 TYPE 2 DIABETES MELLITUS WITH OTHER SPECIFIED COMPLICATION, WITHOUT LONG-TERM CURRENT USE OF INSULIN (H): ICD-10-CM

## 2024-08-19 DIAGNOSIS — E78.5 HYPERLIPIDEMIA LDL GOAL <100: ICD-10-CM

## 2024-08-19 DIAGNOSIS — I25.84 CORONARY ARTERY DISEASE DUE TO CALCIFIED CORONARY LESION: ICD-10-CM

## 2024-08-19 DIAGNOSIS — Z11.59 NEED FOR HEPATITIS C SCREENING TEST: ICD-10-CM

## 2024-08-19 DIAGNOSIS — I25.10 CORONARY ARTERY DISEASE DUE TO CALCIFIED CORONARY LESION: ICD-10-CM

## 2024-08-19 DIAGNOSIS — N40.0 BENIGN PROSTATIC HYPERPLASIA, UNSPECIFIED WHETHER LOWER URINARY TRACT SYMPTOMS PRESENT: ICD-10-CM

## 2024-08-19 DIAGNOSIS — K21.9 GASTROESOPHAGEAL REFLUX DISEASE WITHOUT ESOPHAGITIS: ICD-10-CM

## 2024-08-19 DIAGNOSIS — J45.30 MILD PERSISTENT ASTHMA WITHOUT COMPLICATION: ICD-10-CM

## 2024-08-19 DIAGNOSIS — I10 ESSENTIAL HYPERTENSION: ICD-10-CM

## 2024-08-19 PROCEDURE — 99214 OFFICE O/P EST MOD 30 MIN: CPT | Performed by: INTERNAL MEDICINE

## 2024-08-19 RX ORDER — ALLOPURINOL 100 MG/1
TABLET ORAL
Qty: 90 TABLET | Refills: 3 | Status: SHIPPED | OUTPATIENT
Start: 2024-08-19

## 2024-08-19 RX ORDER — MONTELUKAST SODIUM 10 MG/1
10 TABLET ORAL EVERY EVENING
Qty: 30 TABLET | Refills: 11 | Status: SHIPPED | OUTPATIENT
Start: 2024-08-19

## 2024-08-19 RX ORDER — CANDESARTAN 32 MG/1
32 TABLET ORAL AT BEDTIME
Qty: 90 TABLET | Refills: 3 | Status: SHIPPED | OUTPATIENT
Start: 2024-08-19

## 2024-08-19 RX ORDER — ROSUVASTATIN CALCIUM 40 MG/1
40 TABLET, COATED ORAL DAILY
Qty: 90 TABLET | Refills: 3 | Status: SHIPPED | OUTPATIENT
Start: 2024-08-19

## 2024-08-19 RX ORDER — AMLODIPINE BESYLATE 10 MG/1
10 TABLET ORAL AT BEDTIME
Qty: 90 TABLET | Refills: 3 | Status: SHIPPED | OUTPATIENT
Start: 2024-08-19

## 2024-08-19 RX ORDER — BUPROPION HYDROCHLORIDE 300 MG/1
300 TABLET ORAL EVERY MORNING
Qty: 90 TABLET | Refills: 3 | Status: SHIPPED | OUTPATIENT
Start: 2024-08-19

## 2024-08-19 RX ORDER — TAMSULOSIN HYDROCHLORIDE 0.4 MG/1
0.4 CAPSULE ORAL DAILY
Qty: 90 CAPSULE | Refills: 3 | Status: SHIPPED | OUTPATIENT
Start: 2024-08-19

## 2024-08-19 RX ORDER — ALBUTEROL SULFATE 90 UG/1
2 AEROSOL, METERED RESPIRATORY (INHALATION) EVERY 4 HOURS PRN
Qty: 18 G | Refills: 11 | Status: SHIPPED | OUTPATIENT
Start: 2024-08-19

## 2024-08-19 RX ORDER — SEMAGLUTIDE 2.68 MG/ML
2 INJECTION, SOLUTION SUBCUTANEOUS WEEKLY
Qty: 9 ML | Refills: 3 | Status: SHIPPED | OUTPATIENT
Start: 2024-08-19

## 2024-08-19 ASSESSMENT — ASTHMA QUESTIONNAIRES
QUESTION_4 LAST FOUR WEEKS HOW OFTEN HAVE YOU USED YOUR RESCUE INHALER OR NEBULIZER MEDICATION (SUCH AS ALBUTEROL): NOT AT ALL
ACT_TOTALSCORE: 25
QUESTION_2 LAST FOUR WEEKS HOW OFTEN HAVE YOU HAD SHORTNESS OF BREATH: NOT AT ALL
QUESTION_5 LAST FOUR WEEKS HOW WOULD YOU RATE YOUR ASTHMA CONTROL: COMPLETELY CONTROLLED
QUESTION_3 LAST FOUR WEEKS HOW OFTEN DID YOUR ASTHMA SYMPTOMS (WHEEZING, COUGHING, SHORTNESS OF BREATH, CHEST TIGHTNESS OR PAIN) WAKE YOU UP AT NIGHT OR EARLIER THAN USUAL IN THE MORNING: NOT AT ALL
QUESTION_1 LAST FOUR WEEKS HOW MUCH OF THE TIME DID YOUR ASTHMA KEEP YOU FROM GETTING AS MUCH DONE AT WORK, SCHOOL OR AT HOME: NONE OF THE TIME
ACT_TOTALSCORE: 25

## 2024-08-19 NOTE — PROGRESS NOTES
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 He is traveling widely and has an upcoming vacation to [place]. Significant weight loss has led to resolution of obesity, now in the overweight range. Immunizations UTD and routine cancer screening UTD. Diet and exercise regimen has been excellent with  sessions twice a week.     Type 2 diabetes mellitus with other specified complication, without long-term current use of insulin (H)  Weight loss has led to normalization of hgbA1C   Lab Results   Component Value Date    A1C 4.8 11/09/2023    A1C 4.9 06/06/2023    A1C 4.9 11/30/2022    A1C 5.0 06/03/2022    A1C 4.8 07/15/2021    A1C 5.0 12/09/2020    A1C 5.1 08/06/2020    A1C 5.2 12/03/2019    A1C 5.6 04/30/2019    A1C 5.7 11/13/2018    Regimen mangaged by his endocrinologist. No changes.  - OZEMPIC, 2 MG/DOSE, 8 MG/3ML pen; Inject 2 mg subcutaneously once a week  - **Comprehensive metabolic panel FUTURE 2mo; Future  - CBC with platelets and differential; Future    Essential hypertension  BP in target range without side effects. No changes. BMP ok. Due in November (ordered).  - allopurinol (ZYLOPRIM) 100 MG tablet; take by mouth 1 tab ( 100 mg)  at bedtime  - amLODIPine (NORVASC) 10 MG tablet; Take 1 tablet (10 mg) by mouth at bedtime  - metFORMIN (GLUCOPHAGE) 1000 MG tablet; Take 2 tablets (2,000 mg) by mouth daily (with dinner)  - rosuvastatin (CRESTOR) 40 MG tablet; Take 1 tablet (40 mg) by mouth daily  - CBC with platelets and differential; Future    Hyperlipidemia LDL goal <100  Recent Labs   Lab Test 11/09/23  0740 06/06/23  0751   CHOL 85 92   HDL 43 48   LDL 28 34   TRIG 71 51     No change in rosuvastatin dosing.  - allopurinol (ZYLOPRIM) 100 MG tablet; take by mouth 1 tab ( 100 mg)  at bedtime  - amLODIPine (NORVASC) 10 MG  "tablet; Take 1 tablet (10 mg) by mouth at bedtime  - metFORMIN (GLUCOPHAGE) 1000 MG tablet; Take 2 tablets (2,000 mg) by mouth daily (with dinner)  - rosuvastatin (CRESTOR) 40 MG tablet; Take 1 tablet (40 mg) by mouth daily  - Lipid panel reflex to direct LDL Fasting; Future    Coronary artery disease due to calcified coronary lesion  CAC over 700. No change in max risk reduction strategy. Lipids in ideal range. Need assessment of inflammatory markers (hs-crp) and if elevated would discuss potential use of daily colchicine 0.5 mg given the results of several recent meta-analyses, NEJM article and a [facility] Review.     Mild persistent asthma without complication  No cough at present. Avoidance of viral infections with all available immunizations.   - albuterol (PROAIR HFA/PROVENTIL HFA/VENTOLIN HFA) 108 (90 Base) MCG/ACT inhaler; Inhale 2 puffs into the lungs every 4 hours as needed for shortness of breath, wheezing or cough    Gastroesophageal reflux disease without esophagitis  Continue omeprazole.   - omeprazole (PRILOSEC) 20 MG DR capsule; Take 1 capsule (20 mg) by mouth daily    Benign prostatic hyperplasia, unspecified whether lower urinary tract symptoms present  Continue tamsulosin.  - tamsulosin (FLOMAX) 0.4 MG capsule; Take 1 capsule (0.4 mg) by mouth daily    Need for hepatitis C screening test  Ordered.  - Hepatitis C Screen Reflex to HCV RNA Quant and Genotype; Future    Gout, unspecified cause, unspecified chronicity, unspecified site  No change recommended- urate level <5.   - **Uric acid FUTURE 2mo; Future          BMI  Estimated body mass index is 26.96 kg/m  as calculated from the following:    Height as of 1/29/24: 1.88 m (6' 2.02\").    Weight as of this encounter: 95.3 kg (210 lb 1.6 oz).     Pal Patrick is a 68 year old, presenting for the following health issues:  Follow Up      8/19/2024     1:29 PM   Additional Questions   Roomed by KTR     History of Present Illness       Reason for " visit:  Follow up    He eats 2-3 servings of fruits and vegetables daily.He consumes 0 sweetened beverage(s) daily.He exercises with enough effort to increase his heart rate 20 to 29 minutes per day.  He exercises with enough effort to increase his heart rate 7 days per week.   He is taking medications regularly.       Review of Systems  Constitutional, HEENT, cardiovascular, pulmonary, gi and gu systems are negative, except as otherwise noted.      Objective    /77 (BP Location: Right arm, Patient Position: Sitting, Cuff Size: Adult Large)   Pulse 86   Wt 95.3 kg (210 lb 1.6 oz)   SpO2 96%   BMI 26.96 kg/m    Body mass index is 26.96 kg/m .  Physical Exam   GENERAL: alert and no distress  NECK: no adenopathy, no asymmetry, masses, or scars  RESP: lungs clear to auscultation - no rales, rhonchi or wheezes  CV: regular rate and rhythm, normal S1 S2, no S3 or S4, no murmur, click or rub, no peripheral edema  ABDOMEN: soft, nontender, no hepatosplenomegaly, no masses and bowel sounds normal  MS: no gross musculoskeletal defects noted, no edema  SKIN: no suspicious lesions or rashes  NEURO: Normal strength and tone, mentation intact and speech normal  PSYCH: mentation appears normal, affect normal/bright    Office Visit on 06/28/2024   Component Date Value Ref Range Status    Ventricular Rate 06/28/2024 91  BPM Final    Atrial Rate 06/28/2024 91  BPM Final    WI Interval 06/28/2024 158  ms Final    QRS Duration 06/28/2024 126  ms Final    QT 06/28/2024 380  ms Final    QTc 06/28/2024 467  ms Final    P Axis 06/28/2024 34  degrees Final    R AXIS 06/28/2024 -60  degrees Final    T Axis 06/28/2024 79  degrees Final    Interpretation ECG 06/28/2024    Final                    Value:Sinus rhythm  Right bundle branch block  Left anterior fascicular block  ** Bifascicular block **  Abnormal ECG  When compared with ECG of 18-MAR-2022 14:37,  (RBBB and left anterior fascicular block) is now Present    Confirmed by  MD BOB JANE ([id number]) on 6/28/2024 9:40:40 AM             Signed Electronically by: Raul Dutta MD

## 2024-08-20 ENCOUNTER — TELEPHONE (OUTPATIENT)
Dept: INTERNAL MEDICINE | Facility: CLINIC | Age: 69
End: 2024-08-20

## 2024-08-20 NOTE — TELEPHONE ENCOUNTER
[facility] Pharm[facility] Prior Authorization Team   Phone: [phone]    PRIOR AUTHORIZATION DENIED    Medication: OZEMPIC (2 MG/DOSE) 8 MG/3ML SC SOPN  Insurance Company: [employer] - Phone [phone] Fax [phone]  Denial Date: 8/20/2024  Denial Reason(s): MUST PROVIDE DOCUMENTATION CONFIRMING DX - A1C GREATER THAN OR EQUAL TO 6.5%      Appeal Information: IF THE PROVIDER WOULD LIKE TO APPEAL THIS DECISION PLEASE PROVIDE THE PA TEAM WITH A LETTER OF MEDICAL NECESSITY      Patient Notified: NO

## 2024-10-08 NOTE — OR NURSING
Discharge instructions reviewed with patient's wife Jyoti via phone. No questions or concerns at this time.    Patient stopped in and asked if she can get procedure done this month so that she can travel to [place]. If not she would like to push procedure out to March of 2025.    Please advise     CB# [phone]

## 2024-11-08 ENCOUNTER — LAB (OUTPATIENT)
Dept: LAB | Facility: CLINIC | Age: 69
End: 2024-11-08
Attending: INTERNAL MEDICINE
Payer: COMMERCIAL

## 2024-11-08 DIAGNOSIS — M10.9 GOUT, UNSPECIFIED: ICD-10-CM

## 2024-11-08 DIAGNOSIS — N25.81 SECONDARY HYPERPARATHYROIDISM (H): ICD-10-CM

## 2024-11-08 DIAGNOSIS — Z11.59 NEED FOR HEPATITIS C SCREENING TEST: ICD-10-CM

## 2024-11-08 DIAGNOSIS — E23.0 PANHYPOPITUITARISM (H): ICD-10-CM

## 2024-11-08 DIAGNOSIS — E78.5 HYPERLIPEMIA: Primary | ICD-10-CM

## 2024-11-08 DIAGNOSIS — E55.9 VITAMIN D DEFICIENCY: ICD-10-CM

## 2024-11-08 DIAGNOSIS — R73.01 IMPAIRED FASTING GLUCOSE: ICD-10-CM

## 2024-11-08 DIAGNOSIS — I10 ESSENTIAL HYPERTENSION: ICD-10-CM

## 2024-11-08 DIAGNOSIS — E11.69 TYPE 2 DIABETES MELLITUS WITH OTHER SPECIFIED COMPLICATION, WITHOUT LONG-TERM CURRENT USE OF INSULIN (H): ICD-10-CM

## 2024-11-08 LAB
ALBUMIN SERPL BCG-MCNC: 4.3 G/DL (ref 3.5–5.2)
ALP SERPL-CCNC: 82 U/L (ref 40–150)
ALT SERPL W P-5'-P-CCNC: 33 U/L (ref 0–70)
ANION GAP SERPL CALCULATED.3IONS-SCNC: 9 MMOL/L (ref 7–15)
AST SERPL W P-5'-P-CCNC: 23 U/L (ref 0–45)
BASOPHILS # BLD AUTO: 0 10E3/UL (ref 0–0.2)
BASOPHILS NFR BLD AUTO: 1 %
BILIRUB SERPL-MCNC: 0.5 MG/DL
BUN SERPL-MCNC: 17 MG/DL (ref 8–23)
C PEPTIDE SERPL-MCNC: 2.5 NG/ML (ref 0.9–6.9)
CALCIUM SERPL-MCNC: 9.9 MG/DL (ref 8.8–10.4)
CALCIUM SERPL-MCNC: 9.9 MG/DL (ref 8.8–10.4)
CHLORIDE SERPL-SCNC: 105 MMOL/L (ref 98–107)
CHOLEST SERPL-MCNC: 87 MG/DL
CREAT SERPL-MCNC: 0.69 MG/DL (ref 0.67–1.17)
CRP SERPL-MCNC: <3 MG/L
EGFRCR SERPLBLD CKD-EPI 2021: >90 ML/MIN/1.73M2
EOSINOPHIL # BLD AUTO: 0.2 10E3/UL (ref 0–0.7)
EOSINOPHIL NFR BLD AUTO: 3 %
ERYTHROCYTE [DISTWIDTH] IN BLOOD BY AUTOMATED COUNT: 14.4 % (ref 10–15)
EST. AVERAGE GLUCOSE BLD GHB EST-MCNC: 100 MG/DL
FASTING STATUS PATIENT QL REPORTED: NORMAL
FSH SERPL IRP2-ACNC: 6.2 MIU/ML (ref 1.5–12.4)
GLUCOSE SERPL-MCNC: 85 MG/DL (ref 70–99)
GLUCOSE SERPL-MCNC: 85 MG/DL (ref 70–99)
HBA1C MFR BLD: 5.1 %
HCO3 SERPL-SCNC: 26 MMOL/L (ref 22–29)
HCT VFR BLD AUTO: 42.5 % (ref 40–53)
HCV AB SERPL QL IA: NONREACTIVE
HDLC SERPL-MCNC: 44 MG/DL
HGB BLD-MCNC: 14.5 G/DL (ref 13.3–17.7)
IMM GRANULOCYTES # BLD: 0 10E3/UL
IMM GRANULOCYTES NFR BLD: 0 %
LDLC SERPL CALC-MCNC: 31 MG/DL
LH SERPL-ACNC: 9.3 MIU/ML (ref 1.7–8.6)
LYMPHOCYTES # BLD AUTO: 1.1 10E3/UL (ref 0.8–5.3)
LYMPHOCYTES NFR BLD AUTO: 21 %
MAGNESIUM SERPL-MCNC: 2 MG/DL (ref 1.7–2.3)
MCH RBC QN AUTO: 30.9 PG (ref 26.5–33)
MCHC RBC AUTO-ENTMCNC: 34.1 G/DL (ref 31.5–36.5)
MCV RBC AUTO: 91 FL (ref 78–100)
MONOCYTES # BLD AUTO: 0.4 10E3/UL (ref 0–1.3)
MONOCYTES NFR BLD AUTO: 7 %
NEUTROPHILS # BLD AUTO: 3.6 10E3/UL (ref 1.6–8.3)
NEUTROPHILS NFR BLD AUTO: 69 %
NONHDLC SERPL-MCNC: 43 MG/DL
NRBC # BLD AUTO: 0 10E3/UL
NRBC BLD AUTO-RTO: 0 /100
PHOSPHATE SERPL-MCNC: 2.7 MG/DL (ref 2.5–4.5)
PLATELET # BLD AUTO: 152 10E3/UL (ref 150–450)
POTASSIUM SERPL-SCNC: 3.9 MMOL/L (ref 3.4–5.3)
PROT SERPL-MCNC: 6.6 G/DL (ref 6.4–8.3)
PTH-INTACT SERPL-MCNC: 53 PG/ML (ref 15–65)
RBC # BLD AUTO: 4.69 10E6/UL (ref 4.4–5.9)
SODIUM SERPL-SCNC: 140 MMOL/L (ref 135–145)
TRIGL SERPL-MCNC: 62 MG/DL
TSH SERPL DL<=0.005 MIU/L-ACNC: 3.01 UIU/ML (ref 0.3–4.2)
URATE SERPL-MCNC: 2.9 MG/DL (ref 3.4–7)
VIT D+METAB SERPL-MCNC: 62 NG/ML (ref 20–50)
WBC # BLD AUTO: 5.2 10E3/UL (ref 4–11)

## 2024-11-08 PROCEDURE — 86803 HEPATITIS C AB TEST: CPT | Performed by: INTERNAL MEDICINE

## 2024-11-08 PROCEDURE — 83735 ASSAY OF MAGNESIUM: CPT | Performed by: PATHOLOGY

## 2024-11-08 PROCEDURE — 84550 ASSAY OF BLOOD/URIC ACID: CPT | Performed by: PATHOLOGY

## 2024-11-08 PROCEDURE — 80053 COMPREHEN METABOLIC PANEL: CPT | Performed by: PATHOLOGY

## 2024-11-08 PROCEDURE — 36415 COLL VENOUS BLD VENIPUNCTURE: CPT | Performed by: PATHOLOGY

## 2024-11-08 PROCEDURE — 82306 VITAMIN D 25 HYDROXY: CPT | Performed by: INTERNAL MEDICINE

## 2024-11-08 PROCEDURE — 84403 ASSAY OF TOTAL TESTOSTERONE: CPT | Performed by: INTERNAL MEDICINE

## 2024-11-08 PROCEDURE — 83001 ASSAY OF GONADOTROPIN (FSH): CPT | Performed by: INTERNAL MEDICINE

## 2024-11-08 PROCEDURE — 85025 COMPLETE CBC W/AUTO DIFF WBC: CPT | Performed by: PATHOLOGY

## 2024-11-08 PROCEDURE — 83002 ASSAY OF GONADOTROPIN (LH): CPT | Performed by: INTERNAL MEDICINE

## 2024-11-08 PROCEDURE — 80061 LIPID PANEL: CPT | Performed by: PATHOLOGY

## 2024-11-08 PROCEDURE — 84100 ASSAY OF PHOSPHORUS: CPT | Performed by: PATHOLOGY

## 2024-11-08 PROCEDURE — 99000 SPECIMEN HANDLING OFFICE-LAB: CPT | Performed by: PATHOLOGY

## 2024-11-08 PROCEDURE — 86140 C-REACTIVE PROTEIN: CPT | Performed by: PATHOLOGY

## 2024-11-08 PROCEDURE — 83036 HEMOGLOBIN GLYCOSYLATED A1C: CPT | Performed by: INTERNAL MEDICINE

## 2024-11-08 PROCEDURE — 83970 ASSAY OF PARATHORMONE: CPT | Performed by: PATHOLOGY

## 2024-11-08 PROCEDURE — 84681 ASSAY OF C-PEPTIDE: CPT | Performed by: INTERNAL MEDICINE

## 2024-11-08 PROCEDURE — 84443 ASSAY THYROID STIM HORMONE: CPT | Performed by: PATHOLOGY

## 2024-11-12 LAB — TESTOST SERPL-MCNC: 297 NG/DL (ref 240–950)

## 2025-01-07 ENCOUNTER — OFFICE VISIT (OUTPATIENT)
Dept: OPHTHALMOLOGY | Facility: CLINIC | Age: 70
End: 2025-01-07
Attending: OPHTHALMOLOGY
Payer: COMMERCIAL

## 2025-01-07 DIAGNOSIS — H04.123 BILATERAL DRY EYES: ICD-10-CM

## 2025-01-07 DIAGNOSIS — Z96.1 PSEUDOPHAKIA, BOTH EYES: Primary | ICD-10-CM

## 2025-01-07 DIAGNOSIS — H02.88A MEIBOMIAN GLAND DYSFUNCTION (MGD), BILATERAL, BOTH UPPER AND LOWER LIDS: ICD-10-CM

## 2025-01-07 DIAGNOSIS — H02.88B MEIBOMIAN GLAND DYSFUNCTION (MGD), BILATERAL, BOTH UPPER AND LOWER LIDS: ICD-10-CM

## 2025-01-07 PROCEDURE — 92014 COMPRE OPH EXAM EST PT 1/>: CPT | Performed by: OPHTHALMOLOGY

## 2025-01-07 PROCEDURE — 99213 OFFICE O/P EST LOW 20 MIN: CPT | Performed by: OPHTHALMOLOGY

## 2025-01-07 ASSESSMENT — VISUAL ACUITY
OD_CC+: -1
OS_CC: 20/40
OD_CC+: -2
OS_PH_CC+: -3
OS_CC: 20/25
OS_PH_CC: 20/20
OD_PH_CC+: -3
OD_PH_CC: 20/25
METHOD_MR: OPPOSITE SIGNS ARE CORRECT.
OD_CC: 20/25
METHOD: SNELLEN - LINEAR
OD_CC: 20/50
METHOD: SNELLEN - LINEAR
CORRECTION_TYPE: GLASSES

## 2025-01-07 ASSESSMENT — REFRACTION_MANIFEST
OD_SPHERE: -0.50
OS_ADD: +2.50
OS_CYLINDER: +0.25
OS_SPHERE: +0.50
OS_AXIS: 061
OD_ADD: +2.50
OD_CYLINDER: +0.75
OD_AXIS: 136

## 2025-01-07 ASSESSMENT — CONF VISUAL FIELD
OD_SUPERIOR_TEMPORAL_RESTRICTION: 0
OS_SUPERIOR_NASAL_RESTRICTION: 0
OD_INFERIOR_TEMPORAL_RESTRICTION: 0
OD_SUPERIOR_NASAL_RESTRICTION: 0
OS_INFERIOR_NASAL_RESTRICTION: 0
OS_NORMAL: 1
OD_INFERIOR_NASAL_RESTRICTION: 0
OD_NORMAL: 1
OS_INFERIOR_TEMPORAL_RESTRICTION: 0
OS_SUPERIOR_TEMPORAL_RESTRICTION: 0
METHOD: COUNTING FINGERS

## 2025-01-07 ASSESSMENT — REFRACTION_WEARINGRX
OD_SPHERE: -0.50
OS_ADD: +2.50
OS_CYLINDER: +0.50
SPECS_TYPE: PAL
OD_CYLINDER: +0.75
OS_AXIS: 059
OD_ADD: +2.50
OD_AXIS: 130
OS_SPHERE: +0.75

## 2025-01-07 ASSESSMENT — TONOMETRY
IOP_METHOD: ICARE
OS_IOP_MMHG: 15
OD_IOP_MMHG: 15

## 2025-01-07 ASSESSMENT — CUP TO DISC RATIO
OD_RATIO: 0.1
OS_RATIO: 0.1

## 2025-01-07 NOTE — PROGRESS NOTES
HPI       COMPREHENSIVE EYE EXAM    In both eyes.  Associated symptoms include Negative for dryness, eye pain, tearing, flashes and floaters.  Treatments tried include no treatments.  Pain was noted as 0/10. Additional comments: Pt here for CEE.             Comments    Pt states vision is the same or a little worse.  No pain.  No flashes/floaters.  No ocular meds.  Pt states he is not DM.    TIGRE Osman January 7, 2025 12:33 PM                  Last edited by Cortez Boggs COT on 1/7/2025 12:33 PM.          Review of systems for the eyes was negative other than the pertinent positives/negatives listed in the HPI.      Assessment & Plan      Darnell Grant is a 69 year old male with the following diagnoses:   1. Pseudophakia, both eyes    2. Meibomian gland dysfunction (MGD), bilateral, both upper and lower lids    3. Bilateral dry eyes         Here for annual dilated fundus exam   s/p CE IOL 2020 (right eye: 10/2020, left eye: 9/2020) by Dr. Tobin  Overall feels the eyes are doing well, but needs updated glasses   Dilated fundus exam stable and healthy in both eyes   Posterior capsular opacity (PCO) not visually significant     Artificial tears and warm compresses 1-2x daily as needed   Breaks from computer throughout the day  Refractive options reviewed  Refraction given   Return precautions reviewed       Patient disposition:   Return in about 1 year (around 1/7/2026) for Dr. Bentley for annual exams.           Attending Physician Attestation:  Complete documentation of historical and exam elements from today's encounter can be found in the full encounter summary report (not reduplicated in this progress note).  I personally obtained the chief complaint(s) and history of present illness.  I confirmed and edited as necessary the review of systems, past medical/surgical history, family history, social history, and examination findings as documented by others; and I examined the patient myself.  I  personally reviewed the relevant tests, images, and reports as documented above.  I formulated and edited as necessary the assessment and plan and discussed the findings and management plan with the patient and family. . - Jermaine Jeffries MD

## 2025-01-07 NOTE — NURSING NOTE
Chief Complaints and History of Present Illnesses   Patient presents with    COMPREHENSIVE EYE EXAM     Pt here for CEE.     Chief Complaint(s) and History of Present Illness(es)       COMPREHENSIVE EYE EXAM              Laterality: both eyes    Associated symptoms: Negative for dryness, eye pain, tearing, flashes and floaters    Treatments tried: no treatments    Pain scale: 0/10    Comments: Pt here for CEE.              Comments    Pt states vision is the same or a little worse.  No pain.  No flashes/floaters.  No ocular meds.  Pt states he is not DM.    TIGRE Osman January 7, 2025 12:33 PM

## 2025-01-18 ENCOUNTER — LAB (OUTPATIENT)
Dept: LAB | Facility: CLINIC | Age: 70
End: 2025-01-18
Payer: COMMERCIAL

## 2025-01-18 DIAGNOSIS — N40.0 BENIGN PROSTATIC HYPERPLASIA, UNSPECIFIED WHETHER LOWER URINARY TRACT SYMPTOMS PRESENT: ICD-10-CM

## 2025-01-18 LAB — PSA SERPL DL<=0.01 NG/ML-MCNC: 3.4 NG/ML (ref 0–6.5)

## 2025-01-18 PROCEDURE — 84153 ASSAY OF PSA TOTAL: CPT | Performed by: PATHOLOGY

## 2025-01-18 PROCEDURE — 36415 COLL VENOUS BLD VENIPUNCTURE: CPT | Performed by: PATHOLOGY

## 2025-01-21 ENCOUNTER — OFFICE VISIT (OUTPATIENT)
Dept: UROLOGY | Facility: CLINIC | Age: 70
End: 2025-01-21
Payer: COMMERCIAL

## 2025-01-21 VITALS — DIASTOLIC BLOOD PRESSURE: 78 MMHG | OXYGEN SATURATION: 97 % | HEART RATE: 77 BPM | SYSTOLIC BLOOD PRESSURE: 131 MMHG

## 2025-01-21 DIAGNOSIS — N40.1 BENIGN LOCALIZED PROSTATIC HYPERPLASIA WITH LOWER URINARY TRACT SYMPTOMS (LUTS): Primary | ICD-10-CM

## 2025-01-21 DIAGNOSIS — N40.0 BENIGN PROSTATIC HYPERPLASIA, UNSPECIFIED WHETHER LOWER URINARY TRACT SYMPTOMS PRESENT: ICD-10-CM

## 2025-01-21 RX ORDER — TAMSULOSIN HYDROCHLORIDE 0.4 MG/1
0.8 CAPSULE ORAL DAILY
Qty: 180 CAPSULE | Refills: 3 | Status: SHIPPED | OUTPATIENT
Start: 2025-01-21

## 2025-01-21 ASSESSMENT — PAIN SCALES - GENERAL: PAINLEVEL_OUTOF10: NO PAIN (0)

## 2025-01-21 NOTE — PROGRESS NOTES
UROLOGY OUTPATIENT VISIT      Chief Complaint:   Enlarged prostate      Synopsis    Darnell Grant is a very pleasant AGE: 69 year old year old person    He has a hx of moderate LUTS, previously underwent Rezum years ago  Returns for annual check  Symptoms essentiall stable, AUA SS 20/35, QOL 2  PVR around 100  No UTI or hematuria  Started back on testosterone several months ago  Recent PSA repeat is elevated from the 1's where it has been the past several years to 3.4         Medications     Current Outpatient Medications   Medication Sig Dispense Refill    albuterol (PROAIR HFA/PROVENTIL HFA/VENTOLIN HFA) 108 (90 Base) MCG/ACT inhaler Inhale 2 puffs into the lungs every 4 hours as needed for shortness of breath, wheezing or cough 18 g 11    allopurinol (ZYLOPRIM) 100 MG tablet take by mouth 1 tab ( 100 mg)  at bedtime 90 tablet 3    amLODIPine (NORVASC) 10 MG tablet Take 1 tablet (10 mg) by mouth at bedtime 90 tablet 3    buPROPion (WELLBUTRIN XL) 300 MG 24 hr tablet Take 1 tablet (300 mg) by mouth every morning 90 tablet 3    candesartan (ATACAND) 32 MG tablet Take 32 mg by mouth at bedtime 90 tablet 3    empagliflozin (JARDIANCE) 25 MG TABS tablet Take 1 tablet (25 mg) by mouth every morning 90 tablet 3    metFORMIN (GLUCOPHAGE) 1000 MG tablet Take 2 tablets (2,000 mg) by mouth daily (with dinner) 180 tablet 3    montelukast (SINGULAIR) 10 MG tablet Take 1 tablet (10 mg) by mouth every evening 30 tablet 11    multivitamin w/minerals (THERA-VIT-M) tablet Take 1 tablet by mouth At Bedtime      omeprazole (PRILOSEC) 20 MG DR capsule Take 1 capsule (20 mg) by mouth daily 90 capsule 3    phentermine (ADIPEX-P) 37.5 MG tablet Take 1.5 tablets (56 mg) by mouth every morning (before breakfast) 135 tablet 3    rosuvastatin (CRESTOR) 40 MG tablet Take 1 tablet (40 mg) by mouth daily 90 tablet 3    tamsulosin (FLOMAX) 0.4 MG capsule Take 2 capsules (0.8 mg) by mouth daily. 180 capsule 3    OZEMPIC, 2 MG/DOSE, 8  MG/3ML pen Inject 2 mg subcutaneously once a week 9 mL 3     No current facility-administered medications for this visit.     Facility-Administered Medications Ordered in Other Visits   Medication Dose Route Frequency Provider Last Rate Last Admin    barium sulfate 40% (VARIBAR THIN HONEY) oral suspension   Oral Once Nathan Rojas MD             The following  distinct labs were reviewed    I personally reviewed all applicable laboratory data and went over findings with patient  Significant for:    CBC RESULTS:  Recent Labs   Lab Test 11/08/24 0724 04/18/22  1458 10/26/21  1229 07/14/21  1122   WBC 5.2 5.3 7.3 16.3*   HGB 14.5 16.1 18.2* 18.2*    174 175 170        BMP RESULTS:  Recent Labs   Lab Test 11/08/24 0724 11/09/23  0740 06/06/23  0751 11/30/22  0746 06/03/22  0804 02/07/22  0805 06/11/21  0800 12/09/20  0815 09/15/20  0732 08/06/20  0714 12/10/19  0630 12/03/19  0836    141  --  141  --  140   < > 141 142 141  --  138   POTASSIUM 3.9 4.1  --  4.1  --  3.8   < > 4.2 3.9 4.1   < > 4.0   CHLORIDE 105 105  --  104  --  108   < > 107 107 109  --  105   CO2 26 25  --  27  --  24   < > 30 28 26  --  28   ANIONGAP 9 11  --  10  --  8   < > 4 7 6  --  4   GLC 85  85 90  90 91 94  92   < > 120*   < > 90 131* 88   < > 96   BUN 17.0 17.0  --  15.8  --  19   < > 16 18 25  --  22   CR 0.69 0.70  --  0.74  --  0.78   < > 0.98 0.94 1.00  --  0.83   GFRESTIMATED >90 >90  --  >90  --  >90   < > 80 84 78  --  >90   GFRESTBLACK  --   --   --   --   --   --   --  >90 >90 >90  --  >90    < > = values in this interval not displayed.       CALCIUM RESULTS:  Recent Labs   Lab Test 11/08/24 0724 11/09/23  0740 06/06/23  0751 11/30/22  0746   ZHANE 9.9  9.9 10.1  10.1 10.1 10.1  10.3*       PTH RESULTS:  Recent Labs   Lab Test 11/08/24  0724 11/09/23  0740 06/06/23  0751 11/30/22  0746   PTHI 53 60 67* 60       HGB A1C RESULTS:  Lab Results   Component Value Date    A1C 5.1 11/08/2024    A1C 4.8  11/09/2023    A1C 4.9 06/06/2023    A1C 4.9 11/30/2022    A1C 5.0 06/03/2022    A1C 5.0 12/09/2020    A1C 5.1 08/06/2020    A1C 5.2 12/03/2019    A1C 5.6 04/30/2019    A1C 5.7 11/13/2018       UA RESULTS:   Recent Labs   Lab Test 07/15/21  0245 12/09/20  0821 12/03/19  0832   SG 1.039* 1.017 1.024   URINEPH 6.0 5.0 6.0   NITRITE Positive* Negative Negative   RBCU 2  --  3*   WBCU 4  --  90*       PSA RESULTS  PSA   Date Value Ref Range Status   08/06/2020 2.07 0 - 4 ug/L Final     Comment:     Assay Method:  Chemiluminescence using Siemens Vista analyzer   12/03/2019 2.08 0 - 4 ug/L Final     Comment:     Assay Method:  Chemiluminescence using Siemens Vista analyzer   04/30/2019 1.90 0 - 4 ug/L Final     Comment:     Assay Method:  Chemiluminescence using Siemens Vista analyzer   11/13/2018 4.51 (H) 0 - 4 ug/L Final     Comment:     Assay Method:  Chemiluminescence using Siemens Vista analyzer   03/06/2018 0.96 0 - 4 ug/L Final     Comment:     Assay Method:  Chemiluminescence using Siemens Vista analyzer   08/31/2015 0.60 0 - 4 ug/L Final   07/14/2014 0.35 0 - 4 ug/L Final     Comment:     PSA results are about 7% lower than our prior method due to a methodology   change   on August 30, 2011.     02/10/2009 0.40 0 - 4 ug/L Final   07/21/2008 0.56 0 - 4 ug/L Final   07/09/2007 0.53 0 - 4 ug/L Final     Prostate Specific Antigen Screen   Date Value Ref Range Status   11/09/2023 1.31 0.00 - 4.50 ng/mL Final     PSA Tumor Marker   Date Value Ref Range Status   01/18/2025 3.40 0.00 - 6.50 ng/mL Final   01/24/2023 1.29 0.00 - 4.50 ng/mL Final   01/03/2022 2.03 0.00 - 4.00 ug/L Final            Assessment/Plan   69 year old year old person with hx of BPH/LUTS  -Renewed flomax, recommend trial of 0.8 mg, risks and benefits reviewed.  He prefers to hold off on discussion of procedures at this time  -Update PSA In one month, suspect rise related to recent resuming TRT but will repeat.  If does not downtrend consider MRI  prostate, also with known hx of prostate nodule many years    CC:  Raul Dutta

## 2025-01-21 NOTE — LETTER
1/21/2025       RE: Darnell Grant  [address]  [facility] [address]     Dear Colleague,    Thank you for referring your patient, Darnell Grant, to the [facility] [place] [facility]. Please see a copy of my visit note below.          UROLOGY OUTPATIENT VISIT      Chief Complaint:   Enlarged prostate      Synopsis    Darnell Grant is a very pleasant AGE: 69 year old year old person    He has a hx of moderate LUTS, previously underwent Rezum years ago  Returns for annual check  Symptoms essentiall stable, AUA SS 20/35, QOL 2  PVR around 100  No UTI or hematuria  Started back on testosterone several months ago  Recent PSA repeat is elevated from the 1's where it has been the past several years to 3.4         Medications     Current Outpatient Medications   Medication Sig Dispense Refill     albuterol (PROAIR HFA/PROVENTIL HFA/VENTOLIN HFA) 108 (90 Base) MCG/ACT inhaler Inhale 2 puffs into the lungs every 4 hours as needed for shortness of breath, wheezing or cough 18 g 11     allopurinol (ZYLOPRIM) 100 MG tablet take by mouth 1 tab ( 100 mg)  at bedtime 90 tablet 3     amLODIPine (NORVASC) 10 MG tablet Take 1 tablet (10 mg) by mouth at bedtime 90 tablet 3     buPROPion (WELLBUTRIN XL) 300 MG 24 hr tablet Take 1 tablet (300 mg) by mouth every morning 90 tablet 3     candesartan (ATACAND) 32 MG tablet Take 32 mg by mouth at bedtime 90 tablet 3     empagliflozin (JARDIANCE) 25 MG TABS tablet Take 1 tablet (25 mg) by mouth every morning 90 tablet 3     metFORMIN (GLUCOPHAGE) 1000 MG tablet Take 2 tablets (2,000 mg) by mouth daily (with dinner) 180 tablet 3     montelukast (SINGULAIR) 10 MG tablet Take 1 tablet (10 mg) by mouth every evening 30 tablet 11     multivitamin w/minerals (THERA-VIT-M) tablet Take 1 tablet by mouth At Bedtime       omeprazole (PRILOSEC) 20 MG DR capsule Take 1 capsule (20 mg) by mouth daily 90 capsule 3      phentermine (ADIPEX-P) 37.5 MG tablet Take 1.5 tablets (56 mg) by mouth every morning (before breakfast) 135 tablet 3     rosuvastatin (CRESTOR) 40 MG tablet Take 1 tablet (40 mg) by mouth daily 90 tablet 3     tamsulosin (FLOMAX) 0.4 MG capsule Take 2 capsules (0.8 mg) by mouth daily. 180 capsule 3     OZEMPIC, 2 MG/DOSE, 8 MG/3ML pen Inject 2 mg subcutaneously once a week 9 mL 3     No current facility-administered medications for this visit.     Facility-Administered Medications Ordered in Other Visits   Medication Dose Route Frequency Provider Last Rate Last Admin     barium sulfate 40% (VARIBAR THIN HONEY) oral suspension   Oral Once Nathan Rojas MD             The following  distinct labs were reviewed    I personally reviewed all applicable laboratory data and went over findings with patient  Significant for:    CBC RESULTS:  Recent Labs   Lab Test 11/08/24  0724 04/18/22  1458 10/26/21  1229 07/14/21  1122   WBC 5.2 5.3 7.3 16.3*   HGB 14.5 16.1 18.2* 18.2*    174 175 170        BMP RESULTS:  Recent Labs   Lab Test 11/08/24  0724 11/09/23  0740 06/06/23  0751 11/30/22  0746 06/03/22  0804 02/07/22  0805 06/11/21  0800 12/09/20  0815 09/15/20  0732 08/06/20  0714 12/10/19  0630 12/03/19  0836    141  --  141  --  140   < > 141 142 141  --  138   POTASSIUM 3.9 4.1  --  4.1  --  3.8   < > 4.2 3.9 4.1   < > 4.0   CHLORIDE 105 105  --  104  --  108   < > 107 107 109  --  105   CO2 26 25  --  27  --  24   < > 30 28 26  --  28   ANIONGAP 9 11  --  10  --  8   < > 4 7 6  --  4   GLC 85  85 90  90 91 94  92   < > 120*   < > 90 131* 88   < > 96   BUN 17.0 17.0  --  15.8  --  19   < > 16 18 25  --  22   CR 0.69 0.70  --  0.74  --  0.78   < > 0.98 0.94 1.00  --  0.83   GFRESTIMATED >90 >90  --  >90  --  >90   < > 80 84 78  --  >90   GFRESTBLACK  --   --   --   --   --   --   --  >90 >90 >90  --  >90    < > = values in this interval not displayed.       CALCIUM RESULTS:  Recent Labs   Lab Test  11/08/24  0724 11/09/23  0740 06/06/23  0751 11/30/22  0746   ZHANE 9.9  9.9 10.1  10.1 10.1 10.1  10.3*       PTH RESULTS:  Recent Labs   Lab Test 11/08/24  0724 11/09/23  0740 06/06/23  0751 11/30/22  0746   PTHI 53 60 67* 60       HGB A1C RESULTS:  Lab Results   Component Value Date    A1C 5.1 11/08/2024    A1C 4.8 11/09/2023    A1C 4.9 06/06/2023    A1C 4.9 11/30/2022    A1C 5.0 06/03/2022    A1C 5.0 12/09/2020    A1C 5.1 08/06/2020    A1C 5.2 12/03/2019    A1C 5.6 04/30/2019    A1C 5.7 11/13/2018       UA RESULTS:   Recent Labs   Lab Test 07/15/21  0245 12/09/20  0821 12/03/19  0832   SG 1.039* 1.017 1.024   URINEPH 6.0 5.0 6.0   NITRITE Positive* Negative Negative   RBCU 2  --  3*   WBCU 4  --  90*       PSA RESULTS  PSA   Date Value Ref Range Status   08/06/2020 2.07 0 - 4 ug/L Final     Comment:     Assay Method:  Chemiluminescence using Siemens Vista analyzer   12/03/2019 2.08 0 - 4 ug/L Final     Comment:     Assay Method:  Chemiluminescence using Siemens Vista analyzer   04/30/2019 1.90 0 - 4 ug/L Final     Comment:     Assay Method:  Chemiluminescence using Siemens Vista analyzer   11/13/2018 4.51 (H) 0 - 4 ug/L Final     Comment:     Assay Method:  Chemiluminescence using Siemens Vista analyzer   03/06/2018 0.96 0 - 4 ug/L Final     Comment:     Assay Method:  Chemiluminescence using Siemens Vista analyzer   08/31/2015 0.60 0 - 4 ug/L Final   07/14/2014 0.35 0 - 4 ug/L Final     Comment:     PSA results are about 7% lower than our prior method due to a methodology   change   on August 30, 2011.     02/10/2009 0.40 0 - 4 ug/L Final   07/21/2008 0.56 0 - 4 ug/L Final   07/09/2007 0.53 0 - 4 ug/L Final     Prostate Specific Antigen Screen   Date Value Ref Range Status   11/09/2023 1.31 0.00 - 4.50 ng/mL Final     PSA Tumor Marker   Date Value Ref Range Status   01/18/2025 3.40 0.00 - 6.50 ng/mL Final   01/24/2023 1.29 0.00 - 4.50 ng/mL Final   01/03/2022 2.03 0.00 - 4.00 ug/L Final             Assessment/Plan   69 year old year old person with hx of BPH/LUTS  -Renewed flomax, recommend trial of 0.8 mg, risks and benefits reviewed.  He prefers to hold off on discussion of procedures at this time  -Update PSA In one month, suspect rise related to recent resuming TRT but will repeat.  If does not downtrend consider MRI prostate, also with known hx of prostate nodule many years    CC:  Raul Dutta      Again, thank you for allowing me to participate in the care of your patient.      Sincerely,    Diaz Pitt MD

## 2025-01-21 NOTE — NURSING NOTE
Post-Void Residual US:   US performed at bedside with patient laying supine on exam table   Residual urine: 113 mls   Performed with patient consent.   PVR education provided      Tests performed as requested by Dr. Sweetie MD

## 2025-01-21 NOTE — NURSING NOTE
Chief Complaint   Patient presents with    Follow Up     Here today for a one year follow up - review PSA results. Symptoms are worsening . He is concerned is prostate is larger than before. No pain today. No cold medications.        Blood pressure 131/78, pulse 77, SpO2 97%. There is no height or weight on file to calculate BMI.    Patient Active Problem List   Diagnosis    Adjustment disorder with mixed anxiety and depressed mood    Prostate nodule    Pure hypercholesterolemia    Essential hypertension, benign    Chronic cough    Diarrhea    Obstructive sleep apnea hypopnea, moderate [AHI 21 on CPAP 5-15]    Agatston coronary artery calcium score greater than 400    Mass of urinary bladder    Microscopic hematuria    Benign prostatic hyperplasia with urinary obstruction    Combined forms of age-related cataract of both eyes    Myopia of both eyes with astigmatism and presbyopia    Esophageal dysphagia    Osteophyte of cervical spine    Hyperlipidemia    Hypertension    Lumbar radiculopathy    Metabolic syndrome    Obesity, unspecified    Dysphagia    Nuclear sclerotic cataract of both eyes    Acute appendicitis with localized peritonitis, without perforation, abscess, or gangrene    Supraventricular tachycardia    Elevated hemoglobin       Allergies   Allergen Reactions    Ragweeds      Watery eyes, itchy nose       Current Outpatient Medications   Medication Sig Dispense Refill    albuterol (PROAIR HFA/PROVENTIL HFA/VENTOLIN HFA) 108 (90 Base) MCG/ACT inhaler Inhale 2 puffs into the lungs every 4 hours as needed for shortness of breath, wheezing or cough 18 g 11    allopurinol (ZYLOPRIM) 100 MG tablet take by mouth 1 tab ( 100 mg)  at bedtime 90 tablet 3    amLODIPine (NORVASC) 10 MG tablet Take 1 tablet (10 mg) by mouth at bedtime 90 tablet 3    buPROPion (WELLBUTRIN XL) 300 MG 24 hr tablet Take 1 tablet (300 mg) by mouth every morning 90 tablet 3    candesartan (ATACAND) 32 MG tablet Take 32 mg by mouth at  bedtime 90 tablet 3    empagliflozin (JARDIANCE) 25 MG TABS tablet Take 1 tablet (25 mg) by mouth every morning 90 tablet 3    metFORMIN (GLUCOPHAGE) 1000 MG tablet Take 2 tablets (2,000 mg) by mouth daily (with dinner) 180 tablet 3    montelukast (SINGULAIR) 10 MG tablet Take 1 tablet (10 mg) by mouth every evening 30 tablet 11    multivitamin w/minerals (THERA-VIT-M) tablet Take 1 tablet by mouth At Bedtime      omeprazole (PRILOSEC) 20 MG DR capsule Take 1 capsule (20 mg) by mouth daily 90 capsule 3    phentermine (ADIPEX-P) 37.5 MG tablet Take 1.5 tablets (56 mg) by mouth every morning (before breakfast) 135 tablet 3    rosuvastatin (CRESTOR) 40 MG tablet Take 1 tablet (40 mg) by mouth daily 90 tablet 3    tamsulosin (FLOMAX) 0.4 MG capsule Take 1 capsule (0.4 mg) by mouth daily 90 capsule 3    OZEMPIC, 2 MG/DOSE, 8 MG/3ML pen Inject 2 mg subcutaneously once a week 9 mL 3       Social History     Tobacco Use    Smoking status: Never    Smokeless tobacco: Never   Vaping Use    Vaping status: Never Used   Substance Use Topics    Alcohol use: No    Drug use: No       Wilda Hamilton  1/21/2025  1:57 PM

## 2025-01-31 NOTE — PROGRESS NOTES
"SUBJECTIVE:  Darnell Grant is a 62 year old male presents for   Chief Complaint   Patient presents with     RECHECK     \" I see him every 6 months\"      Darnell has been clinically well. BPH was treated with surgical/cystoscopic procedure and now off all meds for it. PSA has markedly increased from 0.96 to 4.51 and plan for repeat with MRI and/or biopsy if still high. LISETTE treated. Had post-viral cough that went on for months and required 30d steroid taper to resolve.     Medications and allergies were reviewed by me today.     Current Outpatient Prescriptions   Medication     albuterol (PROAIR HFA/PROVENTIL HFA/VENTOLIN HFA) 108 (90 Base) MCG/ACT inhaler     allopurinol (ZYLOPRIM) 100 MG tablet     amLODIPine (NORVASC) 10 MG tablet     candesartan cilexetil 32 MG TABS     metFORMIN (GLUCOPHAGE) 1000 MG tablet     multivitamin, therapeutic with minerals (MULTI-VITAMIN) TABS tablet     order for DME     phentermine (ADIPEX-P) 37.5 MG tablet     ranitidine (ZANTAC) 150 MG tablet     rosuvastatin (CRESTOR) 10 MG tablet     tamsulosin (FLOMAX) 0.4 MG capsule     No current facility-administered medications for this visit.        Review Of Systems:  ROS: 10 point ROS neg other than the symptoms noted above in the HPI.      PHYSICAL EXAM:  /87 (BP Location: Right arm, Patient Position: Sitting, Cuff Size: Adult Large)  Pulse 99  Resp 20  Wt 118.4 kg (261 lb)  SpO2 96%  BMI 33.51 kg/m2      Constitutional: no distress, comfortable, pleasant   Eyes: anicteric, normal extra-ocular movements   Ears, Nose and Throat: tympanic membranes clear, nose clear and free of lesions, throat clear, neck supple with full range of motion, no thyromegaly.   Cardiovascular: regular rate and rhythm, normal S1 and S2, no murmurs, rubs or gallops, peripheral pulses full and symmetric   Respiratory: clear to auscultation, no wheezes or crackles, normal breath sounds   Gastrointestinal: positive bowel sounds, nontender, no " [email or web address]://www.kidney.org/sites/default/[email or web address]    List of high potassium foods   hepatosplenomegaly, no masses   Musculoskeletal: full range of motion, no edema   Skin: no concerning lesions, no jaundice   Neurological: cranial nerves intact, normal strength and sensation, reflexes at patella and biceps normal, normal gait, no tremor   Psychological: appropriate mood   Lymphatic: no cervical, axillary or inguinal lymphadenopathy      ASSESSMENT/PLAN:  1) HCM. Shingrix UTD. Obese by BMI criteria but wt trend is downward with lifestyle modification. Exercise bike ongoing. Walking desk discussed. Colon screening UTD.  2) HTN. BP controlled with amlodipine 10 mg/d, valsartan 160 mg/d. Need home BP readings to assess whether we need to . He will email thiese.   3) ASCVD Risk. Max RF reduction with statin, BP control. Coronary calcium score elevated but Nuc Med stress test with Lexiscan showed summed stress score of 0. Continue current regimen/strategy. Labs reviewed.   4) BPH. Recently PSA testing elevated with significant increase in PSA velocity (0.96 to 4.5). He discussed with Ellie Lomeli who recommends repeat in 4-6 weeks with MRI or biopsy thereafter if still elevated.  5) LISETTE. Not tolerating CPAP. Sleep will improve with BPH treatment.  6) Obesity. Currently seeing a wt loss provider and using phentermine for pharmacologic treatment. 20 lb loss in past 10 mos.  7) Gout. No flares. Urate <6 on allopurinol. No changes.  8) GERD. Zantac.  9) Arrhythmia. Zio patch reassuring. Needs to f/u with Dr. Dutta in Cardiology.  10) Metabolic syndrome. Continue metformin, wt loss, exercise. Last A1C at 5.7%  11) Vitamin D deficiency. Vitamin D level at 39 but PTH still up at 91. Concerned about primary hyperparathyroidism (normocalcemic).         Raul Dutta

## 2025-02-19 ENCOUNTER — LAB (OUTPATIENT)
Dept: LAB | Facility: CLINIC | Age: 70
End: 2025-02-19
Payer: COMMERCIAL

## 2025-02-19 DIAGNOSIS — N40.1 BENIGN LOCALIZED PROSTATIC HYPERPLASIA WITH LOWER URINARY TRACT SYMPTOMS (LUTS): ICD-10-CM

## 2025-02-19 LAB — PSA SERPL DL<=0.01 NG/ML-MCNC: 1.86 NG/ML (ref 0–6.5)

## 2025-02-19 PROCEDURE — 84153 ASSAY OF PSA TOTAL: CPT | Performed by: PATHOLOGY

## 2025-02-19 PROCEDURE — 36415 COLL VENOUS BLD VENIPUNCTURE: CPT | Performed by: PATHOLOGY

## 2025-02-23 ENCOUNTER — HEALTH MAINTENANCE LETTER (OUTPATIENT)
Age: 70
End: 2025-02-23

## 2025-03-04 ENCOUNTER — TELEPHONE (OUTPATIENT)
Dept: CARDIOLOGY | Facility: CLINIC | Age: 70
End: 2025-03-04
Payer: COMMERCIAL

## 2025-03-10 ENCOUNTER — OFFICE VISIT (OUTPATIENT)
Dept: INTERNAL MEDICINE | Facility: CLINIC | Age: 70
End: 2025-03-10
Payer: COMMERCIAL

## 2025-03-10 VITALS
BODY MASS INDEX: 27.77 KG/M2 | RESPIRATION RATE: 18 BRPM | SYSTOLIC BLOOD PRESSURE: 103 MMHG | OXYGEN SATURATION: 96 % | WEIGHT: 216.4 LBS | HEART RATE: 91 BPM | HEIGHT: 74 IN | DIASTOLIC BLOOD PRESSURE: 67 MMHG | TEMPERATURE: 98 F

## 2025-03-10 DIAGNOSIS — Z00.00 ROUTINE GENERAL MEDICAL EXAMINATION AT A HEALTH CARE FACILITY: Primary | ICD-10-CM

## 2025-03-10 DIAGNOSIS — M10.9 GOUT, UNSPECIFIED CAUSE, UNSPECIFIED CHRONICITY, UNSPECIFIED SITE: ICD-10-CM

## 2025-03-10 DIAGNOSIS — E88.810 METABOLIC SYNDROME X: ICD-10-CM

## 2025-03-10 DIAGNOSIS — E78.5 HYPERLIPIDEMIA LDL GOAL <100: ICD-10-CM

## 2025-03-10 DIAGNOSIS — N40.0 BENIGN PROSTATIC HYPERPLASIA, UNSPECIFIED WHETHER LOWER URINARY TRACT SYMPTOMS PRESENT: ICD-10-CM

## 2025-03-10 DIAGNOSIS — J45.30 MILD PERSISTENT ASTHMA WITHOUT COMPLICATION: ICD-10-CM

## 2025-03-10 DIAGNOSIS — E11.69 TYPE 2 DIABETES MELLITUS WITH OTHER SPECIFIED COMPLICATION, WITHOUT LONG-TERM CURRENT USE OF INSULIN (H): ICD-10-CM

## 2025-03-10 DIAGNOSIS — I10 ESSENTIAL HYPERTENSION: ICD-10-CM

## 2025-03-10 PROCEDURE — 3074F SYST BP LT 130 MM HG: CPT | Performed by: INTERNAL MEDICINE

## 2025-03-10 PROCEDURE — 99214 OFFICE O/P EST MOD 30 MIN: CPT | Performed by: INTERNAL MEDICINE

## 2025-03-10 PROCEDURE — 3078F DIAST BP <80 MM HG: CPT | Performed by: INTERNAL MEDICINE

## 2025-03-10 RX ORDER — FLUTICASONE FUROATE, UMECLIDINIUM BROMIDE AND VILANTEROL TRIFENATATE 200; 62.5; 25 UG/1; UG/1; UG/1
POWDER RESPIRATORY (INHALATION)
COMMUNITY
Start: 2024-05-11

## 2025-03-10 RX ORDER — CLOMIPHENE CITRATE 50 MG/1
TABLET ORAL
COMMUNITY
Start: 2025-03-07

## 2025-03-10 ASSESSMENT — ASTHMA QUESTIONNAIRES
QUESTION_1 LAST FOUR WEEKS HOW MUCH OF THE TIME DID YOUR ASTHMA KEEP YOU FROM GETTING AS MUCH DONE AT WORK, SCHOOL OR AT HOME: NONE OF THE TIME
QUESTION_2 LAST FOUR WEEKS HOW OFTEN HAVE YOU HAD SHORTNESS OF BREATH: NOT AT ALL
QUESTION_4 LAST FOUR WEEKS HOW OFTEN HAVE YOU USED YOUR RESCUE INHALER OR NEBULIZER MEDICATION (SUCH AS ALBUTEROL): NOT AT ALL
QUESTION_5 LAST FOUR WEEKS HOW WOULD YOU RATE YOUR ASTHMA CONTROL: COMPLETELY CONTROLLED
ACT_TOTALSCORE: 25
ACT_TOTALSCORE: 25
QUESTION_3 LAST FOUR WEEKS HOW OFTEN DID YOUR ASTHMA SYMPTOMS (WHEEZING, COUGHING, SHORTNESS OF BREATH, CHEST TIGHTNESS OR PAIN) WAKE YOU UP AT NIGHT OR EARLIER THAN USUAL IN THE MORNING: NOT AT ALL

## 2025-03-10 NOTE — PROGRESS NOTES
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 Significant weight loss has led to resolution of obesity, now with BMI of 27. Immunizations UTD and routine cancer screening UTD. Diet and exercise regimen has been excellent with  sessions twice a week focused on strength (sarcopenia reduction) and balance.     Type 2 diabetes mellitus with other specified complication, without long-term current use of insulin (H)  Weight loss has led to normalization of hgbA1C   Lab Results   Component Value Date    A1C 5.1 11/08/2024    A1C 4.8 11/09/2023    A1C 4.9 06/06/2023    A1C 4.9 11/30/2022    A1C 5.0 06/03/2022    A1C 5.0 12/09/2020    A1C 5.1 08/06/2020    A1C 5.2 12/03/2019    A1C 5.6 04/30/2019    A1C 5.7 11/13/2018       Regimen mangaged by his endocrinologist. No changes.     Essential hypertension  BP in target range without side effects. No changes.   Last Comprehensive Metabolic Panel:  Lab Results   Component Value Date     11/08/2024    POTASSIUM 3.9 11/08/2024    CHLORIDE 105 11/08/2024    CO2 26 11/08/2024    ANIONGAP 9 11/08/2024    GLC 85 11/08/2024    GLC 85 11/08/2024    BUN 17.0 11/08/2024    CR 0.69 11/08/2024    GFRESTIMATED >90 11/08/2024    ZHANE 9.9 11/08/2024    ZHANE 9.9 11/08/2024      Hyperlipidemia LDL goal <100  Recent Labs   Lab Test 11/08/24  0724 11/09/23  0740   CHOL 87 85   HDL 44 43   LDL 31 28   TRIG 62 71     No change in rosuvastatin dosing.     Coronary artery disease due to calcified coronary lesion  CAC over 700. No change in max risk reduction strategy. Lipids in ideal range. No evidence of ongoing inflammation (would consider colchicine for this given impressive literature in CAD with high hs-CRP).      Mild persistent asthma without complication  No cough at present. Avoidance of viral infections with all available immunizations.     Gastroesophageal reflux disease without esophagitis  Continue  "omeprazole.      Benign prostatic hyperplasia, unspecified whether lower urinary tract symptoms present  Continue tamsulosin.     Gout, unspecified cause, unspecified chronicity, unspecified site  No change recommended- urate level 2.8 in 11/2024 (goal <5). No flares.     30 minutes spent on the date of the encounter performing chart review, history and exam, documentation and further activities as noted above.    Christian Dutta MD  [facility] 69 [facility], presenting for the following health issues:  Follow Up      3/10/2025     4:27 PM   Additional Questions   Roomed by KTR     History of Present Illness       Reason for visit:  Followup metabolic syndrome    He eats 2-3 servings of fruits and vegetables daily.He consumes 1 sweetened beverage(s) daily.He exercises with enough effort to increase his heart rate 10 to 19 minutes per day.  He exercises with enough effort to increase his heart rate 3 or less days per week.   He is taking medications regularly.      Review of Systems  Constitutional, HEENT, cardiovascular, pulmonary, gi and gu systems are negative, except as otherwise noted.      Objective    /67 (BP Location: Right arm, Patient Position: Sitting, Cuff Size: Adult Large)   Pulse 91   Temp 98  F (36.7  C) (Oral)   Resp 18   Ht 1.88 m (6' 2.02\")   Wt 98.2 kg (216 lb 6.4 oz)   SpO2 96%   BMI 27.77 kg/m    Body mass index is 27.77 kg/m .  Physical Exam   GENERAL: alert and no distress  EYES: Eyes grossly normal to inspection, PERRL and conjunctivae and sclerae normal  NECK: no adenopathy, no asymmetry, masses, or scars  RESP: lungs clear to auscultation - no rales, rhonchi or wheezes  CV: regular rate and rhythm, normal S1 S2, no S3 or S4, no murmur, click or rub, no peripheral edema  ABDOMEN: soft, nontender, no hepatosplenomegaly, no masses and bowel sounds normal  MS: no gross musculoskeletal defects noted, no edema  SKIN: no suspicious " lesions or rashes  NEURO: Normal strength and tone, mentation intact and speech normal  PSYCH: mentation appears normal, affect normal/bright    Lab on 02/19/2025   Component Date Value Ref Range Status    PSA Tumor Marker 02/19/2025 1.86  0.00 - 6.50 ng/mL Final           Signed Electronically by: Raul Dutta MD

## 2025-04-15 ENCOUNTER — MYC MEDICAL ADVICE (OUTPATIENT)
Dept: INTERNAL MEDICINE | Facility: CLINIC | Age: 70
End: 2025-04-15
Payer: COMMERCIAL

## 2025-04-24 DIAGNOSIS — E88.810 METABOLIC SYNDROME X: ICD-10-CM

## 2025-04-24 DIAGNOSIS — E66.811 CLASS 1 OBESITY WITH SERIOUS COMORBIDITY AND BODY MASS INDEX (BMI) OF 31.0 TO 31.9 IN ADULT, UNSPECIFIED OBESITY TYPE: ICD-10-CM

## 2025-04-24 RX ORDER — PHENTERMINE HYDROCHLORIDE 37.5 MG/1
56 TABLET ORAL
Qty: 135 TABLET | Refills: 3 | Status: SHIPPED | OUTPATIENT
Start: 2025-04-24

## 2025-04-25 ENCOUNTER — TELEPHONE (OUTPATIENT)
Dept: INTERNAL MEDICINE | Facility: CLINIC | Age: 70
End: 2025-04-25

## 2025-04-25 NOTE — TELEPHONE ENCOUNTER
Central Prior Authorization Team - Phone: [phone]     *DRUG APPROVED TO MAXIMUM 30 TABLETS PER 30 DAYS    PRIOR AUTHORIZATION *QUANTITY DENIED    Medication: PHENTERMINE HCL 37.5 MG PO TABS  Insurance Company: [employer] - Phone [phone] Fax [phone]  Denial Date: 4/25/2025  Denial Reason(s): Quantity denied max 30 tablets per 30 days          Appeal Information:       Patient Notified: no  .Unfortunately, we cannot call the patient with denials because we do not know what next steps the MD will take nor can we give medical advice, please notify the patient of what they are to expect for the continuation of their therapy from the provider.

## 2025-04-29 ENCOUNTER — MYC MEDICAL ADVICE (OUTPATIENT)
Dept: INTERNAL MEDICINE | Facility: CLINIC | Age: 70
End: 2025-04-29
Payer: COMMERCIAL

## 2025-04-29 DIAGNOSIS — E88.810 METABOLIC SYNDROME X: ICD-10-CM

## 2025-04-29 DIAGNOSIS — E66.811 CLASS 1 OBESITY WITH SERIOUS COMORBIDITY AND BODY MASS INDEX (BMI) OF 31.0 TO 31.9 IN ADULT, UNSPECIFIED OBESITY TYPE: ICD-10-CM

## 2025-04-29 RX ORDER — PHENTERMINE HYDROCHLORIDE 37.5 MG/1
37.5 TABLET ORAL
Qty: 30 TABLET | Refills: 3 | Status: SHIPPED | OUTPATIENT
Start: 2025-04-29

## 2025-05-09 ENCOUNTER — TELEPHONE (OUTPATIENT)
Dept: INTERNAL MEDICINE | Facility: CLINIC | Age: 70
End: 2025-05-09

## 2025-05-09 DIAGNOSIS — R31.0 GROSS HEMATURIA: Primary | ICD-10-CM

## 2025-05-10 ENCOUNTER — MYC REFILL (OUTPATIENT)
Dept: INTERNAL MEDICINE | Facility: CLINIC | Age: 70
End: 2025-05-10

## 2025-05-10 ENCOUNTER — LAB (OUTPATIENT)
Dept: LAB | Facility: CLINIC | Age: 70
End: 2025-05-10
Payer: COMMERCIAL

## 2025-05-10 DIAGNOSIS — E11.69 TYPE 2 DIABETES MELLITUS WITH OTHER SPECIFIED COMPLICATION, WITHOUT LONG-TERM CURRENT USE OF INSULIN (H): Primary | ICD-10-CM

## 2025-05-10 DIAGNOSIS — R31.0 GROSS HEMATURIA: ICD-10-CM

## 2025-05-10 DIAGNOSIS — E66.9 OBESITY, UNSPECIFIED: ICD-10-CM

## 2025-05-10 LAB
ALBUMIN SERPL BCG-MCNC: 3.9 G/DL (ref 3.5–5.2)
ALBUMIN UR-MCNC: 30 MG/DL
ALP SERPL-CCNC: 74 U/L (ref 40–150)
ALT SERPL W P-5'-P-CCNC: 34 U/L (ref 0–70)
ANION GAP SERPL CALCULATED.3IONS-SCNC: 10 MMOL/L (ref 7–15)
APPEARANCE UR: ABNORMAL
AST SERPL W P-5'-P-CCNC: 28 U/L (ref 0–45)
BILIRUB SERPL-MCNC: 0.4 MG/DL
BILIRUB UR QL STRIP: NEGATIVE
BUN SERPL-MCNC: 14.1 MG/DL (ref 8–23)
CALCIUM SERPL-MCNC: 9.6 MG/DL (ref 8.8–10.4)
CHLORIDE SERPL-SCNC: 105 MMOL/L (ref 98–107)
CK SERPL-CCNC: 176 U/L (ref 39–308)
COLOR UR AUTO: ABNORMAL
CREAT SERPL-MCNC: 0.86 MG/DL (ref 0.67–1.17)
CREAT UR-MCNC: 82.9 MG/DL
CRP SERPL-MCNC: <3 MG/L
EGFRCR SERPLBLD CKD-EPI 2021: >90 ML/MIN/1.73M2
ERYTHROCYTE [DISTWIDTH] IN BLOOD BY AUTOMATED COUNT: 14.6 % (ref 10–15)
EST. AVERAGE GLUCOSE BLD GHB EST-MCNC: 97 MG/DL
GLUCOSE SERPL-MCNC: 94 MG/DL (ref 70–99)
GLUCOSE UR STRIP-MCNC: >=1000 MG/DL
HBA1C MFR BLD: 5 %
HCO3 SERPL-SCNC: 25 MMOL/L (ref 22–29)
HCT VFR BLD AUTO: 44.1 % (ref 40–53)
HGB BLD-MCNC: 14.6 G/DL (ref 13.3–17.7)
HGB UR QL STRIP: ABNORMAL
INR PPP: 0.85 (ref 0.85–1.15)
KETONES UR STRIP-MCNC: NEGATIVE MG/DL
LEUKOCYTE ESTERASE UR QL STRIP: ABNORMAL
MCH RBC QN AUTO: 30.7 PG (ref 26.5–33)
MCHC RBC AUTO-ENTMCNC: 33.1 G/DL (ref 31.5–36.5)
MCV RBC AUTO: 93 FL (ref 78–100)
MICROALBUMIN UR-MCNC: 149 MG/L
MICROALBUMIN/CREAT UR: 179.73 MG/G CR (ref 0–17)
MUCOUS THREADS #/AREA URNS LPF: PRESENT /LPF
NITRATE UR QL: NEGATIVE
PH UR STRIP: 5.5 [PH] (ref 5–7)
PLATELET # BLD AUTO: 142 10E3/UL (ref 150–450)
POTASSIUM SERPL-SCNC: 4.6 MMOL/L (ref 3.4–5.3)
PROT SERPL-MCNC: 6.4 G/DL (ref 6.4–8.3)
PROTHROMBIN TIME: 11.8 SECONDS (ref 11.8–14.8)
RBC # BLD AUTO: 4.76 10E6/UL (ref 4.4–5.9)
RBC URINE: >182 /HPF
SODIUM SERPL-SCNC: 140 MMOL/L (ref 135–145)
SP GR UR STRIP: 1.03 (ref 1–1.03)
UROBILINOGEN UR STRIP-MCNC: NORMAL MG/DL
WBC # BLD AUTO: 8.2 10E3/UL (ref 4–11)
WBC CLUMPS #/AREA URNS HPF: PRESENT /HPF
WBC URINE: 49 /HPF

## 2025-05-10 PROCEDURE — 86140 C-REACTIVE PROTEIN: CPT | Performed by: PATHOLOGY

## 2025-05-10 PROCEDURE — 82043 UR ALBUMIN QUANTITATIVE: CPT | Performed by: INTERNAL MEDICINE

## 2025-05-10 PROCEDURE — 83036 HEMOGLOBIN GLYCOSYLATED A1C: CPT | Performed by: INTERNAL MEDICINE

## 2025-05-10 PROCEDURE — 82550 ASSAY OF CK (CPK): CPT | Performed by: PATHOLOGY

## 2025-05-10 PROCEDURE — 99000 SPECIMEN HANDLING OFFICE-LAB: CPT | Performed by: PATHOLOGY

## 2025-05-10 PROCEDURE — 87086 URINE CULTURE/COLONY COUNT: CPT | Performed by: INTERNAL MEDICINE

## 2025-05-10 PROCEDURE — 85027 COMPLETE CBC AUTOMATED: CPT | Performed by: PATHOLOGY

## 2025-05-10 PROCEDURE — 36415 COLL VENOUS BLD VENIPUNCTURE: CPT | Performed by: PATHOLOGY

## 2025-05-10 PROCEDURE — 85610 PROTHROMBIN TIME: CPT | Performed by: PATHOLOGY

## 2025-05-10 PROCEDURE — 80053 COMPREHEN METABOLIC PANEL: CPT | Performed by: PATHOLOGY

## 2025-05-10 PROCEDURE — 81001 URINALYSIS AUTO W/SCOPE: CPT | Performed by: PATHOLOGY

## 2025-05-10 RX ORDER — CLOMIPHENE CITRATE 50 MG/1
TABLET ORAL
Status: CANCELLED | OUTPATIENT
Start: 2025-05-10

## 2025-05-11 LAB — BACTERIA UR CULT: NORMAL

## 2025-05-12 ENCOUNTER — RESULTS FOLLOW-UP (OUTPATIENT)
Dept: INTERNAL MEDICINE | Facility: CLINIC | Age: 70
End: 2025-05-12

## 2025-05-12 ENCOUNTER — ANCILLARY PROCEDURE (OUTPATIENT)
Dept: CT IMAGING | Facility: CLINIC | Age: 70
End: 2025-05-12
Attending: HOSPITALIST
Payer: COMMERCIAL

## 2025-05-12 ENCOUNTER — MYC MEDICAL ADVICE (OUTPATIENT)
Dept: INTERNAL MEDICINE | Facility: CLINIC | Age: 70
End: 2025-05-12

## 2025-05-12 ENCOUNTER — OFFICE VISIT (OUTPATIENT)
Dept: INTERNAL MEDICINE | Facility: CLINIC | Age: 70
End: 2025-05-12
Payer: COMMERCIAL

## 2025-05-12 VITALS
WEIGHT: 216.49 LBS | BODY MASS INDEX: 27.78 KG/M2 | HEIGHT: 74 IN | HEART RATE: 100 BPM | DIASTOLIC BLOOD PRESSURE: 77 MMHG | SYSTOLIC BLOOD PRESSURE: 127 MMHG | OXYGEN SATURATION: 96 %

## 2025-05-12 DIAGNOSIS — N20.0 NEPHROLITHIASIS: Primary | ICD-10-CM

## 2025-05-12 DIAGNOSIS — R31.0 GROSS HEMATURIA: ICD-10-CM

## 2025-05-12 DIAGNOSIS — N13.2 HYDRONEPHROSIS WITH URINARY OBSTRUCTION DUE TO URETERAL CALCULUS: ICD-10-CM

## 2025-05-12 DIAGNOSIS — N20.0 NEPHROLITHIASIS: ICD-10-CM

## 2025-05-12 DIAGNOSIS — R31.0 GROSS HEMATURIA: Primary | ICD-10-CM

## 2025-05-12 LAB
RADIOLOGIST FLAGS: ABNORMAL
RADIOLOGIST FLAGS: ABNORMAL

## 2025-05-12 PROCEDURE — 3074F SYST BP LT 130 MM HG: CPT | Performed by: HOSPITALIST

## 2025-05-12 PROCEDURE — 3078F DIAST BP <80 MM HG: CPT | Performed by: HOSPITALIST

## 2025-05-12 PROCEDURE — 99213 OFFICE O/P EST LOW 20 MIN: CPT | Performed by: HOSPITALIST

## 2025-05-12 PROCEDURE — 74178 CT ABD&PLV WO CNTR FLWD CNTR: CPT | Performed by: RADIOLOGY

## 2025-05-12 RX ORDER — IOPAMIDOL 755 MG/ML
106 INJECTION, SOLUTION INTRAVASCULAR ONCE
Status: COMPLETED | OUTPATIENT
Start: 2025-05-12 | End: 2025-05-12

## 2025-05-12 RX ORDER — AMOXICILLIN 500 MG/1
CAPSULE ORAL
COMMUNITY
Start: 2025-05-09

## 2025-05-12 RX ADMIN — IOPAMIDOL 106 ML: 755 INJECTION, SOLUTION INTRAVASCULAR at 09:06

## 2025-05-12 NOTE — DISCHARGE INSTRUCTIONS

## 2025-05-12 NOTE — ASSESSMENT & PLAN NOTE
UA with pyuria and hematuria present. Culture with low colonies of normal luke. No anemia on labs, slightly low platelets at 142. Creatinine at 0.86. Not on an NSAID or anticoagulation.   - Stat CT urogram today if possible.     Addendum: CT scan showed a [id number] stone obstructing left distal ureter. I called patient who was already in contact with Dr. Dutta. Mentions he is set up to see urology tomorrow on video. He did cancel his trip to [facility] for follow ups.

## 2025-05-12 NOTE — PROGRESS NOTES
"  Assessment & Plan   Problem List Items Addressed This Visit       Gross hematuria - Primary    UA with pyuria and hematuria present. Culture with low colonies of normal luke. No anemia on labs, slightly low platelets at 142. Creatinine at 0.86. Not on an NSAID or anticoagulation.   - Stat CT urogram today if possible.     Addendum: CT scan showed a [id number] stone obstructing left distal ureter. I called patient who was already in contact with Dr. Dutta. Mentions he is set up to see urology tomorrow on video. He did cancel his trip to [facility] for follow ups.          Relevant Orders    CT Urogram wo & w Contrast (Completed)     Other Visit Diagnoses         Nephrolithiasis          Hydronephrosis with urinary obstruction due to ureteral calculus                      BMI  Estimated body mass index is 27.78 kg/m  as calculated from the following:    Height as of this encounter: 1.88 m (6' 2.02\").    Weight as of this encounter: 98.2 kg (216 lb 7.9 oz).       Follow-up  Return if symptoms worsen or fail to improve.    Pal Patrick is a 69 year old, presenting for the following health issues:  Follow Up (Hematuria)      5/12/2025     8:11 AM   Additional Questions   Roomed by Filemon     HPI      Gross hematuria that started on Friday. No pain urinating. No abdominal pain. Had a foul smell to urine. Has had kidney stone before. Had resection of prostate in the past, not in the bladder. Still has some blood in the urine that he sees. Mentions he does have an international flight this evening he will be taking.       Review of Systems  Constitutional, neuro, ENT, endocrine, pulmonary, cardiac, gastrointestinal, genitourinary, musculoskeletal, integument and psychiatric systems are negative, except as otherwise noted.      Objective    /77 (BP Location: Right arm, Patient Position: Sitting, Cuff Size: Adult Regular)   Pulse 100   Ht 1.88 m (6' 2.02\")   Wt 98.2 kg (216 lb 7.9 oz)   SpO2 96%   BMI 27.78 " kg/m    Body mass index is 27.78 kg/m .  Physical Exam   GENERAL: alert and no distress  EYES: Eyes grossly normal to inspection, and conjunctivae and sclerae normal  ABDOMEN: soft, nontender, no hepatosplenomegaly, no masses and bowel sounds normal  MS: no gross musculoskeletal defects noted, no edema  SKIN: no suspicious lesions or rashes  NEURO: Normal strength and tone, mentation intact and speech normal  PSYCH: mentation appears normal, affect normal/bright            Signed Electronically by: Chadd Sevilla MD

## 2025-05-13 ENCOUNTER — VIRTUAL VISIT (OUTPATIENT)
Dept: UROLOGY | Facility: CLINIC | Age: 70
End: 2025-05-13
Payer: COMMERCIAL

## 2025-05-13 ENCOUNTER — MYC MEDICAL ADVICE (OUTPATIENT)
Dept: INTERNAL MEDICINE | Facility: CLINIC | Age: 70
End: 2025-05-13

## 2025-05-13 DIAGNOSIS — N20.0 NEPHROLITHIASIS: ICD-10-CM

## 2025-05-13 NOTE — NURSING NOTE
Current patient location: [address] [place] [address]    Is the patient currently in the state of [place]? YES    Visit mode: VIDEO    If the visit is dropped, the patient can be reconnected by:VIDEO VISIT: Send to e-mail at: [email or web address]    Will anyone else be joining the visit? NO  (If patient encounters technical issues they should call [phone]    Are changes needed to the allergy or medication list? Pt stated no med changes    Are refills needed on medications prescribed by this physician? NO    Rooming Documentation:  Questionnaire(s) completed    Reason for visit: NARESH ADAMS

## 2025-05-14 ENCOUNTER — TELEPHONE (OUTPATIENT)
Dept: UROLOGY | Facility: CLINIC | Age: 70
End: 2025-05-14
Payer: COMMERCIAL

## 2025-05-14 NOTE — TELEPHONE ENCOUNTER
LUIS    Spoke with: LVM for Patient   Per Dr Pitt would like patient added for surgery      Date of surgery: Friday  May 16 2025 with Dr Pitt      Location: [facility]      Informed patient they will need a adult : YES      Pre op with provider:  Dr Pitt would like to see if patient can get a pre op if he can't Dr Pitt ok to do DOS       H&P Scheduled in PAC- NA      Pre procedure covid : not req      Additional imaging: NA        Surgery Packet : Sent via StudyRoom      Additional comments: Please call for surgery teaching

## 2025-05-15 ENCOUNTER — ANESTHESIA EVENT (OUTPATIENT)
Dept: SURGERY | Facility: AMBULATORY SURGERY CENTER | Age: 70
End: 2025-05-15
Payer: COMMERCIAL

## 2025-05-15 NOTE — TELEPHONE ENCOUNTER
Spoke with patient who confirmed he would like to proceed with surgery. Last dose of GLP-1 was Friday 05/09.    Patient has already received pre-op instructions from [facility] and has no additional questions.     HAY Madrigal  Care Coordinator- Urology   [phone]

## 2025-05-16 ENCOUNTER — ANESTHESIA (OUTPATIENT)
Dept: SURGERY | Facility: AMBULATORY SURGERY CENTER | Age: 70
End: 2025-05-16
Payer: COMMERCIAL

## 2025-05-16 ENCOUNTER — HOSPITAL ENCOUNTER (OUTPATIENT)
Facility: AMBULATORY SURGERY CENTER | Age: 70
Discharge: HOME | End: 2025-05-16
Attending: UROLOGY
Payer: COMMERCIAL

## 2025-05-16 VITALS
BODY MASS INDEX: 27.22 KG/M2 | TEMPERATURE: 97.9 F | HEART RATE: 63 BPM | SYSTOLIC BLOOD PRESSURE: 125 MMHG | OXYGEN SATURATION: 97 % | RESPIRATION RATE: 16 BRPM | DIASTOLIC BLOOD PRESSURE: 73 MMHG | WEIGHT: 212.1 LBS | HEIGHT: 74 IN

## 2025-05-16 DIAGNOSIS — N40.0 BENIGN PROSTATIC HYPERPLASIA, UNSPECIFIED WHETHER LOWER URINARY TRACT SYMPTOMS PRESENT: ICD-10-CM

## 2025-05-16 DIAGNOSIS — N20.0 NEPHROLITHIASIS: ICD-10-CM

## 2025-05-16 LAB — GLUCOSE POCT: 108 MG/DL (ref 70–99)

## 2025-05-16 RX ORDER — ONDANSETRON 4 MG/1
4 TABLET, ORALLY DISINTEGRATING ORAL EVERY 30 MIN PRN
Status: DISCONTINUED | OUTPATIENT
Start: 2025-05-16 | End: 2025-05-17 | Stop reason: HOSPADM

## 2025-05-16 RX ORDER — DEXAMETHASONE SODIUM PHOSPHATE 4 MG/ML
4 INJECTION, SOLUTION INTRA-ARTICULAR; INTRALESIONAL; INTRAMUSCULAR; INTRAVENOUS; SOFT TISSUE
Status: DISCONTINUED | OUTPATIENT
Start: 2025-05-16 | End: 2025-05-17 | Stop reason: HOSPADM

## 2025-05-16 RX ORDER — FENTANYL CITRATE 0.05 MG/ML
50 INJECTION, SOLUTION INTRAMUSCULAR; INTRAVENOUS EVERY 5 MIN PRN
Status: DISCONTINUED | OUTPATIENT
Start: 2025-05-16 | End: 2025-05-17 | Stop reason: HOSPADM

## 2025-05-16 RX ORDER — DEXAMETHASONE SODIUM PHOSPHATE 10 MG/ML
INJECTION, SOLUTION INTRAMUSCULAR; INTRAVENOUS PRN
Status: DISCONTINUED | OUTPATIENT
Start: 2025-05-16 | End: 2025-05-16

## 2025-05-16 RX ORDER — FENTANYL CITRATE 0.05 MG/ML
25 INJECTION, SOLUTION INTRAMUSCULAR; INTRAVENOUS EVERY 5 MIN PRN
Status: DISCONTINUED | OUTPATIENT
Start: 2025-05-16 | End: 2025-05-17 | Stop reason: HOSPADM

## 2025-05-16 RX ORDER — CEFAZOLIN SODIUM 2 G/100ML
2 INJECTION, SOLUTION INTRAVENOUS SEE ADMIN INSTRUCTIONS
Status: DISCONTINUED | OUTPATIENT
Start: 2025-05-16 | End: 2025-05-17 | Stop reason: HOSPADM

## 2025-05-16 RX ORDER — CEFAZOLIN SODIUM 2 G/100ML
2 INJECTION, SOLUTION INTRAVENOUS
Status: COMPLETED | OUTPATIENT
Start: 2025-05-16 | End: 2025-05-16

## 2025-05-16 RX ORDER — PROPOFOL 10 MG/ML
INJECTION, EMULSION INTRAVENOUS PRN
Status: DISCONTINUED | OUTPATIENT
Start: 2025-05-16 | End: 2025-05-16

## 2025-05-16 RX ORDER — ACETAMINOPHEN 650 MG/1
650 SUPPOSITORY RECTAL ONCE
Status: COMPLETED | OUTPATIENT
Start: 2025-05-16 | End: 2025-05-16

## 2025-05-16 RX ORDER — KETOROLAC TROMETHAMINE 30 MG/ML
INJECTION, SOLUTION INTRAMUSCULAR; INTRAVENOUS PRN
Status: DISCONTINUED | OUTPATIENT
Start: 2025-05-16 | End: 2025-05-16

## 2025-05-16 RX ORDER — OXYCODONE HYDROCHLORIDE 10 MG/1
10 TABLET ORAL
Status: DISCONTINUED | OUTPATIENT
Start: 2025-05-16 | End: 2025-05-17 | Stop reason: HOSPADM

## 2025-05-16 RX ORDER — HYDROMORPHONE HCL IN WATER/PF 6 MG/30 ML
0.4 PATIENT CONTROLLED ANALGESIA SYRINGE INTRAVENOUS EVERY 5 MIN PRN
Status: DISCONTINUED | OUTPATIENT
Start: 2025-05-16 | End: 2025-05-17 | Stop reason: HOSPADM

## 2025-05-16 RX ORDER — FENTANYL CITRATE 0.05 MG/ML
25 INJECTION, SOLUTION INTRAMUSCULAR; INTRAVENOUS
Status: DISCONTINUED | OUTPATIENT
Start: 2025-05-16 | End: 2025-05-17 | Stop reason: HOSPADM

## 2025-05-16 RX ORDER — ACETAMINOPHEN 325 MG/1
975 TABLET ORAL ONCE
Status: DISCONTINUED | OUTPATIENT
Start: 2025-05-16 | End: 2025-05-17 | Stop reason: HOSPADM

## 2025-05-16 RX ORDER — PHENAZOPYRIDINE HYDROCHLORIDE 200 MG/1
200 TABLET, FILM COATED ORAL 3 TIMES DAILY PRN
Qty: 12 TABLET | Refills: 0 | Status: SHIPPED | OUTPATIENT
Start: 2025-05-16

## 2025-05-16 RX ORDER — HYDROMORPHONE HCL IN WATER/PF 6 MG/30 ML
0.2 PATIENT CONTROLLED ANALGESIA SYRINGE INTRAVENOUS EVERY 5 MIN PRN
Status: DISCONTINUED | OUTPATIENT
Start: 2025-05-16 | End: 2025-05-17 | Stop reason: HOSPADM

## 2025-05-16 RX ORDER — MEPERIDINE HYDROCHLORIDE 25 MG/ML
12.5 INJECTION INTRAMUSCULAR; INTRAVENOUS; SUBCUTANEOUS EVERY 5 MIN PRN
Status: DISCONTINUED | OUTPATIENT
Start: 2025-05-16 | End: 2025-05-17 | Stop reason: HOSPADM

## 2025-05-16 RX ORDER — ACETAMINOPHEN 325 MG/1
975 TABLET ORAL ONCE
Status: COMPLETED | OUTPATIENT
Start: 2025-05-16 | End: 2025-05-16

## 2025-05-16 RX ORDER — LIDOCAINE HYDROCHLORIDE 20 MG/ML
INJECTION, SOLUTION INFILTRATION; PERINEURAL PRN
Status: DISCONTINUED | OUTPATIENT
Start: 2025-05-16 | End: 2025-05-16

## 2025-05-16 RX ORDER — SODIUM CHLORIDE, SODIUM LACTATE, POTASSIUM CHLORIDE, CALCIUM CHLORIDE 600; 310; 30; 20 MG/100ML; MG/100ML; MG/100ML; MG/100ML
INJECTION, SOLUTION INTRAVENOUS CONTINUOUS
Status: DISCONTINUED | OUTPATIENT
Start: 2025-05-16 | End: 2025-05-17 | Stop reason: HOSPADM

## 2025-05-16 RX ORDER — ONDANSETRON 2 MG/ML
INJECTION INTRAMUSCULAR; INTRAVENOUS PRN
Status: DISCONTINUED | OUTPATIENT
Start: 2025-05-16 | End: 2025-05-16

## 2025-05-16 RX ORDER — ONDANSETRON 2 MG/ML
4 INJECTION INTRAMUSCULAR; INTRAVENOUS EVERY 30 MIN PRN
Status: DISCONTINUED | OUTPATIENT
Start: 2025-05-16 | End: 2025-05-17 | Stop reason: HOSPADM

## 2025-05-16 RX ORDER — LIDOCAINE 40 MG/G
CREAM TOPICAL
Status: DISCONTINUED | OUTPATIENT
Start: 2025-05-16 | End: 2025-05-17 | Stop reason: HOSPADM

## 2025-05-16 RX ORDER — FENTANYL CITRATE 50 UG/ML
INJECTION, SOLUTION INTRAMUSCULAR; INTRAVENOUS PRN
Status: DISCONTINUED | OUTPATIENT
Start: 2025-05-16 | End: 2025-05-16

## 2025-05-16 RX ORDER — NALOXONE HYDROCHLORIDE 0.4 MG/ML
0.1 INJECTION, SOLUTION INTRAMUSCULAR; INTRAVENOUS; SUBCUTANEOUS
Status: DISCONTINUED | OUTPATIENT
Start: 2025-05-16 | End: 2025-05-17 | Stop reason: HOSPADM

## 2025-05-16 RX ORDER — PROPOFOL 10 MG/ML
INJECTION, EMULSION INTRAVENOUS CONTINUOUS PRN
Status: DISCONTINUED | OUTPATIENT
Start: 2025-05-16 | End: 2025-05-16

## 2025-05-16 RX ORDER — OXYCODONE HYDROCHLORIDE 5 MG/1
5 TABLET ORAL
Status: DISCONTINUED | OUTPATIENT
Start: 2025-05-16 | End: 2025-05-17 | Stop reason: HOSPADM

## 2025-05-16 RX ORDER — CEPHALEXIN 500 MG/1
500 CAPSULE ORAL DAILY
Qty: 7 CAPSULE | Refills: 0 | Status: SHIPPED | OUTPATIENT
Start: 2025-05-16

## 2025-05-16 RX ADMIN — PROPOFOL 200 MCG/KG/MIN: 10 INJECTION, EMULSION INTRAVENOUS at 14:32

## 2025-05-16 RX ADMIN — ONDANSETRON 4 MG: 2 INJECTION INTRAMUSCULAR; INTRAVENOUS at 14:36

## 2025-05-16 RX ADMIN — LIDOCAINE HYDROCHLORIDE 50 MG: 20 INJECTION, SOLUTION INFILTRATION; PERINEURAL at 14:32

## 2025-05-16 RX ADMIN — CEFAZOLIN SODIUM 2 G: 2 INJECTION, SOLUTION INTRAVENOUS at 14:27

## 2025-05-16 RX ADMIN — KETOROLAC TROMETHAMINE 15 MG: 30 INJECTION, SOLUTION INTRAMUSCULAR; INTRAVENOUS at 15:03

## 2025-05-16 RX ADMIN — ACETAMINOPHEN 975 MG: 325 TABLET ORAL at 13:26

## 2025-05-16 RX ADMIN — Medication 100 MCG: at 14:41

## 2025-05-16 RX ADMIN — SODIUM CHLORIDE, SODIUM LACTATE, POTASSIUM CHLORIDE, CALCIUM CHLORIDE: 600; 310; 30; 20 INJECTION, SOLUTION INTRAVENOUS at 13:44

## 2025-05-16 RX ADMIN — FENTANYL CITRATE 50 MCG: 50 INJECTION, SOLUTION INTRAMUSCULAR; INTRAVENOUS at 14:32

## 2025-05-16 RX ADMIN — PROPOFOL 200 MG: 10 INJECTION, EMULSION INTRAVENOUS at 14:32

## 2025-05-16 RX ADMIN — DEXAMETHASONE SODIUM PHOSPHATE 8 MG: 10 INJECTION, SOLUTION INTRAMUSCULAR; INTRAVENOUS at 14:36

## 2025-05-16 RX ADMIN — FENTANYL CITRATE 50 MCG: 50 INJECTION, SOLUTION INTRAMUSCULAR; INTRAVENOUS at 14:27

## 2025-05-16 NOTE — ANESTHESIA CARE TRANSFER NOTE
Patient: Darnell Grant    Procedure: Procedure(s):  Cystoscopy, Left Ureteroscopy, Left Retrograde Pyelogram, Left Laser Lithotripsy, Left Ureteral Stent Placement       Diagnosis: Nephrolithiasis [N20.0]  Diagnosis Additional Information: No value filed.    Anesthesia Type:   General     Note:    Oropharynx: oropharynx clear of all foreign objects  Level of Consciousness: drowsy  Oxygen Supplementation: face mask  Level of Supplemental Oxygen (L/min / FiO2): 8  Independent Airway: airway patency satisfactory and stable  Dentition: dentition unchanged  Vital Signs Stable: post-procedure vital signs reviewed and stable  Report to RN Given: handoff report given  Patient transferred to: PACU    Handoff Report: Identifed the Patient, Identified the Reponsible Provider, Reviewed the pertinent medical history, Discussed the surgical course, Reviewed Intra-OP anesthesia mangement and issues during anesthesia, Set expectations for post-procedure period and Allowed opportunity for questions and acknowledgement of understanding      Vitals:  Vitals Value Taken Time   /78    Temp 37C    Pulse 70    Resp 14    SpO2 99        Electronically Signed By: CHRISTIN Matute CRNA  May 16, 2025  3:12 PM

## 2025-05-16 NOTE — LETTER
May 16, 2025      Darnell Grant  [address] 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  Unfortunately, he unexpectedly experienced an acutely obstructing ureteral stone requiring surgery.  He is under my care and will be unable to travel as a result of surgery and related care.  Please feel free to contact my office with any questions or concerns.  Phone number is [phone]          Sincerely,       Diaz Pitt MD

## 2025-05-16 NOTE — OP NOTE
PREOPERATIVE DIAGNOSIS:  Left ureteral stone  POSTOPERATIVE DIAGNOSIS: Same  PROCEDURES PERFORMED:    Cystoscopy  Left retrograde pyelogram with interpretation of imaging  Left ureteroscopy with thulium laser lithotripsy and stone basket extraction  Left ureteral stent placement    STAFF SURGEON: Diaz Pitt MD  RESIDENT(S) none    ANESTHESIA: General  ESTIMATED BLOOD LOSS: 1 ml  COMPLICATIONS: None  SPECIMEN: Left ureteral stone  URETERAL STENT(S): 6 Kazakh by 28 cm left double-J silicone stent    SIGNIFICANT FINDINGS: Left ureteral stone    BRIEF OPERATIVE INDICATIONS: Patient presented with hematuria found to have a left ureteral stone.  After a discussion of all risks, benefits, and alternatives, the patient elected to proceed with definitive stone management. The patient understands the potential need for more than one procedure to eliminate all stone burden.      DESCRIPTION OF PROCEDURE:  After informed consent was obtained, the patient was transported to the operating room & placed supine on the table. After adequate anesthesia was induced, the patient was placed in lithotomy and prepped and draped in the usual sterile fashion. A timeout was taken to confirm correct patient, procedure and laterality. Pre-operative IV antibiotics were administered.     We started the procedure by performing cystoscopy.  The prostate was enlarged with changes consistent with prior thermotherapy.  The bladder was otherwise unremarkable there were no tumors.  Left ureteral orifice was identified and cannulated with a sensor wire.  The wire passed easily to the left kidney.  We calibrated the ureteral orifice to 10 Kazakh.  Passed the flexible ureteroscope adjacent to the wire and identified an obstructing stone in the mid ureter.  The stone was moderately impacted but once we were able to nudge it backwards into the dilated portion of the ureter and retroflexed to the kidney.  Once within the left kidney we identified a  second smaller stone in the lower pole calyx.  There were bits of nephrocalcinosis in the lower pole as well.  We then turned our attention to the 2 stones which were repositioned into the dependent upper pole calyx.  We set the laser to 1 J and 10 Hz.  There was a thulium laser 200  m.  We systematically dusted the stones into tiny sand-like particulate debris.  At the end of the procedure there were essentially no pieces of millimeter or 2.  We struggled to identify a piece that was large enough to send for analysis but ultimately found 1 piece which we basket extracted.  Final retrograde pyelogram of the kidney was unremarkable there was no extravasation but there was moderate hydronephrosis.  We subsequently performed pullback ureteroscopy which was unrevealing.  There was no evidence of ureteral injury just a little bit of mid ureteral inflammation where the stone was stuck.  We placed a 6 Syrian by 28 cm double-J stent ensuring a full curl in the kidney and the bladder.  We left a string on the stent to facilitate removal in 1 week.    The bladder was drained and the patient was awoken from anesthesia and transported to the postanesthesia care unit in stable condition.     POSTOP PLAN:  Stent removal in 1 week, follow-up 2 months with imaging

## 2025-05-16 NOTE — ANESTHESIA PREPROCEDURE EVALUATION
Anesthesia Pre-Procedure Evaluation    Patient: Darnell Grant   MRN: [id number] : 1955          Procedure : Procedure(s):  Cystoscopy, Left Ureteroscopy, Left Retrograde Pyelogram, Left Laser Lithotripsy, Possible Left Ureteral Stent Placement         Past Medical History:   Diagnosis Date    Bladder mass     BPH (benign prostatic hyperplasia)     GERD (gastroesophageal reflux disease)     Gout many yrs ago    on allopurinol, inactive    Heart disease >5 years ago    PVCs    High serum parathyroid hormone (PTH) 2017    Hyperlipidemia     Hypertension     Metabolic syndrome     Motion sickness     Nephrolithiasis     Obesity     LISETTE (obstructive sleep apnea)     Prediabetes     Shortness of breath months    with excercise/exertion    Uncomplicated asthma >5 years ago    Stress induced asthma      Past Surgical History:   Procedure Laterality Date    ABDOMEN SURGERY  2021    APPENDECTOMY OPEN N/A 2021    Procedure: APPENDECTOMY, OPEN;  Surgeon: Walter Boggs MD;  Location: [facility]    BACK SURGERY      repair disc    BIOPSY  >5 years ago    Prostate    CATARACT IOL, RT/LT      COLONOSCOPY N/A 2024    Procedure: COLONOSCOPY, FLEXIBLE, WITH LESION REMOVAL USING SNARE;  Surgeon: Srini Gao MD;  [facility]: [facility]    CYSTOSCOPY  several years ago    CYSTOSCOPY, TRANSURETHRAL RESECTION (TUR) TUMOR BLADDER, COMBINED N/A 2018    Procedure: COMBINED CYSTOSCOPY, TRANSURETHRAL RESECTION (TUR) TUMOR BLADDER;  Transurethral Biopsy and Resection of Bladder Tumor;  Surgeon: Yaya Lomeli MD;  [facility]    DECOMPRESSION, FUSION CERVICAL ANTERIOR TWO LEVELS, COMBINED Right 12/10/2019    Procedure: right approach anterior cervical 2-3, 3-4 resection of anterior cervical osteophytes causing dysphagia, with microscope;  Surgeon: María Fitzgerald MD;  [facility]    HC BREATH HYDROGEN TEST  2013    Procedure: HYDROGEN BREATH TEST;  Surgeon: Donny Paris  MD;  [facility]: [facility]    LAPAROSCOPIC APPENDECTOMY N/A 07/14/2021    Procedure: APPENDECTOMY, LAPAROSCOPIC;  Surgeon: Walter Boggs MD;  [facility]    ORTHOPEDIC SURGERY      L5-S1 disk    PHACOEMULSIFICATION CLEAR CORNEA WITH STANDARD INTRAOCULAR LENS IMPLANT Left 09/25/2020    Procedure: LEFT CATARACT REMOVAL WITH INTRAOCULAR LENS IMPLANT;  Surgeon: Keyla Tobin MD;  [facility]    PHACOEMULSIFICATION CLEAR CORNEA WITH STANDARD INTRAOCULAR LENS IMPLANT Right 10/02/2020    Procedure: RIGHT CATARACT REMOVAL WITH INTRAOCULAR LENS IMPLANT;  Surgeon: Keyla Tobin MD;  [facility]    PROSTATE SURGERY  2018; tupr    TRANSURETHERAL DESTRUCTION OF PROSTATE BY THERMOTHERAPY N/A 04/13/2018    Procedure: TRANSURETHERAL DESTRUCTION OF PROSTATE BY THERMOTHERAPY;  Re[facility]um Procedure;  Surgeon: Yaya Lomeli MD;  [facility]      Allergies   Allergen Reactions    Ragweeds      Watery eyes, itchy nose      Social History     Tobacco Use    Smoking status: Never    Smokeless tobacco: Never   Substance Use Topics    Alcohol use: No      Wt Readings from Last 1 Encounters:   05/16/25 96.2 kg (212 lb 1.6 oz)        Anesthesia Evaluation   Pt has had prior anesthetic.     No history of anesthetic complications       ROS/MED HX  ENT/Pulmonary:  - neg pulmonary ROS   (+) sleep apnea (resolved with weight loss), doesn't use CPAP,                    asthma (with viral URI's, none for past 6 months)                  Neurologic:  - neg neurologic ROS     Cardiovascular:  - neg cardiovascular ROS   (+) Dyslipidemia hypertension- -  CAD (elevated coronary calcium score) -  - -                                      METS/Exercise Tolerance:     Hematologic:  - neg hematologic  ROS     Musculoskeletal:  - neg musculoskeletal ROS     GI/Hepatic:  - neg GI/hepatic ROS   (+) GERD, Asymptomatic on medication,                  Renal/Genitourinary:  - neg Renal ROS   (+)       Nephrolithiasis , BPH,      Endo:  -  neg endo ROS   (+)               Obesity (80 lb weight loss, BMI now 27),       Psychiatric/Substance Use:  - neg psychiatric ROS     Infectious Disease:  - neg infectious disease ROS     Malignancy:  - neg malignancy ROS     Other:  - neg other ROS            Physical Exam  Airway  Mallampati: II  TM distance: >3 FB  Neck ROM: full  Mouth opening: >= 4 cm    Cardiovascular - normal exam  Rhythm: regular  Rate: normal rate     Dental   (+) Minor Abnormalities - some fillings, tiny chips      increased risk of dental damage  Pulmonary - normal examBreath sounds clear to auscultation        Neurological - normal exam  He appears awake, alert and oriented x3.    Other Findings       OUTSIDE LABS:  CBC:   Lab Results   Component Value Date    WBC 8.2 05/10/2025    WBC 5.2 11/08/2024    HGB 14.6 05/10/2025    HGB 14.5 11/08/2024    HCT 44.1 05/10/2025    HCT 42.5 11/08/2024     (L) 05/10/2025     11/08/2024     BMP:   Lab Results   Component Value Date     05/10/2025     11/08/2024    POTASSIUM 4.6 05/10/2025    POTASSIUM 3.9 11/08/2024    CHLORIDE 105 05/10/2025    CHLORIDE 105 11/08/2024    CO2 25 05/10/2025    CO2 26 11/08/2024    BUN 14.1 05/10/2025    BUN 17.0 11/08/2024    CR 0.86 05/10/2025    CR 0.69 11/08/2024    GLC 94 05/10/2025    GLC 85 11/08/2024    GLC 85 11/08/2024     COAGS:   Lab Results   Component Value Date    PTT 39 (H) 12/03/2019    INR 0.85 05/10/2025     POC:   Lab Results   Component Value Date     (H) 09/25/2020     HEPATIC:   Lab Results   Component Value Date    ALBUMIN 3.9 05/10/2025    PROTTOTAL 6.4 05/10/2025    ALT 34 05/10/2025    AST 28 05/10/2025    ALKPHOS 74 05/10/2025    BILITOTAL 0.4 05/10/2025     OTHER:   Lab Results   Component Value Date    LACT 3.4 (H) 07/14/2021    A1C 5.0 05/10/2025    ZHANE 9.6 05/10/2025    PHOS 2.7 11/08/2024    MAG 2.0 11/08/2024    LIPASE 42 (L) 07/14/2021    TSH 3.01 11/08/2024    T4 1.05 12/09/2020    CRP 4.4 06/11/2021  "   SED 5 05/20/2013       Anesthesia Plan    ASA Status:  3      NPO Status: NPO Appropriate   Anesthesia Type: General.  Airway: supraglottic airway.  Induction: intravenous.  Maintenance: TIVA.   Techniques and Equipment:     - Airway:  Planned airway equipment includes supraglottic airway.     - Monitoring Plan: standard ASA monitoring     Consents    Anesthesia Plan(s) and associated risks, benefits, and realistic alternatives discussed. Questions answered and patient/representative(s) expressed understanding.     - Discussed:     - Discussed with:  Patient, family               Postoperative Care    Pain management: non-narcotic analgesics, plan for postoperative opioid use, multimodal analgesia.     Comments:    Other Comments: Anesthetic risks, benefits, and options reviewed. Patient agrees to proceed.                     Joe Moody MD    I have reviewed the pertinent notes and labs in the chart from the past 30 days and (re)examined the patient.  Any updates or changes from those notes are reflected in this note.    Clinically Significant Risk Factors Present on Admission                   # Hypertension: Noted on problem list           # Overweight: Estimated body mass index is 27.23 kg/m  as calculated from the following:    Height as of this encounter: 1.88 m (6' 2\").    Weight as of this encounter: 96.2 kg (212 lb 1.6 oz).                    "

## 2025-05-16 NOTE — LETTER
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  His perioperative care will require him to be under limited restrictions with no travel recommended until after 5/27/25.    Please contact me for questions or concerns. [phone]      Sincerely,      Diaz Pitt MD

## 2025-05-16 NOTE — DISCHARGE INSTRUCTIONS
If you have any questions or concerns regarding your procedure please contact Dr. Pitt, his office number is [phone]    You have received 975 mg of Acetaminophen (Tylenol) at 1:30pm. Please do not take an additional dose of Tylenol until after 7:30pm.     Do not exceed 4,000 mg of acetaminophen during a 24 hour period and keep in mind that acetaminophen can also be found in many over-the-counter cold medications as well as narcotics that may be given for pain.      You received a medication called Toradol (a stronger IV ibuprofen) at 3PM. Do NOT take any Ibuprofen / Advil / Aleve / Naproxen or products containing Ibuprofen until 9PM or later.    [facility]   Adult Discharge Orders & Instructions     For 24 hours after surgery    Get plenty of rest.  A responsible adult must stay with you for at least 24 hours after you leave the hospital.   Do not drive or use heavy equipment.  If you have weakness or tingling, don't drive or use heavy equipment until this feeling goes away.  Do not drink alcohol.  Avoid strenuous or risky activities.  Ask for help when climbing stairs.   You may feel lightheaded.  IF so, sit for a few minutes before standing.  Have someone help you get up.   If you have nausea (feel sick to your stomach): Drink only clear liquids such as apple juice, ginger ale, broth or 7-Up.  Rest may also help.  Be sure to drink enough fluids.  Move to a regular diet as you feel able.  You may have a slight fever. Call the doctor if your fever is over 100 F (37.7 C) (taken under the tongue) or lasts longer than 24 hours.  You may have a dry mouth, a sore throat, muscle aches or trouble sleeping.  These should go away after 24 hours.  Do not make important or legal decisions.   Call your doctor for any of the followin.  Signs of infection (fever, growing tenderness at the surgery site, a large amount of drainage or bleeding, severe pain, foul-smelling drainage, redness,  swelling).    2. It has been over 8 to 10 hours since surgery and you are still not able to urinate (pass water).    3.  Headache for over 24 hours.

## 2025-05-16 NOTE — PROGRESS NOTES
I personally met with Darnell Grant and discussed their current medical situation.     We reviewed the nature of planned surgery, the risks benefits and complications and treatment alternatives.      Shared decision made to proceed with left ureteroscopic stone treatment

## 2025-05-16 NOTE — LETTER
[facility]  [address]  [facility] [address]  Phone: [phone]  Fax: [phone]    May 16, 2025        Danrell Grant  [address] [facility] [address]          To whom it may concern:    RE: Darnell Grant was scheduled to travel this upcoming week.  Unfortunately he experienced an episode of acute ureteral obstruction secondary to a ureteral and kidney stone.  He underwent surgery with me today.  He will be in the recovery period for at least the next week and will be unable to travel.  Please contact my office, I can be reached at [phone] with any questions or concerns.    Please contact me for questions or concerns.      Sincerely,      Diaz Pitt MD

## 2025-05-19 DIAGNOSIS — E29.1 HYPOGONADISM MALE: Primary | ICD-10-CM

## 2025-05-21 LAB
APPEARANCE STONE: NORMAL
COMPN STONE: NORMAL
SPECIMEN WT: 2 MG

## 2025-05-22 ENCOUNTER — ALLIED HEALTH/NURSE VISIT (OUTPATIENT)
Dept: UROLOGY | Facility: CLINIC | Age: 70
End: 2025-05-22
Payer: COMMERCIAL

## 2025-05-22 DIAGNOSIS — N20.0 NEPHROLITHIASIS: Primary | ICD-10-CM

## 2025-05-22 NOTE — PROGRESS NOTES
Darnell Arguetalanigabino comes into clinic today at the request of Dr. Pitt with the diagnosis of kidney stone for a stent removal.    Patient educated regarding stent removal procedure and possible symptoms after removal.  Patient voiced understanding of information.      Stent removed intact without difficulty.     Patient tolerated procedure well.      Education: Teaching done with patient verbally as to where to go or call  if unable to urinate post-catheter removal. Increase fluids.   Plan: Follow-up as scheduled    This service provided today was under the supervising provider of the day Dr. Flores, who was available if needed.    Kaitlin Fitzgerald RN

## 2025-06-02 DIAGNOSIS — E11.69 TYPE 2 DIABETES MELLITUS WITH OTHER SPECIFIED COMPLICATION, WITHOUT LONG-TERM CURRENT USE OF INSULIN (H): ICD-10-CM

## 2025-06-02 DIAGNOSIS — E66.811 CLASS 1 OBESITY WITH SERIOUS COMORBIDITY AND BODY MASS INDEX (BMI) OF 31.0 TO 31.9 IN ADULT, UNSPECIFIED OBESITY TYPE: Primary | ICD-10-CM

## 2025-06-02 DIAGNOSIS — E88.810 METABOLIC SYNDROME X: ICD-10-CM

## 2025-06-09 RX ORDER — CLOMIPHENE CITRATE 50 MG/1
50 TABLET ORAL EVERY OTHER DAY
Qty: 45 TABLET | Refills: 3 | Status: SHIPPED | OUTPATIENT
Start: 2025-06-09

## 2025-06-15 ENCOUNTER — HEALTH MAINTENANCE LETTER (OUTPATIENT)
Age: 70
End: 2025-06-15

## 2025-07-14 ENCOUNTER — MYC REFILL (OUTPATIENT)
Dept: INTERNAL MEDICINE | Facility: CLINIC | Age: 70
End: 2025-07-14
Payer: COMMERCIAL

## 2025-07-14 DIAGNOSIS — K21.9 GASTROESOPHAGEAL REFLUX DISEASE WITHOUT ESOPHAGITIS: ICD-10-CM

## 2025-07-14 DIAGNOSIS — E78.5 HYPERLIPIDEMIA LDL GOAL <100: ICD-10-CM

## 2025-07-14 DIAGNOSIS — Z00.00 ROUTINE GENERAL MEDICAL EXAMINATION AT A HEALTH CARE FACILITY: ICD-10-CM

## 2025-07-14 DIAGNOSIS — I10 ESSENTIAL HYPERTENSION: ICD-10-CM

## 2025-07-14 DIAGNOSIS — J45.30 MILD PERSISTENT ASTHMA WITHOUT COMPLICATION: Primary | ICD-10-CM

## 2025-07-14 DIAGNOSIS — Z11.59 NEED FOR HEPATITIS C SCREENING TEST: ICD-10-CM

## 2025-07-14 RX ORDER — MONTELUKAST SODIUM 10 MG/1
10 TABLET ORAL EVERY EVENING
Qty: 30 TABLET | Refills: 11 | Status: CANCELLED | OUTPATIENT
Start: 2025-07-14

## 2025-07-16 ENCOUNTER — ANCILLARY PROCEDURE (OUTPATIENT)
Dept: GENERAL RADIOLOGY | Facility: CLINIC | Age: 70
End: 2025-07-16
Attending: UROLOGY
Payer: COMMERCIAL

## 2025-07-16 ENCOUNTER — ANCILLARY PROCEDURE (OUTPATIENT)
Dept: ULTRASOUND IMAGING | Facility: CLINIC | Age: 70
End: 2025-07-16
Attending: UROLOGY
Payer: COMMERCIAL

## 2025-07-16 DIAGNOSIS — N20.0 NEPHROLITHIASIS: ICD-10-CM

## 2025-07-16 PROCEDURE — 74019 RADEX ABDOMEN 2 VIEWS: CPT | Performed by: RADIOLOGY

## 2025-07-16 RX ORDER — OMEPRAZOLE 20 MG/1
20 CAPSULE, DELAYED RELEASE ORAL DAILY
Qty: 90 CAPSULE | Refills: 3 | Status: SHIPPED | OUTPATIENT
Start: 2025-07-16

## 2025-07-16 NOTE — TELEPHONE ENCOUNTER
Last Written Prescription:     omeprazole (PRILOSEC) 20 MG DR capsule 90 capsule 3 8/19/2024 -- No   Sig - Route: Take 1 capsule (20 mg) by mouth daily - Oral     ----------------------  Last Visit Date: 5/12/25 [facility]  Future Visit Date:  10/6/25 [facility]  ----------------------      Refill decision:   [x] Medication refilled per  Medication Refill in Ambulatory Care  policy.           Request from pharmacy:  Requested Prescriptions   Pending Prescriptions Disp Refills    omeprazole (PRILOSEC) 20 MG DR capsule 90 capsule 3     Sig: Take 1 capsule (20 mg) by mouth daily.       PPI Protocol Passed - 7/16/2025  4:19 PM        Passed - Medication is active on med list and the sig matches. RN to manually verify dose and sig if red X/fail.     If the protocol passes (green check), you do not need to verify med dose and sig.    A prescription matches if they are the same clinical intention.    For Example: once daily and every morning are the same.    The protocol can not identify upper and lower case letters as matching and will fail.     For Example: Take 1 tablet (50 mg) by mouth daily     TAKE 1 TABLET (50 MG) BY MOUTH DAILY    For all fails (red x), verify dose and sig.    If the refill does match what is on file, the RN can still proceed to approve the refill request.       If they do not match, route to the appropriate provider.             Passed - Medication indicated for associated diagnosis     The medication is prescribed for one or more of the following conditions:     Erosive esophagitis    Gastritis   Gastric hypersecretion   Gastric ulcer   Gastroesophageal reflux disease   Helicobacter pylori gastrointestinal tract infection   Ulcer of duodenum   Drug-induced peptic ulcer   Esophageal stricture   Gastrointestinal hemorrhage   Indigestion   Stress ulcer   Zollinger-Portillo syndrome   Hinojosa s esophagus   Laryngopharyngeal reflux   Epigastric Pain   Morbid Obesity   Cough   History of  Peptic Ulcer   Esophageal Atresia, Stenosis and Fistula   Cystic Fibrosis  Bronchiectasis          Passed - Recent (12 month) or future (90 days) visit with authorizing provider's specialty (provided they have been seen in the past 15 months)     The patient must have completed an in-person or virtual visit within the past 12 months or has a future visit scheduled within the next 90 days with the authorizing provider s specialty.  Urgent care and e-visits do not qualify as an office visit for this protocol.          Passed - Patient is age 18 or older

## 2025-07-17 RX ORDER — ROSUVASTATIN CALCIUM 40 MG/1
40 TABLET, COATED ORAL DAILY
Qty: 90 TABLET | Refills: 2 | Status: SHIPPED | OUTPATIENT
Start: 2025-07-17

## 2025-07-17 RX ORDER — MONTELUKAST SODIUM 10 MG/1
1 TABLET ORAL EVERY MORNING
Qty: 30 TABLET | Refills: 9 | Status: SHIPPED | OUTPATIENT
Start: 2025-07-17

## 2025-07-17 NOTE — TELEPHONE ENCOUNTER
Last Written Prescription:  montelukast (SINGULAIR) 10 MG tablet 30 tablet 11 8/19/2024     ----------------------  Last Visit Date: 5/12/25  Future Visit Date: 10/6/25  ----------------------      Refill decision:   [x] Medication refilled per  Medication Refill in Ambulatory Care  policy.  Sig reviewed      Request from pharmacy:  Requested Prescriptions   Pending Prescriptions Disp Refills    montelukast (SINGULAIR) 10 MG tablet [Pharmacy Med Name: MONTELUKAST SODIUM 10MG TABS] 30 tablet 11     Sig: TAKE ONE TABLET BY MOUTH EVERY EVENING       Leukotriene Inhibitors Protocol Failed - 7/17/2025 10:28 AM        Failed - Medication is active on med list and the sig matches. RN to manually verify dose and sig if red X/fail.     If the protocol passes (green check), you do not need to verify med dose and sig.    A prescription matches if they are the same clinical intention.    For Example: once daily and every morning are the same.    The protocol can not identify upper and lower case letters as matching and will fail.     For Example: Take 1 tablet (50 mg) by mouth daily     TAKE 1 TABLET (50 MG) BY MOUTH DAILY    For all fails (red x), verify dose and sig.    If the refill does match what is on file, the RN can still proceed to approve the refill request.       If they do not match, route to the appropriate provider.             Failed - Medication indicated for associated diagnosis     Medication is associated with one or more of the following diagnoses:   Allergies   Asthma   Atopic Dermatitis   Nasal Congestion   Nasal discharge   Rhinitis   Urticaria, chronic   Cystic Fibrosis  Bronchiectasis            Passed - Patient is age 12 or older     If patient is under 16, ok to refill using age based dosing.           Passed - Recent (12 month) or future (90 days) visit with authorizing provider's specialty (provided they have been seen in the past 15 months)     The patient must have completed an in-person or  virtual visit within the past 12 months or has a future visit scheduled within the next 90 days with the authorizing provider s specialty.  Urgent care and e-visits do not qualify as an office visit for this protocol.                  Island Pedicle Flap With Canthal Suspension Text: The defect edges were debeveled with a #15 scalpel blade.  Given the location of the defect, shape of the defect and the proximity to free margins an island pedicle advancement flap was deemed most appropriate.  Using a sterile surgical marker, an appropriate advancement flap was drawn incorporating the defect, outlining the appropriate donor tissue and placing the expected incisions within the relaxed skin tension lines where possible. The area thus outlined was incised deep to adipose tissue with a #15 scalpel blade.  The skin margins were undermined to an appropriate distance in all directions around the primary defect and laterally outward around the island pedicle utilizing iris scissors.  There was minimal undermining beneath the pedicle flap. A suspension suture was placed in the canthal tendon to prevent tension and prevent ectropion.

## 2025-07-17 NOTE — TELEPHONE ENCOUNTER
Last Written Prescription:  rosuvastatin (CRESTOR) 40 MG tablet 90 tablet 3 8/19/2024     ----------------------  Last Visit Date: 5/12/25  Future Visit Date: 10/6/25  ----------------------      Refill decision:   [x] Medication refilled per  Medication Refill in Ambulatory Care  policy.          Request from pharmacy:  Requested Prescriptions   Pending Prescriptions Disp Refills    rosuvastatin (CRESTOR) 40 MG tablet 90 tablet 3     Sig: Take 1 tablet (40 mg) by mouth daily.       Antihyperlipidemic agents Passed - 7/17/2025  9:43 AM        Passed - LDL on file in the past 12 months        Passed - Medication is active on med list and the sig matches. RN to manually verify dose and sig if red X/fail.     If the protocol passes (green check), you do not need to verify med dose and sig.    A prescription matches if they are the same clinical intention.    For Example: once daily and every morning are the same.    The protocol can not identify upper and lower case letters as matching and will fail.     For Example: Take 1 tablet (50 mg) by mouth daily     TAKE 1 TABLET (50 MG) BY MOUTH DAILY    For all fails (red x), verify dose and sig.    If the refill does match what is on file, the RN can still proceed to approve the refill request.       If they do not match, route to the appropriate provider.             Passed - Recent (12 month) or future (90 days) visit with authorizing provider's specialty (provided they have been seen in the past 15 months)     The patient must have completed an in-person or virtual visit within the past 12 months or has a future visit scheduled within the next 90 days with the authorizing provider s specialty.  Urgent care and e-visits do not qualify as an office visit for this protocol.          Passed - Patient is age 18 years or older

## 2025-07-22 ENCOUNTER — VIRTUAL VISIT (OUTPATIENT)
Dept: UROLOGY | Facility: CLINIC | Age: 70
End: 2025-07-22
Payer: COMMERCIAL

## 2025-07-22 DIAGNOSIS — N20.0 KIDNEY STONE: Primary | ICD-10-CM

## 2025-07-22 PROCEDURE — 1126F AMNT PAIN NOTED NONE PRSNT: CPT | Mod: 95 | Performed by: UROLOGY

## 2025-07-22 PROCEDURE — 98006 SYNCH AUDIO-VIDEO EST MOD 30: CPT | Performed by: UROLOGY

## 2025-07-22 ASSESSMENT — PAIN SCALES - GENERAL: PAINLEVEL_OUTOF10: NO PAIN (0)

## 2025-07-22 NOTE — LETTER
7/22/2025       RE: Darnell Grant  [address]  [facility] [id number]     Dear Colleague,    Thank you for referring your patient, Darnell Grant, to the [facility] Tram at [facility]. Please see a copy of my visit note below.    Virtual Visit Details    Type of service:  Video Visit     Originating Location (pt. Location): Home    Distant Location (provider location):  Off-site  Platform used for Video Visit: [facility]        UROLOGY OUTPATIENT VISIT     ASSESSMENT/PLAN   69 year old year old being seen today in follow up of L URS    -Assessment & Plan  Enlarged prostate:  - Enlarged prostate confirmed. Symptoms are currently managed with a high dose of Flomax. Surgical options discussed include HoLEP (Holmium Laser Enucleation of the Prostate) and prostatic arterial embolization. HoLEP is a size-independent treatment and is recommended if symptoms worsen or lifestyle is impaired.  - Continue monitoring symptoms. Consider HoLEP or prostatic arterial embolization if symptoms become bothersome or if medications are no longer effective.  - Risks and side effects: Discussed potential side effects of HoLEP, including urinary leakage, hematuria, temporary urinary incontinence, and loss of ejaculation. No expectation of permanent incontinence or major sexual side effects.    Kidney stone:  - Recurrent calcium oxalate monohydrate kidney stones, with this being the third occurrence.  - Order a 24-hour urinalysis to identify potential causes of stone formation. Follow-up in 6-8 weeks to review the report. Continue dietary modifications and adequate hydration.        CHIEF COMPLAINT   Kidney Stone      Synopsis    Darnell Grant is a very pleasant AGE: 69 year old year old person    History of Present Illness-  Darnell Grant, 69-year-old male  - History of enlarged prostate, previously treated by Dr. Henderson several years ago  - Ongoing urinary  symptoms: nocturia, some residual urine sensation, occasional presentation, but able to manage by adjusting fluid intake  - No current hematuria, no difficulty urinating, no blood in urine  - History of kidney stones, with at least three episodes; one required ER visit, another treated with URS 2 months ago  - Most recent kidney stone identified as calcium oxalate monohydrate  - Attempting to increase fluid intake to at least 64 ounces daily for kidney stone prevention  - Avoiding some high-oxalate foods as advised  - Recent moderate to large amount of stool; started increasing Colace and using Senokot for bowel management  - No current urinary retention, no need for catheterization  - No current urinary stream issues reported  - No new urinary or kidney stone symptoms since last visit           Medications     Current Outpatient Medications   Medication Sig Dispense Refill     albuterol (PROAIR HFA/PROVENTIL HFA/VENTOLIN HFA) 108 (90 Base) MCG/ACT inhaler Inhale 2 puffs into the lungs every 4 hours as needed for shortness of breath, wheezing or cough 18 g 11     allopurinol (ZYLOPRIM) 100 MG tablet take by mouth 1 tab ( 100 mg)  at bedtime 90 tablet 3     amLODIPine (NORVASC) 10 MG tablet Take 1 tablet (10 mg) by mouth at bedtime 90 tablet 3     amoxicillin (AMOXIL) 500 MG capsule        buPROPion (WELLBUTRIN XL) 300 MG 24 hr tablet Take 1 tablet (300 mg) by mouth every morning 90 tablet 3     candesartan (ATACAND) 32 MG tablet Take 32 mg by mouth at bedtime 90 tablet 3     cephALEXin (KEFLEX) 500 MG capsule Take 1 capsule (500 mg) by mouth daily. 7 capsule 0     clomiPHENE (CLOMID) 50 MG tablet Take 1 tablet (50 mg) by mouth every other day. 45 tablet 3     docusate sodium (COLACE) 50 MG capsule Take 100 mg by mouth.       empagliflozin (JARDIANCE) 25 MG TABS tablet Take 1 tablet (25 mg) by mouth every morning. 90 tablet 3     metFORMIN (GLUCOPHAGE) 1000 MG tablet Take 2 tablets (2,000 mg) by mouth daily (with  dinner) 180 tablet 3     montelukast (SINGULAIR) 10 MG tablet Take 1 tablet (10 mg) by mouth every morning. 30 tablet 9     multivitamin w/minerals (THERA-VIT-M) tablet Take 1 tablet by mouth At Bedtime       omeprazole (PRILOSEC) 20 MG DR capsule Take 1 capsule (20 mg) by mouth daily. 90 capsule 3     phenazopyridine (PYRIDIUM) 200 MG tablet Take 1 tablet (200 mg) by mouth 3 times daily as needed. 12 tablet 0     phentermine (ADIPEX-P) 37.5 MG tablet Take 1 tablet (37.5 mg) by mouth every morning (before breakfast). 30 tablet 3     rosuvastatin (CRESTOR) 40 MG tablet Take 1 tablet (40 mg) by mouth daily. 90 tablet 2     Semaglutide-Weight Management (WEGOVY) 2.4 MG/0.75ML pen Inject 2.4 mg subcutaneously once a week. 3 mL 3     tamsulosin (FLOMAX) 0.4 MG capsule Take 2 capsules (0.8 mg) by mouth daily. 180 capsule 3     TRELEGY ELLIPTA 200-62.5-25 MCG/ACT oral inhaler        No current facility-administered medications for this visit.     Facility-Administered Medications Ordered in Other Visits   Medication Dose Route Frequency Provider Last Rate Last Admin     barium sulfate 40% (VARIBAR THIN HONEY) oral suspension   Oral Once Nathan Rojas MD             The following  distinct labs were reviewed    I personally reviewed all applicable laboratory data and went over findings with patient  Significant for:    CBC RESULTS:  Recent Labs   Lab Test 05/10/25  0851 11/08/24  0724 04/18/22  1458 10/26/21  1229   WBC 8.2 5.2 5.3 7.3   HGB 14.6 14.5 16.1 18.2*   * 152 174 175        BMP RESULTS:  Recent Labs   Lab Test 05/10/25  0851 11/08/24  0724 11/09/23  0740 06/06/23  0751 11/30/22  0746 06/11/21  0800 12/09/20  0815 09/15/20  0732 08/06/20  0714 12/10/19  0630 12/03/19  0836    140 141  --  141   < > 141 142 141  --  138   POTASSIUM 4.6 3.9 4.1  --  4.1   < > 4.2 3.9 4.1   < > 4.0   CHLORIDE 105 105 105  --  104   < > 107 107 109  --  105   CO2 25 26 25  --  27   < > 30 28 26  --  28    ANIONGAP 10 9 11  --  10   < > 4 7 6  --  4   GLC 94 85  85 90  90 91 94  92   < > 90 131* 88   < > 96   BUN 14.1 17.0 17.0  --  15.8   < > 16 18 25  --  22   CR 0.86 0.69 0.70  --  0.74   < > 0.98 0.94 1.00  --  0.83   GFRESTIMATED >90 >90 >90  --  >90   < > 80 84 78  --  >90   GFRESTBLACK  --   --   --   --   --   --  >90 >90 >90  --  >90    < > = values in this interval not displayed.       CALCIUM RESULTS:  Recent Labs   Lab Test 05/10/25  0851 11/08/24  0724 11/09/23  0740 06/06/23  0751   ZHANE 9.6 9.9  9.9 10.1  10.1 10.1       PTH RESULTS:  Recent Labs   Lab Test 11/08/24  0724 11/09/23  0740 06/06/23  0751 11/30/22  0746   PTHI 53 60 67* 60       HGB A1C RESULTS:  Lab Results   Component Value Date    A1C 5.0 05/10/2025    A1C 5.1 11/08/2024    A1C 4.8 11/09/2023    A1C 4.9 06/06/2023    A1C 4.9 11/30/2022    A1C 5.0 12/09/2020    A1C 5.1 08/06/2020    A1C 5.2 12/03/2019    A1C 5.6 04/30/2019    A1C 5.7 11/13/2018       UA RESULTS:   Recent Labs   Lab Test 05/10/25  0927 07/15/21  0245 12/09/20  0821 12/03/19  0832   SG 1.027 1.039* 1.017 1.024   URINEPH 5.5 6.0 5.0 6.0   NITRITE Negative Positive* Negative Negative   RBCU >182* 2  --  3*   WBCU 49* 4  --  90*       PSA RESULTS  PSA   Date Value Ref Range Status   08/06/2020 2.07 0 - 4 ug/L Final     Comment:     Assay Method:  Chemiluminescence using Siemens Vista analyzer   12/03/2019 2.08 0 - 4 ug/L Final     Comment:     Assay Method:  Chemiluminescence using Siemens Vista analyzer   04/30/2019 1.90 0 - 4 ug/L Final     Comment:     Assay Method:  Chemiluminescence using Siemens Vista analyzer   11/13/2018 4.51 (H) 0 - 4 ug/L Final     Comment:     Assay Method:  Chemiluminescence using Siemens Vista analyzer   03/06/2018 0.96 0 - 4 ug/L Final     Comment:     Assay Method:  Chemiluminescence using Siemens Vista analyzer   08/31/2015 0.60 0 - 4 ug/L Final   07/14/2014 0.35 0 - 4 ug/L Final     Comment:     PSA results are about 7% lower than our  prior method due to a methodology   change   on August 30, 2011.     02/10/2009 0.40 0 - 4 ug/L Final   07/21/2008 0.56 0 - 4 ug/L Final   07/09/2007 0.53 0 - 4 ug/L Final     Prostate Specific Antigen Screen   Date Value Ref Range Status   11/09/2023 1.31 0.00 - 4.50 ng/mL Final     PSA Tumor Marker   Date Value Ref Range Status   02/19/2025 1.86 0.00 - 6.50 ng/mL Final   01/18/2025 3.40 0.00 - 6.50 ng/mL Final   01/24/2023 1.29 0.00 - 4.50 ng/mL Final   01/03/2022 2.03 0.00 - 4.00 ug/L Final         Recent Imaging Report    I personally reviewed all applicable imaging and went over the below findings with patient.    Results for orders placed or performed in visit on 07/16/25   US Renal Complete Non-Vascular    Narrative    EXAMINATION: US RENAL COMPLETE NON-VASCULAR, 7/16/2025 8:15 AM     COMPARISON: Abdomen 7/16/2025. CT urogram 5/12/2025.     HISTORY: post-op from ureteroscopy and stent removal, assess for  hydronephrosis; Nephrolithiasis    TECHNIQUE: The kidneys and bladder were scanned in the standard  fashion with specialized ultrasound transducer(s) using both gray  scale and limited color/spectral Doppler techniques.    FINDINGS:    Right kidney: Measures 11.5 cm in length. Parenchyma is of normal  thickness and echogenicity. No focal mass. No hydronephrosis.    Left kidney: Measures 12.4 cm in length. Parenchyma is of normal  thickness and echogenicity. No focal mass. No hydronephrosis.     Bladder: Not well distended with prominence of the wall presumably  secondary to same, possibly in conjunction with chronic outlet  obstruction. Enlarged prostate bulging into the inferior bladder  noted.      Impression    IMPRESSION:  1.  No hydronephrosis.  2.  Enlarged prostate noted. Bladder wall thickening may be in part  due to degree of distention and some chronic bladder outlet  obstruction.    BIRGIT SPRINGER MD         SYSTEM ID:  [id number]       CC:  Raul Dutta    Again, thank you for allowing  me to participate in the care of your patient.      Sincerely,    Diaz Pitt MD

## 2025-07-22 NOTE — PROGRESS NOTES
Virtual Visit Details    Type of service:  Video Visit     Originating Location (pt. Location): Home    Distant Location (provider location):  Off-site  Platform used for Video Visit: [facility]        UROLOGY OUTPATIENT VISIT     ASSESSMENT/PLAN   69 year old year old being seen today in follow up of L URS    -Assessment & Plan  Enlarged prostate:  - Enlarged prostate confirmed. Symptoms are currently managed with a high dose of Flomax. Surgical options discussed include HoLEP (Holmium Laser Enucleation of the Prostate) and prostatic arterial embolization. HoLEP is a size-independent treatment and is recommended if symptoms worsen or lifestyle is impaired.  - Continue monitoring symptoms. Consider HoLEP or prostatic arterial embolization if symptoms become bothersome or if medications are no longer effective.  - Risks and side effects: Discussed potential side effects of HoLEP, including urinary leakage, hematuria, temporary urinary incontinence, and loss of ejaculation. No expectation of permanent incontinence or major sexual side effects.    Kidney stone:  - Recurrent calcium oxalate monohydrate kidney stones, with this being the third occurrence.  - Order a 24-hour urinalysis to identify potential causes of stone formation. Follow-up in 6-8 weeks to review the report. Continue dietary modifications and adequate hydration.        CHIEF COMPLAINT   Kidney Stone      Synopsis    Darnell Grant is a very pleasant AGE: 69 year old year old person    History of Present Illness-  Darnell Grant, 69-year-old male  - History of enlarged prostate, previously treated by Dr. Henderson several years ago  - Ongoing urinary symptoms: nocturia, some residual urine sensation, occasional presentation, but able to manage by adjusting fluid intake  - No current hematuria, no difficulty urinating, no blood in urine  - History of kidney stones, with at least three episodes; one required ER visit, another treated with URS 2 months ago  -  Most recent kidney stone identified as calcium oxalate monohydrate  - Attempting to increase fluid intake to at least 64 ounces daily for kidney stone prevention  - Avoiding some high-oxalate foods as advised  - Recent moderate to large amount of stool; started increasing Colace and using Senokot for bowel management  - No current urinary retention, no need for catheterization  - No current urinary stream issues reported  - No new urinary or kidney stone symptoms since last visit           Medications     Current Outpatient Medications   Medication Sig Dispense Refill    albuterol (PROAIR HFA/PROVENTIL HFA/VENTOLIN HFA) 108 (90 Base) MCG/ACT inhaler Inhale 2 puffs into the lungs every 4 hours as needed for shortness of breath, wheezing or cough 18 g 11    allopurinol (ZYLOPRIM) 100 MG tablet take by mouth 1 tab ( 100 mg)  at bedtime 90 tablet 3    amLODIPine (NORVASC) 10 MG tablet Take 1 tablet (10 mg) by mouth at bedtime 90 tablet 3    amoxicillin (AMOXIL) 500 MG capsule       buPROPion (WELLBUTRIN XL) 300 MG 24 hr tablet Take 1 tablet (300 mg) by mouth every morning 90 tablet 3    candesartan (ATACAND) 32 MG tablet Take 32 mg by mouth at bedtime 90 tablet 3    cephALEXin (KEFLEX) 500 MG capsule Take 1 capsule (500 mg) by mouth daily. 7 capsule 0    clomiPHENE (CLOMID) 50 MG tablet Take 1 tablet (50 mg) by mouth every other day. 45 tablet 3    docusate sodium (COLACE) 50 MG capsule Take 100 mg by mouth.      empagliflozin (JARDIANCE) 25 MG TABS tablet Take 1 tablet (25 mg) by mouth every morning. 90 tablet 3    metFORMIN (GLUCOPHAGE) 1000 MG tablet Take 2 tablets (2,000 mg) by mouth daily (with dinner) 180 tablet 3    montelukast (SINGULAIR) 10 MG tablet Take 1 tablet (10 mg) by mouth every morning. 30 tablet 9    multivitamin w/minerals (THERA-VIT-M) tablet Take 1 tablet by mouth At Bedtime      omeprazole (PRILOSEC) 20 MG DR capsule Take 1 capsule (20 mg) by mouth daily. 90 capsule 3    phenazopyridine (PYRIDIUM)  200 MG tablet Take 1 tablet (200 mg) by mouth 3 times daily as needed. 12 tablet 0    phentermine (ADIPEX-P) 37.5 MG tablet Take 1 tablet (37.5 mg) by mouth every morning (before breakfast). 30 tablet 3    rosuvastatin (CRESTOR) 40 MG tablet Take 1 tablet (40 mg) by mouth daily. 90 tablet 2    Semaglutide-Weight Management (WEGOVY) 2.4 MG/0.75ML pen Inject 2.4 mg subcutaneously once a week. 3 mL 3    tamsulosin (FLOMAX) 0.4 MG capsule Take 2 capsules (0.8 mg) by mouth daily. 180 capsule 3    TRELEGY ELLIPTA 200-62.5-25 MCG/ACT oral inhaler        No current facility-administered medications for this visit.     Facility-Administered Medications Ordered in Other Visits   Medication Dose Route Frequency Provider Last Rate Last Admin    barium sulfate 40% (VARIBAR THIN HONEY) oral suspension   Oral Once Nathan Rojas MD             The following  distinct labs were reviewed    I personally reviewed all applicable laboratory data and went over findings with patient  Significant for:    CBC RESULTS:  Recent Labs   Lab Test 05/10/25  0851 11/08/24  0724 04/18/22  1458 10/26/21  1229   WBC 8.2 5.2 5.3 7.3   HGB 14.6 14.5 16.1 18.2*   * 152 174 175        BMP RESULTS:  Recent Labs   Lab Test 05/10/25  0851 11/08/24  0724 11/09/23  0740 06/06/23  0751 11/30/22  0746 06/11/21  0800 12/09/20  0815 09/15/20  0732 08/06/20  0714 12/10/19  0630 12/03/19  0836    140 141  --  141   < > 141 142 141  --  138   POTASSIUM 4.6 3.9 4.1  --  4.1   < > 4.2 3.9 4.1   < > 4.0   CHLORIDE 105 105 105  --  104   < > 107 107 109  --  105   CO2 25 26 25  --  27   < > 30 28 26  --  28   ANIONGAP 10 9 11  --  10   < > 4 7 6  --  4   GLC 94 85  85 90  90 91 94  92   < > 90 131* 88   < > 96   BUN 14.1 17.0 17.0  --  15.8   < > 16 18 25  --  22   CR 0.86 0.69 0.70  --  0.74   < > 0.98 0.94 1.00  --  0.83   GFRESTIMATED >90 >90 >90  --  >90   < > 80 84 78  --  >90   GFRESTBLACK  --   --   --   --   --   --  >90 >90 >90  --   >90    < > = values in this interval not displayed.       CALCIUM RESULTS:  Recent Labs   Lab Test 05/10/25  0851 11/08/24  0724 11/09/23  0740 06/06/23  0751   ZHANE 9.6 9.9  9.9 10.1  10.1 10.1       PTH RESULTS:  Recent Labs   Lab Test 11/08/24  0724 11/09/23  0740 06/06/23  0751 11/30/22  0746   PTHI 53 60 67* 60       HGB A1C RESULTS:  Lab Results   Component Value Date    A1C 5.0 05/10/2025    A1C 5.1 11/08/2024    A1C 4.8 11/09/2023    A1C 4.9 06/06/2023    A1C 4.9 11/30/2022    A1C 5.0 12/09/2020    A1C 5.1 08/06/2020    A1C 5.2 12/03/2019    A1C 5.6 04/30/2019    A1C 5.7 11/13/2018       UA RESULTS:   Recent Labs   Lab Test 05/10/25  0927 07/15/21  0245 12/09/20  0821 12/03/19  0832   SG 1.027 1.039* 1.017 1.024   URINEPH 5.5 6.0 5.0 6.0   NITRITE Negative Positive* Negative Negative   RBCU >182* 2  --  3*   WBCU 49* 4  --  90*       PSA RESULTS  PSA   Date Value Ref Range Status   08/06/2020 2.07 0 - 4 ug/L Final     Comment:     Assay Method:  Chemiluminescence using Siemens Vista analyzer   12/03/2019 2.08 0 - 4 ug/L Final     Comment:     Assay Method:  Chemiluminescence using Siemens Vista analyzer   04/30/2019 1.90 0 - 4 ug/L Final     Comment:     Assay Method:  Chemiluminescence using Siemens Vista analyzer   11/13/2018 4.51 (H) 0 - 4 ug/L Final     Comment:     Assay Method:  Chemiluminescence using Siemens Vista analyzer   03/06/2018 0.96 0 - 4 ug/L Final     Comment:     Assay Method:  Chemiluminescence using Siemens Vista analyzer   08/31/2015 0.60 0 - 4 ug/L Final   07/14/2014 0.35 0 - 4 ug/L Final     Comment:     PSA results are about 7% lower than our prior method due to a methodology   change   on August 30, 2011.     02/10/2009 0.40 0 - 4 ug/L Final   07/21/2008 0.56 0 - 4 ug/L Final   07/09/2007 0.53 0 - 4 ug/L Final     Prostate Specific Antigen Screen   Date Value Ref Range Status   11/09/2023 1.31 0.00 - 4.50 ng/mL Final     PSA Tumor Marker   Date Value Ref Range Status   02/19/2025  1.86 0.00 - 6.50 ng/mL Final   01/18/2025 3.40 0.00 - 6.50 ng/mL Final   01/24/2023 1.29 0.00 - 4.50 ng/mL Final   01/03/2022 2.03 0.00 - 4.00 ug/L Final         Recent Imaging Report    I personally reviewed all applicable imaging and went over the below findings with patient.    Results for orders placed or performed in visit on 07/16/25   US Renal Complete Non-Vascular    Narrative    EXAMINATION: US RENAL COMPLETE NON-VASCULAR, 7/16/2025 8:15 AM     COMPARISON: Abdomen 7/16/2025. CT urogram 5/12/2025.     HISTORY: post-op from ureteroscopy and stent removal, assess for  hydronephrosis; Nephrolithiasis    TECHNIQUE: The kidneys and bladder were scanned in the standard  fashion with specialized ultrasound transducer(s) using both gray  scale and limited color/spectral Doppler techniques.    FINDINGS:    Right kidney: Measures 11.5 cm in length. Parenchyma is of normal  thickness and echogenicity. No focal mass. No hydronephrosis.    Left kidney: Measures 12.4 cm in length. Parenchyma is of normal  thickness and echogenicity. No focal mass. No hydronephrosis.     Bladder: Not well distended with prominence of the wall presumably  secondary to same, possibly in conjunction with chronic outlet  obstruction. Enlarged prostate bulging into the inferior bladder  noted.      Impression    IMPRESSION:  1.  No hydronephrosis.  2.  Enlarged prostate noted. Bladder wall thickening may be in part  due to degree of distention and some chronic bladder outlet  obstruction.    BIRGIT SPRINGER MD         SYSTEM ID:  [id number]       CC:  Raul Dutta

## 2025-07-22 NOTE — NURSING NOTE
Is the patient currently in the state of [place]? yes    Location: home    Visit mode:VIDEO    If the visit is dropped, the patient can be reconnected by: VIDEO VISIT: Text to cell phone:   Telephone Information:   Mobile [phone]    and VIDEO VISIT: Send to e-mail at: [email or web address]    Will anyone else be joining the visit? NO  (If patient encounters technical issues they should call [phone])    Are changes needed to the allergy or medication list? No    Are refills needed on medications prescribed by this physician? NO    Reason for visit: NARESH Drake, Virtual Visit Facilitator    QNR Status: NA

## 2025-07-26 ENCOUNTER — MYC REFILL (OUTPATIENT)
Dept: INTERNAL MEDICINE | Facility: CLINIC | Age: 70
End: 2025-07-26
Payer: COMMERCIAL

## 2025-07-26 DIAGNOSIS — E66.811 CLASS 1 OBESITY WITH SERIOUS COMORBIDITY AND BODY MASS INDEX (BMI) OF 31.0 TO 31.9 IN ADULT, UNSPECIFIED OBESITY TYPE: ICD-10-CM

## 2025-07-26 DIAGNOSIS — E88.810 METABOLIC SYNDROME X: ICD-10-CM

## 2025-07-28 NOTE — TELEPHONE ENCOUNTER
Controlled substance refill request notes    Refill request received for: Phentermine 37.5 Mg Tablet  Last Rx: Phentermine 37.5 Mg Tablet, qty 30, 30 day supply  [place]  data reviewed 07/28/25:  Medication last refill: qty 135, 90 day supply, filled/sold to patient on 04/26/2025  Pended order: Phentermine 37.5 Mg Tablet, qty 30, 30 day supply, no delay in fill date     Primary care provider: Raul Dutta  Last office visit with this department: 5/12/2025  Next appointment with PCP:    10/6/2025  4:00 PM [facility] RETURN 30 min [facility] Raul Dutta MD   Location Instructions:    Due to [place] [address], travel times to this location may be longer than usual. Please plan for extra travel time and check the [place] Department of Transportation [address] [place] website for delay, closure, and detour information.  The [facility] ([facility]) is in a dense urban area with multiple transportation and parking options. You may wish to review options for  service and self-parking in more detail on the [facility] s website at [email or web address].     Refill request forwarded to provider for review.     Jenn ARNOLD LPN  [facility]

## 2025-07-29 RX ORDER — PHENTERMINE HYDROCHLORIDE 37.5 MG/1
37.5 TABLET ORAL
Qty: 30 TABLET | Refills: 3 | Status: SHIPPED | OUTPATIENT
Start: 2025-07-29

## 2025-08-22 LAB
AMMONIA 24H UR-SRATE: 32 MMOL/24 HR
CA H2 PHOS DIHYD 24H SATFR UR: 1.35
CALCIUM 24H UR-MRATE: 327 MG/24 HR
CALCIUM/CREAT 24H UR: 193 MG/G CREAT
CAOX INDEX 24H UR-RTO: 4.46
CHLORIDE 24H UR-SRATE: 329 MMOL/24 HR
CITRATE 24H UR-MRATE: 1264 MG/24 HR
CREAT 24H UR-MRATE: 1697 MG/24 HR
CREAT/BW 24H UR-RELMRAT: 18 MG/24 HR/KG
CYSTINE 24H UR QL: ABNORMAL
Lab: 1.2 G/KG/24 HR
Lab: 3.5 MG/24 HR/KG
Lab: ABNORMAL
MAGNESIUM 24H UR-MRATE: 213 MG/24 HR
OXALATE 24H UR-MRATE: 56 MG/24 HR
PH 24H UR: 6.68 [PH]
PHOSPHATE 24H UR-MRATE: 1208 MG/24 HR
POTASSIUM 24H UR-SRATE: 147 MMOL/24 HR
SODIUM 24H UR-SRATE: 309 MMOL/24 HR
SPECIMEN VOL 24H UR: 3620 ML/24 HR
SULFATE 24H UR-SRATE: 59 MEQ/24 HR
URATE 24H SATFR UR: 0.13
URATE 24H UR-MRATE: 936 MG/24 HR
UUN 24H UR-MRATE: 14.6 G/24 HR

## 2025-09-04 ENCOUNTER — TELEPHONE (OUTPATIENT)
Dept: UROLOGY | Facility: CLINIC | Age: 70
End: 2025-09-04
Payer: COMMERCIAL

## (undated) DEVICE — SOL NACL 0.9% INJ 1000ML BAG 2B1324X

## (undated) DEVICE — DRAPE GYN/UROLOGY FLUID POUCH TUR 29455

## (undated) DEVICE — GOWN XLG DISP 9545

## (undated) DEVICE — LINEN TOWEL PACK X5 5464

## (undated) DEVICE — SU VICRYL 0 TIE 54" J608H

## (undated) DEVICE — PREP CHLORAPREP 26ML TINTED HI-LITE ORANGE 930815

## (undated) DEVICE — GLOVE PROTEXIS MICRO 6.5  2D73PM65

## (undated) DEVICE — SUCTION MANIFOLD NEPTUNE 2 SYS 4 PORT 0702-020-000

## (undated) DEVICE — Device

## (undated) DEVICE — ENDO TROCAR FIRST ENTRY KII FIOS Z-THRD 05X100MM CTF03

## (undated) DEVICE — GLOVE PROTEXIS W/NEU-THERA 7.5  2D73TE75

## (undated) DEVICE — EYE TIP IRRIGATION & ASPIRATION POLYMER CVD 0.3MM 8065751512

## (undated) DEVICE — EYE CANN IRR 27GA ANTERIOR CHAMBER 581280

## (undated) DEVICE — SOL WATER IRRIG 1000ML BOTTLE 2F7114

## (undated) DEVICE — SU VICRYL 2-0 CT-1 27" UND J259H

## (undated) DEVICE — EYE SHIELD PLASTIC

## (undated) DEVICE — SOL NACL 0.9% IRRIG 1000ML BOTTLE 2F7124

## (undated) DEVICE — LIGHT HANDLE X2

## (undated) DEVICE — TUBING SUCTION MEDI-VAC 1/4"X20' N620A

## (undated) DEVICE — PREP CHLORAPREP 26ML TINTED ORANGE  260815

## (undated) DEVICE — STRAP KNEE/BODY 31143004

## (undated) DEVICE — SPECIMEN CONTAINER 3OZ W/FORMALIN 59901

## (undated) DEVICE — LINEN TOWEL PACK X30 5481

## (undated) DEVICE — EYE CANN IRR 25GA CYSTOTOME 581610

## (undated) DEVICE — EYE KNIFE STILETTO VISITEC 1.1MM ANG 45DEG SIDEPORT 376620

## (undated) DEVICE — STPL ENDO LINEAR CUT ARTICULATING 45MM ATS45

## (undated) DEVICE — ESU CORD BIPOLAR GREEN 10-4000

## (undated) DEVICE — SUCTION IRR STRYKERFLOW II W/TIP 250-070-520

## (undated) DEVICE — GLOVE PROTEXIS W/NEU-THERA 7.0  2D73TE70

## (undated) DEVICE — PEN MARKING SKIN W/PAPER RULER 31145785

## (undated) DEVICE — ESU LIGASURE LAPAROSCOPIC BLUNT TIP SEALER 5MMX37CM LF1837

## (undated) DEVICE — DRAPE MICROSCOPE LEICA 46X120" AR8033651

## (undated) DEVICE — SU MONOCRYL 4-0 P-3 18" UND Y494G

## (undated) DEVICE — DRAPE C-ARM W/STRAPS 42X72" 07-CA104

## (undated) DEVICE — BASIN SET MAJOR

## (undated) DEVICE — SOL NACL 0.9% IRRIG 3000ML BAG 2B7477

## (undated) DEVICE — SOL ISOPROPYL RUBBING ALCOHOL USP 70% 4OZ HDX-20 I0020

## (undated) DEVICE — DRSG KERLIX FLUFFS X5

## (undated) DEVICE — ENDO POUCH UNIV RETRIEVAL SYSTEM INZII 10MM CD001

## (undated) DEVICE — GLOVE PROTEXIS MICRO 7.0  2D73PM70

## (undated) DEVICE — LINEN GOWN X4 5410

## (undated) DEVICE — DRSG TELFA 3X8" 1238

## (undated) DEVICE — LIGHT HANDLE X1 31140133

## (undated) DEVICE — DRSG PRIMAPORE 02X3" 7133

## (undated) DEVICE — EYE PACK CUSTOM ANTERIOR 30DEG TIP CENTURION PPK6682-04

## (undated) DEVICE — CUP AND LID 4OZ STERILE SSK9008A

## (undated) DEVICE — PACK CATARACT CUSTOM ASC SEY15CPUMC

## (undated) DEVICE — DECANTER TRANSFER DEVICE 2008S

## (undated) DEVICE — NDL COUNTER 40CT  31142311

## (undated) DEVICE — DRAPE POUCH INSTRUMENT 1018

## (undated) DEVICE — PAD CHUX UNDERPAD 30X36" P3036C

## (undated) DEVICE — ESU ELEC BLADE 2.75" COATED/INSULATED E1455

## (undated) DEVICE — SURGICEL HEMOSTAT 4X8" 1952

## (undated) DEVICE — SPONGE COTTONOID 1/2X1/2" 80-1400

## (undated) DEVICE — PACK CYSTO CUSTOM ASC

## (undated) DEVICE — PAD CHUX UNDERPAD 30X30"

## (undated) DEVICE — SU VICRYL 0 UR-6 27" J603H

## (undated) DEVICE — ESU GROUND PAD ADULT W/CORD E7507

## (undated) DEVICE — BLADE KNIFE SURG 15 371115

## (undated) DEVICE — ESU GROUND PAD UNIVERSAL W/O CORD

## (undated) DEVICE — ADH FLOSEAL W/HUMAN THROMBIN 5ML W/APPLICATOR TIP ADS201844

## (undated) DEVICE — TUBING INSUFFLATION W/FILTER 10FT GS1016

## (undated) DEVICE — SOL WATER IRRIG 500ML BOTTLE 2F7113

## (undated) DEVICE — POSITIONER ARMBOARD FOAM 1PAIR LF FP-ARMB1

## (undated) DEVICE — DRSG PRIMAPORE 03 1/8X6" 66000318

## (undated) DEVICE — LINEN ORTHO PACK 5446

## (undated) DEVICE — SUCTION MANIFOLD DORNOCH ULTRA CART UL-CL500

## (undated) DEVICE — TAPE DURAPORE ADVANCED PURPLE 3" 1590-3

## (undated) DEVICE — ENDO TROCAR BLUNT TIP KII BALLOON 12X100MM C0R47

## (undated) DEVICE — SYR 30ML LL W/O NDL 302832

## (undated) DEVICE — COVER CAMERA IN-LIGHT DISP LT-C02

## (undated) DEVICE — NDL 18GA 1.5" 305196

## (undated) DEVICE — EYE KNIFE SLIT XSTAR VISITEC 2.6MM 45DEG 373726

## (undated) DEVICE — SU VICRYL 0 CT-1 3X27" J430T

## (undated) DEVICE — DRAPE STERI TOWEL LG 1010

## (undated) DEVICE — SU VICRYL 4-0 PS-2 18" UND J496H

## (undated) DEVICE — ESU ELEC CUTTING LOOP BIPOLAR 24/26FR 0.35MM  27040GP1/6

## (undated) DEVICE — DRAPE LAP W/ARMBOARD 29410

## (undated) DEVICE — ADH SKIN CLOSURE PREMIERPRO EXOFIN 1.0ML 3470

## (undated) DEVICE — POSITIONER HEAD DONUT FOAM 9" LF FP-HEAD9

## (undated) DEVICE — ENDO TROCAR SLEEVE KII Z-THREADED 05X100MM CTS02

## (undated) DEVICE — GLOVE PROTEXIS BLUE W/NEU-THERA 8.0  2D73EB80

## (undated) DEVICE — NDL 22GA 1.5"

## (undated) DEVICE — SU SILK 2-0 TIE 12X30" A305H

## (undated) DEVICE — LINEN BACK PACK 5440

## (undated) DEVICE — BRUSH SURGICAL SCRUB W/4% CHLORHEXIDINE GLUCONATE SOL 4458A

## (undated) DEVICE — GLOVE PROTEXIS BLUE W/NEU-THERA 7.5  2D73EB75

## (undated) DEVICE — MIDAS REX DISSECTING TOOL  14MH30

## (undated) DEVICE — SPONGE KITTNER 31001010

## (undated) DEVICE — ESU HOLDER LAP INST DISP PURPLE LONG 330MM H-PRO-330

## (undated) DEVICE — DRAPE TIBURON TOP SHEET 100X60" 29352

## (undated) DEVICE — GOWN IMPERVIOUS ZONED XLG 9041

## (undated) DEVICE — BONE WAX 2.5GM W31G

## (undated) DEVICE — SPONGE SURGIFOAM 100 1974

## (undated) DEVICE — STPL ENDO RELOAD 45X3.5MM 6R45B

## (undated) DEVICE — PACK NEURO MINOR UMMC SNE32MNMU4

## (undated) RX ORDER — HYDROMORPHONE HYDROCHLORIDE 1 MG/ML
INJECTION, SOLUTION INTRAMUSCULAR; INTRAVENOUS; SUBCUTANEOUS
Status: DISPENSED
Start: 2021-07-14

## (undated) RX ORDER — BUPIVACAINE HYDROCHLORIDE AND EPINEPHRINE 5; 5 MG/ML; UG/ML
INJECTION, SOLUTION EPIDURAL; INTRACAUDAL; PERINEURAL
Status: DISPENSED
Start: 2019-12-10

## (undated) RX ORDER — LIDOCAINE HYDROCHLORIDE 20 MG/ML
JELLY TOPICAL
Status: DISPENSED
Start: 2020-10-27

## (undated) RX ORDER — ACETAMINOPHEN 325 MG/1
TABLET ORAL
Status: DISPENSED
Start: 2018-04-13

## (undated) RX ORDER — FENTANYL CITRATE 50 UG/ML
INJECTION, SOLUTION INTRAMUSCULAR; INTRAVENOUS
Status: DISPENSED
Start: 2019-12-10

## (undated) RX ORDER — FENTANYL CITRATE 50 UG/ML
INJECTION, SOLUTION INTRAMUSCULAR; INTRAVENOUS
Status: DISPENSED
Start: 2024-03-27

## (undated) RX ORDER — GABAPENTIN 300 MG/1
CAPSULE ORAL
Status: DISPENSED
Start: 2018-04-13

## (undated) RX ORDER — ONDANSETRON 2 MG/ML
INJECTION INTRAMUSCULAR; INTRAVENOUS
Status: DISPENSED
Start: 2019-12-10

## (undated) RX ORDER — PROPOFOL 10 MG/ML
INJECTION, EMULSION INTRAVENOUS
Status: DISPENSED
Start: 2018-01-31

## (undated) RX ORDER — CEFAZOLIN SODIUM 2 G/100ML
INJECTION, SOLUTION INTRAVENOUS
Status: DISPENSED
Start: 2019-12-10

## (undated) RX ORDER — REGADENOSON 0.08 MG/ML
INJECTION, SOLUTION INTRAVENOUS
Status: DISPENSED
Start: 2018-01-19

## (undated) RX ORDER — LIDOCAINE HYDROCHLORIDE 20 MG/ML
INJECTION, SOLUTION EPIDURAL; INFILTRATION; INTRACAUDAL; PERINEURAL
Status: DISPENSED
Start: 2019-12-10

## (undated) RX ORDER — GABAPENTIN 300 MG/1
CAPSULE ORAL
Status: DISPENSED
Start: 2018-01-31

## (undated) RX ORDER — GLYCOPYRROLATE 0.2 MG/ML
INJECTION, SOLUTION INTRAMUSCULAR; INTRAVENOUS
Status: DISPENSED
Start: 2019-12-10

## (undated) RX ORDER — ONDANSETRON 2 MG/ML
INJECTION INTRAMUSCULAR; INTRAVENOUS
Status: DISPENSED
Start: 2021-07-14

## (undated) RX ORDER — LIDOCAINE HYDROCHLORIDE 20 MG/ML
INJECTION, SOLUTION EPIDURAL; INFILTRATION; INTRACAUDAL; PERINEURAL
Status: DISPENSED
Start: 2018-01-31

## (undated) RX ORDER — FENTANYL CITRATE 50 UG/ML
INJECTION, SOLUTION INTRAMUSCULAR; INTRAVENOUS
Status: DISPENSED
Start: 2021-07-14

## (undated) RX ORDER — PROPOFOL 10 MG/ML
INJECTION, EMULSION INTRAVENOUS
Status: DISPENSED
Start: 2019-12-10

## (undated) RX ORDER — FENTANYL CITRATE 50 UG/ML
INJECTION, SOLUTION INTRAMUSCULAR; INTRAVENOUS
Status: DISPENSED
Start: 2018-01-31

## (undated) RX ORDER — CEFAZOLIN SODIUM 1 G/3ML
INJECTION, POWDER, FOR SOLUTION INTRAMUSCULAR; INTRAVENOUS
Status: DISPENSED
Start: 2018-04-13

## (undated) RX ORDER — LIDOCAINE HYDROCHLORIDE 20 MG/ML
INJECTION, SOLUTION EPIDURAL; INFILTRATION; INTRACAUDAL; PERINEURAL
Status: DISPENSED
Start: 2018-04-13

## (undated) RX ORDER — EPINEPHRINE 0.1 MG/ML
INJECTION INTRAVENOUS
Status: DISPENSED
Start: 2024-03-27

## (undated) RX ORDER — HYDROMORPHONE HYDROCHLORIDE 1 MG/ML
INJECTION, SOLUTION INTRAMUSCULAR; INTRAVENOUS; SUBCUTANEOUS
Status: DISPENSED
Start: 2019-12-10

## (undated) RX ORDER — ACETAMINOPHEN 325 MG/1
TABLET ORAL
Status: DISPENSED
Start: 2021-07-14

## (undated) RX ORDER — CIPROFLOXACIN 500 MG/1
TABLET, FILM COATED ORAL
Status: DISPENSED
Start: 2018-03-07

## (undated) RX ORDER — DEXAMETHASONE SODIUM PHOSPHATE 4 MG/ML
INJECTION, SOLUTION INTRA-ARTICULAR; INTRALESIONAL; INTRAMUSCULAR; INTRAVENOUS; SOFT TISSUE
Status: DISPENSED
Start: 2019-12-10

## (undated) RX ORDER — REGADENOSON 0.08 MG/ML
INJECTION, SOLUTION INTRAVENOUS
Status: DISPENSED
Start: 2017-02-07

## (undated) RX ORDER — KETOROLAC TROMETHAMINE 15 MG/ML
INJECTION, SOLUTION INTRAMUSCULAR; INTRAVENOUS
Status: DISPENSED
Start: 2021-07-14

## (undated) RX ORDER — PHENYLEPHRINE HCL IN 0.9% NACL 1 MG/10 ML
SYRINGE (ML) INTRAVENOUS
Status: DISPENSED
Start: 2019-12-10

## (undated) RX ORDER — OXYCODONE HYDROCHLORIDE 5 MG/1
TABLET ORAL
Status: DISPENSED
Start: 2018-04-13

## (undated) RX ORDER — ACETAMINOPHEN 325 MG/1
TABLET ORAL
Status: DISPENSED
Start: 2018-01-31

## (undated) RX ORDER — PROPOFOL 10 MG/ML
INJECTION, EMULSION INTRAVENOUS
Status: DISPENSED
Start: 2021-07-14

## (undated) RX ORDER — BUPIVACAINE HYDROCHLORIDE 5 MG/ML
INJECTION, SOLUTION PERINEURAL
Status: DISPENSED
Start: 2021-07-14

## (undated) RX ORDER — ONDANSETRON 2 MG/ML
INJECTION INTRAMUSCULAR; INTRAVENOUS
Status: DISPENSED
Start: 2018-01-31

## (undated) RX ORDER — PHENYLEPHRINE HCL IN 0.9% NACL 1 MG/10 ML
SYRINGE (ML) INTRAVENOUS
Status: DISPENSED
Start: 2018-01-31

## (undated) RX ORDER — SIMETHICONE 40MG/0.6ML
SUSPENSION, DROPS(FINAL DOSAGE FORM)(ML) ORAL
Status: DISPENSED
Start: 2024-03-27

## (undated) RX ORDER — BUPIVACAINE HYDROCHLORIDE 2.5 MG/ML
INJECTION, SOLUTION INFILTRATION; PERINEURAL
Status: DISPENSED
Start: 2021-07-14

## (undated) RX ORDER — LIDOCAINE HYDROCHLORIDE AND EPINEPHRINE 10; 10 MG/ML; UG/ML
INJECTION, SOLUTION INFILTRATION; PERINEURAL
Status: DISPENSED
Start: 2021-07-14

## (undated) RX ORDER — PROPOFOL 10 MG/ML
INJECTION, EMULSION INTRAVENOUS
Status: DISPENSED
Start: 2018-04-13

## (undated) RX ORDER — LIDOCAINE HYDROCHLORIDE 10 MG/ML
INJECTION, SOLUTION EPIDURAL; INFILTRATION; INTRACAUDAL; PERINEURAL
Status: DISPENSED
Start: 2018-04-13

## (undated) RX ORDER — ONDANSETRON 2 MG/ML
INJECTION INTRAMUSCULAR; INTRAVENOUS
Status: DISPENSED
Start: 2018-04-13

## (undated) RX ORDER — SODIUM CHLORIDE, SODIUM LACTATE, POTASSIUM CHLORIDE, CALCIUM CHLORIDE 600; 310; 30; 20 MG/100ML; MG/100ML; MG/100ML; MG/100ML
INJECTION, SOLUTION INTRAVENOUS
Status: DISPENSED
Start: 2021-07-14

## (undated) RX ORDER — FENTANYL CITRATE 50 UG/ML
INJECTION, SOLUTION INTRAMUSCULAR; INTRAVENOUS
Status: DISPENSED
Start: 2020-10-02

## (undated) RX ORDER — FENTANYL CITRATE 50 UG/ML
INJECTION, SOLUTION INTRAMUSCULAR; INTRAVENOUS
Status: DISPENSED
Start: 2020-09-25

## (undated) RX ORDER — DEXAMETHASONE SODIUM PHOSPHATE 4 MG/ML
INJECTION, SOLUTION INTRA-ARTICULAR; INTRALESIONAL; INTRAMUSCULAR; INTRAVENOUS; SOFT TISSUE
Status: DISPENSED
Start: 2021-07-14

## (undated) RX ORDER — LIDOCAINE HYDROCHLORIDE 20 MG/ML
INJECTION, SOLUTION EPIDURAL; INFILTRATION; INTRACAUDAL; PERINEURAL
Status: DISPENSED
Start: 2021-07-14

## (undated) RX ORDER — CEFAZOLIN SODIUM 1 G/3ML
INJECTION, POWDER, FOR SOLUTION INTRAMUSCULAR; INTRAVENOUS
Status: DISPENSED
Start: 2021-07-14

## (undated) RX ORDER — DEXAMETHASONE SODIUM PHOSPHATE 4 MG/ML
INJECTION, SOLUTION INTRA-ARTICULAR; INTRALESIONAL; INTRAMUSCULAR; INTRAVENOUS; SOFT TISSUE
Status: DISPENSED
Start: 2018-01-31

## (undated) RX ORDER — FENTANYL CITRATE 50 UG/ML
INJECTION, SOLUTION INTRAMUSCULAR; INTRAVENOUS
Status: DISPENSED
Start: 2018-04-13